# Patient Record
Sex: MALE | Race: WHITE | NOT HISPANIC OR LATINO | Employment: OTHER | URBAN - METROPOLITAN AREA
[De-identification: names, ages, dates, MRNs, and addresses within clinical notes are randomized per-mention and may not be internally consistent; named-entity substitution may affect disease eponyms.]

---

## 2017-01-03 ENCOUNTER — RESULTS ENCOUNTER (OUTPATIENT)
Dept: ENDOCRINOLOGY | Age: 47
End: 2017-01-03

## 2017-01-03 DIAGNOSIS — E03.8 SECONDARY HYPOTHYROIDISM: ICD-10-CM

## 2017-01-03 DIAGNOSIS — IMO0002 UNCONTROLLED TYPE 2 DIABETES MELLITUS WITH COMPLICATION, WITHOUT LONG-TERM CURRENT USE OF INSULIN: ICD-10-CM

## 2017-01-03 DIAGNOSIS — E08.43 DIABETIC AUTONOMIC NEUROPATHY ASSOCIATED WITH DIABETES MELLITUS DUE TO UNDERLYING CONDITION (HCC): ICD-10-CM

## 2017-01-03 DIAGNOSIS — E55.9 VITAMIN D DEFICIENCY: ICD-10-CM

## 2017-01-03 DIAGNOSIS — E78.2 MIXED HYPERLIPIDEMIA: ICD-10-CM

## 2017-01-03 DIAGNOSIS — Z46.81 INSULIN PUMP TITRATION: ICD-10-CM

## 2017-01-06 LAB
25(OH)D3+25(OH)D2 SERPL-MCNC: 19.3 NG/ML (ref 30–100)
ALBUMIN SERPL-MCNC: 4.4 G/DL (ref 3.5–5.2)
ALBUMIN/GLOB SERPL: 2 G/DL
ALP SERPL-CCNC: 89 U/L (ref 39–117)
ALT SERPL-CCNC: 32 U/L (ref 1–41)
AST SERPL-CCNC: 18 U/L (ref 1–40)
BILIRUB SERPL-MCNC: 0.3 MG/DL (ref 0.1–1.2)
BUN SERPL-MCNC: 13 MG/DL (ref 6–20)
BUN/CREAT SERPL: 11.8 (ref 7–25)
CALCIUM SERPL-MCNC: 9.5 MG/DL (ref 8.6–10.5)
CHLORIDE SERPL-SCNC: 101 MMOL/L (ref 98–107)
CHOLEST SERPL-MCNC: 189 MG/DL (ref 0–200)
CO2 SERPL-SCNC: 23.7 MMOL/L (ref 22–29)
CREAT SERPL-MCNC: 1.1 MG/DL (ref 0.76–1.27)
GLOBULIN SER CALC-MCNC: 2.2 GM/DL
GLUCOSE SERPL-MCNC: 215 MG/DL (ref 65–99)
HBA1C MFR BLD: 6.1 % (ref 4.8–5.6)
HDLC SERPL-MCNC: 24 MG/DL (ref 40–60)
LDLC SERPL CALC-MCNC: ABNORMAL MG/DL
POTASSIUM SERPL-SCNC: 4.8 MMOL/L (ref 3.5–5.2)
PROT SERPL-MCNC: 6.6 G/DL (ref 6–8.5)
SODIUM SERPL-SCNC: 140 MMOL/L (ref 136–145)
T3FREE SERPL-MCNC: 4 PG/ML (ref 2–4.4)
T4 FREE SERPL-MCNC: 1.19 NG/DL (ref 0.93–1.7)
TRIGL SERPL-MCNC: 619 MG/DL (ref 0–150)
TSH SERPL DL<=0.005 MIU/L-ACNC: 1.9 MIU/ML (ref 0.27–4.2)
UNABLE TO VOID: NORMAL
VLDLC SERPL CALC-MCNC: ABNORMAL MG/DL

## 2017-01-19 ENCOUNTER — OFFICE VISIT (OUTPATIENT)
Dept: ENDOCRINOLOGY | Age: 47
End: 2017-01-19

## 2017-01-19 VITALS
DIASTOLIC BLOOD PRESSURE: 74 MMHG | SYSTOLIC BLOOD PRESSURE: 122 MMHG | BODY MASS INDEX: 38.37 KG/M2 | WEIGHT: 268 LBS | HEIGHT: 70 IN

## 2017-01-19 DIAGNOSIS — E78.2 MIXED HYPERLIPIDEMIA: ICD-10-CM

## 2017-01-19 DIAGNOSIS — Z46.81 INSULIN PUMP TITRATION: ICD-10-CM

## 2017-01-19 DIAGNOSIS — R73.9 HYPERGLYCEMIA: ICD-10-CM

## 2017-01-19 DIAGNOSIS — E03.8 SECONDARY HYPOTHYROIDISM: ICD-10-CM

## 2017-01-19 DIAGNOSIS — E55.9 VITAMIN D DEFICIENCY: ICD-10-CM

## 2017-01-19 DIAGNOSIS — K86.1 OTHER CHRONIC PANCREATITIS (HCC): ICD-10-CM

## 2017-01-19 DIAGNOSIS — IMO0002 UNCONTROLLED TYPE 2 DIABETES MELLITUS WITH COMPLICATION, WITHOUT LONG-TERM CURRENT USE OF INSULIN: Primary | ICD-10-CM

## 2017-01-19 DIAGNOSIS — E08.43 DIABETIC AUTONOMIC NEUROPATHY ASSOCIATED WITH DIABETES MELLITUS DUE TO UNDERLYING CONDITION (HCC): ICD-10-CM

## 2017-01-19 PROCEDURE — 99214 OFFICE O/P EST MOD 30 MIN: CPT | Performed by: NURSE PRACTITIONER

## 2017-01-19 NOTE — MR AVS SNAPSHOT
Ricardo Pereira   1/19/2017 11:20 AM   Office Visit    Dept Phone:  [phone]   Encounter #:  [id number]    Provider:  MADHU Malagon   Department:  [facility] ENDOCRINOLOGY                Your Full Care Plan              Your Updated Medication List          This list is accurate as of: 1/19/17 12:21 PM.  Always use your most recent med list.                albuterol 108 (90 BASE) MCG/ACT inhaler   Commonly known as:  PROVENTIL HFA;VENTOLIN HFA       amitriptyline 10 MG tablet   Commonly known as:  ELAVIL       aspirin 81 MG EC tablet       VIDA MICROLET LANCETS lancets   Test blood glucose 8 times daily       budesonide-formoterol 80-4.5 MCG/ACT inhaler   Commonly known as:  SYMBICORT       BYSTOLIC 5 MG tablet   Generic drug:  nebivolol       fenofibrate 160 MG tablet       gabapentin 600 MG tablet   Commonly known as:  NEURONTIN   Take 1 tablet by mouth 2 (two) times a day.       * glucose blood test strip   Check blood glucose 5-6 times daily       * glucose blood test strip   Commonly known as:  Quture CONTOUR NEXT TEST   Test blood glucose 8 times daily       icosapent ethyl 1 G capsule capsule   Commonly known as:  VASCEPA   Take 2 g by mouth 2 (Two) Times a Day With Meals.       insulin aspart 100 UNIT/ML injection   Commonly known as:  NOVOLOG   TDD 100u daily via insulin pump       lisinopril 10 MG tablet   Commonly known as:  PRINIVIL,ZESTRIL   Take 1 tablet by mouth daily.       ondansetron 4 MG tablet   Commonly known as:  ZOFRAN   Take 1 tablet by mouth every 8 (eight) hours as needed for nausea.       oxyCODONE-acetaminophen 5-325 MG per tablet   Commonly known as:  PERCOCET   Take 1-2 tablets by mouth every 4 (four) hours as needed for moderate pain (4-6).       pantoprazole 40 MG EC tablet   Commonly known as:  PROTONIX       pregabalin 150 MG capsule   Commonly known as:  LYRICA   Take 1 capsule by mouth 2 (Two) Times a Day.       rosuvastatin  20 MG tablet   Commonly known as:  CRESTOR   Take 1 tablet by mouth Every Night.       SYNTHROID 112 MCG tablet   Generic drug:  levothyroxine   Take 1 tablet by mouth Daily. On an empty stomach       vitamin D 66917 UNITS capsule capsule   Commonly known as:  ERGOCALCIFEROL   Take 1 capsule 2 times weekly       * Notice:  This list has 2 medication(s) that are the same as other medications prescribed for you. Read the directions carefully, and ask your doctor or other care provider to review them with you.            You Were Diagnosed With        Codes Comments    Uncontrolled type 2 diabetes mellitus with complication, without long-term current use of insulin    -  Primary ICD-10-CM: E11.8, E11.65  ICD-9-CM: 250.82     Diabetic autonomic neuropathy associated with diabetes mellitus due to underlying condition     ICD-10-CM: E08.43  ICD-9-CM: 249.60, 337.1     Mixed hyperlipidemia     ICD-10-CM: E78.2  ICD-9-CM: 272.2     Vitamin D deficiency     ICD-10-CM: E55.9  ICD-9-CM: 268.9     Secondary hypothyroidism     ICD-10-CM: E03.8  ICD-9-CM: 244.8     Insulin pump titration     ICD-10-CM: Z46.81  ICD-9-CM: V53.91     Other chronic pancreatitis     ICD-10-CM: K86.1  ICD-9-CM: 577.1     Hyperglycemia     ICD-10-CM: R73.9  ICD-9-CM: 790.29       Instructions     None    Patient Instructions History      Upcoming Appointments     Visit Type Date Time Department    OFFICE VISIT 1/19/2017 11:20 AM [facility]    [facility] 1/26/2017  8:10 AM [facility]    [facility] 4/13/2017  9:40 AM [facility]    OFFICE VISIT 4/20/2017  9:40 AM [facility]      [employer] Signup     [employer] allows you to send messages to your doctor, view your test results, renew your prescriptions, schedule appointments, and more. To sign up, go to [employer] and click on the Sign Up Now link in the New User? box. Enter your [employer] Activation Code exactly as it appears below along with the last  "four digits of your Social Security Number and your Date of Birth () to complete the sign-up process. If you do not sign up before the expiration date, you must request a new code.    [employer] Activation Code: [email or web address]  Expires: 2017 12:19 PM    If you have questions, you can email [email or web address] or call [phone] to talk to our [employer] staff. Remember, [employer] is NOT to be used for urgent needs. For medical emergencies, dial 911.               Other Info from Your Visit           Your Appointments     2017  8:10 AM EST   [facility]   [facility] ENDOCRINOLOGY (--)    [address] [place], [place] [id number]   [phone]            2017  9:40 AM EDT   [facility] with [facility]   [facility] ENDOCRINOLOGY (--)    [address] [place] [id number]   [phone]            2017  9:40 AM EDT   Office Visit with MADHU Malagon   [facility] ENDOCRINOLOGY (--)    [address] [place], [place] [id number]   [phone]           Arrive 15 minutes prior to appointment.              Allergies     Penicillins  Swelling      Reason for Visit     Follow-up DM2, labs 1/3/17, (RM 1)      Vital Signs     Blood Pressure Height Weight Body Mass Index Smoking Status       122/74 70\" (177.8 cm) 268 lb (122 kg) 38.45 kg/m2 Current Some Day Smoker       Problems and Diagnoses Noted     Uncontrolled type 2 diabetes mellitus with complication, without long-term current use of insulin    -  Primary    Diabetic autonomic neuropathy associated with diabetes mellitus due to underlying condition        Mixed hyperlipidemia        Vitamin D deficiency        Secondary hypothyroidism        Fitting and adjustment of insulin pump        Other chronic pancreatitis        Hyperglycemia            "

## 2017-01-19 NOTE — PROGRESS NOTES
Ricardo WALLACE Randall  Presents to the office  for the follow-up appointment for Type 2 diabetes mellitus.     Location is system with the  clinical course has fluctuated, the severity is Mild, and the modifying/allievating factors are insulin pump.  Medications and  Dosages were  reviewed with Ricardo Pereira and suggested that compliance most of the time.    Associated symptoms of hyperglycemia have been irritability.  Associated symptoms of hypoglycemia have been irritability and fatigue. Patient reports  hypoglycemia threshold for symptoms is 80 mg/dl .     The patient is currently on insulin pump    Compliance with blood glucose monitoring: good.     Meal panning: The patient is using carbohydrate counting, but is not on a specified limit, being a pump user.    The patient is currently taking home blood tests - Blood glucose testin-6 times daily, that are: fasting- 1st thing in morning before eating or drinking, before each meal  bedtime  Patient is presently on Novolog U100 per insulin pump with the setting as  Basal: 12am 1.60 u/hr, 6am 1.65u/hr,   sensitivity factor: 1 unit for 20 mg/dl  Carb ratio: 6 gram,   Target/ goals- day - 100 to 140   active insulin time 4 hours  special bolus instructions: dual wave 40% / 60% for 30 minutes - the higher the fat content with protein and the complex of the Carb- extend the bolus time out to 1 hour.     Last instructions given were:   to pay attention to patterns with breakfast and eating to determine the ration of 40/60% or 20/80% with dual wave.     Home blood glucose testing daily: 4-6  insulin pump upload -YES - average blood glucose 157+ or -44 with an average 3.1 blood glucose checking a day 59% of the time is above target.  He has a total daily dose of 52 units a day with 74% of a BM basal and 26% of a BM bolusing    Last reported blood glucose readings are:   Home blood glucose testing daily: 6  insulin pump upload -Yes    Patterns reported per patient are that he  has been having a hard time affording supplies and medications.          The following portions of the patient's history were reviewed and updated as appropriate: current medications, past family history, past medical history, past social history, past surgical history and problem list.      Current Outpatient Prescriptions:   •  albuterol (PROVENTIL HFA;VENTOLIN HFA) 108 (90 BASE) MCG/ACT inhaler, Inhale 2 puffs every 4 (four) hours as needed for wheezing., Disp: , Rfl:   •  amitriptyline (ELAVIL) 10 MG tablet, Take 10 mg by mouth every night., Disp: , Rfl:   •  aspirin 81 MG EC tablet, Take 81 mg by mouth daily., Disp: , Rfl:   •  VIDA MICROLET LANCETS lancets, Test blood glucose 8 times daily, Disp: 250 each, Rfl: 5  •  budesonide-formoterol (SYMBICORT) 80-4.5 MCG/ACT inhaler, Inhale 2 puffs 2 (two) times a day., Disp: , Rfl:   •  BYSTOLIC 5 MG tablet, daily., Disp: , Rfl:   •  fenofibrate 160 MG tablet, Take 160 mg by mouth daily., Disp: , Rfl:   •  gabapentin (NEURONTIN) 600 MG tablet, Take 1 tablet by mouth 2 (two) times a day., Disp: 30 tablet, Rfl: 2  •  glucose blood (VIDA CONTOUR NEXT TEST) test strip, Test blood glucose 8 times daily, Disp: 250 each, Rfl: 5  •  glucose blood test strip, Check blood glucose 5-6 times daily, Disp: 180 each, Rfl: 5  •  icosapent ethyl (VASCEPA) 1 G capsule capsule, Take 2 g by mouth 2 (Two) Times a Day With Meals., Disp: 120 capsule, Rfl: 3  •  insulin aspart (NOVOLOG) 100 UNIT/ML injection, TDD 100u daily via insulin pump, Disp: 4000 Units, Rfl: 3  •  lisinopril (PRINIVIL,ZESTRIL) 10 MG tablet, Take 1 tablet by mouth daily., Disp: 30 tablet, Rfl: 4  •  ondansetron (ZOFRAN) 4 MG tablet, Take 1 tablet by mouth every 8 (eight) hours as needed for nausea., Disp: 10 tablet, Rfl: 0  •  oxyCODONE-acetaminophen (PERCOCET) 5-325 MG per tablet, Take 1-2 tablets by mouth every 4 (four) hours as needed for moderate pain (4-6)., Disp: 20 tablet, Rfl: 0  •  pantoprazole (PROTONIX) 40  "MG EC tablet, Take 40 mg by mouth daily., Disp: , Rfl:   •  pregabalin (LYRICA) 150 MG capsule, Take 1 capsule by mouth 2 (Two) Times a Day., Disp: 60 capsule, Rfl: 2  •  rosuvastatin (CRESTOR) 20 MG tablet, Take 1 tablet by mouth Every Night., Disp: 30 tablet, Rfl: 4  •  SYNTHROID 112 MCG tablet, Take 1 tablet by mouth Daily. On an empty stomach, Disp: 30 tablet, Rfl: 3  •  vitamin D (ERGOCALCIFEROL) 71916 UNITS capsule capsule, Take 1 capsule 2 times weekly, Disp: 24 capsule, Rfl: 1    Patient Active Problem List    Diagnosis   • Pain of upper abdomen [R10.10]   • Pancreatitis [K85.90]       Review of Systems   A comprehensive review of the 14 systems was negative except of listed below:  Neurological: positive for coordination problems, gait problems, paresthesia, weakness and pain, numbness and tingling with feet & legs  Endocrine: diabetic symptoms including increased fatigue and polydipsia  hyperglycemia     Objective:     Wt Readings from Last 3 Encounters:   01/19/17 268 lb (122 kg)   11/14/16 269 lb 6.4 oz (122 kg)   11/25/16 270 lb (122 kg)     Temp Readings from Last 3 Encounters:   09/10/16 96.9 °F (36.1 °C) (Tympanic)   07/27/16 98.7 °F (37.1 °C) (Tympanic)   04/13/16 97.6 °F (36.4 °C) (Oral)     BP Readings from Last 3 Encounters:   01/19/17 122/74   11/14/16 122/84   10/03/16 126/84     Pulse Readings from Last 3 Encounters:   09/10/16 81   07/27/16 68   04/13/16 77          Visit Vitals   • /74   • Ht 70\" (177.8 cm)   • Wt 268 lb (122 kg)   • BMI 38.45 kg/m2       General appearance:  alert, cooperative, delirious, fatigued, nourished\" \"appears stated age and cooperative\" \"NAD   Neck: no carotid bruit, supple, symmetrical, trachea midline and thyroid not enlarged, symmetric, no tenderness/mass/nodules   Thyroid:  no palpable nodule   Lung:  wheezes bibasilar and insp/ exp throughout   Heart: regular rate and rhythm, S1, S2 normal, no murmur, click, rub or gallop      Abdomen:  normal bowel " "sounds- 4 quads, soft non-tender, contour - rounded and contour - obese      Extremities: extremities normal, atraumatic, no cyanosis or edema extremities normal, atraumatic, no cyanosis or edema\" WNL - gait and station, strength and stability\"       Skin:   Pulses:  warm and dry, no hyperpigmentation, normal skin coloring, or suspicious lesions   2+ and symmetric   Neuro: Alert and oriented x3. Gait normal. Reflexes and motor strength normal and symmetric. Cranial nerves 2-12 and sensation grossly intact.      Psych: behavior - normal, judgement - normal, mood - normal, affect - normal                 Lab Review      Results Encounter on 01/03/2017   Component Date Value Ref Range Status   • Glucose 01/03/2017 215* 65 - 99 mg/dL Final   • BUN 01/03/2017 13  6 - 20 mg/dL Final   • Creatinine 01/03/2017 1.10  0.76 - 1.27 mg/dL Final   • eGFR Non  Am 01/03/2017 73  >60 mL/min/1.73 Final   • eGFR African Am 01/03/2017 88  >60 mL/min/1.73 Final   • BUN/Creatinine Ratio 01/03/2017 11.8  7.0 - 25.0 Final   • Sodium 01/03/2017 140  136 - 145 mmol/L Final   • Potassium 01/03/2017 4.8  3.5 - 5.2 mmol/L Final   • Chloride 01/03/2017 101  98 - 107 mmol/L Final   • Total CO2 01/03/2017 23.7  22.0 - 29.0 mmol/L Final   • Calcium 01/03/2017 9.5  8.6 - 10.5 mg/dL Final   • Total Protein 01/03/2017 6.6  6.0 - 8.5 g/dL Final   • Albumin 01/03/2017 4.40  3.50 - 5.20 g/dL Final   • Globulin 01/03/2017 2.2  gm/dL Final   • A/G Ratio 01/03/2017 2.0  g/dL Final   • Total Bilirubin 01/03/2017 0.3  0.1 - 1.2 mg/dL Final   • Alkaline Phosphatase 01/03/2017 89  39 - 117 U/L Final   • AST (SGOT) 01/03/2017 18  1 - 40 U/L Final   • ALT (SGPT) 01/03/2017 32  1 - 41 U/L Final   • Hemoglobin A1C 01/03/2017 6.10* 4.80 - 5.60 % Final    Comment: Hemoglobin A1C Ranges:  Increased Risk for Diabetes  5.7% to 6.4%  Diabetes                     >= 6.5%  Diabetic Goal                < 7.0%     • Total Cholesterol 01/03/2017 189  0 - 200 mg/dL " Final   • Triglycerides 2017 619* 0 - 150 mg/dL Final   • HDL Cholesterol 2017 24* 40 - 60 mg/dL Final   • VLDL Cholesterol 2017 CANCELED  mg/dL Final-Edited    Comment: Test not performed  Unable to calculate    Result canceled by the ancillary     • LDL Cholesterol  2017 CANCELED  mg/dL Final-Edited    Comment: Test not performed  Unable to calculate    Result canceled by the ancillary     • 25 Hydroxy, Vitamin D 2017 19.3* 30.0 - 100.0 ng/mL Final    Comment: Reference Range for Total Vitamin D 25(OH)  Deficiency    <20.0 ng/mL  Insufficiency 21-29 ng/mL  Sufficiency    ng/mL  Toxicity      >100 ng/ml        • TSH 2017 1.900  0.270 - 4.200 mIU/mL Final   • T3, Free 2017 4.0  2.0 - 4.4 pg/mL Final   • Free T4 2017 1.19  0.93 - 1.70 ng/dL Final   • Unable to Void 2017 Comment   Final    Comment: The patient was not able to render a urine sample and has been  instructed to return for a urine collection at their earliest  convenience.  The urine testing that you have requested has  been deleted from this report.  When the patient returns and  provides a urine specimen, the urine testing will be performed  and separately reported.             Assessment:   Ricardo was seen today for follow-up.    Diagnoses and all orders for this visit:    Uncontrolled type 2 diabetes mellitus with complication, without long-term current use of insulin    Diabetic autonomic neuropathy associated with diabetes mellitus due to underlying condition    Mixed hyperlipidemia    Vitamin D deficiency    Secondary hypothyroidism    Insulin pump titration    Other chronic pancreatitis    Hyperglycemia        Plan:    Education:  interpretation of lab results, blood sugar goals, complications of diabetes mellitus, hypoglycemia prevention and treatment, exercise, illness management, self-monitoring of blood glucose skills and nutrition    home blood tests -  Blood glucose testin-6  times daily, that are:  fasting- 1st thing in morning before eating or drinking  before each meal and 1 or 2 hours after meal  bedtime  anytime you feel symptoms of hyperglycemia or hypoglycemia (high or low blood sugars)    Insulin pump changes  Basal-12 AM 1.7 units per hour, 12 PM-1.75 units per hour.  Carb changes 1 unit for every 5 g  Insulin sensitivity 1 unit for every 18 with goals 100-1 120 mg/Donna.  Active insulin time 4 hours.  Changes dining room with provider.      Office visit with additional education given 12 minutes - SMBG with goals. Pump upload and changes. Exercising with LBP and safety conditions with diabetic neuropathy.       Return in about 3 months (around 4/19/2017).

## 2017-01-26 ENCOUNTER — LAB (OUTPATIENT)
Dept: ENDOCRINOLOGY | Age: 47
End: 2017-01-26

## 2017-01-26 ENCOUNTER — TELEPHONE (OUTPATIENT)
Dept: ENDOCRINOLOGY | Age: 47
End: 2017-01-26

## 2017-01-26 DIAGNOSIS — R68.89 ABNORMAL ENDOCRINE LABORATORY TEST FINDING: ICD-10-CM

## 2017-01-26 DIAGNOSIS — R68.89 ABNORMAL ENDOCRINE LABORATORY TEST FINDING: Primary | ICD-10-CM

## 2017-01-27 LAB
C PEPTIDE SERPL-MCNC: 6.7 NG/ML (ref 1.1–4.4)
GLUCOSE SERPL-MCNC: 108 MG/DL (ref 65–99)

## 2017-02-01 ENCOUNTER — TELEPHONE (OUTPATIENT)
Dept: ENDOCRINOLOGY | Age: 47
End: 2017-02-01

## 2017-02-01 NOTE — TELEPHONE ENCOUNTER
----- Message from MADHU Malagon sent at 2/1/2017 11:10 AM EST -----  Contact: Morgan  Yes, most definitely  Thank you  ----- Message -----     From: Codi Fung MA     Sent: 2/1/2017   9:18 AM       To: MADHU Malagon    Is it okay to make this change ? Patient is on insulin pump.    ----- Message -----     From: Shweta Quesada     Sent: 2/1/2017   8:28 AM       To: Codi Fung MA    Patient's insurance prefers humalog instead of novalog, morgan wants to know if we can make this change  Morgan  [phone] (Phone)  [phone] (Fax)

## 2017-04-11 ENCOUNTER — TELEPHONE (OUTPATIENT)
Dept: GASTROENTEROLOGY | Facility: CLINIC | Age: 47
End: 2017-04-11

## 2017-04-11 NOTE — TELEPHONE ENCOUNTER
----- Message from Javad Morrissey sent at 4/11/2017 10:14 AM EDT -----  Regarding: EGD  Contact: [phone]  PT CALLED WITH QUESTION ABOUT EGD

## 2017-04-12 DIAGNOSIS — E78.5 HYPERLIPIDEMIA, UNSPECIFIED HYPERLIPIDEMIA TYPE: ICD-10-CM

## 2017-04-12 DIAGNOSIS — IMO0002 UNCONTROLLED TYPE 2 DIABETES MELLITUS WITH OTHER SPECIFIED COMPLICATION: Primary | ICD-10-CM

## 2017-04-12 NOTE — TELEPHONE ENCOUNTER
Call to Dr Arpit Armstrong's office and spoke with Michelle to request abd imaging.  She states that have CT and US - will fax to [phone].    Call to Dr Simba Moore's office @ [phone]  - [email or web address] without answer. Will try back - requesting HIDA.

## 2017-04-12 NOTE — TELEPHONE ENCOUNTER
Call to Dr Moore's office and spoke with Riya.  No records of HIDA scan.    CT and US under Media Tab.  Message to Dr Jones.

## 2017-04-13 ENCOUNTER — OFFICE VISIT (OUTPATIENT)
Dept: GASTROENTEROLOGY | Facility: CLINIC | Age: 47
End: 2017-04-13

## 2017-04-13 ENCOUNTER — TELEPHONE (OUTPATIENT)
Dept: GASTROENTEROLOGY | Facility: CLINIC | Age: 47
End: 2017-04-13

## 2017-04-13 VITALS
DIASTOLIC BLOOD PRESSURE: 84 MMHG | WEIGHT: 280 LBS | SYSTOLIC BLOOD PRESSURE: 138 MMHG | HEIGHT: 70 IN | BODY MASS INDEX: 40.09 KG/M2

## 2017-04-13 DIAGNOSIS — E66.01 MORBID OBESITY DUE TO EXCESS CALORIES (HCC): ICD-10-CM

## 2017-04-13 DIAGNOSIS — E78.5 HYPERLIPIDEMIA, UNSPECIFIED HYPERLIPIDEMIA TYPE: ICD-10-CM

## 2017-04-13 DIAGNOSIS — Z72.0 TOBACCO ABUSE: ICD-10-CM

## 2017-04-13 DIAGNOSIS — R10.33 PERIUMBILICAL PAIN: Primary | ICD-10-CM

## 2017-04-13 DIAGNOSIS — Z87.19 HISTORY OF PANCREATITIS: ICD-10-CM

## 2017-04-13 DIAGNOSIS — IMO0002 UNCONTROLLED TYPE 2 DIABETES MELLITUS WITH OTHER SPECIFIED COMPLICATION: ICD-10-CM

## 2017-04-13 PROCEDURE — 99214 OFFICE O/P EST MOD 30 MIN: CPT | Performed by: INTERNAL MEDICINE

## 2017-04-13 NOTE — TELEPHONE ENCOUNTER
Call to Dr Moore's office and spoke with Sari.  She states c/s/path report from 2014 - no EGD.  Will fax to [phone].

## 2017-04-13 NOTE — TELEPHONE ENCOUNTER
----- Message from Marissa Jones MD sent at 4/13/2017  9:19 AM EDT -----  2 things-- reports recent imaging at [facility] -- CT and USN    Also reports endoscopy ~4 years ago, ? If Dr Hernandez has the results?    thx

## 2017-04-13 NOTE — TELEPHONE ENCOUNTER
CT and US under Media tab.  (see message of 4/11).      Call to Dr Moore's office @ [phone] - [email or web address] without answer.  Will try back.

## 2017-04-13 NOTE — PATIENT INSTRUCTIONS
Schedule the small bowel follow through test    I will get the records of your recent CT scan    To ER for any worsening symptoms    For any additional questions, concerns or changes to your condition after today's office visit please contact the office at [phone].

## 2017-04-13 NOTE — PROGRESS NOTES
"Chief Complaint   Patient presents with   • Abdominal Pain     Subjective     HPI  Ricardo Pereira is a 46 y.o. male who presents as a follow up.  He has new complaint of abdominal pain but I have been following him for fatty liver and recurrent pancreatitis.  Describes a new peribumbilical pain. Present for about 3 weeks.  Described as sharp.  Severe in quality, 10/10.  Constant.  Worse with movement.  Better with laying down on his stomach with a pillow.  Associated with bloating.  There is no nausea or vomiting.  No change in appetite or eating habits.  He was seen at both [facility] and [facility].  Reports CT abd and GB USN at [facility] which was reported as normal.  HIDA at [facility] (reviewed in Care Everywhere) 4/4/17 that was normal with 94% ejection fraction.  He was seen by a surgeon who told him that he needed an EGD and colonoscopy for further evaluation.  He reports endoscopy 4 years ago for diverticulitis, can't remember where this was done but thinks his pcp would have the results.    In terms of his recurrent pancreatitis and fatty liver, workup of liver disease negative for autoimmune, genetic liver disease. MRCP 11/25/16 with normal ducts, no evidence of pancreas divisum.  Episodes of pancreatitis thought to be due to elevated triglycerides (have been in the 600 to 700 range).  He is on fenofibrate.    Still smoking and reeks of smoke today.  Weight is up 12# in the past 3 months.      Past Medical History:   Diagnosis Date   • Chronic pain     flank pain \" permenantly damaged muscle\"   • COPD (chronic obstructive pulmonary disease)    • Diabetes mellitus    • Diverticulitis    • Diverticulitis of colon    • Fibromyalgia    • Hypertension    • Pancreatitis     x2   • Renal cell carcinoma     stage 4       Social History     Social History   • Marital status:      Spouse name: N/A   • Number of children: N/A   • Years of education: N/A     Social History Main Topics   • Smoking status: Current " Some Day Smoker     Types: Cigars   • Smokeless tobacco: Never Used   • Alcohol use No   • Drug use: No   • Sexual activity: Not Asked     Other Topics Concern   • None     Social History Narrative         Current Outpatient Prescriptions:   •  albuterol (PROVENTIL HFA;VENTOLIN HFA) 108 (90 BASE) MCG/ACT inhaler, Inhale 2 puffs every 4 (four) hours as needed for wheezing., Disp: , Rfl:   •  amitriptyline (ELAVIL) 10 MG tablet, Take 10 mg by mouth every night., Disp: , Rfl:   •  aspirin 81 MG EC tablet, Take 81 mg by mouth daily., Disp: , Rfl:   •  VIDA MICROLET LANCETS lancets, Test blood glucose 8 times daily, Disp: 250 each, Rfl: 5  •  budesonide-formoterol (SYMBICORT) 80-4.5 MCG/ACT inhaler, Inhale 2 puffs 2 (two) times a day., Disp: , Rfl:   •  BYSTOLIC 5 MG tablet, daily., Disp: , Rfl:   •  fenofibrate 160 MG tablet, Take 160 mg by mouth daily., Disp: , Rfl:   •  gabapentin (NEURONTIN) 600 MG tablet, Take 1 tablet by mouth 2 (two) times a day., Disp: 30 tablet, Rfl: 2  •  glucose blood (VIDA CONTOUR NEXT TEST) test strip, Test blood glucose 8 times daily, Disp: 250 each, Rfl: 5  •  glucose blood test strip, Check blood glucose 5-6 times daily, Disp: 180 each, Rfl: 5  •  icosapent ethyl (VASCEPA) 1 G capsule capsule, Take 2 g by mouth 2 (Two) Times a Day With Meals., Disp: 120 capsule, Rfl: 3  •  insulin aspart (NOVOLOG) 100 UNIT/ML injection, TDD 100u daily via insulin pump, Disp: 4000 Units, Rfl: 3  •  insulin lispro (HUMALOG) 100 UNIT/ML injection, Inject 100 units daily via insulin pump, Disp: 30 mL, Rfl: 2  •  lisinopril (PRINIVIL,ZESTRIL) 10 MG tablet, Take 1 tablet by mouth daily., Disp: 30 tablet, Rfl: 4  •  ondansetron (ZOFRAN) 4 MG tablet, Take 1 tablet by mouth every 8 (eight) hours as needed for nausea., Disp: 10 tablet, Rfl: 0  •  oxyCODONE-acetaminophen (PERCOCET) 5-325 MG per tablet, Take 1-2 tablets by mouth every 4 (four) hours as needed for moderate pain (4-6)., Disp: 20 tablet, Rfl: 0  •   pantoprazole (PROTONIX) 40 MG EC tablet, Take 40 mg by mouth daily., Disp: , Rfl:   •  pregabalin (LYRICA) 150 MG capsule, Take 1 capsule by mouth 2 (Two) Times a Day., Disp: 60 capsule, Rfl: 2  •  rosuvastatin (CRESTOR) 20 MG tablet, Take 1 tablet by mouth Every Night., Disp: 30 tablet, Rfl: 4  •  SYNTHROID 112 MCG tablet, Take 1 tablet by mouth Daily. On an empty stomach, Disp: 30 tablet, Rfl: 3  •  vitamin D (ERGOCALCIFEROL) 65121 UNITS capsule capsule, Take 1 capsule 2 times weekly, Disp: 24 capsule, Rfl: 1    Review of Systems   Constitutional: Negative for activity change, appetite change and fatigue.   HENT: Negative for congestion, sore throat and trouble swallowing.    Gastrointestinal: Positive for abdominal pain. Negative for abdominal distention, blood in stool, diarrhea, nausea and vomiting.   Endocrine: Negative for cold intolerance and heat intolerance.   Genitourinary: Negative for difficulty urinating, dysuria and frequency.   Musculoskeletal: Negative for arthralgias, back pain and myalgias.   Skin: Negative.    Hematological: Negative for adenopathy. Does not bruise/bleed easily.   All other systems reviewed and are negative.      Objective   Vitals:    04/13/17 0854   BP: 138/84     Last Weight    04/13/17  0854   Weight: 280 lb (127 kg)     Body mass index is 40.18 kg/(m^2).      Physical Exam   Constitutional: He is oriented to person, place, and time. He appears well-developed and well-nourished. No distress.   He is obese   HENT:   Head: Normocephalic and atraumatic.   Right Ear: External ear normal.   Left Ear: External ear normal.   Nose: Nose normal.   Eyes: Conjunctivae and EOM are normal. No scleral icterus.   Cardiovascular: Normal rate, regular rhythm, normal heart sounds and intact distal pulses.  Exam reveals no gallop.    No murmur heard.  No lower extremity edema   Pulmonary/Chest: Effort normal. No respiratory distress. He has no wheezes.   He has coarse breath sounds bilaterally    Abdominal: Soft. Normal appearance and bowel sounds are normal. He exhibits no distension and no mass. There is no hepatosplenomegaly. There is tenderness. There is no rigidity, no rebound and no guarding. A hernia is present.   Around the periumbilical area, even to light palpation.  However he can cough heavily without any pain.  He does have a notable mild ventral hernia.  His abdomen is significantly obese   Genitourinary:   Genitourinary Comments: Rectal exam deferred   Musculoskeletal: Normal range of motion. He exhibits no edema or tenderness.   Normal digits and nails of both hands.  No atrophy or wasting of upper or lower extremeties   Neurological: He is alert and oriented to person, place, and time. He displays no atrophy. Coordination normal.   Skin: Skin is warm and dry. No rash noted. He is not diaphoretic. No erythema.   Psychiatric: He has a normal mood and affect. His behavior is normal. Judgment and thought content normal.   Vitals reviewed.      WBC   Date Value Ref Range Status   09/09/2016 8.89 4.50 - 10.70 10*3/mm3 Final     RBC   Date Value Ref Range Status   09/09/2016 5.15 4.60 - 6.00 10*6/mm3 Final     Hemoglobin   Date Value Ref Range Status   09/09/2016 16.7 13.7 - 17.6 g/dL Final     Hematocrit   Date Value Ref Range Status   09/09/2016 46.6 40.4 - 52.2 % Final     MCV   Date Value Ref Range Status   09/09/2016 90.5 79.8 - 96.2 fL Final     MCH   Date Value Ref Range Status   09/09/2016 32.4 27.0 - 32.7 pg Final     MCHC   Date Value Ref Range Status   09/09/2016 35.8 32.6 - 36.4 g/dL Final     RDW   Date Value Ref Range Status   09/09/2016 13.0 11.5 - 14.5 % Final     RDW-SD   Date Value Ref Range Status   09/09/2016 42.6 37.0 - 54.0 fl Final     MPV   Date Value Ref Range Status   09/09/2016 12.2 (H) 6.0 - 12.0 fL Final     Platelets   Date Value Ref Range Status   09/09/2016 173 140 - 500 10*3/mm3 Final     Neutrophil %   Date Value Ref Range Status   09/09/2016 52.6 42.7 - 76.0 %  Final     Lymphocyte %   Date Value Ref Range Status   09/09/2016 37.3 19.6 - 45.3 % Final     Monocyte %   Date Value Ref Range Status   09/09/2016 7.1 5.0 - 12.0 % Final     Eosinophil %   Date Value Ref Range Status   09/09/2016 2.6 0.3 - 6.2 % Final     Basophil %   Date Value Ref Range Status   09/09/2016 0.2 0.0 - 1.5 % Final     Immature Grans %   Date Value Ref Range Status   09/09/2016 0.2 0.0 - 0.5 % Final     Neutrophils, Absolute   Date Value Ref Range Status   09/09/2016 4.67 1.90 - 8.10 10*3/mm3 Final     Lymphocytes, Absolute   Date Value Ref Range Status   09/09/2016 3.32 0.90 - 4.80 10*3/mm3 Final     Monocytes, Absolute   Date Value Ref Range Status   09/09/2016 0.63 0.20 - 1.20 10*3/mm3 Final     Eosinophils, Absolute   Date Value Ref Range Status   09/09/2016 0.23 0.00 - 0.70 10*3/mm3 Final     Basophils, Absolute   Date Value Ref Range Status   09/09/2016 0.02 0.00 - 0.20 10*3/mm3 Final     Immature Grans, Absolute   Date Value Ref Range Status   09/09/2016 0.02 0.00 - 0.03 10*3/mm3 Final       Lab Results   Component Value Date    GLUCOSE 162 (H) 09/09/2016    BUN 13 01/03/2017    CREATININE 1.10 01/03/2017    EGFRIFNONA 73 01/03/2017    EGFRIFAFRI 88 01/03/2017    BCR 11.8 01/03/2017    CO2 23.7 01/03/2017    CALCIUM 9.5 01/03/2017    PROTENTOTREF 6.6 01/03/2017    ALBUMIN 4.40 01/03/2017    LABIL2 2.0 01/03/2017    AST 18 01/03/2017    ALT 32 01/03/2017         Imaging Results (last 7 days)     ** No results found for the last 168 hours. **            Assessment/Plan    1.  Umbilical pain: This is a new issue for him.  It is been present for 3 weeks.  He has had workup at both [place] and [facility] that has been reportedly normal.  He is quite tender on exam but also no tenderness with distraction.  With his past surgeries, he is at risk for adhesions versus hernia pain.  He was quite uncomfortable with laying down on the exam table.  I asked numerous times if he felt that he needed to go  to the emergency room and he refuses.    2.  History of pancreatitis: Related to hypertriglyceridemia. On fenbfibrate    3.  Morbid obesity: He has actually gained weight and is at 280 today.  His BMI is now 40    4.  Tobacco abuse: He is still smoking.  This is not good for his history of pancreatitis nor his lung disease    Plan:  -I will get the records from [place] regarding his CT of his abdomen and gallbladder ultrasound.  I have reviewed the HIDA scan from [place] which is entirely normal.  -I think checking a small bowel follow-through will be important to rule out adhesions and possibly herniation  -Additionally, I will try to get the records of his past colonoscopy.  Given the location of this pain I don't know how much endoscopy will help unless there is an abnormality seen on the imaging that he has had  -I advised him again to quit smoking  -Device and to go to the ER for any worsening symptoms  Ricardo was seen today for abdominal pain.    Diagnoses and all orders for this visit:    Periumbilical pain  -     FL small bowel follow through; Future    History of pancreatitis    Morbid obesity due to excess calories    Tobacco abuse    Uncontrolled type 2 diabetes mellitus with other specified complication  -     Comprehensive Metabolic Panel  -     C-Peptide  -     Hemoglobin A1c  -     MicroAlbumin, Urine, Random  -     Lipid Panel    Hyperlipidemia, unspecified hyperlipidemia type  -     Comprehensive Metabolic Panel  -     C-Peptide  -     Hemoglobin A1c  -     MicroAlbumin, Urine, Random  -     Lipid Panel        EMR Dragon/Transcription disclaimer:       Much of this encounter note is an electronic transcription/translation of spoken language to printed text. The electronic translation of spoken language may permit erroneous, or at times, nonsensical words or phrases to be inadvertently transcribed; Although I have reviewed the note for such errors, some may still exist.

## 2017-04-14 LAB
ALBUMIN SERPL-MCNC: 4.4 G/DL (ref 3.5–5.2)
ALBUMIN/GLOB SERPL: 1.6 G/DL
ALP SERPL-CCNC: 87 U/L (ref 39–117)
ALT SERPL-CCNC: 59 U/L (ref 1–41)
AST SERPL-CCNC: 38 U/L (ref 1–40)
BILIRUB SERPL-MCNC: 0.4 MG/DL (ref 0.1–1.2)
BUN SERPL-MCNC: 13 MG/DL (ref 6–20)
BUN/CREAT SERPL: 12.4 (ref 7–25)
C PEPTIDE SERPL-MCNC: 6.9 NG/ML (ref 1.1–4.4)
CALCIUM SERPL-MCNC: 9.9 MG/DL (ref 8.6–10.5)
CHLORIDE SERPL-SCNC: 100 MMOL/L (ref 98–107)
CHOLEST SERPL-MCNC: 171 MG/DL (ref 0–200)
CO2 SERPL-SCNC: 25.5 MMOL/L (ref 22–29)
CREAT SERPL-MCNC: 1.05 MG/DL (ref 0.76–1.27)
GLOBULIN SER CALC-MCNC: 2.7 GM/DL
GLUCOSE SERPL-MCNC: 117 MG/DL (ref 65–99)
HBA1C MFR BLD: 7.27 % (ref 4.8–5.6)
HDLC SERPL-MCNC: 24 MG/DL (ref 40–60)
LDLC SERPL CALC-MCNC: 82 MG/DL (ref 0–100)
MICROALBUMIN UR-MCNC: 10.4 UG/ML
POTASSIUM SERPL-SCNC: 4.4 MMOL/L (ref 3.5–5.2)
PROT SERPL-MCNC: 7.1 G/DL (ref 6–8.5)
SODIUM SERPL-SCNC: 141 MMOL/L (ref 136–145)
TRIGL SERPL-MCNC: 324 MG/DL (ref 0–150)
VLDLC SERPL CALC-MCNC: 64.8 MG/DL (ref 5–40)

## 2017-04-14 NOTE — TELEPHONE ENCOUNTER
Records not received.  Call to DR Moore's office and spoke with Yesi.  She will fax c/s/path report from 10/27/14 to [phone].

## 2017-04-20 ENCOUNTER — OFFICE VISIT (OUTPATIENT)
Dept: ENDOCRINOLOGY | Age: 47
End: 2017-04-20

## 2017-04-20 VITALS
WEIGHT: 281 LBS | DIASTOLIC BLOOD PRESSURE: 78 MMHG | SYSTOLIC BLOOD PRESSURE: 124 MMHG | HEIGHT: 70 IN | BODY MASS INDEX: 40.23 KG/M2

## 2017-04-20 DIAGNOSIS — E78.2 MIXED HYPERLIPIDEMIA: ICD-10-CM

## 2017-04-20 DIAGNOSIS — IMO0002 UNCONTROLLED TYPE 2 DIABETES MELLITUS WITH COMPLICATION, WITH LONG-TERM CURRENT USE OF INSULIN: Primary | ICD-10-CM

## 2017-04-20 DIAGNOSIS — Z79.4 LONG-TERM INSULIN USE (HCC): ICD-10-CM

## 2017-04-20 DIAGNOSIS — E08.43 DIABETIC AUTONOMIC NEUROPATHY ASSOCIATED WITH DIABETES MELLITUS DUE TO UNDERLYING CONDITION (HCC): ICD-10-CM

## 2017-04-20 DIAGNOSIS — Z46.81 INSULIN PUMP TITRATION: ICD-10-CM

## 2017-04-20 DIAGNOSIS — E03.8 SECONDARY HYPOTHYROIDISM: ICD-10-CM

## 2017-04-20 DIAGNOSIS — R73.9 HYPERGLYCEMIA: ICD-10-CM

## 2017-04-20 DIAGNOSIS — I10 ESSENTIAL HYPERTENSION: ICD-10-CM

## 2017-04-20 PROCEDURE — 99214 OFFICE O/P EST MOD 30 MIN: CPT | Performed by: NURSE PRACTITIONER

## 2017-04-20 RX ORDER — GABAPENTIN 800 MG/1
800 TABLET ORAL 3 TIMES DAILY
Qty: 90 TABLET | Refills: 4 | Status: SHIPPED | OUTPATIENT
Start: 2017-04-20 | End: 2017-12-04 | Stop reason: SDUPTHER

## 2017-04-20 RX ORDER — AMITRIPTYLINE HYDROCHLORIDE 25 MG/1
25 TABLET, FILM COATED ORAL NIGHTLY
Qty: 30 TABLET | Refills: 4 | Status: SHIPPED | OUTPATIENT
Start: 2017-04-20 | End: 2017-09-04

## 2017-04-20 NOTE — PROGRESS NOTES
Ricardo Pereira  presents to the office  for the follow-up appointment for Type 2 diabetes mellitus.     The diabete's condition location is throughout the system with the  clinical course has fluctuated, the severity is Moderate, and the modifying/allievating factors are insulin pump.  Medications and  Dosages were  reviewed with Ricardo WALLACE Randall and suggested that compliance most of the time.    Associated symptoms of hyperglycemia have been irritability.  Associated symptoms of hypoglycemia have been irritabilty and fatigue. Patient reports  hypoglycemia threshold for symptoms is 80 mg/dl .     The patient is currently on insulin    Compliance with blood glucose monitoring: good.     Meal panning: The patient is using carbohydrate counting, but is not on a specified limit, being a pump user.    The patient is currently taking home blood tests - Blood glucose testin times daily, that are: fasting- 1st thing in morning before eating or drinking, before each meal  Bedtime, and anytime you feel symptoms of hyperglycemia or hypoglycemia (high or low blood sugars)  The Humalog U100 per insulin pump    Last instructions given were:   Insulin pump changes  Basal-12 AM 1.7 units per hour, 12 PM-1.75 units per hour.  Carb changes 1 unit for every 5 g  Insulin sensitivity 1 unit for every 18 with goals 100-1 120 mg/Donna.  Active insulin time 4 hours.  Changes dining room with provider.    Home blood glucose testing daily: 4  insulin pump upload -Yes  -adherence-good.  1 override appears to be after eaten a small amount more and bolused at that time.  Average blood glucose 195+ or -38.  Blood glucose readings per day 2.2 times.  94% of the time as above target 0% below target.  90 g daily plus or -13.  Total daily dose of insulin's 67.7 units with 61% of it being basal 39% of that BM bolus.  Sensor meter over view shows an average glucose is between 151 mg/Donna and 295 mg/Donna.  Patient is only bolusing with meals.  Logbook is  equally all other reports.    Last reported blood glucose readings are:   *see insulin pump upload    Patterns reported per patient are that he is in extreme pain and his B/S are fluctuating greatly.          The following portions of the patient's history were reviewed and updated as appropriate: current medications, past family history, past medical history, past social history, past surgical history and problem list.      Current Outpatient Prescriptions:   •  albuterol (PROVENTIL HFA;VENTOLIN HFA) 108 (90 BASE) MCG/ACT inhaler, Inhale 2 puffs every 4 (four) hours as needed for wheezing., Disp: , Rfl:   •  aspirin 81 MG EC tablet, Take 81 mg by mouth daily., Disp: , Rfl:   •  VIDA MICROLET LANCETS lancets, Test blood glucose 8 times daily, Disp: 250 each, Rfl: 5  •  budesonide-formoterol (SYMBICORT) 80-4.5 MCG/ACT inhaler, Inhale 2 puffs 2 (two) times a day., Disp: , Rfl:   •  BYSTOLIC 5 MG tablet, daily., Disp: , Rfl:   •  fenofibrate 160 MG tablet, Take 160 mg by mouth daily., Disp: , Rfl:   •  glucose blood (VIDA CONTOUR NEXT TEST) test strip, Test blood glucose 8 times daily, Disp: 250 each, Rfl: 5  •  glucose blood test strip, Check blood glucose 5-6 times daily, Disp: 180 each, Rfl: 5  •  icosapent ethyl (VASCEPA) 1 G capsule capsule, Take 2 g by mouth 2 (Two) Times a Day With Meals., Disp: 120 capsule, Rfl: 3  •  insulin aspart (NOVOLOG) 100 UNIT/ML injection, TDD 100u daily via insulin pump, Disp: 4000 Units, Rfl: 3  •  insulin lispro (HUMALOG) 100 UNIT/ML injection, Inject 100 units daily via insulin pump, Disp: 30 mL, Rfl: 2  •  lisinopril (PRINIVIL,ZESTRIL) 10 MG tablet, Take 1 tablet by mouth daily., Disp: 30 tablet, Rfl: 4  •  ondansetron (ZOFRAN) 4 MG tablet, Take 1 tablet by mouth every 8 (eight) hours as needed for nausea., Disp: 10 tablet, Rfl: 0  •  oxyCODONE-acetaminophen (PERCOCET) 5-325 MG per tablet, Take 1-2 tablets by mouth every 4 (four) hours as needed for moderate pain (4-6)., Disp:  20 tablet, Rfl: 0  •  pantoprazole (PROTONIX) 40 MG EC tablet, Take 40 mg by mouth daily., Disp: , Rfl:   •  pregabalin (LYRICA) 150 MG capsule, Take 1 capsule by mouth 2 (Two) Times a Day., Disp: 60 capsule, Rfl: 2  •  rosuvastatin (CRESTOR) 20 MG tablet, Take 1 tablet by mouth Every Night., Disp: 30 tablet, Rfl: 4  •  SYNTHROID 112 MCG tablet, Take 1 tablet by mouth Daily. On an empty stomach, Disp: 30 tablet, Rfl: 3  •  vitamin D (ERGOCALCIFEROL) 00892 UNITS capsule capsule, Take 1 capsule 2 times weekly, Disp: 24 capsule, Rfl: 1  •  amitriptyline (ELAVIL) 25 MG tablet, Take 1 tablet by mouth Every Night., Disp: 30 tablet, Rfl: 4  •  gabapentin (NEURONTIN) 800 MG tablet, Take 1 tablet by mouth 3 (Three) Times a Day., Disp: 90 tablet, Rfl: 4    Patient Active Problem List    Diagnosis   • Pain of upper abdomen [R10.10]   • Pancreatitis [K85.90]       Review of Systems   A comprehensive review of the 14 systems was negative except of listed below:  Constitutional: fatigue and malaise  Eyes: positive for visual disturbance  Gastrointestinal: positive for abdominal pain, change in bowel habits, constipation, dyspepsia, dysphagia, reflux symptoms and dx with umbical hernia  Musculoskeletal:positive for back pain, bone pain, muscle weakness and myalgias - location is generalized  Neurological: positive for paresthesia, weakness and numbness and tingling in feet and hands.   Behavioral/Psych: positive for depression, fatigue and sleep disturbance  Endocrine: hyperglycemia  .     Objective:     Wt Readings from Last 3 Encounters:   04/20/17 281 lb (127 kg)   04/13/17 280 lb (127 kg)   01/19/17 268 lb (122 kg)     Temp Readings from Last 3 Encounters:   09/10/16 96.9 °F (36.1 °C) (Tympanic)   07/27/16 98.7 °F (37.1 °C) (Tympanic)   04/13/16 97.6 °F (36.4 °C) (Oral)     BP Readings from Last 3 Encounters:   04/20/17 124/78   04/13/17 138/84   01/19/17 122/74     Pulse Readings from Last 3 Encounters:   09/10/16 81  "  07/27/16 68   04/13/16 77        /78  Ht 70\" (177.8 cm)  Wt 281 lb (127 kg)  BMI 40.32 kg/m2    General appearance:  alert, cooperative, delirious, fatigued, nourished\" \"appears stated age and cooperative\" \"NAD   Neck: no carotid bruit, supple, symmetrical, trachea midline and thyroid not enlarged, symmetric, no tenderness/mass/nodules   Thyroid:  no palpable nodule, no enlargement, no tenderness   Lung:  \"clear to auscultation bilaterally\" \"no abnormal breath sounds  \" effort was normal, no labored breath, no use of accessory muscles.   Heart: regular rate and rhythm, S1, S2 normal, no murmur, click, rub or gallop      Abdomen:  normal bowel sounds- 4 quads, soft non-tender, contour - rounded and contour - obese      Extremities: extremities normal, atraumatic, no cyanosis or edema extremities normal, atraumatic, no cyanosis or edema\" WNL - gait and station, strength and stability\"       Skin:   Pulses:  warm and dry, no hyperpigmentation, normal skin coloring, or suspicious lesions   2+ and symmetric   Neuro: Alert and oriented x3. Gait normal. Reflexes and motor strength normal and symmetric. Cranial nerves 2-12 and sensation grossly intact.      Psych: behavior - normal, judgement - normal, mood - normal, affect - normal                 Lab Review  Results for orders placed or performed in visit on 04/13/17   Comprehensive Metabolic Panel   Result Value Ref Range    Glucose 117 (H) 65 - 99 mg/dL    BUN 13 6 - 20 mg/dL    Creatinine 1.05 0.76 - 1.27 mg/dL    eGFR Non African Am 77 >60 mL/min/1.73    eGFR African Am 93 >60 mL/min/1.73    BUN/Creatinine Ratio 12.4 7.0 - 25.0    Sodium 141 136 - 145 mmol/L    Potassium 4.4 3.5 - 5.2 mmol/L    Chloride 100 98 - 107 mmol/L    Total CO2 25.5 22.0 - 29.0 mmol/L    Calcium 9.9 8.6 - 10.5 mg/dL    Total Protein 7.1 6.0 - 8.5 g/dL    Albumin 4.40 3.50 - 5.20 g/dL    Globulin 2.7 gm/dL    A/G Ratio 1.6 g/dL    Total Bilirubin 0.4 0.1 - 1.2 mg/dL    Alkaline " Phosphatase 87 39 - 117 U/L    AST (SGOT) 38 1 - 40 U/L    ALT (SGPT) 59 (H) 1 - 41 U/L   Lipid Panel   Result Value Ref Range    Total Cholesterol 171 0 - 200 mg/dL    Triglycerides 324 (H) 0 - 150 mg/dL    HDL Cholesterol 24 (L) 40 - 60 mg/dL    VLDL Cholesterol 64.8 (H) 5 - 40 mg/dL    LDL Cholesterol  82 0 - 100 mg/dL   Hemoglobin A1c   Result Value Ref Range    Hemoglobin A1C 7.27 (H) 4.80 - 5.60 %   C-Peptide   Result Value Ref Range    C-Peptide 6.9 (H) 1.1 - 4.4 ng/mL   MicroAlbumin, Urine, Random   Result Value Ref Range    Microalbumin, Urine 10.4 Not Estab. ug/mL           Assessment:   Ricardo was seen today for follow-up.    Diagnoses and all orders for this visit:    Uncontrolled type 2 diabetes mellitus with complication, with long-term current use of insulin    Essential hypertension    Long-term insulin use    Mixed hyperlipidemia    Hyperglycemia    Insulin pump titration    Secondary hypothyroidism    Diabetic autonomic neuropathy associated with diabetes mellitus due to underlying condition  -     gabapentin (NEURONTIN) 800 MG tablet; Take 1 tablet by mouth 3 (Three) Times a Day.  -     amitriptyline (ELAVIL) 25 MG tablet; Take 1 tablet by mouth Every Night.        Plan:    Education:  interpretation of lab results, blood sugar goals, complications of diabetes mellitus, hypoglycemia prevention and treatment, exercise, illness management, self-monitoring of blood glucose skills, nutrition and carbohydrate counting    home blood tests -  Blood glucose testin times daily, that are:  fasting- 1st thing in morning before eating or drinking  before each meal and 1 or 2 hours after meal  bedtime  anytime you feel symptoms of hyperglycemia or hypoglycemia (high or low blood sugars)       Pump changes  Basal changes will be 12 AM 1.8, 12 PM 1.85  Carb ratio one unit for every 5 g  Insulin sensitivity 1 unit for every 18 with target 110 through 120  Active insulin 4 hours.  Office visit 25  minutes with  additional education given 13 minutes - SMBG with goals, medication changes with purposes and s/e profiles.       Return in about 4 weeks (around 5/18/2017).      Dragon transcription disclaimer     Much of this encounter note is an electronic transcription/translation of spoken language to printed text. The electronic translation of spoken language may permit erroneous, or at times, nonsensical words or phrases to be inadvertently transcribed. Although I have reviewed the note for such errors, some may still exist.

## 2017-04-28 ENCOUNTER — HOSPITAL ENCOUNTER (OUTPATIENT)
Dept: GENERAL RADIOLOGY | Facility: HOSPITAL | Age: 47
Discharge: HOME | End: 2017-04-28
Attending: INTERNAL MEDICINE | Admitting: INTERNAL MEDICINE

## 2017-04-28 DIAGNOSIS — R10.33 PERIUMBILICAL PAIN: ICD-10-CM

## 2017-04-28 PROCEDURE — 74250 X-RAY XM SM INT 1CNTRST STD: CPT

## 2017-05-02 ENCOUNTER — APPOINTMENT (OUTPATIENT)
Dept: GENERAL RADIOLOGY | Facility: HOSPITAL | Age: 47
End: 2017-05-02

## 2017-05-02 ENCOUNTER — HOSPITAL ENCOUNTER (EMERGENCY)
Facility: HOSPITAL | Age: 47
Discharge: HOME | End: 2017-05-02
Attending: EMERGENCY MEDICINE | Admitting: EMERGENCY MEDICINE

## 2017-05-02 ENCOUNTER — TELEPHONE (OUTPATIENT)
Dept: GASTROENTEROLOGY | Facility: CLINIC | Age: 47
End: 2017-05-02

## 2017-05-02 ENCOUNTER — APPOINTMENT (OUTPATIENT)
Dept: CT IMAGING | Facility: HOSPITAL | Age: 47
End: 2017-05-02

## 2017-05-02 VITALS
HEIGHT: 70 IN | SYSTOLIC BLOOD PRESSURE: 126 MMHG | HEART RATE: 98 BPM | BODY MASS INDEX: 40.09 KG/M2 | DIASTOLIC BLOOD PRESSURE: 71 MMHG | RESPIRATION RATE: 16 BRPM | WEIGHT: 280 LBS | OXYGEN SATURATION: 95 % | TEMPERATURE: 98.3 F

## 2017-05-02 DIAGNOSIS — R10.33 PERIUMBILICAL ABDOMINAL PAIN: Primary | ICD-10-CM

## 2017-05-02 DIAGNOSIS — R93.5 ABNORMAL CT OF THE ABDOMEN: Primary | ICD-10-CM

## 2017-05-02 LAB
ALBUMIN SERPL-MCNC: 4.1 G/DL (ref 3.5–5.2)
ALBUMIN/GLOB SERPL: 1.5 G/DL
ALP SERPL-CCNC: 91 U/L (ref 39–117)
ALT SERPL W P-5'-P-CCNC: 62 U/L (ref 1–41)
ANION GAP SERPL CALCULATED.3IONS-SCNC: 15.6 MMOL/L
AST SERPL-CCNC: 48 U/L (ref 1–40)
BASOPHILS # BLD AUTO: 0.02 10*3/MM3 (ref 0–0.2)
BASOPHILS NFR BLD AUTO: 0.2 % (ref 0–1.5)
BILIRUB SERPL-MCNC: 0.3 MG/DL (ref 0.1–1.2)
BILIRUB UR QL STRIP: NEGATIVE
BUN BLD-MCNC: 14 MG/DL (ref 6–20)
BUN/CREAT SERPL: 9.5 (ref 7–25)
CALCIUM SPEC-SCNC: 9.1 MG/DL (ref 8.6–10.5)
CHLORIDE SERPL-SCNC: 99 MMOL/L (ref 98–107)
CLARITY UR: CLEAR
CO2 SERPL-SCNC: 25.4 MMOL/L (ref 22–29)
COLOR UR: YELLOW
CREAT BLD-MCNC: 1.48 MG/DL (ref 0.76–1.27)
DEPRECATED RDW RBC AUTO: 43.2 FL (ref 37–54)
EOSINOPHIL # BLD AUTO: 0.24 10*3/MM3 (ref 0–0.7)
EOSINOPHIL NFR BLD AUTO: 2.8 % (ref 0.3–6.2)
ERYTHROCYTE [DISTWIDTH] IN BLOOD BY AUTOMATED COUNT: 12.7 % (ref 11.5–14.5)
GFR SERPL CREATININE-BSD FRML MDRD: 52 ML/MIN/1.73
GLOBULIN UR ELPH-MCNC: 2.8 GM/DL
GLUCOSE BLD-MCNC: 236 MG/DL (ref 65–99)
GLUCOSE UR STRIP-MCNC: ABNORMAL MG/DL
HCT VFR BLD AUTO: 47.8 % (ref 40.4–52.2)
HGB BLD-MCNC: 17.3 G/DL (ref 13.7–17.6)
HGB UR QL STRIP.AUTO: NEGATIVE
HOLD SPECIMEN: NORMAL
HOLD SPECIMEN: NORMAL
IMM GRANULOCYTES # BLD: 0.04 10*3/MM3 (ref 0–0.03)
IMM GRANULOCYTES NFR BLD: 0.5 % (ref 0–0.5)
KETONES UR QL STRIP: NEGATIVE
LEUKOCYTE ESTERASE UR QL STRIP.AUTO: NEGATIVE
LIPASE SERPL-CCNC: 62 U/L (ref 13–60)
LYMPHOCYTES # BLD AUTO: 2.75 10*3/MM3 (ref 0.9–4.8)
LYMPHOCYTES NFR BLD AUTO: 32.1 % (ref 19.6–45.3)
MCH RBC QN AUTO: 33.7 PG (ref 27–32.7)
MCHC RBC AUTO-ENTMCNC: 36.2 G/DL (ref 32.6–36.4)
MCV RBC AUTO: 93.2 FL (ref 79.8–96.2)
MONOCYTES # BLD AUTO: 0.51 10*3/MM3 (ref 0.2–1.2)
MONOCYTES NFR BLD AUTO: 6 % (ref 5–12)
NEUTROPHILS # BLD AUTO: 5.01 10*3/MM3 (ref 1.9–8.1)
NEUTROPHILS NFR BLD AUTO: 58.4 % (ref 42.7–76)
NITRITE UR QL STRIP: NEGATIVE
PH UR STRIP.AUTO: 6 [PH] (ref 5–8)
PLATELET # BLD AUTO: 155 10*3/MM3 (ref 140–500)
PMV BLD AUTO: 12.1 FL (ref 6–12)
POTASSIUM BLD-SCNC: 4.2 MMOL/L (ref 3.5–5.2)
PROT SERPL-MCNC: 6.9 G/DL (ref 6–8.5)
PROT UR QL STRIP: NEGATIVE
RBC # BLD AUTO: 5.13 10*6/MM3 (ref 4.6–6)
SODIUM BLD-SCNC: 140 MMOL/L (ref 136–145)
SP GR UR STRIP: 1.03 (ref 1–1.03)
TROPONIN T SERPL-MCNC: <0.01 NG/ML (ref 0–0.03)
UROBILINOGEN UR QL STRIP: ABNORMAL
WBC NRBC COR # BLD: 8.57 10*3/MM3 (ref 4.5–10.7)
WHOLE BLOOD HOLD SPECIMEN: NORMAL
WHOLE BLOOD HOLD SPECIMEN: NORMAL

## 2017-05-02 PROCEDURE — 25010000002 HYDROMORPHONE PER 4 MG: Performed by: EMERGENCY MEDICINE

## 2017-05-02 PROCEDURE — 83690 ASSAY OF LIPASE: CPT | Performed by: EMERGENCY MEDICINE

## 2017-05-02 PROCEDURE — 93010 ELECTROCARDIOGRAM REPORT: CPT | Performed by: INTERNAL MEDICINE

## 2017-05-02 PROCEDURE — 25010000002 MORPHINE PER 10 MG: Performed by: EMERGENCY MEDICINE

## 2017-05-02 PROCEDURE — 25010000002 ONDANSETRON PER 1 MG: Performed by: PHYSICIAN ASSISTANT

## 2017-05-02 PROCEDURE — 99283 EMERGENCY DEPT VISIT LOW MDM: CPT

## 2017-05-02 PROCEDURE — 84484 ASSAY OF TROPONIN QUANT: CPT | Performed by: EMERGENCY MEDICINE

## 2017-05-02 PROCEDURE — 74176 CT ABD & PELVIS W/O CONTRAST: CPT

## 2017-05-02 PROCEDURE — 96375 TX/PRO/DX INJ NEW DRUG ADDON: CPT

## 2017-05-02 PROCEDURE — 71020 HC CHEST PA AND LATERAL: CPT

## 2017-05-02 PROCEDURE — 80053 COMPREHEN METABOLIC PANEL: CPT | Performed by: EMERGENCY MEDICINE

## 2017-05-02 PROCEDURE — 85025 COMPLETE CBC W/AUTO DIFF WBC: CPT | Performed by: EMERGENCY MEDICINE

## 2017-05-02 PROCEDURE — 96374 THER/PROPH/DIAG INJ IV PUSH: CPT

## 2017-05-02 PROCEDURE — 81003 URINALYSIS AUTO W/O SCOPE: CPT | Performed by: EMERGENCY MEDICINE

## 2017-05-02 PROCEDURE — 96361 HYDRATE IV INFUSION ADD-ON: CPT

## 2017-05-02 PROCEDURE — 36415 COLL VENOUS BLD VENIPUNCTURE: CPT

## 2017-05-02 PROCEDURE — 93005 ELECTROCARDIOGRAM TRACING: CPT

## 2017-05-02 RX ORDER — SODIUM CHLORIDE, SODIUM LACTATE, POTASSIUM CHLORIDE, CALCIUM CHLORIDE 600; 310; 30; 20 MG/100ML; MG/100ML; MG/100ML; MG/100ML
30 INJECTION, SOLUTION INTRAVENOUS CONTINUOUS
Status: CANCELLED | OUTPATIENT
Start: 2017-05-02

## 2017-05-02 RX ORDER — DICYCLOMINE HCL 20 MG
20 TABLET ORAL EVERY 6 HOURS PRN
Qty: 30 TABLET | Refills: 0 | Status: SHIPPED | OUTPATIENT
Start: 2017-05-02 | End: 2017-05-10

## 2017-05-02 RX ORDER — ONDANSETRON 2 MG/ML
4 INJECTION INTRAMUSCULAR; INTRAVENOUS ONCE
Status: COMPLETED | OUTPATIENT
Start: 2017-05-02 | End: 2017-05-02

## 2017-05-02 RX ORDER — ONDANSETRON 4 MG/1
4 TABLET, ORALLY DISINTEGRATING ORAL EVERY 8 HOURS PRN
Qty: 12 TABLET | Refills: 0 | Status: SHIPPED | OUTPATIENT
Start: 2017-05-02 | End: 2017-05-10

## 2017-05-02 RX ORDER — SODIUM CHLORIDE 0.9 % (FLUSH) 0.9 %
10 SYRINGE (ML) INJECTION AS NEEDED
Status: DISCONTINUED | OUTPATIENT
Start: 2017-05-02 | End: 2017-05-03 | Stop reason: HOSPADM

## 2017-05-02 RX ORDER — SODIUM CHLORIDE 0.9 % (FLUSH) 0.9 %
1-10 SYRINGE (ML) INJECTION AS NEEDED
Status: CANCELLED | OUTPATIENT
Start: 2017-05-02

## 2017-05-02 RX ADMIN — ONDANSETRON 4 MG: 2 INJECTION INTRAMUSCULAR; INTRAVENOUS at 21:10

## 2017-05-02 RX ADMIN — MORPHINE SULFATE 4 MG: 4 INJECTION, SOLUTION INTRAMUSCULAR; INTRAVENOUS at 21:10

## 2017-05-02 RX ADMIN — HYDROMORPHONE HYDROCHLORIDE 1 MG: 1 INJECTION, SOLUTION INTRAMUSCULAR; INTRAVENOUS; SUBCUTANEOUS at 23:08

## 2017-05-02 RX ADMIN — SODIUM CHLORIDE 1000 ML: 9 INJECTION, SOLUTION INTRAVENOUS at 21:09

## 2017-05-08 ENCOUNTER — PREP FOR SURGERY (OUTPATIENT)
Dept: SURGERY | Facility: HOSPITAL | Age: 47
End: 2017-05-08

## 2017-05-08 DIAGNOSIS — K43.2 RECURRENT INCISIONAL HERNIA: Primary | ICD-10-CM

## 2017-05-08 RX ORDER — CLINDAMYCIN PHOSPHATE 900 MG/50ML
900 INJECTION INTRAVENOUS ONCE
Status: CANCELLED | OUTPATIENT
Start: 2017-05-08 | End: 2017-05-08

## 2017-05-11 ENCOUNTER — ANESTHESIA (OUTPATIENT)
Dept: PERIOP | Facility: HOSPITAL | Age: 47
End: 2017-05-11

## 2017-05-11 ENCOUNTER — HOSPITAL ENCOUNTER (OUTPATIENT)
Facility: HOSPITAL | Age: 47
Setting detail: OBSERVATION
Discharge: HOME | End: 2017-05-12
Attending: SURGERY | Admitting: SURGERY

## 2017-05-11 ENCOUNTER — ANESTHESIA EVENT (OUTPATIENT)
Dept: PERIOP | Facility: HOSPITAL | Age: 47
End: 2017-05-11

## 2017-05-11 PROBLEM — K43.2 INCISIONAL HERNIA: Status: ACTIVE | Noted: 2017-05-11

## 2017-05-11 LAB
GLUCOSE BLDC GLUCOMTR-MCNC: 101 MG/DL (ref 70–130)
GLUCOSE BLDC GLUCOMTR-MCNC: 102 MG/DL (ref 70–130)
GLUCOSE BLDC GLUCOMTR-MCNC: 97 MG/DL (ref 70–130)

## 2017-05-11 PROCEDURE — G0378 HOSPITAL OBSERVATION PER HR: HCPCS

## 2017-05-11 PROCEDURE — 25010000002 HYDROMORPHONE PER 4 MG: Performed by: NURSE ANESTHETIST, CERTIFIED REGISTERED

## 2017-05-11 PROCEDURE — C1781 MESH (IMPLANTABLE): HCPCS | Performed by: SURGERY

## 2017-05-11 PROCEDURE — 25010000002 FENTANYL CITRATE (PF) 100 MCG/2ML SOLUTION: Performed by: NURSE ANESTHETIST, CERTIFIED REGISTERED

## 2017-05-11 PROCEDURE — 25010000002 MEPERIDINE 25 MG/0.5ML SOLUTION: Performed by: NURSE ANESTHETIST, CERTIFIED REGISTERED

## 2017-05-11 PROCEDURE — 25010000002 NEOSTIGMINE 10 MG/10ML SOLUTION: Performed by: NURSE ANESTHETIST, CERTIFIED REGISTERED

## 2017-05-11 PROCEDURE — 25010000002 HYDROMORPHONE PER 4 MG: Performed by: SURGERY

## 2017-05-11 PROCEDURE — 25010000002 MIDAZOLAM PER 1 MG: Performed by: ANESTHESIOLOGY

## 2017-05-11 PROCEDURE — 25010000002 PHENYLEPHRINE PER 1 ML: Performed by: NURSE ANESTHETIST, CERTIFIED REGISTERED

## 2017-05-11 PROCEDURE — 25010000002 PROPOFOL 10 MG/ML EMULSION: Performed by: NURSE ANESTHETIST, CERTIFIED REGISTERED

## 2017-05-11 PROCEDURE — 25010000002 FENTANYL CITRATE (PF) 100 MCG/2ML SOLUTION: Performed by: ANESTHESIOLOGY

## 2017-05-11 PROCEDURE — 82962 GLUCOSE BLOOD TEST: CPT

## 2017-05-11 PROCEDURE — 94640 AIRWAY INHALATION TREATMENT: CPT

## 2017-05-11 PROCEDURE — 25010000002 ONDANSETRON PER 1 MG: Performed by: NURSE ANESTHETIST, CERTIFIED REGISTERED

## 2017-05-11 DEVICE — VENTRALEX ST HERNIA PATCH
Type: IMPLANTABLE DEVICE | Site: ABDOMEN | Status: FUNCTIONAL
Brand: VENTRALEX ST HERNIA PATCH

## 2017-05-11 RX ORDER — ACETAMINOPHEN 10 MG/ML
1000 INJECTION, SOLUTION INTRAVENOUS ONCE
Status: COMPLETED | OUTPATIENT
Start: 2017-05-11 | End: 2017-05-11

## 2017-05-11 RX ORDER — PROMETHAZINE HYDROCHLORIDE 25 MG/1
25 TABLET ORAL ONCE AS NEEDED
Status: DISCONTINUED | OUTPATIENT
Start: 2017-05-11 | End: 2017-05-11 | Stop reason: HOSPADM

## 2017-05-11 RX ORDER — ACETAMINOPHEN 650 MG/1
650 SUPPOSITORY RECTAL EVERY 4 HOURS PRN
Status: DISCONTINUED | OUTPATIENT
Start: 2017-05-11 | End: 2017-05-12 | Stop reason: HOSPADM

## 2017-05-11 RX ORDER — FAMOTIDINE 10 MG/ML
20 INJECTION, SOLUTION INTRAVENOUS ONCE
Status: COMPLETED | OUTPATIENT
Start: 2017-05-11 | End: 2017-05-11

## 2017-05-11 RX ORDER — SODIUM CHLORIDE 0.9 % (FLUSH) 0.9 %
1-10 SYRINGE (ML) INJECTION AS NEEDED
Status: DISCONTINUED | OUTPATIENT
Start: 2017-05-11 | End: 2017-05-11 | Stop reason: HOSPADM

## 2017-05-11 RX ORDER — LIDOCAINE HYDROCHLORIDE 20 MG/ML
INJECTION, SOLUTION INFILTRATION; PERINEURAL AS NEEDED
Status: DISCONTINUED | OUTPATIENT
Start: 2017-05-11 | End: 2017-05-11 | Stop reason: SURG

## 2017-05-11 RX ORDER — PROMETHAZINE HYDROCHLORIDE 25 MG/ML
12.5 INJECTION, SOLUTION INTRAMUSCULAR; INTRAVENOUS ONCE AS NEEDED
Status: DISCONTINUED | OUTPATIENT
Start: 2017-05-11 | End: 2017-05-11 | Stop reason: HOSPADM

## 2017-05-11 RX ORDER — SODIUM CHLORIDE, SODIUM LACTATE, POTASSIUM CHLORIDE, CALCIUM CHLORIDE 600; 310; 30; 20 MG/100ML; MG/100ML; MG/100ML; MG/100ML
75 INJECTION, SOLUTION INTRAVENOUS CONTINUOUS
Status: DISCONTINUED | OUTPATIENT
Start: 2017-05-11 | End: 2017-05-12

## 2017-05-11 RX ORDER — ALBUTEROL SULFATE 2.5 MG/3ML
2.5 SOLUTION RESPIRATORY (INHALATION) EVERY 4 HOURS PRN
Status: DISCONTINUED | OUTPATIENT
Start: 2017-05-11 | End: 2017-05-12 | Stop reason: HOSPADM

## 2017-05-11 RX ORDER — ONDANSETRON 2 MG/ML
INJECTION INTRAMUSCULAR; INTRAVENOUS AS NEEDED
Status: DISCONTINUED | OUTPATIENT
Start: 2017-05-11 | End: 2017-05-11 | Stop reason: SURG

## 2017-05-11 RX ORDER — BUDESONIDE AND FORMOTEROL FUMARATE DIHYDRATE 80; 4.5 UG/1; UG/1
2 AEROSOL RESPIRATORY (INHALATION)
Status: DISCONTINUED | OUTPATIENT
Start: 2017-05-11 | End: 2017-05-12 | Stop reason: HOSPADM

## 2017-05-11 RX ORDER — SODIUM CHLORIDE, SODIUM LACTATE, POTASSIUM CHLORIDE, CALCIUM CHLORIDE 600; 310; 30; 20 MG/100ML; MG/100ML; MG/100ML; MG/100ML
9 INJECTION, SOLUTION INTRAVENOUS CONTINUOUS
Status: DISCONTINUED | OUTPATIENT
Start: 2017-05-11 | End: 2017-05-12 | Stop reason: HOSPADM

## 2017-05-11 RX ORDER — FENTANYL CITRATE 50 UG/ML
50 INJECTION, SOLUTION INTRAMUSCULAR; INTRAVENOUS
Status: DISCONTINUED | OUTPATIENT
Start: 2017-05-11 | End: 2017-05-11 | Stop reason: HOSPADM

## 2017-05-11 RX ORDER — GLYCOPYRROLATE 0.2 MG/ML
INJECTION INTRAMUSCULAR; INTRAVENOUS AS NEEDED
Status: DISCONTINUED | OUTPATIENT
Start: 2017-05-11 | End: 2017-05-11 | Stop reason: SURG

## 2017-05-11 RX ORDER — HYDROMORPHONE HYDROCHLORIDE 1 MG/ML
0.5 INJECTION, SOLUTION INTRAMUSCULAR; INTRAVENOUS; SUBCUTANEOUS
Status: DISCONTINUED | OUTPATIENT
Start: 2017-05-11 | End: 2017-05-11 | Stop reason: HOSPADM

## 2017-05-11 RX ORDER — ACETAMINOPHEN 10 MG/ML
INJECTION, SOLUTION INTRAVENOUS
Status: COMPLETED
Start: 2017-05-11 | End: 2017-05-11

## 2017-05-11 RX ORDER — AMITRIPTYLINE HYDROCHLORIDE 25 MG/1
25 TABLET, FILM COATED ORAL NIGHTLY
Status: DISCONTINUED | OUTPATIENT
Start: 2017-05-11 | End: 2017-05-12 | Stop reason: HOSPADM

## 2017-05-11 RX ORDER — MIDAZOLAM HYDROCHLORIDE 1 MG/ML
1 INJECTION INTRAMUSCULAR; INTRAVENOUS
Status: DISCONTINUED | OUTPATIENT
Start: 2017-05-11 | End: 2017-05-11 | Stop reason: HOSPADM

## 2017-05-11 RX ORDER — PROPOFOL 10 MG/ML
VIAL (ML) INTRAVENOUS AS NEEDED
Status: DISCONTINUED | OUTPATIENT
Start: 2017-05-11 | End: 2017-05-11 | Stop reason: SURG

## 2017-05-11 RX ORDER — GABAPENTIN 800 MG/1
800 TABLET ORAL 3 TIMES DAILY
Status: DISCONTINUED | OUTPATIENT
Start: 2017-05-11 | End: 2017-05-12 | Stop reason: HOSPADM

## 2017-05-11 RX ORDER — ALBUTEROL SULFATE 90 UG/1
2 AEROSOL, METERED RESPIRATORY (INHALATION) EVERY 4 HOURS PRN
Status: DISCONTINUED | OUTPATIENT
Start: 2017-05-11 | End: 2017-05-11 | Stop reason: CLARIF

## 2017-05-11 RX ORDER — FLUMAZENIL 0.1 MG/ML
0.2 INJECTION INTRAVENOUS AS NEEDED
Status: DISCONTINUED | OUTPATIENT
Start: 2017-05-11 | End: 2017-05-11 | Stop reason: HOSPADM

## 2017-05-11 RX ORDER — LEVOTHYROXINE SODIUM 112 UG/1
112 TABLET ORAL
Status: DISCONTINUED | OUTPATIENT
Start: 2017-05-12 | End: 2017-05-12 | Stop reason: HOSPADM

## 2017-05-11 RX ORDER — PANTOPRAZOLE SODIUM 40 MG/1
40 TABLET, DELAYED RELEASE ORAL DAILY
Status: DISCONTINUED | OUTPATIENT
Start: 2017-05-11 | End: 2017-05-12 | Stop reason: HOSPADM

## 2017-05-11 RX ORDER — ACETAMINOPHEN 325 MG/1
650 TABLET ORAL EVERY 4 HOURS PRN
Status: DISCONTINUED | OUTPATIENT
Start: 2017-05-11 | End: 2017-05-12 | Stop reason: HOSPADM

## 2017-05-11 RX ORDER — ASPIRIN 81 MG/1
81 TABLET ORAL DAILY
Status: DISCONTINUED | OUTPATIENT
Start: 2017-05-11 | End: 2017-05-12 | Stop reason: HOSPADM

## 2017-05-11 RX ORDER — PROMETHAZINE HYDROCHLORIDE 25 MG/1
25 SUPPOSITORY RECTAL ONCE AS NEEDED
Status: DISCONTINUED | OUTPATIENT
Start: 2017-05-11 | End: 2017-05-11 | Stop reason: HOSPADM

## 2017-05-11 RX ORDER — CLINDAMYCIN PHOSPHATE 900 MG/50ML
900 INJECTION INTRAVENOUS ONCE
Status: COMPLETED | OUTPATIENT
Start: 2017-05-11 | End: 2017-05-11

## 2017-05-11 RX ORDER — NEOSTIGMINE METHYLSULFATE 1 MG/ML
INJECTION, SOLUTION INTRAVENOUS AS NEEDED
Status: DISCONTINUED | OUTPATIENT
Start: 2017-05-11 | End: 2017-05-11 | Stop reason: SURG

## 2017-05-11 RX ORDER — OXYCODONE HYDROCHLORIDE AND ACETAMINOPHEN 5; 325 MG/1; MG/1
2 TABLET ORAL EVERY 4 HOURS PRN
Status: DISCONTINUED | OUTPATIENT
Start: 2017-05-11 | End: 2017-05-12 | Stop reason: HOSPADM

## 2017-05-11 RX ORDER — NALOXONE HCL 0.4 MG/ML
0.1 VIAL (ML) INJECTION
Status: DISCONTINUED | OUTPATIENT
Start: 2017-05-11 | End: 2017-05-12 | Stop reason: HOSPADM

## 2017-05-11 RX ORDER — HYDROCODONE BITARTRATE AND ACETAMINOPHEN 7.5; 325 MG/1; MG/1
1 TABLET ORAL ONCE AS NEEDED
Status: DISCONTINUED | OUTPATIENT
Start: 2017-05-11 | End: 2017-05-11 | Stop reason: HOSPADM

## 2017-05-11 RX ORDER — ONDANSETRON 2 MG/ML
4 INJECTION INTRAMUSCULAR; INTRAVENOUS ONCE AS NEEDED
Status: DISCONTINUED | OUTPATIENT
Start: 2017-05-11 | End: 2017-05-11 | Stop reason: HOSPADM

## 2017-05-11 RX ORDER — MAGNESIUM HYDROXIDE 1200 MG/15ML
LIQUID ORAL AS NEEDED
Status: DISCONTINUED | OUTPATIENT
Start: 2017-05-11 | End: 2017-05-11 | Stop reason: HOSPADM

## 2017-05-11 RX ORDER — LISINOPRIL 10 MG/1
10 TABLET ORAL EVERY MORNING
Status: DISCONTINUED | OUTPATIENT
Start: 2017-05-12 | End: 2017-05-12 | Stop reason: HOSPADM

## 2017-05-11 RX ORDER — NALOXONE HCL 0.4 MG/ML
0.2 VIAL (ML) INJECTION AS NEEDED
Status: DISCONTINUED | OUTPATIENT
Start: 2017-05-11 | End: 2017-05-11 | Stop reason: HOSPADM

## 2017-05-11 RX ORDER — FENTANYL CITRATE 50 UG/ML
INJECTION, SOLUTION INTRAMUSCULAR; INTRAVENOUS AS NEEDED
Status: DISCONTINUED | OUTPATIENT
Start: 2017-05-11 | End: 2017-05-11 | Stop reason: SURG

## 2017-05-11 RX ORDER — ONDANSETRON 2 MG/ML
4 INJECTION INTRAMUSCULAR; INTRAVENOUS EVERY 6 HOURS PRN
Status: DISCONTINUED | OUTPATIENT
Start: 2017-05-11 | End: 2017-05-12 | Stop reason: HOSPADM

## 2017-05-11 RX ORDER — MIDAZOLAM HYDROCHLORIDE 1 MG/ML
2 INJECTION INTRAMUSCULAR; INTRAVENOUS
Status: DISCONTINUED | OUTPATIENT
Start: 2017-05-11 | End: 2017-05-11 | Stop reason: HOSPADM

## 2017-05-11 RX ORDER — ONDANSETRON 4 MG/1
4 TABLET, ORALLY DISINTEGRATING ORAL EVERY 6 HOURS PRN
Status: DISCONTINUED | OUTPATIENT
Start: 2017-05-11 | End: 2017-05-12 | Stop reason: HOSPADM

## 2017-05-11 RX ORDER — BUPIVACAINE HYDROCHLORIDE AND EPINEPHRINE 2.5; 5 MG/ML; UG/ML
INJECTION, SOLUTION INFILTRATION; PERINEURAL AS NEEDED
Status: DISCONTINUED | OUTPATIENT
Start: 2017-05-11 | End: 2017-05-11 | Stop reason: HOSPADM

## 2017-05-11 RX ORDER — PROMETHAZINE HYDROCHLORIDE 25 MG/ML
12.5 INJECTION, SOLUTION INTRAMUSCULAR; INTRAVENOUS EVERY 4 HOURS PRN
Status: DISCONTINUED | OUTPATIENT
Start: 2017-05-11 | End: 2017-05-12 | Stop reason: HOSPADM

## 2017-05-11 RX ORDER — ONDANSETRON 4 MG/1
4 TABLET, FILM COATED ORAL EVERY 6 HOURS PRN
Status: DISCONTINUED | OUTPATIENT
Start: 2017-05-11 | End: 2017-05-12 | Stop reason: HOSPADM

## 2017-05-11 RX ORDER — OXYCODONE AND ACETAMINOPHEN 7.5; 325 MG/1; MG/1
1 TABLET ORAL ONCE AS NEEDED
Status: DISCONTINUED | OUTPATIENT
Start: 2017-05-11 | End: 2017-05-11 | Stop reason: HOSPADM

## 2017-05-11 RX ORDER — ROCURONIUM BROMIDE 10 MG/ML
INJECTION, SOLUTION INTRAVENOUS AS NEEDED
Status: DISCONTINUED | OUTPATIENT
Start: 2017-05-11 | End: 2017-05-11 | Stop reason: SURG

## 2017-05-11 RX ORDER — LABETALOL HYDROCHLORIDE 5 MG/ML
5 INJECTION, SOLUTION INTRAVENOUS
Status: DISCONTINUED | OUTPATIENT
Start: 2017-05-11 | End: 2017-05-11 | Stop reason: HOSPADM

## 2017-05-11 RX ORDER — HYDRALAZINE HYDROCHLORIDE 20 MG/ML
5 INJECTION INTRAMUSCULAR; INTRAVENOUS
Status: DISCONTINUED | OUTPATIENT
Start: 2017-05-11 | End: 2017-05-11 | Stop reason: HOSPADM

## 2017-05-11 RX ORDER — PROMETHAZINE HYDROCHLORIDE 25 MG/1
12.5 TABLET ORAL ONCE AS NEEDED
Status: DISCONTINUED | OUTPATIENT
Start: 2017-05-11 | End: 2017-05-11 | Stop reason: HOSPADM

## 2017-05-11 RX ORDER — PROMETHAZINE HYDROCHLORIDE 25 MG/ML
12.5 INJECTION, SOLUTION INTRAMUSCULAR; INTRAVENOUS EVERY 6 HOURS PRN
Status: DISCONTINUED | OUTPATIENT
Start: 2017-05-11 | End: 2017-05-12 | Stop reason: HOSPADM

## 2017-05-11 RX ORDER — DIPHENHYDRAMINE HYDROCHLORIDE 50 MG/ML
12.5 INJECTION INTRAMUSCULAR; INTRAVENOUS
Status: DISCONTINUED | OUTPATIENT
Start: 2017-05-11 | End: 2017-05-11 | Stop reason: HOSPADM

## 2017-05-11 RX ORDER — HYDROMORPHONE HYDROCHLORIDE 1 MG/ML
0.5 INJECTION, SOLUTION INTRAMUSCULAR; INTRAVENOUS; SUBCUTANEOUS
Status: DISCONTINUED | OUTPATIENT
Start: 2017-05-11 | End: 2017-05-12

## 2017-05-11 RX ORDER — CLINDAMYCIN PHOSPHATE 600 MG/50ML
600 INJECTION INTRAVENOUS EVERY 8 HOURS
Status: COMPLETED | OUTPATIENT
Start: 2017-05-11 | End: 2017-05-12

## 2017-05-11 RX ORDER — NEBIVOLOL 5 MG/1
5 TABLET ORAL EVERY MORNING
Status: DISCONTINUED | OUTPATIENT
Start: 2017-05-12 | End: 2017-05-12 | Stop reason: HOSPADM

## 2017-05-11 RX ADMIN — PROPOFOL 200 MG: 10 INJECTION, EMULSION INTRAVENOUS at 16:23

## 2017-05-11 RX ADMIN — GLYCOPYRROLATE 0.6 MG: 0.2 INJECTION INTRAMUSCULAR; INTRAVENOUS at 16:55

## 2017-05-11 RX ADMIN — MIDAZOLAM 1 MG: 1 INJECTION INTRAMUSCULAR; INTRAVENOUS at 15:23

## 2017-05-11 RX ADMIN — HYDROMORPHONE HYDROCHLORIDE 0.5 MG: 1 INJECTION, SOLUTION INTRAMUSCULAR; INTRAVENOUS; SUBCUTANEOUS at 19:09

## 2017-05-11 RX ADMIN — SODIUM CHLORIDE, POTASSIUM CHLORIDE, SODIUM LACTATE AND CALCIUM CHLORIDE 75 ML/HR: 600; 310; 30; 20 INJECTION, SOLUTION INTRAVENOUS at 20:49

## 2017-05-11 RX ADMIN — HYDROMORPHONE HYDROCHLORIDE 0.5 MG: 1 INJECTION, SOLUTION INTRAMUSCULAR; INTRAVENOUS; SUBCUTANEOUS at 18:28

## 2017-05-11 RX ADMIN — HYDROMORPHONE HYDROCHLORIDE 0.5 MG: 1 INJECTION, SOLUTION INTRAMUSCULAR; INTRAVENOUS; SUBCUTANEOUS at 17:57

## 2017-05-11 RX ADMIN — ALBUTEROL SULFATE 2.5 MG: 2.5 SOLUTION RESPIRATORY (INHALATION) at 19:28

## 2017-05-11 RX ADMIN — CLINDAMYCIN PHOSPHATE 900 MG: 900 INJECTION INTRAVENOUS at 16:15

## 2017-05-11 RX ADMIN — HYDROMORPHONE HYDROCHLORIDE 0.5 MG: 1 INJECTION, SOLUTION INTRAMUSCULAR; INTRAVENOUS; SUBCUTANEOUS at 18:14

## 2017-05-11 RX ADMIN — HYDROMORPHONE HYDROCHLORIDE 0.5 MG: 1 INJECTION, SOLUTION INTRAMUSCULAR; INTRAVENOUS; SUBCUTANEOUS at 21:00

## 2017-05-11 RX ADMIN — FENTANYL CITRATE 100 MCG: 50 INJECTION INTRAMUSCULAR; INTRAVENOUS at 16:21

## 2017-05-11 RX ADMIN — FENTANYL CITRATE 50 MCG: 50 INJECTION INTRAMUSCULAR; INTRAVENOUS at 14:29

## 2017-05-11 RX ADMIN — SUGAMMADEX 2 ML: 100 INJECTION, SOLUTION INTRAVENOUS at 16:55

## 2017-05-11 RX ADMIN — ACETAMINOPHEN 1000 MG: 10 INJECTION, SOLUTION INTRAVENOUS at 17:24

## 2017-05-11 RX ADMIN — SODIUM CHLORIDE, POTASSIUM CHLORIDE, SODIUM LACTATE AND CALCIUM CHLORIDE: 600; 310; 30; 20 INJECTION, SOLUTION INTRAVENOUS at 17:11

## 2017-05-11 RX ADMIN — NEOSTIGMINE METHYLSULFATE 5 MG: 1 INJECTION INTRAVENOUS at 16:55

## 2017-05-11 RX ADMIN — FAMOTIDINE 20 MG: 10 INJECTION, SOLUTION INTRAVENOUS at 14:30

## 2017-05-11 RX ADMIN — LIDOCAINE HYDROCHLORIDE 60 MG: 20 INJECTION, SOLUTION INFILTRATION; PERINEURAL at 16:23

## 2017-05-11 RX ADMIN — PANTOPRAZOLE SODIUM 40 MG: 40 TABLET, DELAYED RELEASE ORAL at 21:00

## 2017-05-11 RX ADMIN — SODIUM CHLORIDE, POTASSIUM CHLORIDE, SODIUM LACTATE AND CALCIUM CHLORIDE 9 ML/HR: 600; 310; 30; 20 INJECTION, SOLUTION INTRAVENOUS at 14:29

## 2017-05-11 RX ADMIN — OXYCODONE HYDROCHLORIDE AND ACETAMINOPHEN 2 TABLET: 5; 325 TABLET ORAL at 22:09

## 2017-05-11 RX ADMIN — FENTANYL CITRATE 50 MCG: 50 INJECTION INTRAMUSCULAR; INTRAVENOUS at 15:23

## 2017-05-11 RX ADMIN — MEPERIDINE HYDROCHLORIDE 12.5 MG: 25 INJECTION, SOLUTION INTRAMUSCULAR; INTRAVENOUS; SUBCUTANEOUS at 17:33

## 2017-05-11 RX ADMIN — PHENYLEPHRINE HYDROCHLORIDE 100 MCG: 10 INJECTION INTRAVENOUS at 16:40

## 2017-05-11 RX ADMIN — ONDANSETRON 4 MG: 2 INJECTION INTRAMUSCULAR; INTRAVENOUS at 16:47

## 2017-05-11 RX ADMIN — ROCURONIUM BROMIDE 40 MG: 10 INJECTION INTRAVENOUS at 16:23

## 2017-05-11 RX ADMIN — GABAPENTIN 800 MG: 800 TABLET ORAL at 21:00

## 2017-05-11 RX ADMIN — FENTANYL CITRATE 50 MCG: 50 INJECTION INTRAMUSCULAR; INTRAVENOUS at 17:39

## 2017-05-11 RX ADMIN — MEPERIDINE HYDROCHLORIDE 12.5 MG: 25 INJECTION, SOLUTION INTRAMUSCULAR; INTRAVENOUS; SUBCUTANEOUS at 17:22

## 2017-05-11 RX ADMIN — FENTANYL CITRATE 50 MCG: 50 INJECTION INTRAMUSCULAR; INTRAVENOUS at 17:51

## 2017-05-11 RX ADMIN — SODIUM CHLORIDE, POTASSIUM CHLORIDE, SODIUM LACTATE AND CALCIUM CHLORIDE: 600; 310; 30; 20 INJECTION, SOLUTION INTRAVENOUS at 16:14

## 2017-05-11 RX ADMIN — MIDAZOLAM 1 MG: 1 INJECTION INTRAMUSCULAR; INTRAVENOUS at 14:29

## 2017-05-11 RX ADMIN — CLINDAMYCIN PHOSPHATE 600 MG: 12 INJECTION, SOLUTION INTRAMUSCULAR; INTRAVENOUS at 23:19

## 2017-05-11 RX ADMIN — AMITRIPTYLINE HYDROCHLORIDE 25 MG: 25 TABLET, FILM COATED ORAL at 21:00

## 2017-05-11 RX ADMIN — ASPIRIN 81 MG: 81 TABLET ORAL at 21:00

## 2017-05-11 RX ADMIN — FENTANYL CITRATE 50 MCG: 50 INJECTION INTRAMUSCULAR; INTRAVENOUS at 17:15

## 2017-05-11 RX ADMIN — HYDROMORPHONE HYDROCHLORIDE 0.5 MG: 1 INJECTION, SOLUTION INTRAMUSCULAR; INTRAVENOUS; SUBCUTANEOUS at 23:11

## 2017-05-12 VITALS
BODY MASS INDEX: 39.76 KG/M2 | HEART RATE: 101 BPM | HEIGHT: 70 IN | WEIGHT: 277.7 LBS | RESPIRATION RATE: 14 BRPM | SYSTOLIC BLOOD PRESSURE: 142 MMHG | OXYGEN SATURATION: 87 % | TEMPERATURE: 97.6 F | DIASTOLIC BLOOD PRESSURE: 98 MMHG

## 2017-05-12 PROBLEM — K43.2 INCISIONAL HERNIA: Status: RESOLVED | Noted: 2017-05-11 | Resolved: 2017-05-12

## 2017-05-12 LAB
ANION GAP SERPL CALCULATED.3IONS-SCNC: 14.9 MMOL/L
BASOPHILS # BLD AUTO: 0.02 10*3/MM3 (ref 0–0.2)
BASOPHILS NFR BLD AUTO: 0.2 % (ref 0–1.5)
BUN BLD-MCNC: 15 MG/DL (ref 6–20)
BUN/CREAT SERPL: 10.5 (ref 7–25)
CALCIUM SPEC-SCNC: 9.4 MG/DL (ref 8.6–10.5)
CHLORIDE SERPL-SCNC: 96 MMOL/L (ref 98–107)
CO2 SERPL-SCNC: 24.1 MMOL/L (ref 22–29)
CREAT BLD-MCNC: 1.43 MG/DL (ref 0.76–1.27)
DEPRECATED RDW RBC AUTO: 45.8 FL (ref 37–54)
EOSINOPHIL # BLD AUTO: 0.17 10*3/MM3 (ref 0–0.7)
EOSINOPHIL NFR BLD AUTO: 1.5 % (ref 0.3–6.2)
ERYTHROCYTE [DISTWIDTH] IN BLOOD BY AUTOMATED COUNT: 13.3 % (ref 11.5–14.5)
GFR SERPL CREATININE-BSD FRML MDRD: 54 ML/MIN/1.73
GLUCOSE BLD-MCNC: 117 MG/DL (ref 65–99)
GLUCOSE BLDC GLUCOMTR-MCNC: 117 MG/DL (ref 70–130)
HCT VFR BLD AUTO: 47.1 % (ref 40.4–52.2)
HGB BLD-MCNC: 16.1 G/DL (ref 13.7–17.6)
IMM GRANULOCYTES # BLD: 0.04 10*3/MM3 (ref 0–0.03)
IMM GRANULOCYTES NFR BLD: 0.3 % (ref 0–0.5)
LYMPHOCYTES # BLD AUTO: 2.7 10*3/MM3 (ref 0.9–4.8)
LYMPHOCYTES NFR BLD AUTO: 23.5 % (ref 19.6–45.3)
MCH RBC QN AUTO: 32.8 PG (ref 27–32.7)
MCHC RBC AUTO-ENTMCNC: 34.2 G/DL (ref 32.6–36.4)
MCV RBC AUTO: 95.9 FL (ref 79.8–96.2)
MONOCYTES # BLD AUTO: 1.01 10*3/MM3 (ref 0.2–1.2)
MONOCYTES NFR BLD AUTO: 8.8 % (ref 5–12)
NEUTROPHILS # BLD AUTO: 7.54 10*3/MM3 (ref 1.9–8.1)
NEUTROPHILS NFR BLD AUTO: 65.7 % (ref 42.7–76)
PLATELET # BLD AUTO: 174 10*3/MM3 (ref 140–500)
PMV BLD AUTO: 11.6 FL (ref 6–12)
POTASSIUM BLD-SCNC: 4.3 MMOL/L (ref 3.5–5.2)
RBC # BLD AUTO: 4.91 10*6/MM3 (ref 4.6–6)
SODIUM BLD-SCNC: 135 MMOL/L (ref 136–145)
WBC NRBC COR # BLD: 11.48 10*3/MM3 (ref 4.5–10.7)

## 2017-05-12 PROCEDURE — 25010000002 ENOXAPARIN PER 10 MG: Performed by: SURGERY

## 2017-05-12 PROCEDURE — 80048 BASIC METABOLIC PNL TOTAL CA: CPT | Performed by: SURGERY

## 2017-05-12 PROCEDURE — G0378 HOSPITAL OBSERVATION PER HR: HCPCS

## 2017-05-12 PROCEDURE — 90732 PPSV23 VACC 2 YRS+ SUBQ/IM: CPT | Performed by: SURGERY

## 2017-05-12 PROCEDURE — 25010000002 HYDROMORPHONE PER 4 MG: Performed by: SURGERY

## 2017-05-12 PROCEDURE — G0009 ADMIN PNEUMOCOCCAL VACCINE: HCPCS | Performed by: SURGERY

## 2017-05-12 PROCEDURE — 94799 UNLISTED PULMONARY SVC/PX: CPT

## 2017-05-12 PROCEDURE — 94640 AIRWAY INHALATION TREATMENT: CPT

## 2017-05-12 PROCEDURE — 82962 GLUCOSE BLOOD TEST: CPT

## 2017-05-12 PROCEDURE — 25010000002 PNEUMOCOCCAL VAC POLYVALENT PER 0.5 ML: Performed by: SURGERY

## 2017-05-12 PROCEDURE — 85025 COMPLETE CBC W/AUTO DIFF WBC: CPT | Performed by: SURGERY

## 2017-05-12 RX ORDER — OXYCODONE HYDROCHLORIDE AND ACETAMINOPHEN 5; 325 MG/1; MG/1
2 TABLET ORAL EVERY 4 HOURS PRN
Qty: 40 TABLET | Refills: 0 | Status: SHIPPED | OUTPATIENT
Start: 2017-05-12 | End: 2017-06-21

## 2017-05-12 RX ORDER — LISINOPRIL 10 MG/1
10 TABLET ORAL EVERY MORNING
Qty: 30 TABLET | Refills: 1 | OUTPATIENT
Start: 2017-05-12 | End: 2017-09-04 | Stop reason: SDUPTHER

## 2017-05-12 RX ADMIN — NEBIVOLOL HYDROCHLORIDE 5 MG: 5 TABLET ORAL at 08:00

## 2017-05-12 RX ADMIN — OXYCODONE HYDROCHLORIDE AND ACETAMINOPHEN 2 TABLET: 5; 325 TABLET ORAL at 02:11

## 2017-05-12 RX ADMIN — OXYCODONE HYDROCHLORIDE AND ACETAMINOPHEN 2 TABLET: 5; 325 TABLET ORAL at 08:00

## 2017-05-12 RX ADMIN — BUDESONIDE AND FORMOTEROL FUMARATE DIHYDRATE 2 PUFF: 80; 4.5 AEROSOL RESPIRATORY (INHALATION) at 08:39

## 2017-05-12 RX ADMIN — LEVOTHYROXINE SODIUM 112 MCG: 112 TABLET ORAL at 07:02

## 2017-05-12 RX ADMIN — ENOXAPARIN SODIUM 40 MG: 40 INJECTION SUBCUTANEOUS at 08:00

## 2017-05-12 RX ADMIN — ALBUTEROL SULFATE 2.5 MG: 2.5 SOLUTION RESPIRATORY (INHALATION) at 03:29

## 2017-05-12 RX ADMIN — GABAPENTIN 800 MG: 800 TABLET ORAL at 08:00

## 2017-05-12 RX ADMIN — HYDROMORPHONE HYDROCHLORIDE 0.5 MG: 1 INJECTION, SOLUTION INTRAMUSCULAR; INTRAVENOUS; SUBCUTANEOUS at 02:53

## 2017-05-12 RX ADMIN — PANTOPRAZOLE SODIUM 40 MG: 40 TABLET, DELAYED RELEASE ORAL at 08:00

## 2017-05-12 RX ADMIN — CLINDAMYCIN PHOSPHATE 600 MG: 12 INJECTION, SOLUTION INTRAMUSCULAR; INTRAVENOUS at 07:02

## 2017-05-12 RX ADMIN — PNEUMOCOCCAL VACCINE POLYVALENT 0.5 ML
25; 25; 25; 25; 25; 25; 25; 25; 25; 25; 25; 25; 25; 25; 25; 25; 25; 25; 25; 25; 25; 25; 25 INJECTION, SOLUTION INTRAMUSCULAR; SUBCUTANEOUS at 09:04

## 2017-05-12 RX ADMIN — HYDROMORPHONE HYDROCHLORIDE 0.5 MG: 1 INJECTION, SOLUTION INTRAMUSCULAR; INTRAVENOUS; SUBCUTANEOUS at 05:00

## 2017-05-12 RX ADMIN — HYDROMORPHONE HYDROCHLORIDE 0.5 MG: 1 INJECTION, SOLUTION INTRAMUSCULAR; INTRAVENOUS; SUBCUTANEOUS at 07:02

## 2017-05-12 RX ADMIN — HYDROMORPHONE HYDROCHLORIDE 0.5 MG: 1 INJECTION, SOLUTION INTRAMUSCULAR; INTRAVENOUS; SUBCUTANEOUS at 00:55

## 2017-05-12 RX ADMIN — LISINOPRIL 10 MG: 10 TABLET ORAL at 08:00

## 2017-08-29 ENCOUNTER — OFFICE VISIT (OUTPATIENT)
Dept: ENDOCRINOLOGY | Age: 47
End: 2017-08-29

## 2017-08-29 VITALS
WEIGHT: 275 LBS | SYSTOLIC BLOOD PRESSURE: 132 MMHG | HEIGHT: 70 IN | DIASTOLIC BLOOD PRESSURE: 88 MMHG | BODY MASS INDEX: 39.37 KG/M2

## 2017-08-29 DIAGNOSIS — K86.1 OTHER CHRONIC PANCREATITIS (HCC): ICD-10-CM

## 2017-08-29 DIAGNOSIS — I10 ESSENTIAL HYPERTENSION: ICD-10-CM

## 2017-08-29 DIAGNOSIS — R73.9 HYPERGLYCEMIA: ICD-10-CM

## 2017-08-29 DIAGNOSIS — E03.8 SECONDARY HYPOTHYROIDISM: ICD-10-CM

## 2017-08-29 DIAGNOSIS — IMO0002 UNCONTROLLED TYPE 2 DIABETES MELLITUS WITH COMPLICATION, WITHOUT LONG-TERM CURRENT USE OF INSULIN: ICD-10-CM

## 2017-08-29 DIAGNOSIS — E55.9 VITAMIN D DEFICIENCY: ICD-10-CM

## 2017-08-29 DIAGNOSIS — E67.3 HYPERVITAMINOSIS D: ICD-10-CM

## 2017-08-29 DIAGNOSIS — IMO0002 UNCONTROLLED TYPE 2 DIABETES MELLITUS WITH COMPLICATION, WITHOUT LONG-TERM CURRENT USE OF INSULIN: Primary | ICD-10-CM

## 2017-08-29 DIAGNOSIS — E78.2 MIXED HYPERLIPIDEMIA: ICD-10-CM

## 2017-08-29 DIAGNOSIS — R68.89 ABNORMAL ENDOCRINE LABORATORY TEST FINDING: ICD-10-CM

## 2017-08-29 PROCEDURE — 99214 OFFICE O/P EST MOD 30 MIN: CPT | Performed by: NURSE PRACTITIONER

## 2017-08-29 RX ORDER — SYRINGE-NEEDLE,INSULIN,0.5 ML 27GX1/2"
SYRINGE, EMPTY DISPOSABLE MISCELLANEOUS
Qty: 200 EACH | Refills: 11 | Status: SHIPPED | OUTPATIENT
Start: 2017-08-29 | End: 2017-12-04 | Stop reason: SDUPTHER

## 2017-08-29 NOTE — PROGRESS NOTES
Ricardo Pereira  presents to the office  for the follow-up appointment for Type 2 diabetes mellitus.     The diabete's condition location is throughout the system with the  clinical course has fluctuated, the severity is Moderate, and the modifying/allievating factors are none.  Medications and  Dosages were  reviewed with Ricardo Pereira and suggested that noncompliance some of the time.    Associated symptoms of hyperglycemia have been irritability.  Associated symptoms of hypoglycemia have been irritability and fatigue. Patient reports  hypoglycemia threshold for symptoms is 80 mg/dl .     The patient is currently on nothing.    Compliance with blood glucose monitoring: good.     Meal panning: The patient is using carbohydrate counting, but is not on a specified limit, being a pump user.    The patient is currently taking home blood tests - Blood glucose testin times daily, that are:     Last instructions given were:   Pump changes  Basal changes will be 12 AM 1.8, 12 PM 1.85  Carb ratio one unit for every 5 g  Insulin sensitivity 1 unit for every 18 with target 110 through 120  Active insulin 4 hours.      Home blood glucose testing daily: 0    Last reported blood glucose readings are:   Home blood glucose testing daily: 4  insulin pump upload -Yes  -adherence-good.  1 override appears to be after eaten a small amount more and bolused at that time.  Average blood glucose 195+ or -38.  Blood glucose readings per day 2.2 times.  94% of the time as above target 0% below target.  90 g daily plus or -13.  Total daily dose of insulin's 67.7 units with 61% of it being basal 39% of that BM bolus.  Sensor meter over view shows an average glucose is between 151 mg/Donna and 295 mg/Donna.  Patient is only bolusing with meals.  Logbook is equally all other reports.    Patterns reported per patient are that he has not had any medication at all because he cannot afford it and he states he cannot get in contact with our office.     "      The following portions of the patient's history were reviewed and updated as appropriate: current medications, past family history, past medical history, past social history, past surgical history and problem list.      Current Outpatient Prescriptions:   •  albuterol (PROVENTIL HFA;VENTOLIN HFA) 108 (90 BASE) MCG/ACT inhaler, Inhale 2 puffs every 4 (four) hours as needed for wheezing., Disp: , Rfl:   •  amitriptyline (ELAVIL) 25 MG tablet, Take 1 tablet by mouth Every Night., Disp: 30 tablet, Rfl: 4  •  aspirin 81 MG EC tablet, Take 81 mg by mouth daily., Disp: , Rfl:   •  VIDA MICROLET LANCETS lancets, Test blood glucose 8 times daily, Disp: 250 each, Rfl: 5  •  budesonide-formoterol (SYMBICORT) 80-4.5 MCG/ACT inhaler, Inhale 2 puffs 2 (two) times a day., Disp: , Rfl:   •  BYSTOLIC 5 MG tablet, Take 5 mg by mouth Every Morning., Disp: , Rfl:   •  fenofibrate 160 MG tablet, Take 160 mg by mouth daily., Disp: , Rfl:   •  gabapentin (NEURONTIN) 800 MG tablet, Take 1 tablet by mouth 3 (Three) Times a Day., Disp: 90 tablet, Rfl: 4  •  glucose blood (VIDA CONTOUR NEXT TEST) test strip, Test blood glucose 8 times daily, Disp: 250 each, Rfl: 5  •  glucose blood test strip, Check blood glucose 5-6 times daily, Disp: 180 each, Rfl: 5  •  insulin lispro (HUMALOG) 100 UNIT/ML injection, Inject up to 100 units daily via insulin pump, Disp: 30 mL, Rfl: 2  •  insulin NPH (NOVOLIN N) 100 UNIT/ML injection, 50 units twice daily titrate as instructed, Disp: 20 mL, Rfl: 3  •  insulin regular (humuLIN R,novoLIN R) 100 UNIT/ML injection, 10 units before each meal with sliding scale, Disp: 20 mL, Rfl: 3  •  Insulin Syringe-Needle U-100 (SAFETY INSULIN SYRINGES) 27G X 1/2\" 1 ML misc, 5 inj daily, Disp: 200 each, Rfl: 11  •  lisinopril (PRINIVIL,ZESTRIL) 10 MG tablet, Take 1 tablet by mouth Every Morning., Disp: 30 tablet, Rfl: 1  •  pantoprazole (PROTONIX) 40 MG EC tablet, Take 40 mg by mouth daily., Disp: , Rfl:   •  " "rosuvastatin (CRESTOR) 20 MG tablet, Take 1 tablet by mouth Every Night., Disp: 30 tablet, Rfl: 4  •  SYNTHROID 112 MCG tablet, Take 1 tablet by mouth Daily. On an empty stomach, Disp: 30 tablet, Rfl: 3    Patient Active Problem List    Diagnosis   • Uncontrolled type 2 diabetes mellitus with complication, without long-term current use of insulin [E11.8, E11.65]   • Vitamin D deficiency [E55.9]   • Chronic pancreatitis [K86.1]   • Mixed hyperlipidemia [E78.2]   • Abnormal endocrine laboratory test finding [R68.89]   • Secondary hypothyroidism [E03.8]   • Hyperglycemia [R73.9]   • Essential hypertension [I10]       Review of Systems   A comprehensive review of the 14 systems was negative except of listed below:  Constitutional: fatigue and weight loss 2* lb in 2 pounds in months**   Eyes: positive for visual disturbance  Respiratory: positive for dyspnea at rest and dyspnea on exertion  Cardiovascular: positive for cough, dyspnea and exertional chest pressure/discomfort  Genitourinary:positive for frequency and nocturia  Musculoskeletal:positive for back pain, bone pain and muscle weakness  Neurological: positive for weakness and numbness and tingling in feet and hands  Behavioral/Psych: positive for depression, fatigue, mood swings, sleep disturbance and tearful  Endocrine: diabetic symptoms including blurry vision, increased fatigue, polydipsia, polyphagia and polyuria  hyperglycemia     Objective:     Wt Readings from Last 3 Encounters:   08/29/17 275 lb (125 kg)   05/11/17 277 lb 11.2 oz (126 kg)   05/02/17 280 lb (127 kg)     Temp Readings from Last 3 Encounters:   05/12/17 97.6 °F (36.4 °C) (Oral)   05/02/17 98.3 °F (36.8 °C) (Tympanic)   09/10/16 96.9 °F (36.1 °C) (Tympanic)     BP Readings from Last 3 Encounters:   08/29/17 132/88   05/12/17 142/98   05/02/17 126/71     Pulse Readings from Last 3 Encounters:   05/12/17 101   05/02/17 98   09/10/16 81        /88  Ht 70\" (177.8 cm)  Wt 275 lb (125 kg)  " "BMI 39.46 kg/m2    General appearance:  alert, cooperative, delirious, fatigued, nourished\" \"appears stated age and cooperative\" \"NAD   Neck: no carotid bruit, supple, symmetrical, trachea midline and thyroid not enlarged, symmetric, no tenderness/mass/nodules   Thyroid:  no palpable nodule, no enlargement, no tenderness   Lung:  diminished breath sounds base - jai, bibasilar and bilaterally, rales bibasilar and bilaterally and wheezes bibasilar and bilaterally   Heart: regularly irregular rhythm      Abdomen:  normal bowel sounds- 4 quads, soft non-tender, contour - rounded and contour - obese      Extremities: extremities normal, atraumatic, no cyanosis or edema extremities normal, atraumatic, no cyanosis or edema\" WNL - gait and station, strength and stability\"       Skin:   Pulses:  warm and dry, no hyperpigmentation, normal skin coloring, or suspicious lesions   2+ and symmetric   Neuro: Alert and oriented x3. Gait normal. Reflexes and motor strength normal and symmetric. Cranial nerves 2-12 and sensation grossly intact.      Psych: behavior - abnormal - sad and tearful, judgement - normal, mood abnormal - wanting to give up, affect - normal, suicidal ideation without plan, no homicidal ideation, no suicidal ideation                 Lab Review  Results for orders placed or performed in visit on 08/29/17   C-Peptide   Result Value Ref Range    C-Peptide 10.9 (H) 1.1 - 4.4 ng/mL   Comprehensive Metabolic Panel   Result Value Ref Range    Glucose 128 (H) 65 - 99 mg/dL    BUN 11 6 - 20 mg/dL    Creatinine 1.02 0.76 - 1.27 mg/dL    eGFR Non African Am 79 >60 mL/min/1.73    eGFR African Am 96 >60 mL/min/1.73    BUN/Creatinine Ratio 10.8 7.0 - 25.0    Sodium 140 136 - 145 mmol/L    Potassium 4.3 3.5 - 5.2 mmol/L    Chloride 100 98 - 107 mmol/L    Total CO2 24.5 22.0 - 29.0 mmol/L    Calcium 10.0 8.6 - 10.5 mg/dL    Total Protein 7.4 6.0 - 8.5 g/dL    Albumin 4.50 3.50 - 5.20 g/dL    Globulin 2.9 gm/dL    A/G Ratio 1.6 " "g/dL    Total Bilirubin 0.4 0.1 - 1.2 mg/dL    Alkaline Phosphatase 100 39 - 117 U/L    AST (SGOT) 45 (H) 1 - 40 U/L    ALT (SGPT) 70 (H) 1 - 41 U/L   Hemoglobin A1c   Result Value Ref Range    Hemoglobin A1C 8.20 (H) 4.80 - 5.60 %   Insulin, Total   Result Value Ref Range    Insulin 77.0 (H) 2.6 - 24.9 uIU/mL   Lipid Panel   Result Value Ref Range    Total Cholesterol 220 (H) 0 - 200 mg/dL    Triglycerides 558 (H) 0 - 150 mg/dL    HDL Cholesterol 22 (L) 40 - 60 mg/dL    VLDL Cholesterol CANCELED mg/dL    LDL Cholesterol  CANCELED mg/dL   Uric Acid   Result Value Ref Range    Uric Acid 6.6 3.4 - 7.0 mg/dL   Vitamin D 25 Hydroxy   Result Value Ref Range    25 Hydroxy, Vitamin D 26.4 (L) 30.0 - 100.0 ng/mL   TSH   Result Value Ref Range    TSH 2.180 0.270 - 4.200 mIU/mL   T4, Free   Result Value Ref Range    Free T4 1.45 0.93 - 1.70 ng/dL   T3, Free   Result Value Ref Range    T3, Free 3.6 2.0 - 4.4 pg/mL   Microalbumin / Creatinine Urine Ratio   Result Value Ref Range    Creatinine, Urine 153.2 Not Estab. mg/dL    Microalbumin, Urine 14.7 Not Estab. ug/mL    Microalbumin/Creatinine Ratio Urine 9.6 0.0 - 30.0 mg/g creat           Assessment:   Ricardo was seen today for follow-up.    Diagnoses and all orders for this visit:    Uncontrolled type 2 diabetes mellitus with complication, without long-term current use of insulin  -     Discontinue: insulin NPH (NOVOLIN N) 100 UNIT/ML injection; 50 units twice daily titrate as instructed  -     Discontinue: insulin regular (humuLIN R,novoLIN R) 100 UNIT/ML injection; 10 units before each meal with sliding scale  -     Insulin Syringe-Needle U-100 (SAFETY INSULIN SYRINGES) 27G X 1/2\" 1 ML misc; 5 inj daily  -     C-Peptide  -     Comprehensive Metabolic Panel  -     Hemoglobin A1c  -     Insulin, Total  -     Microalbumin / Creatinine Urine Ratio  -     Uric Acid  -     Vitamin D 25 Hydroxy  -     TSH  -     T4, Free  -     T3, Free    Vitamin D deficiency  -     Comprehensive " Metabolic Panel    Other chronic pancreatitis  -     Comprehensive Metabolic Panel    Mixed hyperlipidemia  -     Comprehensive Metabolic Panel  -     Lipid Panel    Abnormal endocrine laboratory test finding  -     Comprehensive Metabolic Panel    Secondary hypothyroidism  -     Comprehensive Metabolic Panel    Hyperglycemia  -     Comprehensive Metabolic Panel    Essential hypertension  -     Comprehensive Metabolic Panel    Hypervitaminosis D   -     Vitamin D 25 Hydroxy    Other orders  -     Microalbumin / Creatinine Urine Ratio          Plan:   In summary: Patient is without insurance at this time with the exception of Medicare.  Is without any of his medications.  Patient states that he has not applied for any type of assistance.  Patient was instructed on how to go and apply for assistance for drugs online also if unable to get assistance that he might have to go to one of the clinics such as [facility] or the clinics here in [facility] to assist him with his medications.  Today's change was to get him back on some form of his insulins.  Medications:  NPH 50 units in the morning increase 2 units every day if before evening meal is over 120 mg/Donna  NPH 50 units in the evening before evening meal increase 2 units every day if fasting blood glucoses over 110 mg/Donna    Regular insulin 12 units before each meal with 1 unit for every 30 points above 120 mg/Donna       Education:  interpretation of lab results, blood sugar goals, complications of diabetes mellitus, hypoglycemia prevention and treatment, exercise, illness management, self-monitoring of blood glucose skills, nutrition, carbohydrate counting, site rotation, use of insulin pen, insulin adjustments, self-injection of insulin, use of sliding scale/correction formula and use of insulin: carb ratio    home blood tests -  Blood glucose testin times daily, that are:  fasting- 1st thing in morning before eating or drinking  before each meal and 1 or 2 hours  after meal  bedtime  anytime you feel symptoms of hyperglycemia or hypoglycemia (high or low blood sugars)       Office visit 25 minutes with additional education given 13 minutes - SMBG with goals, medication changes with purposes and s/e profiles.  The importance of medication as instructed.  Medication assistance programs.  Free clinics here in [place].      Return in about 3 months (around 11/29/2017). 3 Months with Rosemarie-1 week prior for labs 6 months with Dr. Gottlieb-one week prior for labs        Dragon transcription disclaimer     Much of this encounter note is an electronic transcription/translation of spoken language to printed text. The electronic translation of spoken language may permit erroneous, or at times, nonsensical words or phrases to be inadvertently transcribed. Although I have reviewed the note for such errors, some may still exist.

## 2017-08-29 NOTE — PATIENT INSTRUCTIONS
NPH 50 units in the morning increase 2 units every day if before evening meal is over 120 mg/Donna  NPH 50 units in the evening before evening meal increase 2 units every day if fasting blood glucoses over 110 mg/Donna    Regular insulin 12 units before each meal with 1 unit for every 30 points above 120 mg/Donna

## 2017-08-30 LAB
25(OH)D3+25(OH)D2 SERPL-MCNC: 26.4 NG/ML (ref 30–100)
ALBUMIN SERPL-MCNC: 4.5 G/DL (ref 3.5–5.2)
ALBUMIN/CREAT UR: 9.6 MG/G CREAT (ref 0–30)
ALBUMIN/GLOB SERPL: 1.6 G/DL
ALP SERPL-CCNC: 100 U/L (ref 39–117)
ALT SERPL-CCNC: 70 U/L (ref 1–41)
AST SERPL-CCNC: 45 U/L (ref 1–40)
BILIRUB SERPL-MCNC: 0.4 MG/DL (ref 0.1–1.2)
BUN SERPL-MCNC: 11 MG/DL (ref 6–20)
BUN/CREAT SERPL: 10.8 (ref 7–25)
C PEPTIDE SERPL-MCNC: 10.9 NG/ML (ref 1.1–4.4)
CALCIUM SERPL-MCNC: 10 MG/DL (ref 8.6–10.5)
CHLORIDE SERPL-SCNC: 100 MMOL/L (ref 98–107)
CHOLEST SERPL-MCNC: 220 MG/DL (ref 0–200)
CO2 SERPL-SCNC: 24.5 MMOL/L (ref 22–29)
CREAT SERPL-MCNC: 1.02 MG/DL (ref 0.76–1.27)
CREAT UR-MCNC: 153.2 MG/DL
GLOBULIN SER CALC-MCNC: 2.9 GM/DL
GLUCOSE SERPL-MCNC: 128 MG/DL (ref 65–99)
HBA1C MFR BLD: 8.2 % (ref 4.8–5.6)
HDLC SERPL-MCNC: 22 MG/DL (ref 40–60)
INSULIN SERPL-ACNC: 77 UIU/ML (ref 2.6–24.9)
LDLC SERPL CALC-MCNC: ABNORMAL MG/DL
MICROALBUMIN UR-MCNC: 14.7 UG/ML
POTASSIUM SERPL-SCNC: 4.3 MMOL/L (ref 3.5–5.2)
PROT SERPL-MCNC: 7.4 G/DL (ref 6–8.5)
SODIUM SERPL-SCNC: 140 MMOL/L (ref 136–145)
T3FREE SERPL-MCNC: 3.6 PG/ML (ref 2–4.4)
T4 FREE SERPL-MCNC: 1.45 NG/DL (ref 0.93–1.7)
TRIGL SERPL-MCNC: 558 MG/DL (ref 0–150)
TSH SERPL DL<=0.005 MIU/L-ACNC: 2.18 MIU/ML (ref 0.27–4.2)
URATE SERPL-MCNC: 6.6 MG/DL (ref 3.4–7)
VLDLC SERPL CALC-MCNC: ABNORMAL MG/DL

## 2017-09-04 DIAGNOSIS — E55.9 VITAMIN D DEFICIENCY: ICD-10-CM

## 2017-09-04 DIAGNOSIS — E78.2 MIXED HYPERLIPIDEMIA: Primary | ICD-10-CM

## 2017-09-04 DIAGNOSIS — E03.8 SECONDARY HYPOTHYROIDISM: ICD-10-CM

## 2017-09-04 DIAGNOSIS — IMO0002 UNCONTROLLED TYPE 2 DIABETES MELLITUS WITH COMPLICATION, WITHOUT LONG-TERM CURRENT USE OF INSULIN: ICD-10-CM

## 2017-09-04 RX ORDER — ERGOCALCIFEROL 1.25 MG/1
50000 CAPSULE ORAL WEEKLY
Qty: 12 CAPSULE | Refills: 3 | Status: SHIPPED | OUTPATIENT
Start: 2017-09-04 | End: 2017-12-04 | Stop reason: SDUPTHER

## 2017-09-04 RX ORDER — LEVOTHYROXINE SODIUM 112 MCG
112 TABLET ORAL DAILY
Qty: 30 TABLET | Refills: 4 | Status: SHIPPED | OUTPATIENT
Start: 2017-09-04 | End: 2017-12-06 | Stop reason: SDUPTHER

## 2017-09-04 RX ORDER — CHLORAL HYDRATE 500 MG
2000 CAPSULE ORAL 2 TIMES DAILY WITH MEALS
Qty: 120 EACH | Refills: 4 | Status: SHIPPED | OUTPATIENT
Start: 2017-09-04 | End: 2017-12-04 | Stop reason: SDUPTHER

## 2017-09-04 RX ORDER — LISINOPRIL 10 MG/1
20 TABLET ORAL EVERY MORNING
Qty: 30 TABLET | Refills: 4 | OUTPATIENT
Start: 2017-09-04 | End: 2017-12-04 | Stop reason: SDUPTHER

## 2017-11-27 ENCOUNTER — LAB (OUTPATIENT)
Dept: ENDOCRINOLOGY | Age: 47
End: 2017-11-27

## 2017-11-27 DIAGNOSIS — E03.8 SECONDARY HYPOTHYROIDISM: ICD-10-CM

## 2017-11-27 DIAGNOSIS — IMO0002 UNCONTROLLED TYPE 2 DIABETES MELLITUS WITH COMPLICATION, WITHOUT LONG-TERM CURRENT USE OF INSULIN: ICD-10-CM

## 2017-11-27 DIAGNOSIS — E78.2 MIXED HYPERLIPIDEMIA: ICD-10-CM

## 2017-11-27 DIAGNOSIS — E55.9 VITAMIN D DEFICIENCY: ICD-10-CM

## 2017-11-27 DIAGNOSIS — E78.2 MIXED HYPERLIPIDEMIA: Primary | ICD-10-CM

## 2017-12-04 ENCOUNTER — OFFICE VISIT (OUTPATIENT)
Dept: ENDOCRINOLOGY | Age: 47
End: 2017-12-04

## 2017-12-04 VITALS
SYSTOLIC BLOOD PRESSURE: 134 MMHG | WEIGHT: 282 LBS | BODY MASS INDEX: 40.37 KG/M2 | DIASTOLIC BLOOD PRESSURE: 88 MMHG | HEIGHT: 70 IN

## 2017-12-04 DIAGNOSIS — IMO0002 DIABETES MELLITUS TYPE 2, UNCONTROLLED, WITH COMPLICATIONS: ICD-10-CM

## 2017-12-04 DIAGNOSIS — E08.43 DIABETIC AUTONOMIC NEUROPATHY ASSOCIATED WITH DIABETES MELLITUS DUE TO UNDERLYING CONDITION (HCC): ICD-10-CM

## 2017-12-04 DIAGNOSIS — IMO0002 UNCONTROLLED TYPE 2 DIABETES MELLITUS WITH COMPLICATION, WITHOUT LONG-TERM CURRENT USE OF INSULIN: ICD-10-CM

## 2017-12-04 DIAGNOSIS — E78.2 MIXED HYPERLIPIDEMIA: ICD-10-CM

## 2017-12-04 DIAGNOSIS — Z46.81 INSULIN PUMP TITRATION: ICD-10-CM

## 2017-12-04 PROCEDURE — 99214 OFFICE O/P EST MOD 30 MIN: CPT | Performed by: NURSE PRACTITIONER

## 2017-12-04 RX ORDER — CHLORAL HYDRATE 500 MG
2000 CAPSULE ORAL 2 TIMES DAILY WITH MEALS
Qty: 120 EACH | Refills: 4 | Status: ON HOLD | OUTPATIENT
Start: 2017-12-04 | End: 2018-03-06

## 2017-12-04 RX ORDER — SYRINGE-NEEDLE,INSULIN,0.5 ML 27GX1/2"
SYRINGE, EMPTY DISPOSABLE MISCELLANEOUS
Qty: 200 EACH | Refills: 11 | Status: ON HOLD | OUTPATIENT
Start: 2017-12-04 | End: 2018-03-06

## 2017-12-04 RX ORDER — ERGOCALCIFEROL 1.25 MG/1
50000 CAPSULE ORAL WEEKLY
Qty: 12 CAPSULE | Refills: 3 | Status: SHIPPED | OUTPATIENT
Start: 2017-12-04 | End: 2018-09-11 | Stop reason: SDUPTHER

## 2017-12-04 RX ORDER — LISINOPRIL 10 MG/1
20 TABLET ORAL EVERY MORNING
Qty: 30 TABLET | Refills: 4 | OUTPATIENT
Start: 2017-12-04 | End: 2017-12-06 | Stop reason: SDUPTHER

## 2017-12-04 RX ORDER — GABAPENTIN 800 MG/1
800 TABLET ORAL 3 TIMES DAILY
Qty: 90 TABLET | Refills: 4 | Status: SHIPPED | OUTPATIENT
Start: 2017-12-04 | End: 2018-09-11 | Stop reason: SDUPTHER

## 2017-12-04 RX ORDER — ASPIRIN 81 MG/1
81 TABLET ORAL DAILY
Qty: 90 TABLET | Refills: 1 | Status: SHIPPED | OUTPATIENT
Start: 2017-12-04 | End: 2021-11-10

## 2017-12-04 RX ORDER — ATORVASTATIN CALCIUM 20 MG/1
20 TABLET, FILM COATED ORAL DAILY
Qty: 30 TABLET | Refills: 4 | Status: SHIPPED | OUTPATIENT
Start: 2017-12-04 | End: 2018-09-11 | Stop reason: SDUPTHER

## 2017-12-04 NOTE — PROGRESS NOTES
Ricardo Pereira  presents to the office  for the follow-up appointment for Type 2 diabetes mellitus.     The diabete's condition location is throughout the system with the  clinical course has fluctuated, the severity is Mild, and the modifying/allievating factors are insulin.  Medications and  Dosages were  reviewed with Ricardo Pereira and suggested that compliance most of the time.    Associated symptoms of hyperglycemia have been irritability.  Associated symptoms of hypoglycemia have been irritabilty and fatigue. Patient reports  hypoglycemia threshold for symptoms is 80 mg/dl .     The patient is currently on insulin.     Compliance with blood glucose monitoring: good.     Meal panning: The patient is using avoidance of concentrated sweets.    The patient is currently taking home blood tests - Blood glucose testing: 3 times daily, that are:  fasting- 1st thing in morning before eating or drinking  before each meal   basal insulIn  NPH, Novolin N 50 units in AM  NPH, Novolin N 50 units in PM  Regular 10 units with each meal     Last instructions given were:   NPH 50 units in the morning increase 2 units every day if before evening meal is over 120 mg/Donna  NPH 50 units in the evening before evening meal increase 2 units every day if fasting blood glucoses over 110 mg/Donna     Regular insulin 12 units before each meal with 1 unit for every 30 points above 120 mg/Donna    Home blood glucose testing daily: 3  FB to 210 mg/dl  before lunch 80 to 242 mg/dl  before dinner/supper 200 to 243 mg/dl    Last reported blood glucose readings are:   Home blood glucose testing daily: 0    Patterns reported per patient are that he is unable afford any medication other that his relion insulin from [facility] so that is all he has been taking.          The following portions of the patient's history were reviewed and updated as appropriate: current medications, past family history, past medical history, past social history, past  "surgical history and problem list.      Current Outpatient Prescriptions:   •  aspirin 81 MG EC tablet, Take 1 tablet by mouth Daily., Disp: 90 tablet, Rfl: 1  •  budesonide-formoterol (SYMBICORT) 80-4.5 MCG/ACT inhaler, Inhale 2 puffs 2 (two) times a day., Disp: , Rfl:   •  BYSTOLIC 5 MG tablet, Take 5 mg by mouth Every Morning., Disp: , Rfl:   •  gabapentin (NEURONTIN) 800 MG tablet, Take 1 tablet by mouth 3 (Three) Times a Day., Disp: 90 tablet, Rfl: 4  •  glucose blood (VIDA CONTOUR NEXT TEST) test strip, Test blood glucose 8 times daily, Disp: 250 each, Rfl: 5  •  glucose blood test strip, Check blood glucose 5-6 times daily, Disp: 180 each, Rfl: 5  •  insulin NPH (NOVOLIN N) 100 UNIT/ML injection, 50 units twice daily titrate as instructed, Disp: 20 mL, Rfl: 3  •  insulin regular (humuLIN R,novoLIN R) 100 UNIT/ML injection, 10 units before each meal with sliding scale, Disp: 20 mL, Rfl: 3  •  Insulin Syringe-Needle U-100 (SAFETY INSULIN SYRINGES) 27G X 1/2\" 1 ML misc, 5 inj daily, Disp: 200 each, Rfl: 11  •  lisinopril (PRINIVIL,ZESTRIL) 10 MG tablet, Take 2 tablets by mouth Every Morning., Disp: 30 tablet, Rfl: 4  •  Omega-3 Fatty Acids (FISH OIL) 1000 MG capsule capsule, Take 2 capsules by mouth 2 (Two) Times a Day With Meals., Disp: 120 each, Rfl: 4  •  pantoprazole (PROTONIX) 40 MG EC tablet, Take 40 mg by mouth daily., Disp: , Rfl:   •  SYNTHROID 112 MCG tablet, Take 1 tablet by mouth Daily. On an empty stomach, Disp: 30 tablet, Rfl: 4  •  vitamin D (ERGOCALCIFEROL) 39824 units capsule capsule, Take 1 capsule by mouth 1 (One) Time Per Week., Disp: 12 capsule, Rfl: 3  •  atorvastatin (LIPITOR) 20 MG tablet, Take 1 tablet by mouth Daily., Disp: 30 tablet, Rfl: 4    Patient Active Problem List    Diagnosis   • Uncontrolled type 2 diabetes mellitus with complication, without long-term current use of insulin [E11.8, E11.65]   • Vitamin D deficiency [E55.9]   • Chronic pancreatitis [K86.1]   • Mixed " "hyperlipidemia [E78.2]   • Abnormal endocrine laboratory test finding [R68.89]   • Secondary hypothyroidism [E03.8]   • Hyperglycemia [R73.9]   • Essential hypertension [I10]       Review of Systems   A comprehensive review of the 14 systems was negative except of listed below:  Constitutional: fatigue  Respiratory: positive for dyspnea at rest and dyspnea on exertion  Cardiovascular: positive for chest pain, chest pressure/discomfort, dyspnea, exertional chest pressure/discomfort and fatigue  Gastrointestinal: positive for change in bowel habits, dyspepsia, dysphagia, nausea and reflux symptoms  Genitourinary:positive for frequency and nocturia  Neurological: positive for weakness and numbness and tingling in feet & hands- jai  Behavioral/Psych: positive for fatigue and sleep disturbance  Endocrine: diabetic symptoms including blurry vision, increased fatigue, polydipsia, polyphagia and polyuria  weight gain  hyperglycemia     Objective:     Wt Readings from Last 3 Encounters:   12/04/17 282 lb (128 kg)   08/29/17 275 lb (125 kg)   05/11/17 277 lb 11.2 oz (126 kg)     Temp Readings from Last 3 Encounters:   05/12/17 97.6 °F (36.4 °C) (Oral)   05/02/17 98.3 °F (36.8 °C) (Tympanic)   09/10/16 96.9 °F (36.1 °C) (Tympanic)     BP Readings from Last 3 Encounters:   12/04/17 134/88   08/29/17 132/88   05/12/17 142/98     Pulse Readings from Last 3 Encounters:   05/12/17 101   05/02/17 98   09/10/16 81        /88  Ht 70\" (177.8 cm)  Wt 282 lb (128 kg)  BMI 40.46 kg/m2    General appearance:  alert, cooperative, delirious, fatigued, nourished\" \"appears stated age and cooperative\" \"NAD   Neck: no carotid bruit, supple, symmetrical, trachea midline and thyroid not enlarged, symmetric, no tenderness/mass/nodules   Thyroid:  no palpable nodule, no enlargement, no tenderness   Lung:  \"clear to auscultation bilaterally\" \"no abnormal breath sounds  \" effort was normal, no labored breath, no use of accessory muscles. " "  Heart: regular rate and rhythm, S1, S2 normal, no murmur, click, rub or gallop      Abdomen:  normal bowel sounds- 4 quads, soft non-tender, contour - rounded and contour - obese      Extremities: extremities normal, atraumatic, no cyanosis or edema extremities normal, atraumatic, no cyanosis or edema\" WNL - gait and station, strength and stability\"       Skin:   Pulses:  warm and dry, no hyperpigmentation, normal skin coloring, or suspicious lesions   2+ and symmetric   Neuro: Alert and oriented x3. Gait normal. Reflexes and motor strength normal and symmetric. Cranial nerves 2-12 and sensation grossly intact.      Psych: behavior - normal, judgement - normal, mood - normal, affect - normal                 Lab Review  Results for orders placed or performed in visit on 08/29/17   C-Peptide   Result Value Ref Range    C-Peptide 10.9 (H) 1.1 - 4.4 ng/mL   Comprehensive Metabolic Panel   Result Value Ref Range    Glucose 128 (H) 65 - 99 mg/dL    BUN 11 6 - 20 mg/dL    Creatinine 1.02 0.76 - 1.27 mg/dL    eGFR Non African Am 79 >60 mL/min/1.73    eGFR African Am 96 >60 mL/min/1.73    BUN/Creatinine Ratio 10.8 7.0 - 25.0    Sodium 140 136 - 145 mmol/L    Potassium 4.3 3.5 - 5.2 mmol/L    Chloride 100 98 - 107 mmol/L    Total CO2 24.5 22.0 - 29.0 mmol/L    Calcium 10.0 8.6 - 10.5 mg/dL    Total Protein 7.4 6.0 - 8.5 g/dL    Albumin 4.50 3.50 - 5.20 g/dL    Globulin 2.9 gm/dL    A/G Ratio 1.6 g/dL    Total Bilirubin 0.4 0.1 - 1.2 mg/dL    Alkaline Phosphatase 100 39 - 117 U/L    AST (SGOT) 45 (H) 1 - 40 U/L    ALT (SGPT) 70 (H) 1 - 41 U/L   Hemoglobin A1c   Result Value Ref Range    Hemoglobin A1C 8.20 (H) 4.80 - 5.60 %   Insulin, Total   Result Value Ref Range    Insulin 77.0 (H) 2.6 - 24.9 uIU/mL   Lipid Panel   Result Value Ref Range    Total Cholesterol 220 (H) 0 - 200 mg/dL    Triglycerides 558 (H) 0 - 150 mg/dL    HDL Cholesterol 22 (L) 40 - 60 mg/dL    VLDL Cholesterol CANCELED mg/dL    LDL Cholesterol  CANCELED " "mg/dL   Uric Acid   Result Value Ref Range    Uric Acid 6.6 3.4 - 7.0 mg/dL   Vitamin D 25 Hydroxy   Result Value Ref Range    25 Hydroxy, Vitamin D 26.4 (L) 30.0 - 100.0 ng/mL   TSH   Result Value Ref Range    TSH 2.180 0.270 - 4.200 mIU/mL   T4, Free   Result Value Ref Range    Free T4 1.45 0.93 - 1.70 ng/dL   T3, Free   Result Value Ref Range    T3, Free 3.6 2.0 - 4.4 pg/mL   Microalbumin / Creatinine Urine Ratio   Result Value Ref Range    Creatinine, Urine 153.2 Not Estab. mg/dL    Microalbumin, Urine 14.7 Not Estab. ug/mL    Microalbumin/Creatinine Ratio Urine 9.6 0.0 - 30.0 mg/g creat     Labs dated November 27, 2017 glucose is 233 mg/Donna, cholesterol 211 mg/Donna, triglycerides 442, HDLs 21 LDLs not tested, TSH 2.5, T4 9 0.3, T3 uptake 25, free T3 2 0.3, A1c 7.7, free T4 1 0.3, C-peptide 13.4, vitamin D 17.9      Assessment:   Ricardo was seen today for follow-up.    Diagnoses and all orders for this visit:    Diabetic autonomic neuropathy associated with diabetes mellitus due to underlying condition  -     gabapentin (NEURONTIN) 800 MG tablet; Take 1 tablet by mouth 3 (Three) Times a Day.  -     aspirin 81 MG EC tablet; Take 1 tablet by mouth Daily.    Mixed hyperlipidemia  -     Omega-3 Fatty Acids (FISH OIL) 1000 MG capsule capsule; Take 2 capsules by mouth 2 (Two) Times a Day With Meals.  -     vitamin D (ERGOCALCIFEROL) 58917 units capsule capsule; Take 1 capsule by mouth 1 (One) Time Per Week.  -     atorvastatin (LIPITOR) 20 MG tablet; Take 1 tablet by mouth Daily.    Uncontrolled type 2 diabetes mellitus with complication, without long-term current use of insulin  -     insulin NPH (NOVOLIN N) 100 UNIT/ML injection; 50 units twice daily titrate as instructed  -     insulin regular (humuLIN R,novoLIN R) 100 UNIT/ML injection; 10 units before each meal with sliding scale  -     Insulin Syringe-Needle U-100 (SAFETY INSULIN SYRINGES) 27G X 1/2\" 1 ML misc; 5 inj daily  -     lisinopril (PRINIVIL,ZESTRIL) 10 " MG tablet; Take 2 tablets by mouth Every Morning.  -     aspirin 81 MG EC tablet; Take 1 tablet by mouth Daily.  -     glucose blood (VIDA CONTOUR NEXT TEST) test strip; Test blood glucose 8 times daily  -     glucose blood test strip; Check blood glucose 5-6 times daily    Insulin pump titration  -     glucose blood test strip; Check blood glucose 5-6 times daily        Plan:   In Summary:I met with the patient today who is metabolically stable.  Patient reports several complaints with abdominal bloating discomfort post meals and chest pressure intermittently not related to meals sporadically no patterns.  Patient does have an appointment this afternoon with primary care.  Referred this work up to that appointment.  Patient stated that that was one of the things that was going to be worked up.  Patient also disclosed to this provider that in January for Medicare with medication coverage will start.  Patient has been without any Medicare or insurance coverage for the last 3-4 months.  Has not been taking any medication with the exception of his plan changes that was done upon last visit.  At this time several medications were restarted at [facility].  Patient states that he will go get them if possible in January when medication coverage restarts.  Titration of insulin as listed below was done today.      Medication changes:  NPH 55  units in the morning increase 2 units every day if before evening meal is over 120 mg/Donna    NPH 55 units in the evening before evening meal increase 2 units every day if fasting blood glucoses over 110 mg/Donna    Regular insulin 15 units before each meal with 1 unit for every 25 points above 120 mg/Donna        Education:  interpretation of lab results, blood sugar goals, complications of diabetes mellitus, hypoglycemia prevention and treatment, exercise, illness management, self-monitoring of blood glucose skills, nutrition, carbohydrate counting, site rotation, use of insulin pen,  insulin adjustments, self-injection of insulin, use of sliding scale/correction formula and use of insulin: carb ratio    Additional instructions given  home blood tests -  Blood glucose testin times daily, that are:  fasting- 1st thing in morning before eating or drinking  before each meal and 1 or 2 hours after meal  bedtime  anytime you feel symptoms of hyperglycemia or hypoglycemia (high or low blood sugars)    Office visit 25  minutes with additional education given 13 minutes - SMBG with goals, medication changes with purposes and s/e profiles.       Return in about 3 months (around 3/4/2018), or if symptoms worsen or fail to improve, for Recheck. 3  Months with Rosemarie-1 week prior for labs 6 months with Dr. Gottlieb-one week prior for labs        Dragon transcription disclaimer     Much of this encounter note is an electronic transcription/translation of spoken language to printed text. The electronic translation of spoken language may permit erroneous, or at times, nonsensical words or phrases to be inadvertently transcribed. Although I have reviewed the note for such errors, some may still exist.

## 2017-12-04 NOTE — PATIENT INSTRUCTIONS
NPH 55  units in the morning increase 2 units every day if before evening meal is over 120 mg/Donna    NPH 55 units in the evening before evening meal increase 2 units every day if fasting blood glucoses over 110 mg/Donna    Regular insulin 15 units before each meal with 1 unit for every 25 points above 120 mg/Donna

## 2017-12-05 RX ORDER — LISINOPRIL 10 MG/1
TABLET ORAL
Qty: 30 TABLET | Refills: 3 | OUTPATIENT
Start: 2017-12-05

## 2017-12-06 DIAGNOSIS — E03.8 SECONDARY HYPOTHYROIDISM: ICD-10-CM

## 2017-12-06 DIAGNOSIS — IMO0002 UNCONTROLLED TYPE 2 DIABETES MELLITUS WITH COMPLICATION, WITHOUT LONG-TERM CURRENT USE OF INSULIN: ICD-10-CM

## 2017-12-06 LAB
25(OH)D3+25(OH)D2 SERPL-MCNC: 17.9 NG/ML (ref 30–100)
ALBUMIN SERPL-MCNC: 4.2 G/DL (ref 3.5–5.2)
ALBUMIN/GLOB SERPL: 1.8 G/DL
ALP SERPL-CCNC: 90 U/L (ref 39–117)
ALT SERPL-CCNC: 52 U/L (ref 1–41)
AST SERPL-CCNC: 40 U/L (ref 1–40)
BILIRUB SERPL-MCNC: 0.4 MG/DL (ref 0.1–1.2)
BUN SERPL-MCNC: 13 MG/DL (ref 6–20)
BUN/CREAT SERPL: 11.3 (ref 7–25)
C PEPTIDE SERPL-MCNC: 13.4 NG/ML (ref 1.1–4.4)
CALCIUM SERPL-MCNC: 9.4 MG/DL (ref 8.6–10.5)
CHLORIDE SERPL-SCNC: 100 MMOL/L (ref 98–107)
CHOLEST SERPL-MCNC: 211 MG/DL (ref 0–200)
CO2 SERPL-SCNC: 23.8 MMOL/L (ref 22–29)
CREAT SERPL-MCNC: 1.15 MG/DL (ref 0.76–1.27)
FT4I SERPL CALC-MCNC: 2.3 (ref 1.2–4.9)
GFR SERPLBLD CREATININE-BSD FMLA CKD-EPI: 69 ML/MIN/1.73
GFR SERPLBLD CREATININE-BSD FMLA CKD-EPI: 83 ML/MIN/1.73
GLOBULIN SER CALC-MCNC: 2.4 GM/DL
GLUCOSE SERPL-MCNC: 233 MG/DL (ref 65–99)
HBA1C MFR BLD: 7.7 % (ref 4.8–5.6)
HDLC SERPL-MCNC: 21 MG/DL (ref 40–60)
LDLC SERPL CALC-MCNC: ABNORMAL MG/DL
POTASSIUM SERPL-SCNC: 4.8 MMOL/L (ref 3.5–5.2)
PROT SERPL-MCNC: 6.6 G/DL (ref 6–8.5)
SODIUM SERPL-SCNC: 138 MMOL/L (ref 136–145)
T3FREE SERPL-MCNC: 3 PG/ML (ref 2–4.4)
T3RU NFR SERPL: 25 % (ref 24–39)
T4 FREE SERPL-MCNC: 1.3 NG/DL (ref 0.93–1.7)
T4 SERPL-MCNC: 9.3 UG/DL (ref 4.5–12)
THYROGLOB AB SERPL-ACNC: <1 IU/ML (ref 0–0.9)
THYROGLOB SERPL-MCNC: 40 NG/ML
TRIGL SERPL-MCNC: 442 MG/DL (ref 0–150)
TSH SERPL DL<=0.005 MIU/L-ACNC: 2.51 UIU/ML (ref 0.45–4.5)
VLDLC SERPL CALC-MCNC: ABNORMAL MG/DL

## 2017-12-06 RX ORDER — LEVOTHYROXINE SODIUM 112 UG/1
112 TABLET ORAL DAILY
Qty: 30 TABLET | Refills: 4 | Status: SHIPPED | OUTPATIENT
Start: 2017-12-06 | End: 2018-09-11 | Stop reason: SDUPTHER

## 2017-12-06 RX ORDER — LISINOPRIL 10 MG/1
20 TABLET ORAL EVERY MORNING
Qty: 30 TABLET | Refills: 4 | OUTPATIENT
Start: 2017-12-06 | End: 2018-01-24 | Stop reason: SDUPTHER

## 2017-12-06 RX ORDER — LEVOTHYROXINE SODIUM 112 MCG
112 TABLET ORAL DAILY
Qty: 30 TABLET | Refills: 4 | Status: SHIPPED | OUTPATIENT
Start: 2017-12-06 | End: 2017-12-06 | Stop reason: SDUPTHER

## 2018-01-23 DIAGNOSIS — IMO0002 UNCONTROLLED TYPE 2 DIABETES MELLITUS WITH COMPLICATION, WITHOUT LONG-TERM CURRENT USE OF INSULIN: ICD-10-CM

## 2018-01-23 RX ORDER — HUMAN INSULIN 100 [IU]/ML
INJECTION, SUSPENSION SUBCUTANEOUS
Qty: 30 ML | Refills: 3 | Status: SHIPPED | OUTPATIENT
Start: 2018-01-23 | End: 2018-02-19 | Stop reason: SDUPTHER

## 2018-01-24 DIAGNOSIS — IMO0002 UNCONTROLLED TYPE 2 DIABETES MELLITUS WITH COMPLICATION, WITHOUT LONG-TERM CURRENT USE OF INSULIN: ICD-10-CM

## 2018-01-24 RX ORDER — LISINOPRIL 10 MG/1
20 TABLET ORAL EVERY MORNING
Qty: 30 TABLET | Refills: 4 | Status: SHIPPED | OUTPATIENT
Start: 2018-01-24 | End: 2018-09-11 | Stop reason: SDUPTHER

## 2018-01-30 ENCOUNTER — APPOINTMENT (OUTPATIENT)
Dept: GENERAL RADIOLOGY | Facility: HOSPITAL | Age: 48
End: 2018-01-30

## 2018-01-30 ENCOUNTER — HOSPITAL ENCOUNTER (EMERGENCY)
Facility: HOSPITAL | Age: 48
Discharge: HOME | End: 2018-01-30
Attending: EMERGENCY MEDICINE | Admitting: EMERGENCY MEDICINE

## 2018-01-30 VITALS
HEIGHT: 70 IN | OXYGEN SATURATION: 98 % | HEART RATE: 84 BPM | WEIGHT: 285 LBS | BODY MASS INDEX: 40.8 KG/M2 | TEMPERATURE: 97.7 F | RESPIRATION RATE: 18 BRPM | DIASTOLIC BLOOD PRESSURE: 82 MMHG | SYSTOLIC BLOOD PRESSURE: 130 MMHG

## 2018-01-30 DIAGNOSIS — G89.18 POST-OPERATIVE PAIN: Primary | ICD-10-CM

## 2018-01-30 DIAGNOSIS — L60.0 INGROWN LEFT BIG TOENAIL: ICD-10-CM

## 2018-01-30 LAB — GLUCOSE BLDC GLUCOMTR-MCNC: 289 MG/DL (ref 70–130)

## 2018-01-30 PROCEDURE — 99284 EMERGENCY DEPT VISIT MOD MDM: CPT

## 2018-01-30 PROCEDURE — 82962 GLUCOSE BLOOD TEST: CPT

## 2018-01-30 PROCEDURE — 73630 X-RAY EXAM OF FOOT: CPT

## 2018-01-30 PROCEDURE — 99283 EMERGENCY DEPT VISIT LOW MDM: CPT

## 2018-01-30 RX ORDER — CEPHALEXIN 500 MG/1
500 CAPSULE ORAL 3 TIMES DAILY
Qty: 21 CAPSULE | Refills: 0 | Status: SHIPPED | OUTPATIENT
Start: 2018-01-30 | End: 2018-02-09

## 2018-01-30 RX ORDER — HYDROCODONE BITARTRATE AND ACETAMINOPHEN 5; 325 MG/1; MG/1
1 TABLET ORAL EVERY 6 HOURS PRN
Qty: 12 TABLET | Refills: 0 | Status: ON HOLD | OUTPATIENT
Start: 2018-01-30 | End: 2018-03-06

## 2018-01-30 RX ORDER — HYDROCODONE BITARTRATE AND ACETAMINOPHEN 7.5; 325 MG/1; MG/1
1 TABLET ORAL ONCE
Status: COMPLETED | OUTPATIENT
Start: 2018-01-30 | End: 2018-01-30

## 2018-01-30 RX ADMIN — HYDROCODONE BITARTRATE AND ACETAMINOPHEN 1 TABLET: 7.5; 325 TABLET ORAL at 22:56

## 2018-02-19 ENCOUNTER — PRIOR AUTHORIZATION (OUTPATIENT)
Dept: ENDOCRINOLOGY | Age: 48
End: 2018-02-19

## 2018-02-19 DIAGNOSIS — IMO0002 UNCONTROLLED TYPE 2 DIABETES MELLITUS WITH COMPLICATION, WITHOUT LONG-TERM CURRENT USE OF INSULIN: ICD-10-CM

## 2018-02-19 NOTE — TELEPHONE ENCOUNTER
PT'S PA FOR NOVOLIN N RELION WAS SUBMITTED TO [employer] ON 2/19/18 AND WAS APPROVED ON 2/19/18. PHARMACY WAS NOTIFIED.

## 2018-03-01 DIAGNOSIS — E03.8 SECONDARY HYPOTHYROIDISM: ICD-10-CM

## 2018-03-01 DIAGNOSIS — IMO0002 UNCONTROLLED TYPE 2 DIABETES MELLITUS WITH COMPLICATION, WITHOUT LONG-TERM CURRENT USE OF INSULIN: ICD-10-CM

## 2018-03-01 DIAGNOSIS — E55.9 VITAMIN D DEFICIENCY: ICD-10-CM

## 2018-03-01 DIAGNOSIS — E78.2 MIXED HYPERLIPIDEMIA: Primary | ICD-10-CM

## 2018-03-01 DIAGNOSIS — R68.89 ABNORMAL ENDOCRINE LABORATORY TEST FINDING: ICD-10-CM

## 2018-03-05 ENCOUNTER — HOSPITAL ENCOUNTER (INPATIENT)
Facility: HOSPITAL | Age: 48
LOS: 4 days | Discharge: HOME | End: 2018-03-10
Attending: EMERGENCY MEDICINE | Admitting: INTERNAL MEDICINE

## 2018-03-05 ENCOUNTER — APPOINTMENT (OUTPATIENT)
Dept: GENERAL RADIOLOGY | Facility: HOSPITAL | Age: 48
End: 2018-03-05

## 2018-03-05 DIAGNOSIS — J44.1 ACUTE EXACERBATION OF CHRONIC OBSTRUCTIVE PULMONARY DISEASE (COPD) (HCC): Primary | ICD-10-CM

## 2018-03-05 DIAGNOSIS — J06.9 UPPER RESPIRATORY TRACT INFECTION, UNSPECIFIED TYPE: ICD-10-CM

## 2018-03-05 LAB
ALBUMIN SERPL-MCNC: 3.9 G/DL (ref 3.5–5.2)
ALBUMIN/GLOB SERPL: 1.2 G/DL
ALP SERPL-CCNC: 109 U/L (ref 39–117)
ALT SERPL W P-5'-P-CCNC: 77 U/L (ref 1–41)
ANION GAP SERPL CALCULATED.3IONS-SCNC: 12.3 MMOL/L
AST SERPL-CCNC: 76 U/L (ref 1–40)
BASOPHILS # BLD AUTO: 0.03 10*3/MM3 (ref 0–0.2)
BASOPHILS NFR BLD AUTO: 0.3 % (ref 0–1.5)
BILIRUB SERPL-MCNC: 0.4 MG/DL (ref 0.1–1.2)
BUN BLD-MCNC: 12 MG/DL (ref 6–20)
BUN/CREAT SERPL: 10.5 (ref 7–25)
CALCIUM SPEC-SCNC: 9.7 MG/DL (ref 8.6–10.5)
CHLORIDE SERPL-SCNC: 99 MMOL/L (ref 98–107)
CO2 SERPL-SCNC: 26.7 MMOL/L (ref 22–29)
CREAT BLD-MCNC: 1.14 MG/DL (ref 0.76–1.27)
D DIMER PPP FEU-MCNC: <0.27 MCGFEU/ML (ref 0–0.49)
DEPRECATED RDW RBC AUTO: 44.8 FL (ref 37–54)
EOSINOPHIL # BLD AUTO: 0.28 10*3/MM3 (ref 0–0.7)
EOSINOPHIL NFR BLD AUTO: 2.9 % (ref 0.3–6.2)
ERYTHROCYTE [DISTWIDTH] IN BLOOD BY AUTOMATED COUNT: 13 % (ref 11.5–14.5)
GFR SERPL CREATININE-BSD FRML MDRD: 69 ML/MIN/1.73
GLOBULIN UR ELPH-MCNC: 3.3 GM/DL
GLUCOSE BLD-MCNC: 267 MG/DL (ref 65–99)
HCT VFR BLD AUTO: 47.7 % (ref 40.4–52.2)
HGB BLD-MCNC: 16.3 G/DL (ref 13.7–17.6)
HOLD SPECIMEN: NORMAL
HOLD SPECIMEN: NORMAL
IMM GRANULOCYTES # BLD: 0.02 10*3/MM3 (ref 0–0.03)
IMM GRANULOCYTES NFR BLD: 0.2 % (ref 0–0.5)
LYMPHOCYTES # BLD AUTO: 2.97 10*3/MM3 (ref 0.9–4.8)
LYMPHOCYTES NFR BLD AUTO: 31.3 % (ref 19.6–45.3)
MCH RBC QN AUTO: 32.9 PG (ref 27–32.7)
MCHC RBC AUTO-ENTMCNC: 34.2 G/DL (ref 32.6–36.4)
MCV RBC AUTO: 96.2 FL (ref 79.8–96.2)
MONOCYTES # BLD AUTO: 0.74 10*3/MM3 (ref 0.2–1.2)
MONOCYTES NFR BLD AUTO: 7.8 % (ref 5–12)
NEUTROPHILS # BLD AUTO: 5.46 10*3/MM3 (ref 1.9–8.1)
NEUTROPHILS NFR BLD AUTO: 57.5 % (ref 42.7–76)
NT-PROBNP SERPL-MCNC: 16 PG/ML (ref 5–450)
PLATELET # BLD AUTO: 151 10*3/MM3 (ref 140–500)
PMV BLD AUTO: 12.6 FL (ref 6–12)
POTASSIUM BLD-SCNC: 4.3 MMOL/L (ref 3.5–5.2)
PROT SERPL-MCNC: 7.2 G/DL (ref 6–8.5)
RBC # BLD AUTO: 4.96 10*6/MM3 (ref 4.6–6)
SODIUM BLD-SCNC: 138 MMOL/L (ref 136–145)
TROPONIN T SERPL-MCNC: <0.01 NG/ML (ref 0–0.03)
WBC NRBC COR # BLD: 9.5 10*3/MM3 (ref 4.5–10.7)
WHOLE BLOOD HOLD SPECIMEN: NORMAL
WHOLE BLOOD HOLD SPECIMEN: NORMAL

## 2018-03-05 PROCEDURE — 80053 COMPREHEN METABOLIC PANEL: CPT | Performed by: EMERGENCY MEDICINE

## 2018-03-05 PROCEDURE — 83036 HEMOGLOBIN GLYCOSYLATED A1C: CPT | Performed by: INTERNAL MEDICINE

## 2018-03-05 PROCEDURE — 71046 X-RAY EXAM CHEST 2 VIEWS: CPT

## 2018-03-05 PROCEDURE — 82962 GLUCOSE BLOOD TEST: CPT

## 2018-03-05 PROCEDURE — 93010 ELECTROCARDIOGRAM REPORT: CPT | Performed by: INTERNAL MEDICINE

## 2018-03-05 PROCEDURE — 36415 COLL VENOUS BLD VENIPUNCTURE: CPT | Performed by: EMERGENCY MEDICINE

## 2018-03-05 PROCEDURE — 85025 COMPLETE CBC W/AUTO DIFF WBC: CPT | Performed by: EMERGENCY MEDICINE

## 2018-03-05 PROCEDURE — 94799 UNLISTED PULMONARY SVC/PX: CPT

## 2018-03-05 PROCEDURE — 85379 FIBRIN DEGRADATION QUANT: CPT | Performed by: EMERGENCY MEDICINE

## 2018-03-05 PROCEDURE — 99285 EMERGENCY DEPT VISIT HI MDM: CPT

## 2018-03-05 PROCEDURE — 83880 ASSAY OF NATRIURETIC PEPTIDE: CPT | Performed by: EMERGENCY MEDICINE

## 2018-03-05 PROCEDURE — 25010000002 METHYLPREDNISOLONE PER 125 MG: Performed by: EMERGENCY MEDICINE

## 2018-03-05 PROCEDURE — 93005 ELECTROCARDIOGRAM TRACING: CPT

## 2018-03-05 PROCEDURE — 84484 ASSAY OF TROPONIN QUANT: CPT | Performed by: EMERGENCY MEDICINE

## 2018-03-05 PROCEDURE — 94640 AIRWAY INHALATION TREATMENT: CPT

## 2018-03-05 RX ORDER — HYDROCODONE BITARTRATE AND ACETAMINOPHEN 7.5; 325 MG/1; MG/1
1 TABLET ORAL ONCE
Status: COMPLETED | OUTPATIENT
Start: 2018-03-05 | End: 2018-03-05

## 2018-03-05 RX ORDER — METHYLPREDNISOLONE SODIUM SUCCINATE 125 MG/2ML
125 INJECTION, POWDER, LYOPHILIZED, FOR SOLUTION INTRAMUSCULAR; INTRAVENOUS ONCE
Status: COMPLETED | OUTPATIENT
Start: 2018-03-05 | End: 2018-03-05

## 2018-03-05 RX ORDER — ALBUTEROL SULFATE 2.5 MG/3ML
2.5 SOLUTION RESPIRATORY (INHALATION)
Status: COMPLETED | OUTPATIENT
Start: 2018-03-05 | End: 2018-03-05

## 2018-03-05 RX ORDER — SODIUM CHLORIDE 0.9 % (FLUSH) 0.9 %
10 SYRINGE (ML) INJECTION AS NEEDED
Status: DISCONTINUED | OUTPATIENT
Start: 2018-03-05 | End: 2018-03-10 | Stop reason: HOSPADM

## 2018-03-05 RX ORDER — ALBUTEROL SULFATE 2.5 MG/3ML
2.5 SOLUTION RESPIRATORY (INHALATION) EVERY 4 HOURS PRN
COMMUNITY
End: 2018-03-10 | Stop reason: HOSPADM

## 2018-03-05 RX ADMIN — ALBUTEROL SULFATE 2.5 MG: 2.5 SOLUTION RESPIRATORY (INHALATION) at 22:42

## 2018-03-05 RX ADMIN — ALBUTEROL SULFATE 2.5 MG: 2.5 SOLUTION RESPIRATORY (INHALATION) at 22:53

## 2018-03-05 RX ADMIN — METHYLPREDNISOLONE SODIUM SUCCINATE 125 MG: 125 INJECTION, POWDER, FOR SOLUTION INTRAMUSCULAR; INTRAVENOUS at 22:39

## 2018-03-05 RX ADMIN — HYDROCODONE BITARTRATE AND ACETAMINOPHEN 1 TABLET: 7.5; 325 TABLET ORAL at 22:38

## 2018-03-05 RX ADMIN — ALBUTEROL SULFATE 2.5 MG: 2.5 SOLUTION RESPIRATORY (INHALATION) at 23:05

## 2018-03-06 PROBLEM — J44.9 COPD (CHRONIC OBSTRUCTIVE PULMONARY DISEASE): Status: ACTIVE | Noted: 2018-03-06

## 2018-03-06 PROBLEM — J44.1 ACUTE EXACERBATION OF CHRONIC OBSTRUCTIVE PULMONARY DISEASE (COPD): Status: ACTIVE | Noted: 2018-03-06

## 2018-03-06 LAB
B PERT DNA SPEC QL NAA+PROBE: NOT DETECTED
C PNEUM DNA NPH QL NAA+NON-PROBE: NOT DETECTED
FLUAV H1 2009 PAND RNA NPH QL NAA+PROBE: NOT DETECTED
FLUAV H1 HA GENE NPH QL NAA+PROBE: NOT DETECTED
FLUAV H3 RNA NPH QL NAA+PROBE: NOT DETECTED
FLUAV SUBTYP SPEC NAA+PROBE: NOT DETECTED
FLUBV RNA ISLT QL NAA+PROBE: NOT DETECTED
GLUCOSE BLDC GLUCOMTR-MCNC: 280 MG/DL (ref 70–130)
GLUCOSE BLDC GLUCOMTR-MCNC: 351 MG/DL (ref 70–130)
GLUCOSE BLDC GLUCOMTR-MCNC: 423 MG/DL (ref 70–130)
GLUCOSE BLDC GLUCOMTR-MCNC: 435 MG/DL (ref 70–130)
GLUCOSE BLDC GLUCOMTR-MCNC: 439 MG/DL (ref 70–130)
GLUCOSE BLDC GLUCOMTR-MCNC: 457 MG/DL (ref 70–130)
GLUCOSE BLDC GLUCOMTR-MCNC: 463 MG/DL (ref 70–130)
HADV DNA SPEC NAA+PROBE: NOT DETECTED
HBA1C MFR BLD: 9.3 % (ref 4.8–5.6)
HCOV 229E RNA SPEC QL NAA+PROBE: NOT DETECTED
HCOV HKU1 RNA SPEC QL NAA+PROBE: NOT DETECTED
HCOV NL63 RNA SPEC QL NAA+PROBE: NOT DETECTED
HCOV OC43 RNA SPEC QL NAA+PROBE: NOT DETECTED
HMPV RNA NPH QL NAA+NON-PROBE: NOT DETECTED
HPIV1 RNA SPEC QL NAA+PROBE: NOT DETECTED
HPIV2 RNA SPEC QL NAA+PROBE: NOT DETECTED
HPIV3 RNA NPH QL NAA+PROBE: NOT DETECTED
HPIV4 P GENE NPH QL NAA+PROBE: NOT DETECTED
M PNEUMO IGG SER IA-ACNC: NOT DETECTED
RHINOVIRUS RNA SPEC NAA+PROBE: DETECTED
RSV RNA NPH QL NAA+NON-PROBE: NOT DETECTED
TROPONIN T SERPL-MCNC: <0.01 NG/ML (ref 0–0.03)

## 2018-03-06 PROCEDURE — 94799 UNLISTED PULMONARY SVC/PX: CPT

## 2018-03-06 PROCEDURE — 84484 ASSAY OF TROPONIN QUANT: CPT | Performed by: HOSPITALIST

## 2018-03-06 PROCEDURE — 63710000001 INSULIN ASPART PER 5 UNITS: Performed by: HOSPITALIST

## 2018-03-06 PROCEDURE — 25010000002 ENOXAPARIN PER 10 MG: Performed by: INTERNAL MEDICINE

## 2018-03-06 PROCEDURE — 87486 CHLMYD PNEUM DNA AMP PROBE: CPT | Performed by: INTERNAL MEDICINE

## 2018-03-06 PROCEDURE — 94640 AIRWAY INHALATION TREATMENT: CPT

## 2018-03-06 PROCEDURE — 87633 RESP VIRUS 12-25 TARGETS: CPT | Performed by: INTERNAL MEDICINE

## 2018-03-06 PROCEDURE — 82962 GLUCOSE BLOOD TEST: CPT

## 2018-03-06 PROCEDURE — 25010000002 METHYLPREDNISOLONE PER 125 MG: Performed by: INTERNAL MEDICINE

## 2018-03-06 PROCEDURE — 87798 DETECT AGENT NOS DNA AMP: CPT | Performed by: INTERNAL MEDICINE

## 2018-03-06 PROCEDURE — 63710000001 INSULIN ASPART PER 5 UNITS: Performed by: INTERNAL MEDICINE

## 2018-03-06 PROCEDURE — 25010000002 METHYLPREDNISOLONE PER 125 MG: Performed by: HOSPITALIST

## 2018-03-06 PROCEDURE — 63710000001 INSULIN ISOPHANE HUMAN PER 5 UNITS: Performed by: INTERNAL MEDICINE

## 2018-03-06 PROCEDURE — 87581 M.PNEUMON DNA AMP PROBE: CPT | Performed by: INTERNAL MEDICINE

## 2018-03-06 RX ORDER — ALPRAZOLAM 0.5 MG/1
0.5 TABLET ORAL ONCE
Status: COMPLETED | OUTPATIENT
Start: 2018-03-06 | End: 2018-03-06

## 2018-03-06 RX ORDER — NICOTINE POLACRILEX 4 MG
15 LOZENGE BUCCAL
Status: DISCONTINUED | OUTPATIENT
Start: 2018-03-06 | End: 2018-03-07 | Stop reason: SDUPTHER

## 2018-03-06 RX ORDER — SODIUM CHLORIDE FOR INHALATION 7 %
4 VIAL, NEBULIZER (ML) INHALATION
Status: DISCONTINUED | OUTPATIENT
Start: 2018-03-06 | End: 2018-03-10 | Stop reason: HOSPADM

## 2018-03-06 RX ORDER — ATORVASTATIN CALCIUM 20 MG/1
20 TABLET, FILM COATED ORAL DAILY
Status: DISCONTINUED | OUTPATIENT
Start: 2018-03-06 | End: 2018-03-10 | Stop reason: HOSPADM

## 2018-03-06 RX ORDER — NICOTINE POLACRILEX 4 MG
15 LOZENGE BUCCAL
Status: DISCONTINUED | OUTPATIENT
Start: 2018-03-06 | End: 2018-03-10 | Stop reason: HOSPADM

## 2018-03-06 RX ORDER — DEXTROSE MONOHYDRATE 25 G/50ML
25 INJECTION, SOLUTION INTRAVENOUS
Status: DISCONTINUED | OUTPATIENT
Start: 2018-03-06 | End: 2018-03-10 | Stop reason: HOSPADM

## 2018-03-06 RX ORDER — PANTOPRAZOLE SODIUM 40 MG/1
40 TABLET, DELAYED RELEASE ORAL DAILY
Status: DISCONTINUED | OUTPATIENT
Start: 2018-03-06 | End: 2018-03-10 | Stop reason: HOSPADM

## 2018-03-06 RX ORDER — IPRATROPIUM BROMIDE AND ALBUTEROL SULFATE 2.5; .5 MG/3ML; MG/3ML
3 SOLUTION RESPIRATORY (INHALATION)
Status: DISCONTINUED | OUTPATIENT
Start: 2018-03-06 | End: 2018-03-10 | Stop reason: HOSPADM

## 2018-03-06 RX ORDER — ASPIRIN 81 MG/1
81 TABLET ORAL DAILY
Status: DISCONTINUED | OUTPATIENT
Start: 2018-03-06 | End: 2018-03-10 | Stop reason: HOSPADM

## 2018-03-06 RX ORDER — HYDROCODONE BITARTRATE AND ACETAMINOPHEN 5; 325 MG/1; MG/1
1 TABLET ORAL EVERY 4 HOURS PRN
Status: DISCONTINUED | OUTPATIENT
Start: 2018-03-06 | End: 2018-03-10 | Stop reason: HOSPADM

## 2018-03-06 RX ORDER — BUDESONIDE AND FORMOTEROL FUMARATE DIHYDRATE 80; 4.5 UG/1; UG/1
2 AEROSOL RESPIRATORY (INHALATION)
Status: DISCONTINUED | OUTPATIENT
Start: 2018-03-06 | End: 2018-03-07

## 2018-03-06 RX ORDER — METHYLPREDNISOLONE SODIUM SUCCINATE 125 MG/2ML
60 INJECTION, POWDER, LYOPHILIZED, FOR SOLUTION INTRAMUSCULAR; INTRAVENOUS EVERY 8 HOURS
Status: DISCONTINUED | OUTPATIENT
Start: 2018-03-06 | End: 2018-03-06

## 2018-03-06 RX ORDER — GABAPENTIN 400 MG/1
800 CAPSULE ORAL EVERY 8 HOURS SCHEDULED
Status: DISCONTINUED | OUTPATIENT
Start: 2018-03-06 | End: 2018-03-10 | Stop reason: HOSPADM

## 2018-03-06 RX ORDER — METHYLPREDNISOLONE SODIUM SUCCINATE 125 MG/2ML
80 INJECTION, POWDER, LYOPHILIZED, FOR SOLUTION INTRAMUSCULAR; INTRAVENOUS EVERY 8 HOURS
Status: DISCONTINUED | OUTPATIENT
Start: 2018-03-06 | End: 2018-03-08

## 2018-03-06 RX ORDER — METHYLPREDNISOLONE SODIUM SUCCINATE 125 MG/2ML
60 INJECTION, POWDER, LYOPHILIZED, FOR SOLUTION INTRAMUSCULAR; INTRAVENOUS EVERY 8 HOURS
Status: DISCONTINUED | OUTPATIENT
Start: 2018-03-07 | End: 2018-03-06

## 2018-03-06 RX ORDER — SODIUM CHLORIDE 0.9 % (FLUSH) 0.9 %
1-10 SYRINGE (ML) INJECTION AS NEEDED
Status: DISCONTINUED | OUTPATIENT
Start: 2018-03-06 | End: 2018-03-10 | Stop reason: HOSPADM

## 2018-03-06 RX ORDER — ACETAMINOPHEN 325 MG/1
650 TABLET ORAL EVERY 4 HOURS PRN
Status: DISCONTINUED | OUTPATIENT
Start: 2018-03-06 | End: 2018-03-10 | Stop reason: HOSPADM

## 2018-03-06 RX ORDER — LEVOTHYROXINE SODIUM 112 UG/1
112 TABLET ORAL
Status: DISCONTINUED | OUTPATIENT
Start: 2018-03-06 | End: 2018-03-10 | Stop reason: HOSPADM

## 2018-03-06 RX ORDER — LISINOPRIL 20 MG/1
20 TABLET ORAL EVERY MORNING
Status: DISCONTINUED | OUTPATIENT
Start: 2018-03-06 | End: 2018-03-07

## 2018-03-06 RX ORDER — DEXTROSE MONOHYDRATE 25 G/50ML
25 INJECTION, SOLUTION INTRAVENOUS
Status: DISCONTINUED | OUTPATIENT
Start: 2018-03-06 | End: 2018-03-07 | Stop reason: SDUPTHER

## 2018-03-06 RX ADMIN — SODIUM CHLORIDE SOLN NEBU 7% 4 ML: 7 NEBU SOLN at 12:42

## 2018-03-06 RX ADMIN — GABAPENTIN 800 MG: 400 CAPSULE ORAL at 21:43

## 2018-03-06 RX ADMIN — IPRATROPIUM BROMIDE AND ALBUTEROL SULFATE 3 ML: .5; 3 SOLUTION RESPIRATORY (INHALATION) at 07:58

## 2018-03-06 RX ADMIN — ASPIRIN 81 MG: 81 TABLET ORAL at 10:32

## 2018-03-06 RX ADMIN — INSULIN ASPART 12 UNITS: 100 INJECTION, SOLUTION INTRAVENOUS; SUBCUTANEOUS at 18:07

## 2018-03-06 RX ADMIN — METHYLPREDNISOLONE SODIUM SUCCINATE 80 MG: 125 INJECTION, POWDER, FOR SOLUTION INTRAMUSCULAR; INTRAVENOUS at 14:05

## 2018-03-06 RX ADMIN — INSULIN ASPART 8 UNITS: 100 INJECTION, SOLUTION INTRAVENOUS; SUBCUTANEOUS at 09:50

## 2018-03-06 RX ADMIN — METHYLPREDNISOLONE SODIUM SUCCINATE 60 MG: 125 INJECTION, POWDER, FOR SOLUTION INTRAMUSCULAR; INTRAVENOUS at 05:30

## 2018-03-06 RX ADMIN — HYDROCODONE BITARTRATE AND ACETAMINOPHEN 1 TABLET: 5; 325 TABLET ORAL at 10:35

## 2018-03-06 RX ADMIN — IPRATROPIUM BROMIDE AND ALBUTEROL SULFATE 3 ML: .5; 3 SOLUTION RESPIRATORY (INHALATION) at 23:34

## 2018-03-06 RX ADMIN — INSULIN ASPART 24 UNITS: 100 INJECTION, SOLUTION INTRAVENOUS; SUBCUTANEOUS at 18:04

## 2018-03-06 RX ADMIN — INSULIN ASPART 14 UNITS: 100 INJECTION, SOLUTION INTRAVENOUS; SUBCUTANEOUS at 08:05

## 2018-03-06 RX ADMIN — HYDROCODONE BITARTRATE AND ACETAMINOPHEN 1 TABLET: 5; 325 TABLET ORAL at 19:15

## 2018-03-06 RX ADMIN — INSULIN ASPART 24 UNITS: 100 INJECTION, SOLUTION INTRAVENOUS; SUBCUTANEOUS at 11:23

## 2018-03-06 RX ADMIN — LEVOTHYROXINE SODIUM 112 MCG: 112 TABLET ORAL at 10:32

## 2018-03-06 RX ADMIN — ENOXAPARIN SODIUM 40 MG: 40 INJECTION SUBCUTANEOUS at 10:34

## 2018-03-06 RX ADMIN — METHYLPREDNISOLONE SODIUM SUCCINATE 80 MG: 125 INJECTION, POWDER, FOR SOLUTION INTRAMUSCULAR; INTRAVENOUS at 21:45

## 2018-03-06 RX ADMIN — IPRATROPIUM BROMIDE AND ALBUTEROL SULFATE 3 ML: .5; 3 SOLUTION RESPIRATORY (INHALATION) at 04:43

## 2018-03-06 RX ADMIN — BUDESONIDE AND FORMOTEROL FUMARATE DIHYDRATE 2 PUFF: 80; 4.5 AEROSOL RESPIRATORY (INHALATION) at 21:11

## 2018-03-06 RX ADMIN — ATORVASTATIN CALCIUM 20 MG: 20 TABLET, FILM COATED ORAL at 10:32

## 2018-03-06 RX ADMIN — ALPRAZOLAM 0.5 MG: 0.5 TABLET ORAL at 20:24

## 2018-03-06 RX ADMIN — LISINOPRIL 20 MG: 20 TABLET ORAL at 10:32

## 2018-03-06 RX ADMIN — BUDESONIDE AND FORMOTEROL FUMARATE DIHYDRATE 2 PUFF: 80; 4.5 AEROSOL RESPIRATORY (INHALATION) at 12:43

## 2018-03-06 RX ADMIN — IPRATROPIUM BROMIDE AND ALBUTEROL SULFATE 3 ML: .5; 3 SOLUTION RESPIRATORY (INHALATION) at 15:35

## 2018-03-06 RX ADMIN — INSULIN ASPART 8 UNITS: 100 INJECTION, SOLUTION INTRAVENOUS; SUBCUTANEOUS at 02:06

## 2018-03-06 RX ADMIN — HUMAN INSULIN 30 UNITS: 100 INJECTION, SUSPENSION SUBCUTANEOUS at 08:53

## 2018-03-06 RX ADMIN — INSULIN ASPART 24 UNITS: 100 INJECTION, SOLUTION INTRAVENOUS; SUBCUTANEOUS at 21:40

## 2018-03-06 RX ADMIN — GABAPENTIN 800 MG: 400 CAPSULE ORAL at 14:05

## 2018-03-06 RX ADMIN — HUMAN INSULIN 50 UNITS: 100 INJECTION, SUSPENSION SUBCUTANEOUS at 18:04

## 2018-03-06 RX ADMIN — PANTOPRAZOLE SODIUM 40 MG: 40 TABLET, DELAYED RELEASE ORAL at 09:50

## 2018-03-06 RX ADMIN — INSULIN ASPART 9 UNITS: 100 INJECTION, SOLUTION INTRAVENOUS; SUBCUTANEOUS at 07:36

## 2018-03-06 RX ADMIN — IPRATROPIUM BROMIDE AND ALBUTEROL SULFATE 3 ML: .5; 3 SOLUTION RESPIRATORY (INHALATION) at 21:11

## 2018-03-06 RX ADMIN — INSULIN ASPART 8 UNITS: 100 INJECTION, SOLUTION INTRAVENOUS; SUBCUTANEOUS at 11:24

## 2018-03-06 RX ADMIN — IPRATROPIUM BROMIDE AND ALBUTEROL SULFATE 3 ML: .5; 3 SOLUTION RESPIRATORY (INHALATION) at 12:38

## 2018-03-06 RX ADMIN — HYDROCODONE BITARTRATE AND ACETAMINOPHEN 1 TABLET: 5; 325 TABLET ORAL at 14:49

## 2018-03-06 NOTE — PLAN OF CARE
Problem: Respiratory Insufficiency (Adult)  Goal: Acid/Base Balance  Outcome: Ongoing (interventions implemented as appropriate)   03/06/18 0517   Respiratory Insufficiency (Adult)   Acid/Base Balance making progress toward outcome       Problem: Pain, Acute (Adult)  Goal: Acceptable Pain Control/Comfort Level  Outcome: Ongoing (interventions implemented as appropriate)   03/06/18 0517   Pain, Acute (Adult)   Acceptable Pain Control/Comfort Level making progress toward outcome       Problem: Patient Care Overview (Adult)  Goal: Plan of Care Review  Outcome: Ongoing (interventions implemented as appropriate)   03/06/18 0517   Coping/Psychosocial Response Interventions   Plan Of Care Reviewed With patient   Patient Care Overview   Progress progress toward functional goals as expected   Outcome Evaluation   Outcome Summary/Follow up Plan Pt not sleeping well tonight; steroids, late arrival, etc. Pt may need some basal insulin started in the morning after we check his blood sugar in the morning. I'm not sure why, but his basal insulin was not even ordered.

## 2018-03-06 NOTE — PROGRESS NOTES
Clinical Pharmacy Services: Medication History    Ricardo Pereira is a 47 y.o. male presenting to [facility] for COPD (chronic obstructive pulmonary disease) [J44.9]  Acute exacerbation of chronic obstructive pulmonary disease (COPD) [J44.1]  Upper respiratory tract infection, unspecified type [J06.9]    He  has a past medical history of Chronic pain; COPD (chronic obstructive pulmonary disease); Diabetes mellitus; Diabetic neuropathy; Disease of thyroid gland; Diverticulitis of colon; Fibromyalgia; History of pancreatitis; History of renal cell cancer; Hypertension; and Umbilical hernia.    Allergies as of 03/05/2018 - Sai as Reviewed 03/05/2018   Allergen Reaction Noted   • Penicillins Swelling 04/10/2016       Medication information was obtained from: [facility]  Pharmacy and Phone Number: [id number] - [place] ([place], [place] - [address] [facility] [phone] [facility] [phone] FX    Prior to Admission Medications       Prescriptions Last Dose Informant Patient Reported? Taking?      albuterol (PROVENTIL) (2.5 MG/3ML) 0.083% nebulizer solution  Pharmacy Yes Yes    Take 2.5 mg by nebulization Every 4 (Four) Hours As Needed for Wheezing.    aspirin 81 MG EC tablet  Self No Yes    Take 1 tablet by mouth Daily.    atorvastatin (LIPITOR) 20 MG tablet  Pharmacy No Yes    Take 1 tablet by mouth Daily.    gabapentin (NEURONTIN) 800 MG tablet  Pharmacy No Yes    Take 1 tablet by mouth 3 (Three) Times a Day.    insulin NPH (NOVOLIN N RELION) 100 UNIT/ML injection  Pharmacy No Yes    Inject 50 Units under the skin 2 (Two) Times a Day Before Meals.    insulin regular (humuLIN R,novoLIN R) 100 UNIT/ML injection  Pharmacy No Yes    10 units before each meal with sliding scale    levothyroxine (SYNTHROID) 112 MCG tablet  Pharmacy No Yes    Take 1 tablet by mouth Daily. On an empty stomach    lisinopril (PRINIVIL,ZESTRIL) 10 MG tablet  Pharmacy No Yes    Take 2 tablets by mouth Every Morning.     "pantoprazole (PROTONIX) 40 MG EC tablet  Pharmacy Yes Yes    Take 40 mg by mouth daily.    vitamin D (ERGOCALCIFEROL) 13499 units capsule capsule  Pharmacy No Yes    Take 1 capsule by mouth 1 (One) Time Per Week.    budesonide-formoterol (SYMBICORT) 80-4.5 MCG/ACT inhaler  Pharmacy Yes No    Inhale 2 puffs 2 (two) times a day.                Medication notes: The above medications with \"pharmacy\" informant have been verified with [facility]. The following changes have been made since the initial reconciliation:    1) No changes    Of note, patient claims to be on Gabapentin 800 mg and [place] 5/325 mg. [facility] claims to have filled these prescriptions, but the [facility] scan does not show any recent fills.     This medication list is complete to the best of my knowledge as of 3/6/2018    Please call if questions.    Rufus Augustine, Pharmacy Intern ([id number])  3/6/2018 9:37 AM          "

## 2018-03-06 NOTE — ED NOTES
Pt arrived from PCP office with complaints of increasing SOA, body aches and fever. Pt was seen at Dr Moore's office today and received 2 breathing treatments then referred to ER for further evaluation.  Pt is an insulin dependent diabetic and had insulin R for blood sugar 239. areAudible wheezing noted. Pt SATS when sitting are 97% RA but pt feels like he can't get enough air.     Sylvia Fung RN  03/05/18 2021

## 2018-03-06 NOTE — PLAN OF CARE
Problem: BH Patient Care Overview (Adult)  Goal: Plan of Care Review  Outcome: Ongoing (interventions implemented as appropriate)      Problem: Respiratory Insufficiency (Adult)  Goal: Identify Related Risk Factors and Signs and Symptoms  Outcome: Outcome(s) achieved Date Met: 03/06/18    Goal: Acid/Base Balance  Outcome: Ongoing (interventions implemented as appropriate)    Goal: Effective Ventilation  Outcome: Ongoing (interventions implemented as appropriate)      Problem: Pain, Acute (Adult)  Goal: Identify Related Risk Factors and Signs and Symptoms  Outcome: Outcome(s) achieved Date Met: 03/06/18    Goal: Acceptable Pain Control/Comfort Level  Outcome: Ongoing (interventions implemented as appropriate)      Problem: Patient Care Overview (Adult)  Goal: Plan of Care Review  Outcome: Ongoing (interventions implemented as appropriate)   03/06/18 1723   Coping/Psychosocial Response Interventions   Plan Of Care Reviewed With patient   Patient Care Overview   Progress progress toward functional goals as expected   Outcome Evaluation   Outcome Summary/Follow up Plan pt complains of constant pain in lower legs. pt states pain is severe before admission. lortab given with moderate relief. alert and oriented. up ad sabiha. no signs or symtpoms of distress. pt on 2.5 liters of oxygen. blood sugars elevated. MD notified. insulin dose altered.      Goal: Adult Individualization and Mutuality  Outcome: Ongoing (interventions implemented as appropriate)    Goal: Discharge Needs Assessment  Outcome: Ongoing (interventions implemented as appropriate)

## 2018-03-06 NOTE — PROGRESS NOTES
Discharge Planning Assessment  [facility]     Patient Name: Ricardo Pereira  MRN: [id number]  Today's Date: 3/6/2018    Admit Date: 3/5/2018          Discharge Needs Assessment       03/06/18 1446    Living Environment    Lives With spouse    Living Arrangements house    Home Accessibility no concerns    Stair Railings at Home none    Type of Financial/Environmental Concern none    Transportation Available family or friend will provide;car    Living Environment    Provides Primary Care For no one, unable/limited ability to care for self    Primary Care Provided By spouse/significant other    Quality Of Family Relationships supportive;helpful;involved    Able to Return to Prior Living Arrangements yes    Discharge Needs Assessment    Concerns To Be Addressed denies needs/concerns at this time;no discharge needs identified    Concerns Comments Follow for Home O2    Readmission Within The Last 30 Days no previous admission in last 30 days    Anticipated Changes Related to Illness none    Equipment Currently Used at Home cane, quad;cane, straight;bipap/ cpap    Equipment Needed After Discharge oxygen    Current Discharge Risk chronically ill    Discharge Disposition still a patient            Discharge Plan       03/06/18 1442    Case Management/Social Work Plan    Plan Home with wife-follow for home o2 setup.    Patient/Family In Agreement With Plan yes    Additional Comments Spoke with pt and wife Macrina Pereira ([phone]) at bedside, introduced self and explained CCP role. Verified facesheet and confirmed pharmacy is [facility]. Pt denies any problems with medication cost. Pt states he is independent with adls with either straight cane/quad cane prior to hospitalization. Pt states he does have a CPAP but unable to tolerate it. Pt denies any HH or SNF history. Pt is currently requiring oxygen, CCP to follow for Home O2 setup if needed. Pt states he would use [facility] for his home o2 if needed. Pt denies  Advance Directive,  CCP provided Information leaflet. Pt and wife denies any dc needs or concerns and CCP to follow. Suzie RN/CCP        Discharge Placement     No information found                Demographic Summary       03/06/18 1447    Referral Information    Admission Type inpatient    Arrived From admitted as an inpatient    Referral Source admission list    Reason For Consult discharge planning    Record Reviewed medical record    Contact Information    Permission Granted to Share Information With family/designee    Primary Care Physician Information    Name Dr. Simba Moore            Functional Status     None            Psychosocial     None            Abuse/Neglect     None            Legal     None            Substance Abuse     None            Patient Forms     None          Gayle Viveros, RN

## 2018-03-06 NOTE — ED PROVIDER NOTES
EMERGENCY DEPARTMENT ENCOUNTER       CHIEF COMPLAINT  Chief Complaint: Dyspnea  History given by: Patient, Family  History limited by: N/A  Room Number: 37/37  PMD: Simba Moore MD      HPI:  HPI Comments: Pt has h/o COPD. Pt reports that he has recently had sick contact from his daughter, who had URI-like symptoms last week. Pt presents to the ED c/o intermittent episodes of dyspnea onset about 2-3 days ago. Pt reports that his dyspnea has been gradually worsening since initial onset. Pt states that he has also had generalized myalgias, subjective fever, and wheezes. Pt denies chest pain, palpitations, N/V/D, abdominal pain, sore throat, headache, cough, and neck pain/stiffness. Pt reports that he was seen for this at his PMD's office earlier today and while there, pt was administered breathing treatments with some relief of his dyspnea; pt was then referred to the ER for further evaluation. There are no other complaints at this time.     Patient is a 47 y.o. male presenting with shortness of breath.   Shortness of Breath   Severity:  Moderate  Onset quality:  Gradual  Duration: onset about 2-3 days ago.  Timing:  Intermittent  Progression:  Worsening  Chronicity:  Recurrent (Pt has h/o COPD)  Context comment:  Pt has h/o COPD. Pt has had recent sick contact from his daughter, who had URI-like symptoms last week.   Relieved by: breathing treatments.  Worsened by:  Nothing  Associated symptoms: fever (subjective) and wheezing    Associated symptoms: no abdominal pain, no chest pain, no cough, no headaches, no neck pain, no rash, no sore throat and no vomiting          PAST MEDICAL HISTORY  Active Ambulatory Problems     Diagnosis Date Noted   • Uncontrolled type 2 diabetes mellitus with complication, without long-term current use of insulin 08/29/2017   • Vitamin D deficiency 08/29/2017   • Chronic pancreatitis 08/29/2017   • Mixed hyperlipidemia 08/29/2017   • Abnormal endocrine laboratory test finding  08/29/2017   • Secondary hypothyroidism 08/29/2017   • Hyperglycemia 08/29/2017   • Essential hypertension 08/29/2017     Resolved Ambulatory Problems     Diagnosis Date Noted   • Pain of upper abdomen 04/10/2016   • Pancreatitis 04/10/2016   • Incisional hernia 05/11/2017     Past Medical History:   Diagnosis Date   • Chronic pain    • COPD (chronic obstructive pulmonary disease)    • Diabetes mellitus    • Diabetic neuropathy    • Disease of thyroid gland    • Diverticulitis of colon    • Fibromyalgia    • History of pancreatitis    • History of renal cell cancer    • Hypertension    • Umbilical hernia          PAST SURGICAL HISTORY  Past Surgical History:   Procedure Laterality Date   • APPENDECTOMY     • COLONOSCOPY  2014    tics   • HERNIA REPAIR      umbilical   • NEPHRECTOMY Right    • VENTRAL/INCISIONAL HERNIA REPAIR N/A 5/11/2017    Procedure: VENTRAL/INCISIONAL HERNIA REPAIR LAPAROSCOPIC, RECURRENT INCISION, WITH MESH AND AHEDLYSIS;  Surgeon: Albin Bee MD;  Location: [facility] OR;  Service:          FAMILY HISTORY  Family History   Problem Relation Age of Onset   • Family history unknown: Yes         SOCIAL HISTORY  Social History     Social History   • Marital status:      Spouse name: N/A   • Number of children: N/A   • Years of education: N/A     Occupational History   • Not on file.     Social History Main Topics   • Smoking status: Current Some Day Smoker     Types: Cigars   • Smokeless tobacco: Never Used   • Alcohol use No   • Drug use: No   • Sexual activity: Defer     Other Topics Concern   • Not on file     Social History Narrative         ALLERGIES  Penicillins        REVIEW OF SYSTEMS  Review of Systems   Constitutional: Positive for fever (subjective). Negative for chills.   HENT: Negative for congestion, rhinorrhea and sore throat.    Eyes: Negative for pain.   Respiratory: Positive for shortness of breath and wheezing. Negative for cough.    Cardiovascular: Negative for chest  pain, palpitations and leg swelling.   Gastrointestinal: Negative for abdominal pain, diarrhea, nausea and vomiting.   Genitourinary: Negative for difficulty urinating, dysuria, flank pain and frequency.   Musculoskeletal: Positive for myalgias (generalized). Negative for neck pain and neck stiffness.   Skin: Negative for rash.   Neurological: Negative for dizziness, speech difficulty, weakness, light-headedness, numbness and headaches.   Psychiatric/Behavioral: Negative.    All other systems reviewed and are negative.           PHYSICAL EXAM  ED Triage Vitals   Temp Heart Rate Resp BP SpO2   03/05/18 2000 03/05/18 2000 03/05/18 2011 03/05/18 2011 03/05/18 2000   98.1 °F (36.7 °C) 119 28 132/84 99 %      Temp src Heart Rate Source Patient Position BP Location FiO2 (%)   03/05/18 2000 03/05/18 2000 03/05/18 2011 03/05/18 2011 --   Tympanic Monitor Sitting Left arm        Physical Exam   Constitutional: He is oriented to person, place, and time. No distress.   HENT:   Head: Normocephalic.   Mouth/Throat: Mucous membranes are normal.   Eyes: EOM are normal. Pupils are equal, round, and reactive to light.   Neck: Normal range of motion. Neck supple.   Cardiovascular: Normal rate, regular rhythm and normal heart sounds.    Pulmonary/Chest: Effort normal. No respiratory distress. He has decreased breath sounds (bilaterally). He has wheezes (bilaterally). He has no rhonchi. He has no rales.   Abdominal: Soft. There is no tenderness. There is no rebound and no guarding.   Musculoskeletal: Normal range of motion.   Neurological: He is alert and oriented to person, place, and time. He has normal sensation.   Skin: Skin is warm and dry.   Psychiatric: Mood and affect normal.   Nursing note and vitals reviewed.          LAB RESULTS  Recent Results (from the past 24 hour(s))   Comprehensive Metabolic Panel    Collection Time: 03/05/18  8:46 PM   Result Value Ref Range    Glucose 267 (H) 65 - 99 mg/dL    BUN 12 6 - 20 mg/dL     Creatinine 1.14 0.76 - 1.27 mg/dL    Sodium 138 136 - 145 mmol/L    Potassium 4.3 3.5 - 5.2 mmol/L    Chloride 99 98 - 107 mmol/L    CO2 26.7 22.0 - 29.0 mmol/L    Calcium 9.7 8.6 - 10.5 mg/dL    Total Protein 7.2 6.0 - 8.5 g/dL    Albumin 3.90 3.50 - 5.20 g/dL    ALT (SGPT) 77 (H) 1 - 41 U/L    AST (SGOT) 76 (H) 1 - 40 U/L    Alkaline Phosphatase 109 39 - 117 U/L    Total Bilirubin 0.4 0.1 - 1.2 mg/dL    eGFR Non African Amer 69 >60 mL/min/1.73    Globulin 3.3 gm/dL    A/G Ratio 1.2 g/dL    BUN/Creatinine Ratio 10.5 7.0 - 25.0    Anion Gap 12.3 mmol/L   BNP    Collection Time: 03/05/18  8:46 PM   Result Value Ref Range    proBNP 16.0 5.0 - 450.0 pg/mL   Troponin    Collection Time: 03/05/18  8:46 PM   Result Value Ref Range    Troponin T <0.010 0.000 - 0.030 ng/mL   Light Blue Top    Collection Time: 03/05/18  8:46 PM   Result Value Ref Range    Extra Tube hold for add-on    Green Top (Gel)    Collection Time: 03/05/18  8:46 PM   Result Value Ref Range    Extra Tube Hold for add-ons.    Lavender Top    Collection Time: 03/05/18  8:46 PM   Result Value Ref Range    Extra Tube hold for add-on    Gold Top - SST    Collection Time: 03/05/18  8:46 PM   Result Value Ref Range    Extra Tube Hold for add-ons.    CBC Auto Differential    Collection Time: 03/05/18  8:46 PM   Result Value Ref Range    WBC 9.50 4.50 - 10.70 10*3/mm3    RBC 4.96 4.60 - 6.00 10*6/mm3    Hemoglobin 16.3 13.7 - 17.6 g/dL    Hematocrit 47.7 40.4 - 52.2 %    MCV 96.2 79.8 - 96.2 fL    MCH 32.9 (H) 27.0 - 32.7 pg    MCHC 34.2 32.6 - 36.4 g/dL    RDW 13.0 11.5 - 14.5 %    RDW-SD 44.8 37.0 - 54.0 fl    MPV 12.6 (H) 6.0 - 12.0 fL    Platelets 151 140 - 500 10*3/mm3    Neutrophil % 57.5 42.7 - 76.0 %    Lymphocyte % 31.3 19.6 - 45.3 %    Monocyte % 7.8 5.0 - 12.0 %    Eosinophil % 2.9 0.3 - 6.2 %    Basophil % 0.3 0.0 - 1.5 %    Immature Grans % 0.2 0.0 - 0.5 %    Neutrophils, Absolute 5.46 1.90 - 8.10 10*3/mm3    Lymphocytes, Absolute 2.97 0.90 - 4.80  10*3/mm3    Monocytes, Absolute 0.74 0.20 - 1.20 10*3/mm3    Eosinophils, Absolute 0.28 0.00 - 0.70 10*3/mm3    Basophils, Absolute 0.03 0.00 - 0.20 10*3/mm3    Immature Grans, Absolute 0.02 0.00 - 0.03 10*3/mm3   D-dimer, Quantitative    Collection Time: 03/05/18 11:21 PM   Result Value Ref Range    D-Dimer, Quantitative <0.27 0.00 - 0.49 MCGFEU/mL   POC Glucose Once    Collection Time: 03/05/18 11:59 PM   Result Value Ref Range    Glucose 280 (H) 70 - 130 mg/dL       Ordered the above labs and reviewed the results.        RADIOLOGY  XR Chest 2 View   Preliminary Result       1. No active disease is seen in the chest with no significant change   when compared to prior chest x-ray 05/02/2017.    Interpreted by radiologist. Independently viewed by me.                    Ordered the above noted radiological studies. Reviewed by me in PACS.           PROCEDURES  Procedures    EKG:           EKG time: 20:28  Rhythm/Rate: Sinus tachycardia rate 100  P waves and DE: Normal P waves  QRS, axis: Incomplete RBBB   ST and T waves: Normal ST     Interpreted Contemporaneously by me, independently viewed  Similar compared to prior 05/02/17          PROGRESS AND CONSULTS  ED Course   Comment By Time   12:23 AM  Patient with COPD and wheezing most likely from viral syndrome.  Seen by PMD and sent in for admission.  Patient given steroids and breathing treatments here. Still wheezing.  Will admit.  Discussed with Dr. Alford who will admit. Red Vazquez MD 03/06 0024     10:24 PM:  D-Dimer ordered for further evaluation. [place] ordered to treat for myalgias. Albuterol nebulizer ordered to improve breathing. Solumedrol ordered to decrease inflammation.     11:50 PM:  Rechecked pt. Pt reports that he has not had significant improvement of his dyspnea after administration of breathing treatments in the ER. Informed pt that his D-Dimer is <0.27. CXR is negative acute. Will discuss with the hospitalist regarding pt's admission to the  hospital for further evaluation and management. Pt agrees with plan. Decision time to admit: Now.     11:54 PM:  Placed call to A for admission.     12:17 AM:  Discussed case with Dr. Alford, hospitalist. He will admit pt to a telemetry bed.                 MEDICAL DECISION MAKING        MDM  Number of Diagnoses or Management Options     Amount and/or Complexity of Data Reviewed  Clinical lab tests: ordered and reviewed (Troponin is negative. D-Dimer is <0.27. )  Tests in the radiology section of CPT®: ordered and reviewed (CXR:  1. No active disease is seen in the chest with no significant change when compared to prior chest x-ray 05/02/2017.)  Tests in the medicine section of CPT®: reviewed and ordered (EKG interpreted.   )  Discuss the patient with other providers: yes (Case d/w Dr. Alford, hospitalist, who will admit pt to a telemetry bed.   )    Patient Progress  Patient progress: stable             DIAGNOSIS  Final diagnoses:   Acute exacerbation of chronic obstructive pulmonary disease (COPD)   Upper respiratory tract infection, unspecified type         DISPOSITION  Pt admitted to telemetry.      ADMISSION    Discussed treatment plan and reason for admission with pt/family and admitting physician.  Pt/family voiced understanding of the plan for admission for further testing/treatment as needed.                 Latest Documented Vital Signs:  As of 12:26 AM  BP- 145/74 HR- 107 Temp- 98.1 °F (36.7 °C) (Tympanic) O2 sat- 93%        --  Documentation assistance provided by kimber Baptiste for Dr. George MD.  Information recorded by the scribjasmina was done at my direction and has been verified and validated by me.              Doron Baptiste  03/06/18 0133       Red Vazquez MD  03/06/18 0202

## 2018-03-06 NOTE — ED NOTES
"Pt reports daughter had respiratory infection last week and pt c/o \"in and out of fever, using nebulizer and its not helping.\" c/o difficulty breathing, temp 102.5 yesterday.      Ashley Nazario RN  03/05/18 1959    "

## 2018-03-06 NOTE — H&P
"    Name: Ricardo Pereira ADMIT: 3/5/2018   : 1970  PCP: Simba Moore MD    MRN: [id number] LOS: 0 days   AGE/SEX: 47 y.o. male  ROOM: 53/1     Chief Complaint   Patient presents with   • Shortness of Breath     Started friday and progressively worse.       Subjective   Mr. Pereira is a 47 y.o. male with a history of COPD that presents to [facility] complaining of increasing cough productive of green/yellow sputum for the past several days.  He has had accompanying severe wheezing and shortness of breath.  He states that his mucus has been so thick that he has had trouble coughing it up, and on a few occasions this has caused him to vomit.  He called his PCP and was told to come to the ER.  He notes that about a week ago his daughter was diagnosed with a viral URI and he has been in contact with her.  He has had some associated myalgias and subjective fever.        History of Present Illness    Past Medical History:   Diagnosis Date   • Chronic pain     flank pain \" permenantly damaged muscle\"   • COPD (chronic obstructive pulmonary disease)    • Diabetes mellitus     Insulin pump   • Diabetic neuropathy    • Disease of thyroid gland     hypothyroid   • Diverticulitis of colon    • Fibromyalgia    • History of pancreatitis     X 2   • History of renal cell cancer     stage 4 , Rt kidney   • Hypertension    • Umbilical hernia      Past Surgical History:   Procedure Laterality Date   • APPENDECTOMY     • COLONOSCOPY      tics   • HERNIA REPAIR      umbilical   • NEPHRECTOMY Right    • VENTRAL/INCISIONAL HERNIA REPAIR N/A 2017    Procedure: VENTRAL/INCISIONAL HERNIA REPAIR LAPAROSCOPIC, RECURRENT INCISION, WITH MESH AND AHEDLYSIS;  Surgeon: Albin Bee MD;  Location: [facility];  Service:      Family History   Problem Relation Age of Onset   • Family history unknown: Yes     Social History   Substance Use Topics   • Smoking status: Current Some Day Smoker     Types: Cigars   • " Smokeless tobacco: Never Used   • Alcohol use No     Prescriptions Prior to Admission   Medication Sig Dispense Refill Last Dose   • albuterol (PROVENTIL) (2.5 MG/3ML) 0.083% nebulizer solution Take 2.5 mg by nebulization Every 4 (Four) Hours As Needed for Wheezing.      • aspirin 81 MG EC tablet Take 1 tablet by mouth Daily. 90 tablet 1    • atorvastatin (LIPITOR) 20 MG tablet Take 1 tablet by mouth Daily. 30 tablet 4    • gabapentin (NEURONTIN) 800 MG tablet Take 1 tablet by mouth 3 (Three) Times a Day. 90 tablet 4    • insulin NPH (NOVOLIN N RELION) 100 UNIT/ML injection Inject 50 Units under the skin 2 (Two) Times a Day Before Meals. 30 mL 3    • insulin regular (humuLIN R,novoLIN R) 100 UNIT/ML injection 10 units before each meal with sliding scale 20 mL 3    • levothyroxine (SYNTHROID) 112 MCG tablet Take 1 tablet by mouth Daily. On an empty stomach 30 tablet 4    • lisinopril (PRINIVIL,ZESTRIL) 10 MG tablet Take 2 tablets by mouth Every Morning. 30 tablet 4    • pantoprazole (PROTONIX) 40 MG EC tablet Take 40 mg by mouth daily.   Taking   • vitamin D (ERGOCALCIFEROL) 10255 units capsule capsule Take 1 capsule by mouth 1 (One) Time Per Week. 12 capsule 3    • budesonide-formoterol (SYMBICORT) 80-4.5 MCG/ACT inhaler Inhale 2 puffs 2 (two) times a day.   Taking     Allergies:    Allergies   Allergen Reactions   • Penicillins Swelling       Review of Systems   Constitutional: Positive for fatigue and fever. Negative for chills.   HENT: Negative for congestion, nosebleeds, rhinorrhea, sore throat and trouble swallowing.    Eyes: Negative for redness and visual disturbance.   Respiratory: Positive for cough, chest tightness, shortness of breath and wheezing. Negative for choking.    Cardiovascular: Negative for chest pain and palpitations.   Gastrointestinal: Positive for vomiting. Negative for abdominal pain, constipation, diarrhea and nausea.   Endocrine: Negative for cold intolerance and heat intolerance.    Genitourinary: Negative for decreased urine volume, difficulty urinating and dysuria.   Musculoskeletal: Positive for myalgias. Negative for arthralgias and neck pain.   Skin: Negative for pallor and rash.   Neurological: Negative for dizziness, light-headedness and headaches.   Hematological: Negative for adenopathy. Does not bruise/bleed easily.   Psychiatric/Behavioral: Negative for confusion and decreased concentration.        Objective    Vital Signs  Temp:  [98.1 °F (36.7 °C)-98.6 °F (37 °C)] 98.6 °F (37 °C)  Heart Rate:  [] 110  Resp:  [16-28] 20  BP: (128-147)/(74-90) 137/80  SpO2:  [92 %-99 %] 92 %  on  Flow (L/min):  [2-2.5] 2.5;   O2 Device: nasal cannula  Body mass index is 38.88 kg/(m^2).    Physical Exam   Constitutional: He is oriented to person, place, and time. He appears distressed.   HENT:   Head: Normocephalic and atraumatic.   Mouth/Throat: Oropharynx is clear and moist.   Eyes: Conjunctivae and EOM are normal. Pupils are equal, round, and reactive to light. No scleral icterus.   Neck: Normal range of motion. Neck supple. No JVD present.   Cardiovascular: Regular rhythm and intact distal pulses.  Tachycardia present.    Pulmonary/Chest: He is in respiratory distress (speaking in short phrases). He has wheezes (diffuse).   Overall decreased air movement   Abdominal: Soft. Bowel sounds are normal. There is no tenderness.   Right nephrectomy scar well-healed   Musculoskeletal: He exhibits tenderness (BLE, has peripheral neuropathy). He exhibits no edema.   Neurological: He is alert and oriented to person, place, and time.   Skin: Skin is warm and dry. He is not diaphoretic.   Psychiatric: He has a normal mood and affect. His behavior is normal.   Nursing note and vitals reviewed.      Results Review:   I reviewed the patient's new clinical results.    Lab Results (last 24 hours)     Procedure Component Value Units Date/Time    CBC & Differential [[id number]] Collected:  03/05/18 2046     Specimen:  Blood Updated:  03/05/18 2110    Narrative:       The following orders were created for panel order CBC & Differential.  Procedure                               Abnormality         Status                     ---------                               -----------         ------                     CBC Auto Differential[[id number]]        Abnormal            Final result                 Please view results for these tests on the individual orders.    Comprehensive Metabolic Panel [[id number]]  (Abnormal) Collected:  03/05/18 2046    Specimen:  Blood Updated:  03/05/18 2130     Glucose 267 (H) mg/dL      BUN 12 mg/dL      Creatinine 1.14 mg/dL      Sodium 138 mmol/L      Potassium 4.3 mmol/L      Chloride 99 mmol/L      CO2 26.7 mmol/L      Calcium 9.7 mg/dL      Total Protein 7.2 g/dL      Albumin 3.90 g/dL      ALT (SGPT) 77 (H) U/L      AST (SGOT) 76 (H) U/L      Alkaline Phosphatase 109 U/L      Total Bilirubin 0.4 mg/dL      eGFR Non African Amer 69 mL/min/1.73      Globulin 3.3 gm/dL      A/G Ratio 1.2 g/dL      BUN/Creatinine Ratio 10.5     Anion Gap 12.3 mmol/L     BNP [[id number]]  (Normal) Collected:  03/05/18 2046    Specimen:  Blood Updated:  03/05/18 2127     proBNP 16.0 pg/mL     Narrative:       Among patients with dyspnea, NT-proBNP is highly sensitive for the detection of acute congestive heart failure. In addition NT-proBNP of <300 pg/ml effectively rules out acute congestive heart failure with 99% negative predictive value.    Troponin [[id number]]  (Normal) Collected:  03/05/18 2046    Specimen:  Blood Updated:  03/05/18 2130     Troponin T <0.010 ng/mL     Narrative:       Troponin T Reference Ranges:  Less than 0.03 ng/mL:    Negative for AMI  0.03 to 0.09 ng/mL:      Indeterminant for AMI  Greater than 0.09 ng/mL: Positive for AMI    CBC Auto Differential [[id number]]  (Abnormal) Collected:  03/05/18 2046    Specimen:  Blood Updated:  03/05/18 2110     WBC 9.50 10*3/mm3      RBC 4.96 10*6/mm3       Hemoglobin 16.3 g/dL      Hematocrit 47.7 %      MCV 96.2 fL      MCH 32.9 (H) pg      MCHC 34.2 g/dL      RDW 13.0 %      RDW-SD 44.8 fl      MPV 12.6 (H) fL      Platelets 151 10*3/mm3      Neutrophil % 57.5 %      Lymphocyte % 31.3 %      Monocyte % 7.8 %      Eosinophil % 2.9 %      Basophil % 0.3 %      Immature Grans % 0.2 %      Neutrophils, Absolute 5.46 10*3/mm3      Lymphocytes, Absolute 2.97 10*3/mm3      Monocytes, Absolute 0.74 10*3/mm3      Eosinophils, Absolute 0.28 10*3/mm3      Basophils, Absolute 0.03 10*3/mm3      Immature Grans, Absolute 0.02 10*3/mm3     Hemoglobin A1c [[id number]] Collected:  03/05/18 2046    Specimen:  Blood Updated:  03/06/18 0955    D-dimer, Quantitative [[id number]]  (Normal) Collected:  03/05/18 2321    Specimen:  Blood Updated:  03/05/18 2343     D-Dimer, Quantitative <0.27 MCGFEU/mL     Narrative:       The [employer]ago D-Dimer test used in conjunction with a clinical pretest probability (PTP) assessment model, has been approved by the FDA to rule out the presence of venous thromboembolism (VTE) in outpatients suspected of deep venous thrombosis (DVT) or pulmonary embolism (PE).     POC Glucose Once [[id number]]  (Abnormal) Collected:  03/05/18 2359    Specimen:  Blood Updated:  03/06/18 0001     Glucose 280 (H) mg/dL     Narrative:       Meter: [id number] : [id number] Leidy Clifford RN    POC Glucose Once [[id number]]  (Abnormal) Collected:  03/06/18 0152    Specimen:  Blood Updated:  03/06/18 0153     Glucose 351 (H) mg/dL     Narrative:       Meter: [id number] : [id number] Rehana KONG    Troponin [[id number]]  (Normal) Collected:  03/06/18 0518    Specimen:  Blood Updated:  03/06/18 0609     Troponin T <0.010 ng/mL     Narrative:       Troponin T Reference Ranges:  Less than 0.03 ng/mL:    Negative for AMI  0.03 to 0.09 ng/mL:      Indeterminant for AMI  Greater than 0.09 ng/mL: Positive for AMI    POC Glucose Once [[id number]]  (Abnormal) Collected:  03/06/18  0604    Specimen:  Blood Updated:  03/06/18 0606     Glucose 463 (C) mg/dL     Narrative:       VJ Meter: [id number] : [id number] Rehana Ward NA          XR Chest 2 View   Final Result       1. No active disease is seen in the chest with no significant change   when compared to prior chest x-ray 05/02/2017.       This report was finalized on 3/6/2018 8:40 AM by Dr. Michael Maria MD.            Assessment/Plan   Active Hospital Problems (** Indicates Principal Problem)    Diagnosis Date Noted   • COPD (chronic obstructive pulmonary disease) [J44.9] 03/06/2018   • Acute exacerbation of chronic obstructive pulmonary disease (COPD) [J44.1] 03/06/2018   • Essential hypertension [I10] 08/29/2017   • Uncontrolled type 2 diabetes mellitus with complication, without long-term current use of insulin [E11.8, E11.65] 08/29/2017   • Hyperglycemia [R73.9] 08/29/2017      Resolved Hospital Problems    Diagnosis Date Noted Date Resolved   No resolved problems to display.   COPD with acute exacerbation  - troponins, D-dimer negative  - Reviewed CXR, no pneumonia  - start on IV steroids, scheduled bronchodilators  - add hypertonic saline to aid in secretion  - check RVP    Type 2 DM  - check A1c  - he is on large amounts of insulin at home, will start his home NPH with high-dose ssi and scheduled prandial novolog- will require increased amounts due to IV steroids    HTN  - restart his home regimen  - renal function and electrolytes are okay but will monitor    DVT Prophylaxis  - Lovenox    Code Status  Full code.  Healthcare surrogate is his wife    I discussed the patients findings and my recommendations with patient and nursing staff.      Simba Gaytan MD  [facility]  03/06/18  9:58 AM

## 2018-03-07 PROBLEM — N17.9 AKI (ACUTE KIDNEY INJURY) (HCC): Status: ACTIVE | Noted: 2018-03-07

## 2018-03-07 LAB
ANION GAP SERPL CALCULATED.3IONS-SCNC: 18.3 MMOL/L
BASOPHILS # BLD AUTO: 0.01 10*3/MM3 (ref 0–0.2)
BASOPHILS NFR BLD AUTO: 0.1 % (ref 0–1.5)
BUN BLD-MCNC: 25 MG/DL (ref 6–20)
BUN/CREAT SERPL: 17.2 (ref 7–25)
CALCIUM SPEC-SCNC: 9.2 MG/DL (ref 8.6–10.5)
CHLORIDE SERPL-SCNC: 96 MMOL/L (ref 98–107)
CO2 SERPL-SCNC: 21.7 MMOL/L (ref 22–29)
CREAT BLD-MCNC: 1.45 MG/DL (ref 0.76–1.27)
DEPRECATED RDW RBC AUTO: 47.5 FL (ref 37–54)
EOSINOPHIL # BLD AUTO: 0 10*3/MM3 (ref 0–0.7)
EOSINOPHIL NFR BLD AUTO: 0 % (ref 0.3–6.2)
ERYTHROCYTE [DISTWIDTH] IN BLOOD BY AUTOMATED COUNT: 13.2 % (ref 11.5–14.5)
GFR SERPL CREATININE-BSD FRML MDRD: 53 ML/MIN/1.73
GLUCOSE BLD-MCNC: 479 MG/DL (ref 65–99)
GLUCOSE BLDC GLUCOMTR-MCNC: 395 MG/DL (ref 70–130)
GLUCOSE BLDC GLUCOMTR-MCNC: 472 MG/DL (ref 70–130)
GLUCOSE BLDC GLUCOMTR-MCNC: 521 MG/DL (ref 70–130)
GLUCOSE BLDC GLUCOMTR-MCNC: 540 MG/DL (ref 70–130)
GLUCOSE BLDC GLUCOMTR-MCNC: >599 MG/DL (ref 70–130)
HCT VFR BLD AUTO: 45.6 % (ref 40.4–52.2)
HGB BLD-MCNC: 15 G/DL (ref 13.7–17.6)
IMM GRANULOCYTES # BLD: 0.06 10*3/MM3 (ref 0–0.03)
IMM GRANULOCYTES NFR BLD: 0.3 % (ref 0–0.5)
LYMPHOCYTES # BLD AUTO: 1.58 10*3/MM3 (ref 0.9–4.8)
LYMPHOCYTES NFR BLD AUTO: 9.1 % (ref 19.6–45.3)
MCH RBC QN AUTO: 32.6 PG (ref 27–32.7)
MCHC RBC AUTO-ENTMCNC: 32.9 G/DL (ref 32.6–36.4)
MCV RBC AUTO: 99.1 FL (ref 79.8–96.2)
MONOCYTES # BLD AUTO: 0.77 10*3/MM3 (ref 0.2–1.2)
MONOCYTES NFR BLD AUTO: 4.4 % (ref 5–12)
NEUTROPHILS # BLD AUTO: 14.97 10*3/MM3 (ref 1.9–8.1)
NEUTROPHILS NFR BLD AUTO: 86.1 % (ref 42.7–76)
PLATELET # BLD AUTO: 167 10*3/MM3 (ref 140–500)
PMV BLD AUTO: 12.5 FL (ref 6–12)
POTASSIUM BLD-SCNC: 4.4 MMOL/L (ref 3.5–5.2)
RBC # BLD AUTO: 4.6 10*6/MM3 (ref 4.6–6)
SODIUM BLD-SCNC: 136 MMOL/L (ref 136–145)
WBC NRBC COR # BLD: 17.39 10*3/MM3 (ref 4.5–10.7)

## 2018-03-07 PROCEDURE — 80048 BASIC METABOLIC PNL TOTAL CA: CPT | Performed by: HOSPITALIST

## 2018-03-07 PROCEDURE — 82962 GLUCOSE BLOOD TEST: CPT

## 2018-03-07 PROCEDURE — 36415 COLL VENOUS BLD VENIPUNCTURE: CPT | Performed by: HOSPITALIST

## 2018-03-07 PROCEDURE — 94760 N-INVAS EAR/PLS OXIMETRY 1: CPT

## 2018-03-07 PROCEDURE — 25010000002 ENOXAPARIN PER 10 MG: Performed by: INTERNAL MEDICINE

## 2018-03-07 PROCEDURE — 94799 UNLISTED PULMONARY SVC/PX: CPT

## 2018-03-07 PROCEDURE — 85025 COMPLETE CBC W/AUTO DIFF WBC: CPT | Performed by: HOSPITALIST

## 2018-03-07 PROCEDURE — 63710000001 INSULIN ISOPHANE HUMAN PER 5 UNITS: Performed by: INTERNAL MEDICINE

## 2018-03-07 PROCEDURE — 25010000002 METHYLPREDNISOLONE PER 125 MG: Performed by: INTERNAL MEDICINE

## 2018-03-07 PROCEDURE — 63710000001 INSULIN ASPART PER 5 UNITS: Performed by: INTERNAL MEDICINE

## 2018-03-07 RX ORDER — ARFORMOTEROL TARTRATE 15 UG/2ML
15 SOLUTION RESPIRATORY (INHALATION)
Status: DISCONTINUED | OUTPATIENT
Start: 2018-03-07 | End: 2018-03-10 | Stop reason: HOSPADM

## 2018-03-07 RX ORDER — BUDESONIDE 0.5 MG/2ML
0.5 INHALANT ORAL
Status: DISCONTINUED | OUTPATIENT
Start: 2018-03-07 | End: 2018-03-10 | Stop reason: HOSPADM

## 2018-03-07 RX ORDER — DEXTROMETHORPHAN HYDROBROMIDE AND PROMETHAZINE HYDROCHLORIDE 15; 6.25 MG/5ML; MG/5ML
5 SYRUP ORAL EVERY 4 HOURS PRN
Status: DISCONTINUED | OUTPATIENT
Start: 2018-03-07 | End: 2018-03-09

## 2018-03-07 RX ORDER — SODIUM CHLORIDE 9 MG/ML
125 INJECTION, SOLUTION INTRAVENOUS CONTINUOUS
Status: DISCONTINUED | OUTPATIENT
Start: 2018-03-07 | End: 2018-03-08

## 2018-03-07 RX ADMIN — INSULIN ASPART 24 UNITS: 100 INJECTION, SOLUTION INTRAVENOUS; SUBCUTANEOUS at 16:57

## 2018-03-07 RX ADMIN — HYDROCODONE BITARTRATE AND ACETAMINOPHEN 1 TABLET: 5; 325 TABLET ORAL at 10:20

## 2018-03-07 RX ADMIN — METHYLPREDNISOLONE SODIUM SUCCINATE 80 MG: 125 INJECTION, POWDER, FOR SOLUTION INTRAMUSCULAR; INTRAVENOUS at 14:57

## 2018-03-07 RX ADMIN — SODIUM CHLORIDE 125 ML/HR: 9 INJECTION, SOLUTION INTRAVENOUS at 15:10

## 2018-03-07 RX ADMIN — ASPIRIN 81 MG: 81 TABLET ORAL at 08:17

## 2018-03-07 RX ADMIN — PANTOPRAZOLE SODIUM 40 MG: 40 TABLET, DELAYED RELEASE ORAL at 08:17

## 2018-03-07 RX ADMIN — INSULIN ASPART 12 UNITS: 100 INJECTION, SOLUTION INTRAVENOUS; SUBCUTANEOUS at 08:18

## 2018-03-07 RX ADMIN — IPRATROPIUM BROMIDE AND ALBUTEROL SULFATE 3 ML: .5; 3 SOLUTION RESPIRATORY (INHALATION) at 07:59

## 2018-03-07 RX ADMIN — HYDROCODONE BITARTRATE AND ACETAMINOPHEN 1 TABLET: 5; 325 TABLET ORAL at 23:29

## 2018-03-07 RX ADMIN — IPRATROPIUM BROMIDE AND ALBUTEROL SULFATE 3 ML: .5; 3 SOLUTION RESPIRATORY (INHALATION) at 03:11

## 2018-03-07 RX ADMIN — GABAPENTIN 800 MG: 400 CAPSULE ORAL at 14:56

## 2018-03-07 RX ADMIN — ATORVASTATIN CALCIUM 20 MG: 20 TABLET, FILM COATED ORAL at 08:17

## 2018-03-07 RX ADMIN — ACETAMINOPHEN 650 MG: 325 TABLET ORAL at 14:56

## 2018-03-07 RX ADMIN — SODIUM CHLORIDE 125 ML/HR: 9 INJECTION, SOLUTION INTRAVENOUS at 23:07

## 2018-03-07 RX ADMIN — INSULIN ASPART 20 UNITS: 100 INJECTION, SOLUTION INTRAVENOUS; SUBCUTANEOUS at 22:19

## 2018-03-07 RX ADMIN — IPRATROPIUM BROMIDE AND ALBUTEROL SULFATE 3 ML: .5; 3 SOLUTION RESPIRATORY (INHALATION) at 15:45

## 2018-03-07 RX ADMIN — LEVOTHYROXINE SODIUM 112 MCG: 112 TABLET ORAL at 06:02

## 2018-03-07 RX ADMIN — IPRATROPIUM BROMIDE AND ALBUTEROL SULFATE 3 ML: .5; 3 SOLUTION RESPIRATORY (INHALATION) at 12:33

## 2018-03-07 RX ADMIN — INSULIN ASPART 24 UNITS: 100 INJECTION, SOLUTION INTRAVENOUS; SUBCUTANEOUS at 08:18

## 2018-03-07 RX ADMIN — INSULIN ASPART 15 UNITS: 100 INJECTION, SOLUTION INTRAVENOUS; SUBCUTANEOUS at 12:19

## 2018-03-07 RX ADMIN — GABAPENTIN 800 MG: 400 CAPSULE ORAL at 06:02

## 2018-03-07 RX ADMIN — BUDESONIDE AND FORMOTEROL FUMARATE DIHYDRATE 2 PUFF: 80; 4.5 AEROSOL RESPIRATORY (INHALATION) at 07:59

## 2018-03-07 RX ADMIN — INSULIN ASPART 15 UNITS: 100 INJECTION, SOLUTION INTRAVENOUS; SUBCUTANEOUS at 16:57

## 2018-03-07 RX ADMIN — INSULIN ASPART 24 UNITS: 100 INJECTION, SOLUTION INTRAVENOUS; SUBCUTANEOUS at 12:18

## 2018-03-07 RX ADMIN — HUMAN INSULIN 50 UNITS: 100 INJECTION, SUSPENSION SUBCUTANEOUS at 08:17

## 2018-03-07 RX ADMIN — ARFORMOTEROL TARTRATE 15 MCG: 15 SOLUTION RESPIRATORY (INHALATION) at 20:27

## 2018-03-07 RX ADMIN — SODIUM CHLORIDE SOLN NEBU 7% 4 ML: 7 NEBU SOLN at 07:59

## 2018-03-07 RX ADMIN — SODIUM CHLORIDE SOLN NEBU 7% 4 ML: 7 NEBU SOLN at 20:28

## 2018-03-07 RX ADMIN — GABAPENTIN 800 MG: 400 CAPSULE ORAL at 22:19

## 2018-03-07 RX ADMIN — METHYLPREDNISOLONE SODIUM SUCCINATE 80 MG: 125 INJECTION, POWDER, FOR SOLUTION INTRAMUSCULAR; INTRAVENOUS at 22:19

## 2018-03-07 RX ADMIN — ENOXAPARIN SODIUM 40 MG: 40 INJECTION SUBCUTANEOUS at 10:17

## 2018-03-07 RX ADMIN — LISINOPRIL 20 MG: 20 TABLET ORAL at 06:03

## 2018-03-07 RX ADMIN — METHYLPREDNISOLONE SODIUM SUCCINATE 80 MG: 125 INJECTION, POWDER, FOR SOLUTION INTRAMUSCULAR; INTRAVENOUS at 06:02

## 2018-03-07 RX ADMIN — DEXTROMETHORPHAN HYDROBROMIDE AND PROMETHAZINE HYDROCHLORIDE 5 ML: 15; 6.25 SOLUTION ORAL at 22:19

## 2018-03-07 RX ADMIN — BUDESONIDE 0.5 MG: 0.5 INHALANT RESPIRATORY (INHALATION) at 20:28

## 2018-03-07 RX ADMIN — HUMAN INSULIN 70 UNITS: 100 INJECTION, SUSPENSION SUBCUTANEOUS at 16:58

## 2018-03-07 RX ADMIN — HYDROCODONE BITARTRATE AND ACETAMINOPHEN 1 TABLET: 5; 325 TABLET ORAL at 19:38

## 2018-03-07 NOTE — PLAN OF CARE
Problem: Respiratory Insufficiency (Adult)  Goal: Acid/Base Balance  Outcome: Ongoing (interventions implemented as appropriate)   03/07/18 1716   Respiratory Insufficiency (Adult)   Acid/Base Balance making progress toward outcome     Goal: Effective Ventilation  Outcome: Ongoing (interventions implemented as appropriate)   03/07/18 1716   Respiratory Insufficiency (Adult)   Effective Ventilation making progress toward outcome       Problem: Pain, Acute (Adult)  Goal: Acceptable Pain Control/Comfort Level  Outcome: Ongoing (interventions implemented as appropriate)      Problem: Patient Care Overview (Adult)  Goal: Plan of Care Review  Outcome: Ongoing (interventions implemented as appropriate)   03/07/18 1716   Coping/Psychosocial Response Interventions   Plan Of Care Reviewed With patient;spouse   Patient Care Overview   Progress improving   Outcome Evaluation   Outcome Summary/Follow up Plan Pt given pain med X 1 and Tylenol X 1. Lung sounds this afternoon wheezes expiratory in the bases. Otherwise much better tonight than this am. RT tx's continue. VSS. NS @ 125. Good oral intake and output. Still trying to bring down BS, w/increases in NPH and scheduled Novolog. Steroids, Rhinovirus contributing to high BS's. CTM.     Goal: Adult Individualization and Mutuality  Outcome: Ongoing (interventions implemented as appropriate)

## 2018-03-07 NOTE — PLAN OF CARE
Problem: BH Patient Care Overview (Adult)  Goal: Plan of Care Review  Outcome: Ongoing (interventions implemented as appropriate)   03/06/18 2234   Coping/Psychosocial Response Interventions   Plan Of Care Reviewed With patient;spouse   Patient Care Overview   Progress improving   Outcome Evaluation   Outcome Summary/Follow up Plan Pt with 1 episode of extreme panic attack this evening that completely resolved after 1 dose of xanax. Breathing skills promoted, guided self imagry; as well.   Coping/Psychosocial   Patient Agreement with Plan of Care agrees       Problem: Respiratory Insufficiency (Adult)  Goal: Acid/Base Balance  Outcome: Ongoing (interventions implemented as appropriate)   03/06/18 2234   Respiratory Insufficiency (Adult)   Acid/Base Balance making progress toward outcome     Goal: Effective Ventilation  Outcome: Ongoing (interventions implemented as appropriate)   03/06/18 2234   Respiratory Insufficiency (Adult)   Effective Ventilation making progress toward outcome       Problem: Pain, Acute (Adult)  Goal: Acceptable Pain Control/Comfort Level  Outcome: Ongoing (interventions implemented as appropriate)      Problem: Patient Care Overview (Adult)  Goal: Plan of Care Review  Outcome: Ongoing (interventions implemented as appropriate)   03/06/18 2234   Coping/Psychosocial Response Interventions   Plan Of Care Reviewed With patient;spouse   Patient Care Overview   Progress improving   Outcome Evaluation   Outcome Summary/Follow up Plan Pt with 1 episode of extreme panic attack this evening that completely resolved after 1 dose of xanax. Breathing skills promoted, guided self imagry; as well..//////////////////////////////////////////////////////////////////////////////

## 2018-03-07 NOTE — PROGRESS NOTES
Name: Ricardo Pereira ADMIT: 3/5/2018   : 1970  PCP: Simba Moore MD    MRN: [id number] LOS: 1 days   AGE/SEX: 47 y.o. male  ROOM: [facility]/   Subjective   Subjective  CC: Shortness of breath  Patient had severe panic attack last night, resolved with xanax.  Breathing is a little better but still significantly limited.  Taking PO well, in fact reported to have had [employer] last night.  No n/v/d.  BG has been quite high.    Objective   Vital Signs  Temp:  [97.1 °F (36.2 °C)-98.1 °F (36.7 °C)] 97.8 °F (36.6 °C)  Heart Rate:  [] 99  Resp:  [20-24] 20  BP: (123-141)/(61-84) 141/64  SpO2:  [91 %-95 %] 92 %  on  Flow (L/min):  [2-2.5] 2;   O2 Device: nasal cannula  Body mass index is 38.88 kg/(m^2).    Physical Exam   Constitutional: He is oriented to person, place, and time. No distress.   HENT:   Head: Normocephalic and atraumatic.   Mouth/Throat: Oropharynx is clear and moist.   Eyes: Conjunctivae and EOM are normal. Pupils are equal, round, and reactive to light.   Neck: Normal range of motion.   Cardiovascular: Normal rate, regular rhythm and intact distal pulses.    Pulmonary/Chest: Effort normal. He has decreased breath sounds in the right lower field and the left lower field. He has wheezes.   Abdominal: Soft. Bowel sounds are normal. There is no tenderness.   Musculoskeletal: He exhibits no edema or tenderness.   Neurological: He is alert and oriented to person, place, and time.   Skin: Skin is warm and dry. He is not diaphoretic.   Psychiatric: He has a normal mood and affect. His behavior is normal.   Nursing note and vitals reviewed.      Results Review:       I reviewed the patient's new clinical results.    Results from last 7 days  Lab Units 18  0528 18  2046   WBC 10*3/mm3 17.39* 9.50   HEMOGLOBIN g/dL 15.0 16.3   PLATELETS 10*3/mm3 167 151       Results from last 7 days  Lab Units 18  0528 18  2046   SODIUM mmol/L 136 138   POTASSIUM mmol/L 4.4 4.3   CHLORIDE  mmol/L 96* 99   CO2 mmol/L 21.7* 26.7   BUN mg/dL 25* 12   CREATININE mg/dL 1.45* 1.14   GLUCOSE mg/dL 479* 267*   Estimated Creatinine Clearance: 84.6 mL/min (by C-G formula based on Cr of 1.45).    Results from last 7 days  Lab Units 03/07/18  0528 03/05/18  2046   CALCIUM mg/dL 9.2 9.7   ALBUMIN g/dL  --  3.90         arformoterol 15 mcg Nebulization BID - RT   aspirin 81 mg Oral Daily   atorvastatin 20 mg Oral Daily   budesonide 0.5 mg Nebulization BID - RT   enoxaparin 40 mg Subcutaneous Q24H   gabapentin 800 mg Oral Q8H   insulin aspart 0-24 Units Subcutaneous 4x Daily With Meals & Nightly   insulin aspart 15 Units Subcutaneous TID With Meals   insulin NPH 70 Units Subcutaneous BID AC   ipratropium-albuterol 3 mL Nebulization Q4H - RT   levothyroxine 112 mcg Oral Q AM   lisinopril 20 mg Oral QAM   methylPREDNISolone sodium succinate 80 mg Intravenous Q8H   pantoprazole 40 mg Oral Daily   sodium chloride 4 mL Nebulization BID - RT       sodium chloride 125 mL/hr Last Rate: 125 mL/hr (03/07/18 1510)   Diet Regular; Consistent Carbohydrate      Assessment/Plan   Active Hospital Problems (** Indicates Principal Problem)    Diagnosis Date Noted   • MADINA (acute kidney injury) [N17.9] 03/07/2018   • COPD (chronic obstructive pulmonary disease) [J44.9] 03/06/2018   • Acute exacerbation of chronic obstructive pulmonary disease (COPD) [J44.1] 03/06/2018   • Essential hypertension [I10] 08/29/2017   • Uncontrolled type 2 diabetes mellitus with complication, without long-term current use of insulin [E11.8, E11.65] 08/29/2017   • Hyperglycemia [R73.9] 08/29/2017      Resolved Hospital Problems    Diagnosis Date Noted Date Resolved   No resolved problems to display.   COPD with acute exacerbation  - continue on IV steroids can hopefully wean these soon as they are causing a lot of issues with his BG  - will replace symbicort with brovana and pulmicort nebs  - continue scheduled duo-nebs and hypertonic saline     Type 2 DM  -  patient states his BG is low to normal at home but A1C is 9.30%- DM educator consulted  - increase NPH from 50 units BID to 70 units BID as well as prandial insulin  - high dose ssi  - I stressed the importance of adhering to a consistent carbohydrate diet     HTN  - stop lisinopril due to MADINA    MADINA  - almost certainly pre-renal due to volume depletion as a result of hyperglycemia  - will start IVF, follow BMP in AM  - no symptoms of obstruction     DVT Prophylaxis  - Lovenox      Simba Gaytan MD  [facility]  03/07/18  3:10 PM

## 2018-03-08 LAB
ANION GAP SERPL CALCULATED.3IONS-SCNC: 11 MMOL/L
BASOPHILS # BLD AUTO: 0.01 10*3/MM3 (ref 0–0.2)
BASOPHILS NFR BLD AUTO: 0.1 % (ref 0–1.5)
BUN BLD-MCNC: 21 MG/DL (ref 6–20)
BUN/CREAT SERPL: 20.6 (ref 7–25)
CALCIUM SPEC-SCNC: 8.5 MG/DL (ref 8.6–10.5)
CHLORIDE SERPL-SCNC: 102 MMOL/L (ref 98–107)
CO2 SERPL-SCNC: 26 MMOL/L (ref 22–29)
CREAT BLD-MCNC: 1.02 MG/DL (ref 0.76–1.27)
DEPRECATED RDW RBC AUTO: 47.5 FL (ref 37–54)
EOSINOPHIL # BLD AUTO: 0 10*3/MM3 (ref 0–0.7)
EOSINOPHIL NFR BLD AUTO: 0 % (ref 0.3–6.2)
ERYTHROCYTE [DISTWIDTH] IN BLOOD BY AUTOMATED COUNT: 13.3 % (ref 11.5–14.5)
GFR SERPL CREATININE-BSD FRML MDRD: 79 ML/MIN/1.73
GLUCOSE BLD-MCNC: 342 MG/DL (ref 65–99)
GLUCOSE BLDC GLUCOMTR-MCNC: 304 MG/DL (ref 70–130)
GLUCOSE BLDC GLUCOMTR-MCNC: 321 MG/DL (ref 70–130)
GLUCOSE BLDC GLUCOMTR-MCNC: 376 MG/DL (ref 70–130)
GLUCOSE BLDC GLUCOMTR-MCNC: 432 MG/DL (ref 70–130)
HCT VFR BLD AUTO: 44.4 % (ref 40.4–52.2)
HGB BLD-MCNC: 14.7 G/DL (ref 13.7–17.6)
IMM GRANULOCYTES # BLD: 0.17 10*3/MM3 (ref 0–0.03)
IMM GRANULOCYTES NFR BLD: 0.9 % (ref 0–0.5)
LYMPHOCYTES # BLD AUTO: 1.65 10*3/MM3 (ref 0.9–4.8)
LYMPHOCYTES NFR BLD AUTO: 9.2 % (ref 19.6–45.3)
MCH RBC QN AUTO: 32.5 PG (ref 27–32.7)
MCHC RBC AUTO-ENTMCNC: 33.1 G/DL (ref 32.6–36.4)
MCV RBC AUTO: 98.2 FL (ref 79.8–96.2)
MONOCYTES # BLD AUTO: 0.8 10*3/MM3 (ref 0.2–1.2)
MONOCYTES NFR BLD AUTO: 4.5 % (ref 5–12)
NEUTROPHILS # BLD AUTO: 15.28 10*3/MM3 (ref 1.9–8.1)
NEUTROPHILS NFR BLD AUTO: 85.3 % (ref 42.7–76)
PLATELET # BLD AUTO: 145 10*3/MM3 (ref 140–500)
PMV BLD AUTO: 12.6 FL (ref 6–12)
POTASSIUM BLD-SCNC: 4 MMOL/L (ref 3.5–5.2)
RBC # BLD AUTO: 4.52 10*6/MM3 (ref 4.6–6)
SODIUM BLD-SCNC: 139 MMOL/L (ref 136–145)
WBC NRBC COR # BLD: 17.91 10*3/MM3 (ref 4.5–10.7)

## 2018-03-08 PROCEDURE — 25010000002 ENOXAPARIN PER 10 MG: Performed by: INTERNAL MEDICINE

## 2018-03-08 PROCEDURE — 25010000002 METHYLPREDNISOLONE PER 125 MG: Performed by: INTERNAL MEDICINE

## 2018-03-08 PROCEDURE — 85025 COMPLETE CBC W/AUTO DIFF WBC: CPT | Performed by: HOSPITALIST

## 2018-03-08 PROCEDURE — 82962 GLUCOSE BLOOD TEST: CPT

## 2018-03-08 PROCEDURE — 80048 BASIC METABOLIC PNL TOTAL CA: CPT | Performed by: HOSPITALIST

## 2018-03-08 PROCEDURE — 94799 UNLISTED PULMONARY SVC/PX: CPT

## 2018-03-08 PROCEDURE — 63710000001 INSULIN ISOPHANE HUMAN PER 5 UNITS: Performed by: INTERNAL MEDICINE

## 2018-03-08 PROCEDURE — 63710000001 INSULIN ASPART PER 5 UNITS: Performed by: INTERNAL MEDICINE

## 2018-03-08 RX ORDER — METHYLPREDNISOLONE SODIUM SUCCINATE 125 MG/2ML
80 INJECTION, POWDER, LYOPHILIZED, FOR SOLUTION INTRAMUSCULAR; INTRAVENOUS EVERY 12 HOURS
Status: DISCONTINUED | OUTPATIENT
Start: 2018-03-08 | End: 2018-03-08

## 2018-03-08 RX ORDER — METHYLPREDNISOLONE SODIUM SUCCINATE 125 MG/2ML
60 INJECTION, POWDER, LYOPHILIZED, FOR SOLUTION INTRAMUSCULAR; INTRAVENOUS EVERY 12 HOURS
Status: DISCONTINUED | OUTPATIENT
Start: 2018-03-08 | End: 2018-03-10 | Stop reason: HOSPADM

## 2018-03-08 RX ADMIN — GABAPENTIN 800 MG: 400 CAPSULE ORAL at 15:28

## 2018-03-08 RX ADMIN — INSULIN ASPART 16 UNITS: 100 INJECTION, SOLUTION INTRAVENOUS; SUBCUTANEOUS at 08:31

## 2018-03-08 RX ADMIN — DEXTROMETHORPHAN HYDROBROMIDE AND PROMETHAZINE HYDROCHLORIDE 5 ML: 15; 6.25 SOLUTION ORAL at 06:07

## 2018-03-08 RX ADMIN — DEXTROMETHORPHAN HYDROBROMIDE AND PROMETHAZINE HYDROCHLORIDE 5 ML: 15; 6.25 SOLUTION ORAL at 20:16

## 2018-03-08 RX ADMIN — INSULIN ASPART 15 UNITS: 100 INJECTION, SOLUTION INTRAVENOUS; SUBCUTANEOUS at 12:14

## 2018-03-08 RX ADMIN — ENOXAPARIN SODIUM 40 MG: 40 INJECTION SUBCUTANEOUS at 12:14

## 2018-03-08 RX ADMIN — ASPIRIN 81 MG: 81 TABLET ORAL at 08:29

## 2018-03-08 RX ADMIN — HUMAN INSULIN 70 UNITS: 100 INJECTION, SUSPENSION SUBCUTANEOUS at 08:29

## 2018-03-08 RX ADMIN — IPRATROPIUM BROMIDE AND ALBUTEROL SULFATE 3 ML: .5; 3 SOLUTION RESPIRATORY (INHALATION) at 00:02

## 2018-03-08 RX ADMIN — SODIUM CHLORIDE SOLN NEBU 7% 4 ML: 7 NEBU SOLN at 19:22

## 2018-03-08 RX ADMIN — IPRATROPIUM BROMIDE AND ALBUTEROL SULFATE 3 ML: .5; 3 SOLUTION RESPIRATORY (INHALATION) at 19:22

## 2018-03-08 RX ADMIN — INSULIN ASPART 20 UNITS: 100 INJECTION, SOLUTION INTRAVENOUS; SUBCUTANEOUS at 17:47

## 2018-03-08 RX ADMIN — ATORVASTATIN CALCIUM 20 MG: 20 TABLET, FILM COATED ORAL at 08:29

## 2018-03-08 RX ADMIN — PANTOPRAZOLE SODIUM 40 MG: 40 TABLET, DELAYED RELEASE ORAL at 08:29

## 2018-03-08 RX ADMIN — HYDROCODONE BITARTRATE AND ACETAMINOPHEN 1 TABLET: 5; 325 TABLET ORAL at 20:16

## 2018-03-08 RX ADMIN — LEVOTHYROXINE SODIUM 112 MCG: 112 TABLET ORAL at 06:07

## 2018-03-08 RX ADMIN — IPRATROPIUM BROMIDE AND ALBUTEROL SULFATE 3 ML: .5; 3 SOLUTION RESPIRATORY (INHALATION) at 09:54

## 2018-03-08 RX ADMIN — HYDROCODONE BITARTRATE AND ACETAMINOPHEN 1 TABLET: 5; 325 TABLET ORAL at 06:07

## 2018-03-08 RX ADMIN — ARFORMOTEROL TARTRATE 15 MCG: 15 SOLUTION RESPIRATORY (INHALATION) at 12:37

## 2018-03-08 RX ADMIN — GABAPENTIN 800 MG: 400 CAPSULE ORAL at 21:50

## 2018-03-08 RX ADMIN — IPRATROPIUM BROMIDE AND ALBUTEROL SULFATE 3 ML: .5; 3 SOLUTION RESPIRATORY (INHALATION) at 16:03

## 2018-03-08 RX ADMIN — ARFORMOTEROL TARTRATE 15 MCG: 15 SOLUTION RESPIRATORY (INHALATION) at 21:34

## 2018-03-08 RX ADMIN — GABAPENTIN 800 MG: 400 CAPSULE ORAL at 06:07

## 2018-03-08 RX ADMIN — METHYLPREDNISOLONE SODIUM SUCCINATE 60 MG: 125 INJECTION, POWDER, FOR SOLUTION INTRAMUSCULAR; INTRAVENOUS at 17:44

## 2018-03-08 RX ADMIN — INSULIN ASPART 16 UNITS: 100 INJECTION, SOLUTION INTRAVENOUS; SUBCUTANEOUS at 17:47

## 2018-03-08 RX ADMIN — HUMAN INSULIN 75 UNITS: 100 INJECTION, SUSPENSION SUBCUTANEOUS at 17:44

## 2018-03-08 RX ADMIN — INSULIN ASPART 20 UNITS: 100 INJECTION, SOLUTION INTRAVENOUS; SUBCUTANEOUS at 21:50

## 2018-03-08 RX ADMIN — BUDESONIDE 0.5 MG: 0.5 INHALANT RESPIRATORY (INHALATION) at 12:37

## 2018-03-08 RX ADMIN — SODIUM CHLORIDE SOLN NEBU 7% 4 ML: 7 NEBU SOLN at 09:54

## 2018-03-08 RX ADMIN — INSULIN ASPART 15 UNITS: 100 INJECTION, SOLUTION INTRAVENOUS; SUBCUTANEOUS at 08:29

## 2018-03-08 RX ADMIN — HYDROCODONE BITARTRATE AND ACETAMINOPHEN 1 TABLET: 5; 325 TABLET ORAL at 14:29

## 2018-03-08 RX ADMIN — METHYLPREDNISOLONE SODIUM SUCCINATE 80 MG: 125 INJECTION, POWDER, FOR SOLUTION INTRAMUSCULAR; INTRAVENOUS at 06:07

## 2018-03-08 RX ADMIN — BUDESONIDE 0.5 MG: 0.5 INHALANT RESPIRATORY (INHALATION) at 21:34

## 2018-03-08 RX ADMIN — INSULIN ASPART 24 UNITS: 100 INJECTION, SOLUTION INTRAVENOUS; SUBCUTANEOUS at 12:15

## 2018-03-08 NOTE — PLAN OF CARE
Problem: Patient Care Overview (Adult)  Goal: Plan of Care Review  Outcome: Ongoing (interventions implemented as appropriate)   03/07/18 1716 03/08/18 1410 03/08/18 1721   Coping/Psychosocial Response Interventions   Plan Of Care Reviewed With --  patient --    Patient Care Overview   Progress improving --  --    Outcome Evaluation   Outcome Summary/Follow up Plan --  --  3 Liters of O2. Coughing causing great pain. PRN medications may need to be evaluated. SOB greatly. Up and walked today on the unit. No falls. Urinating well. DC fluids. Possible DC in two days. CTM      Goal: Adult Individualization and Mutuality  Outcome: Ongoing (interventions implemented as appropriate)    Goal: Discharge Needs Assessment  Outcome: Ongoing (interventions implemented as appropriate)      Problem: Fall Risk (Adult)  Goal: Absence of Falls  Outcome: Ongoing (interventions implemented as appropriate)      Problem: Skin Integrity Impairment, Risk/Actual (Adult)  Goal: Identify Related Risk Factors and Signs and Symptoms  Outcome: Ongoing (interventions implemented as appropriate)    Goal: Skin Integrity/Wound Healing  Outcome: Ongoing (interventions implemented as appropriate)      Problem: Infection, Risk/Actual (Adult)  Goal: Infection Prevention/Resolution  Outcome: Ongoing (interventions implemented as appropriate)

## 2018-03-08 NOTE — PROGRESS NOTES
Name: Ricardo Pereira ADMIT: 3/5/2018   : 1970  PCP: Simba Moore MD    MRN: [id number] LOS: 2 days   AGE/SEX: 47 y.o. male  ROOM: [facility]   Subjective   Subjective  CC: Shortness of breath  No acute events.  Breathing is improving, he is ambulating. Taking PO well.  No n/v/d.    Objective   Vital Signs  Temp:  [96.9 °F (36.1 °C)-98.2 °F (36.8 °C)] 97.6 °F (36.4 °C)  Heart Rate:  [] 90  Resp:  [20-32] 20  BP: (139-146)/(77-93) 139/92  SpO2:  [85 %-94 %] 93 %  on  Flow (L/min):  [2-4] 4;   O2 Device: nasal cannula  Body mass index is 38.88 kg/(m^2).    Physical Exam   Constitutional: He is oriented to person, place, and time. No distress.   HENT:   Head: Normocephalic and atraumatic.   Mouth/Throat: Oropharynx is clear and moist.   Eyes: Conjunctivae and EOM are normal. Pupils are equal, round, and reactive to light.   Neck: Normal range of motion.   Cardiovascular: Normal rate, regular rhythm and intact distal pulses.    Pulmonary/Chest: Effort normal. He has decreased breath sounds in the right lower field and the left lower field. He has wheezes (mild, scattered).   Improving air movement   Abdominal: Soft. Bowel sounds are normal. There is no tenderness.   Musculoskeletal: He exhibits no edema or tenderness.   Neurological: He is alert and oriented to person, place, and time.   Skin: Skin is warm and dry. He is not diaphoretic.   Psychiatric: He has a normal mood and affect. His behavior is normal.   Nursing note and vitals reviewed.      Results Review:       I reviewed the patient's new clinical results.    Results from last 7 days  Lab Units 18  0542 18  0528 186   WBC 10*3/mm3 17.91* 17.39* 9.50   HEMOGLOBIN g/dL 14.7 15.0 16.3   PLATELETS 10*3/mm3 145 167 151       Results from last 7 days  Lab Units 18  0542 18  0528 18   SODIUM mmol/L 139 136 138   POTASSIUM mmol/L 4.0 4.4 4.3   CHLORIDE mmol/L 102 96* 99   CO2 mmol/L 26.0 21.7* 26.7   BUN  mg/dL 21* 25* 12   CREATININE mg/dL 1.02 1.45* 1.14   GLUCOSE mg/dL 342* 479* 267*   Estimated Creatinine Clearance: 120.3 mL/min (by C-G formula based on Cr of 1.02).    Results from last 7 days  Lab Units 03/08/18  0542 03/07/18  0528 03/05/18  2046   CALCIUM mg/dL 8.5* 9.2 9.7   ALBUMIN g/dL  --   --  3.90         arformoterol 15 mcg Nebulization BID - RT   aspirin 81 mg Oral Daily   atorvastatin 20 mg Oral Daily   budesonide 0.5 mg Nebulization BID - RT   enoxaparin 40 mg Subcutaneous Q24H   gabapentin 800 mg Oral Q8H   insulin aspart 0-24 Units Subcutaneous 4x Daily With Meals & Nightly   insulin aspart 20 Units Subcutaneous TID With Meals   insulin NPH 75 Units Subcutaneous BID AC   ipratropium-albuterol 3 mL Nebulization Q4H - RT   levothyroxine 112 mcg Oral Q AM   methylPREDNISolone sodium succinate 60 mg Intravenous Q12H   pantoprazole 40 mg Oral Daily   sodium chloride 4 mL Nebulization BID - RT       sodium chloride 125 mL/hr Last Rate: 125 mL/hr (03/07/18 2307)   Diet Regular; Consistent Carbohydrate      Assessment/Plan   Active Hospital Problems (** Indicates Principal Problem)    Diagnosis Date Noted   • MADINA (acute kidney injury) [N17.9] 03/07/2018   • COPD (chronic obstructive pulmonary disease) [J44.9] 03/06/2018   • Acute exacerbation of chronic obstructive pulmonary disease (COPD) [J44.1] 03/06/2018   • Essential hypertension [I10] 08/29/2017   • Uncontrolled type 2 diabetes mellitus with complication, without long-term current use of insulin [E11.8, E11.65] 08/29/2017   • Hyperglycemia [R73.9] 08/29/2017      Resolved Hospital Problems    Diagnosis Date Noted Date Resolved   No resolved problems to display.   COPD with acute exacerbation  - improving, will decrease IV steroids  - continue brovana and pulmicort nebs  - continue scheduled duo-nebs and hypertonic saline     Type 2 DM  - A1C is 9.30%- DM educator consulted  - increase NPH to 75 units BID as well as increase prandial insulin to 20  units TID  - high dose ssi     HTN  - hold lisinopril due to recent MADINA    MADINA  - almost certainly pre-renal due to volume depletion as a result of hyperglycemia  - resolved with IVF, will SLIV but will need to restart if BG remains high but these are doing a little better     DVT Prophylaxis  - Lovenox    Disposition  - home in 2 days if he continues to improve    Simba Gaytan MD  [facility]  03/08/18  3:02 PM

## 2018-03-08 NOTE — PLAN OF CARE
Problem: Respiratory Insufficiency (Adult)  Goal: Acid/Base Balance  Outcome: Ongoing (interventions implemented as appropriate)    Goal: Effective Ventilation  Outcome: Ongoing (interventions implemented as appropriate)      Problem: Pain, Acute (Adult)  Goal: Acceptable Pain Control/Comfort Level  Outcome: Ongoing (interventions implemented as appropriate)      Problem: Patient Care Overview (Adult)  Goal: Plan of Care Review  Outcome: Ongoing (interventions implemented as appropriate)   03/07/18 1716 03/08/18 0433   Coping/Psychosocial Response Interventions   Plan Of Care Reviewed With --  patient   Patient Care Overview   Progress improving --    Outcome Evaluation   Outcome Summary/Follow up Plan --  2 Liters of oxygen most of night; 4 Liters when in deep sleep and desaturating to low and mid 80's at 0400 while on 2 and 3 Liters; pain treated with medication prn as prescribed; promethazine-DM for cough prn as prescribed; pt requesting many snacks from different staff members and in need of reminding of high blood glucose levels while on steroids     Goal: Adult Individualization and Mutuality  Outcome: Ongoing (interventions implemented as appropriate)    Goal: Discharge Needs Assessment  Outcome: Ongoing (interventions implemented as appropriate)      Problem: Fall Risk (Adult)  Goal: Identify Related Risk Factors and Signs and Symptoms  Outcome: Outcome(s) achieved Date Met: 03/08/18    Goal: Absence of Falls  Outcome: Ongoing (interventions implemented as appropriate)      Problem: Skin Integrity Impairment, Risk/Actual (Adult)  Goal: Identify Related Risk Factors and Signs and Symptoms  Outcome: Ongoing (interventions implemented as appropriate)    Goal: Skin Integrity/Wound Healing  Outcome: Ongoing (interventions implemented as appropriate)      Problem: Infection, Risk/Actual (Adult)  Goal: Identify Related Risk Factors and Signs and Symptoms  Outcome: Outcome(s) achieved Date Met: 03/08/18    Goal:  Infection Prevention/Resolution  Outcome: Ongoing (interventions implemented as appropriate)

## 2018-03-09 PROBLEM — E03.9 HYPOTHYROIDISM: Chronic | Status: ACTIVE | Noted: 2018-03-09

## 2018-03-09 LAB
ANION GAP SERPL CALCULATED.3IONS-SCNC: 12 MMOL/L
BASOPHILS # BLD AUTO: 0.01 10*3/MM3 (ref 0–0.2)
BASOPHILS NFR BLD AUTO: 0.1 % (ref 0–1.5)
BUN BLD-MCNC: 19 MG/DL (ref 6–20)
BUN/CREAT SERPL: 20.9 (ref 7–25)
CALCIUM SPEC-SCNC: 8.3 MG/DL (ref 8.6–10.5)
CHLORIDE SERPL-SCNC: 102 MMOL/L (ref 98–107)
CO2 SERPL-SCNC: 24 MMOL/L (ref 22–29)
CREAT BLD-MCNC: 0.91 MG/DL (ref 0.76–1.27)
DEPRECATED RDW RBC AUTO: 45.7 FL (ref 37–54)
EOSINOPHIL # BLD AUTO: 0 10*3/MM3 (ref 0–0.7)
EOSINOPHIL NFR BLD AUTO: 0 % (ref 0.3–6.2)
ERYTHROCYTE [DISTWIDTH] IN BLOOD BY AUTOMATED COUNT: 12.9 % (ref 11.5–14.5)
GFR SERPL CREATININE-BSD FRML MDRD: 90 ML/MIN/1.73
GLUCOSE BLD-MCNC: 243 MG/DL (ref 65–99)
GLUCOSE BLDC GLUCOMTR-MCNC: 214 MG/DL (ref 70–130)
GLUCOSE BLDC GLUCOMTR-MCNC: 262 MG/DL (ref 70–130)
GLUCOSE BLDC GLUCOMTR-MCNC: 279 MG/DL (ref 70–130)
GLUCOSE BLDC GLUCOMTR-MCNC: 295 MG/DL (ref 70–130)
HCT VFR BLD AUTO: 43.3 % (ref 40.4–52.2)
HGB BLD-MCNC: 14.2 G/DL (ref 13.7–17.6)
IMM GRANULOCYTES # BLD: 0.15 10*3/MM3 (ref 0–0.03)
IMM GRANULOCYTES NFR BLD: 1 % (ref 0–0.5)
LYMPHOCYTES # BLD AUTO: 2.36 10*3/MM3 (ref 0.9–4.8)
LYMPHOCYTES NFR BLD AUTO: 16.2 % (ref 19.6–45.3)
MCH RBC QN AUTO: 31.9 PG (ref 27–32.7)
MCHC RBC AUTO-ENTMCNC: 32.8 G/DL (ref 32.6–36.4)
MCV RBC AUTO: 97.3 FL (ref 79.8–96.2)
MONOCYTES # BLD AUTO: 0.97 10*3/MM3 (ref 0.2–1.2)
MONOCYTES NFR BLD AUTO: 6.6 % (ref 5–12)
NEUTROPHILS # BLD AUTO: 11.1 10*3/MM3 (ref 1.9–8.1)
NEUTROPHILS NFR BLD AUTO: 76.1 % (ref 42.7–76)
PLATELET # BLD AUTO: 139 10*3/MM3 (ref 140–500)
PMV BLD AUTO: 12.1 FL (ref 6–12)
POTASSIUM BLD-SCNC: 3.9 MMOL/L (ref 3.5–5.2)
RBC # BLD AUTO: 4.45 10*6/MM3 (ref 4.6–6)
SODIUM BLD-SCNC: 138 MMOL/L (ref 136–145)
WBC NRBC COR # BLD: 14.59 10*3/MM3 (ref 4.5–10.7)

## 2018-03-09 PROCEDURE — 80048 BASIC METABOLIC PNL TOTAL CA: CPT | Performed by: HOSPITALIST

## 2018-03-09 PROCEDURE — 94799 UNLISTED PULMONARY SVC/PX: CPT

## 2018-03-09 PROCEDURE — 63710000001 INSULIN ISOPHANE HUMAN PER 5 UNITS: Performed by: INTERNAL MEDICINE

## 2018-03-09 PROCEDURE — 82962 GLUCOSE BLOOD TEST: CPT

## 2018-03-09 PROCEDURE — 85025 COMPLETE CBC W/AUTO DIFF WBC: CPT | Performed by: HOSPITALIST

## 2018-03-09 PROCEDURE — 63710000001 INSULIN ASPART PER 5 UNITS: Performed by: INTERNAL MEDICINE

## 2018-03-09 PROCEDURE — 25010000002 ENOXAPARIN PER 10 MG: Performed by: INTERNAL MEDICINE

## 2018-03-09 PROCEDURE — 25010000002 METHYLPREDNISOLONE PER 125 MG: Performed by: INTERNAL MEDICINE

## 2018-03-09 RX ORDER — GUAIFENESIN AND DEXTROMETHORPHAN HYDROBROMIDE 600; 30 MG/1; MG/1
2 TABLET, EXTENDED RELEASE ORAL 2 TIMES DAILY PRN
Status: DISCONTINUED | OUTPATIENT
Start: 2018-03-09 | End: 2018-03-10 | Stop reason: HOSPADM

## 2018-03-09 RX ORDER — BENZONATATE 100 MG/1
200 CAPSULE ORAL 3 TIMES DAILY PRN
Status: DISCONTINUED | OUTPATIENT
Start: 2018-03-09 | End: 2018-03-10 | Stop reason: HOSPADM

## 2018-03-09 RX ADMIN — LEVOTHYROXINE SODIUM 112 MCG: 112 TABLET ORAL at 06:13

## 2018-03-09 RX ADMIN — GABAPENTIN 800 MG: 400 CAPSULE ORAL at 06:13

## 2018-03-09 RX ADMIN — HYDROCODONE BITARTRATE AND ACETAMINOPHEN 1 TABLET: 5; 325 TABLET ORAL at 13:18

## 2018-03-09 RX ADMIN — HUMAN INSULIN 75 UNITS: 100 INJECTION, SUSPENSION SUBCUTANEOUS at 17:42

## 2018-03-09 RX ADMIN — INSULIN ASPART 20 UNITS: 100 INJECTION, SOLUTION INTRAVENOUS; SUBCUTANEOUS at 11:50

## 2018-03-09 RX ADMIN — ARFORMOTEROL TARTRATE 15 MCG: 15 SOLUTION RESPIRATORY (INHALATION) at 21:14

## 2018-03-09 RX ADMIN — INSULIN ASPART 8 UNITS: 100 INJECTION, SOLUTION INTRAVENOUS; SUBCUTANEOUS at 08:47

## 2018-03-09 RX ADMIN — IPRATROPIUM BROMIDE AND ALBUTEROL SULFATE 3 ML: .5; 3 SOLUTION RESPIRATORY (INHALATION) at 23:23

## 2018-03-09 RX ADMIN — METHYLPREDNISOLONE SODIUM SUCCINATE 60 MG: 125 INJECTION, POWDER, FOR SOLUTION INTRAMUSCULAR; INTRAVENOUS at 06:13

## 2018-03-09 RX ADMIN — IPRATROPIUM BROMIDE AND ALBUTEROL SULFATE 3 ML: .5; 3 SOLUTION RESPIRATORY (INHALATION) at 07:10

## 2018-03-09 RX ADMIN — DEXTROMETHORPHAN HYDROBROMIDE AND PROMETHAZINE HYDROCHLORIDE 5 ML: 15; 6.25 SOLUTION ORAL at 09:25

## 2018-03-09 RX ADMIN — BUDESONIDE 0.5 MG: 0.5 INHALANT RESPIRATORY (INHALATION) at 21:14

## 2018-03-09 RX ADMIN — PANTOPRAZOLE SODIUM 40 MG: 40 TABLET, DELAYED RELEASE ORAL at 08:47

## 2018-03-09 RX ADMIN — ENOXAPARIN SODIUM 40 MG: 40 INJECTION SUBCUTANEOUS at 11:50

## 2018-03-09 RX ADMIN — DEXTROMETHORPHAN HYDROBROMIDE AND PROMETHAZINE HYDROCHLORIDE 5 ML: 15; 6.25 SOLUTION ORAL at 03:34

## 2018-03-09 RX ADMIN — IPRATROPIUM BROMIDE AND ALBUTEROL SULFATE 3 ML: .5; 3 SOLUTION RESPIRATORY (INHALATION) at 19:50

## 2018-03-09 RX ADMIN — INSULIN ASPART 20 UNITS: 100 INJECTION, SOLUTION INTRAVENOUS; SUBCUTANEOUS at 08:50

## 2018-03-09 RX ADMIN — GABAPENTIN 800 MG: 400 CAPSULE ORAL at 21:26

## 2018-03-09 RX ADMIN — HUMAN INSULIN 75 UNITS: 100 INJECTION, SUSPENSION SUBCUTANEOUS at 08:56

## 2018-03-09 RX ADMIN — BUDESONIDE 0.5 MG: 0.5 INHALANT RESPIRATORY (INHALATION) at 07:10

## 2018-03-09 RX ADMIN — INSULIN ASPART 20 UNITS: 100 INJECTION, SOLUTION INTRAVENOUS; SUBCUTANEOUS at 17:33

## 2018-03-09 RX ADMIN — HYDROCODONE BITARTRATE AND ACETAMINOPHEN 1 TABLET: 5; 325 TABLET ORAL at 17:59

## 2018-03-09 RX ADMIN — IPRATROPIUM BROMIDE AND ALBUTEROL SULFATE 3 ML: .5; 3 SOLUTION RESPIRATORY (INHALATION) at 11:41

## 2018-03-09 RX ADMIN — ARFORMOTEROL TARTRATE 15 MCG: 15 SOLUTION RESPIRATORY (INHALATION) at 09:40

## 2018-03-09 RX ADMIN — GUAIFENESIN AND DEXTROMETHORPHAN HYDROBROMIDE 2 TABLET: 600; 30 TABLET, EXTENDED RELEASE ORAL at 17:41

## 2018-03-09 RX ADMIN — IPRATROPIUM BROMIDE AND ALBUTEROL SULFATE 3 ML: .5; 3 SOLUTION RESPIRATORY (INHALATION) at 15:22

## 2018-03-09 RX ADMIN — ASPIRIN 81 MG: 81 TABLET ORAL at 08:47

## 2018-03-09 RX ADMIN — GABAPENTIN 800 MG: 400 CAPSULE ORAL at 13:18

## 2018-03-09 RX ADMIN — METHYLPREDNISOLONE SODIUM SUCCINATE 60 MG: 125 INJECTION, POWDER, FOR SOLUTION INTRAMUSCULAR; INTRAVENOUS at 17:33

## 2018-03-09 RX ADMIN — INSULIN ASPART 12 UNITS: 100 INJECTION, SOLUTION INTRAVENOUS; SUBCUTANEOUS at 17:46

## 2018-03-09 RX ADMIN — INSULIN ASPART 12 UNITS: 100 INJECTION, SOLUTION INTRAVENOUS; SUBCUTANEOUS at 11:51

## 2018-03-09 RX ADMIN — INSULIN ASPART 12 UNITS: 100 INJECTION, SOLUTION INTRAVENOUS; SUBCUTANEOUS at 21:26

## 2018-03-09 RX ADMIN — HYDROCODONE BITARTRATE AND ACETAMINOPHEN 1 TABLET: 5; 325 TABLET ORAL at 09:17

## 2018-03-09 RX ADMIN — HYDROCODONE BITARTRATE AND ACETAMINOPHEN 1 TABLET: 5; 325 TABLET ORAL at 03:34

## 2018-03-09 RX ADMIN — ATORVASTATIN CALCIUM 20 MG: 20 TABLET, FILM COATED ORAL at 08:47

## 2018-03-09 RX ADMIN — SODIUM CHLORIDE SOLN NEBU 7% 4 ML: 7 NEBU SOLN at 19:53

## 2018-03-09 NOTE — PROGRESS NOTES
"   LOS: 3 days   Patient Care Team:  Simba Moore MD as PCP - General (Internal Medicine)    Chief Complaint: SOA    Subjective     HPI Comments: Still c/o SOA at rest. Worse with exertion. C/o dry cough. Can only \"get stuff up\" after hypertonic saline nebs. No fever or chills.     Shortness of Breath   Pertinent negatives include no chest pain, fever or vomiting.       Subjective:  Symptoms:  Stable.  He reports shortness of breath, malaise, cough and weakness.  No chest pain, headache, chest pressure, anorexia, diarrhea or anxiety.    Diet:  Adequate intake.  No nausea or vomiting.    Activity level: Impaired due to weakness.    Pain:  He reports no pain.        History taken from: patient chart family    Objective     Vital Signs  Temp:  [96.7 °F (35.9 °C)-98 °F (36.7 °C)] 96.7 °F (35.9 °C)  Heart Rate:  [72-97] 72  Resp:  [20-32] 20  BP: (138-148)/(85-92) 138/86    Objective:  General Appearance:  Comfortable and in no acute distress.    Vital signs: (most recent): Blood pressure 138/86, pulse 72, temperature 96.7 °F (35.9 °C), temperature source Oral, resp. rate 20, height 177.8 cm (70\"), weight 123 kg (271 lb), SpO2 93 %.  Vital signs are normal.  No fever.    Output: Producing urine and producing stool.    HEENT: Normal HEENT exam.  (Voice hoarse)    Lungs:  Normal respiratory rate and normal effort.  There are wheezes (global expiratory, prolonged exp phase).    Heart: Normal rate.  Regular rhythm.    Abdomen: Abdomen is soft.  Bowel sounds are normal.   There is no abdominal tenderness.     Extremities: There is no dependent edema.    Pulses: Distal pulses are intact.    Neurological: Patient is alert and oriented to person, place and time.    Pupils:  Pupils are equal, round, and reactive to light.    Skin:  Warm and dry.              Results Review:     I reviewed the patient's new clinical results.  I reviewed the patient's other test results and agree with the interpretation  Discussed with " patient and wife    Medication Review: reviewed    Assessment/Plan     Principal Problem:    Acute exacerbation of chronic obstructive pulmonary disease (COPD)  Active Problems:    Uncontrolled type 2 diabetes mellitus with complication, without long-term current use of insulin    Hyperglycemia    Essential hypertension    COPD (chronic obstructive pulmonary disease)    MADINA (acute kidney injury)    Hypothyroidism          Plan:   (Continue nebulized Brovana, Pulmicort, DuoNebs  Continue IV Solumedrol  Continue hypertonic saline, flutter valve, add IS  Change phenergan-DM to Mucinex-DM and Tessalon  Continue O2 for now, wean as able  Insulin adjusted, sugars a little better  Cr normalized  Hopefully home soon, but still pretty sick  ).       Thad Tian MD  03/09/18  11:51 AM    Time: 25min

## 2018-03-09 NOTE — PLAN OF CARE
Problem: Respiratory Insufficiency (Adult)  Goal: Acid/Base Balance  Outcome: Ongoing (interventions implemented as appropriate)    Goal: Effective Ventilation  Outcome: Ongoing (interventions implemented as appropriate)      Problem: Pain, Acute (Adult)  Goal: Acceptable Pain Control/Comfort Level  Outcome: Ongoing (interventions implemented as appropriate)      Problem: Patient Care Overview (Adult)  Goal: Plan of Care Review  Outcome: Ongoing (interventions implemented as appropriate)   03/09/18 0522   Coping/Psychosocial Response Interventions   Plan Of Care Reviewed With patient   Patient Care Overview   Progress no change   Outcome Evaluation   Outcome Summary/Follow up Plan Patients vitals stable. Patient reports pain from coughing. PRN pain and cough medication given. Patient reports trouble sleeping this shift and is up all night in chair. Will continue to monitor.       Problem: Fall Risk (Adult)  Goal: Absence of Falls  Outcome: Ongoing (interventions implemented as appropriate)

## 2018-03-09 NOTE — PLAN OF CARE
Problem: BH Patient Care Overview (Adult)  Goal: Plan of Care Review  Outcome: Ongoing (interventions implemented as appropriate)    Goal: Interdisciplinary Rounds/Family Conference  Outcome: Ongoing (interventions implemented as appropriate)    Goal: Individualization and Mutuality  Outcome: Ongoing (interventions implemented as appropriate)    Goal: Discharge Needs Assessment  Outcome: Ongoing (interventions implemented as appropriate)      Problem: Respiratory Insufficiency (Adult)  Goal: Acid/Base Balance  Outcome: Ongoing (interventions implemented as appropriate)    Goal: Effective Ventilation  Outcome: Ongoing (interventions implemented as appropriate)      Problem: Pain, Acute (Adult)  Goal: Acceptable Pain Control/Comfort Level  Outcome: Ongoing (interventions implemented as appropriate)      Problem: Patient Care Overview (Adult)  Goal: Plan of Care Review  Outcome: Ongoing (interventions implemented as appropriate)    Goal: Adult Individualization and Mutuality  Outcome: Ongoing (interventions implemented as appropriate)    Goal: Discharge Needs Assessment  Outcome: Ongoing (interventions implemented as appropriate)      Problem: Fall Risk (Adult)  Goal: Absence of Falls  Outcome: Ongoing (interventions implemented as appropriate)      Problem: Skin Integrity Impairment, Risk/Actual (Adult)  Goal: Identify Related Risk Factors and Signs and Symptoms  Outcome: Ongoing (interventions implemented as appropriate)    Goal: Skin Integrity/Wound Healing  Outcome: Ongoing (interventions implemented as appropriate)      Problem: Infection, Risk/Actual (Adult)  Goal: Infection Prevention/Resolution  Outcome: Ongoing (interventions implemented as appropriate)

## 2018-03-09 NOTE — PROGRESS NOTES
Continued Stay Note  [facility]     Patient Name: Ricardo Pereira  MRN: [id number]  Today's Date: 3/9/2018    Admit Date: 3/5/2018          Discharge Plan       03/09/18 1732    Case Management/Social Work Plan    Plan Home with wife- Following for Home o2 setup.    Patient/Family In Agreement With Plan yes    Additional Comments Followed up with patient at bedside, plan remains home with family. Pt states may need Oxygen. CCP to be notified if discharged over the weekend to arrange home o2 with [facility]. Suzie WADE/CCP              Discharge Codes     None            Gayle Viveros, RN

## 2018-03-10 VITALS
DIASTOLIC BLOOD PRESSURE: 99 MMHG | RESPIRATION RATE: 16 BRPM | TEMPERATURE: 97.7 F | BODY MASS INDEX: 38.8 KG/M2 | SYSTOLIC BLOOD PRESSURE: 150 MMHG | WEIGHT: 271 LBS | HEIGHT: 70 IN | OXYGEN SATURATION: 93 % | HEART RATE: 82 BPM

## 2018-03-10 PROBLEM — N17.9 AKI (ACUTE KIDNEY INJURY): Status: RESOLVED | Noted: 2018-03-07 | Resolved: 2018-03-10

## 2018-03-10 LAB
ANION GAP SERPL CALCULATED.3IONS-SCNC: 10.4 MMOL/L
BASOPHILS # BLD AUTO: 0.01 10*3/MM3 (ref 0–0.2)
BASOPHILS NFR BLD AUTO: 0.1 % (ref 0–1.5)
BUN BLD-MCNC: 17 MG/DL (ref 6–20)
BUN/CREAT SERPL: 17 (ref 7–25)
CALCIUM SPEC-SCNC: 8.2 MG/DL (ref 8.6–10.5)
CHLORIDE SERPL-SCNC: 100 MMOL/L (ref 98–107)
CO2 SERPL-SCNC: 28.6 MMOL/L (ref 22–29)
CREAT BLD-MCNC: 1 MG/DL (ref 0.76–1.27)
DEPRECATED RDW RBC AUTO: 45.2 FL (ref 37–54)
EOSINOPHIL # BLD AUTO: 0.01 10*3/MM3 (ref 0–0.7)
EOSINOPHIL NFR BLD AUTO: 0.1 % (ref 0.3–6.2)
ERYTHROCYTE [DISTWIDTH] IN BLOOD BY AUTOMATED COUNT: 13 % (ref 11.5–14.5)
GFR SERPL CREATININE-BSD FRML MDRD: 81 ML/MIN/1.73
GLUCOSE BLD-MCNC: 232 MG/DL (ref 65–99)
GLUCOSE BLDC GLUCOMTR-MCNC: 244 MG/DL (ref 70–130)
GLUCOSE BLDC GLUCOMTR-MCNC: 263 MG/DL (ref 70–130)
HCT VFR BLD AUTO: 42.6 % (ref 40.4–52.2)
HGB BLD-MCNC: 14.2 G/DL (ref 13.7–17.6)
IMM GRANULOCYTES # BLD: 0.3 10*3/MM3 (ref 0–0.03)
IMM GRANULOCYTES NFR BLD: 2.4 % (ref 0–0.5)
LYMPHOCYTES # BLD AUTO: 2.29 10*3/MM3 (ref 0.9–4.8)
LYMPHOCYTES NFR BLD AUTO: 18 % (ref 19.6–45.3)
MCH RBC QN AUTO: 31.8 PG (ref 27–32.7)
MCHC RBC AUTO-ENTMCNC: 33.3 G/DL (ref 32.6–36.4)
MCV RBC AUTO: 95.5 FL (ref 79.8–96.2)
MONOCYTES # BLD AUTO: 0.81 10*3/MM3 (ref 0.2–1.2)
MONOCYTES NFR BLD AUTO: 6.4 % (ref 5–12)
NEUTROPHILS # BLD AUTO: 9.33 10*3/MM3 (ref 1.9–8.1)
NEUTROPHILS NFR BLD AUTO: 73 % (ref 42.7–76)
NRBC BLD MANUAL-RTO: 0 /100 WBC (ref 0–0)
PLATELET # BLD AUTO: 130 10*3/MM3 (ref 140–500)
PMV BLD AUTO: 12.4 FL (ref 6–12)
POTASSIUM BLD-SCNC: 4.1 MMOL/L (ref 3.5–5.2)
RBC # BLD AUTO: 4.46 10*6/MM3 (ref 4.6–6)
SODIUM BLD-SCNC: 139 MMOL/L (ref 136–145)
WBC NRBC COR # BLD: 12.75 10*3/MM3 (ref 4.5–10.7)

## 2018-03-10 PROCEDURE — 63710000001 INSULIN ISOPHANE HUMAN PER 5 UNITS: Performed by: INTERNAL MEDICINE

## 2018-03-10 PROCEDURE — 25010000002 METHYLPREDNISOLONE PER 125 MG: Performed by: INTERNAL MEDICINE

## 2018-03-10 PROCEDURE — 94799 UNLISTED PULMONARY SVC/PX: CPT

## 2018-03-10 PROCEDURE — 80048 BASIC METABOLIC PNL TOTAL CA: CPT | Performed by: HOSPITALIST

## 2018-03-10 PROCEDURE — 94618 PULMONARY STRESS TESTING: CPT

## 2018-03-10 PROCEDURE — 82962 GLUCOSE BLOOD TEST: CPT

## 2018-03-10 PROCEDURE — 36415 COLL VENOUS BLD VENIPUNCTURE: CPT | Performed by: HOSPITALIST

## 2018-03-10 PROCEDURE — 85025 COMPLETE CBC W/AUTO DIFF WBC: CPT | Performed by: HOSPITALIST

## 2018-03-10 PROCEDURE — 63710000001 INSULIN ASPART PER 5 UNITS: Performed by: INTERNAL MEDICINE

## 2018-03-10 RX ORDER — GUAIFENESIN AND DEXTROMETHORPHAN HYDROBROMIDE 600; 30 MG/1; MG/1
2 TABLET, EXTENDED RELEASE ORAL 2 TIMES DAILY PRN
Qty: 120 TABLET | Refills: 0 | Status: SHIPPED | OUTPATIENT
Start: 2018-03-10 | End: 2019-04-17

## 2018-03-10 RX ORDER — IPRATROPIUM BROMIDE AND ALBUTEROL SULFATE 2.5; .5 MG/3ML; MG/3ML
3 SOLUTION RESPIRATORY (INHALATION) EVERY 4 HOURS PRN
Qty: 360 ML | Refills: 0 | Status: SHIPPED | OUTPATIENT
Start: 2018-03-10 | End: 2018-03-12

## 2018-03-10 RX ORDER — METHYLPREDNISOLONE 4 MG/1
TABLET ORAL
Qty: 21 EACH | Refills: 0 | Status: SHIPPED | OUTPATIENT
Start: 2018-03-10 | End: 2018-05-10

## 2018-03-10 RX ORDER — ARFORMOTEROL TARTRATE 15 UG/2ML
15 SOLUTION RESPIRATORY (INHALATION)
Qty: 120 ML | Refills: 0 | Status: SHIPPED | OUTPATIENT
Start: 2018-03-10 | End: 2018-03-12

## 2018-03-10 RX ORDER — BENZONATATE 200 MG/1
200 CAPSULE ORAL 3 TIMES DAILY PRN
Qty: 30 CAPSULE | Refills: 0 | Status: SHIPPED | OUTPATIENT
Start: 2018-03-10 | End: 2018-05-10

## 2018-03-10 RX ORDER — BUDESONIDE 0.5 MG/2ML
0.5 INHALANT ORAL
Qty: 60 EACH | Refills: 0 | Status: SHIPPED | OUTPATIENT
Start: 2018-03-10 | End: 2018-03-12

## 2018-03-10 RX ADMIN — ATORVASTATIN CALCIUM 20 MG: 20 TABLET, FILM COATED ORAL at 09:36

## 2018-03-10 RX ADMIN — IPRATROPIUM BROMIDE AND ALBUTEROL SULFATE 3 ML: .5; 3 SOLUTION RESPIRATORY (INHALATION) at 11:58

## 2018-03-10 RX ADMIN — IPRATROPIUM BROMIDE AND ALBUTEROL SULFATE 3 ML: .5; 3 SOLUTION RESPIRATORY (INHALATION) at 07:19

## 2018-03-10 RX ADMIN — INSULIN ASPART 8 UNITS: 100 INJECTION, SOLUTION INTRAVENOUS; SUBCUTANEOUS at 09:39

## 2018-03-10 RX ADMIN — BENZONATATE 200 MG: 100 CAPSULE ORAL at 09:47

## 2018-03-10 RX ADMIN — GABAPENTIN 800 MG: 400 CAPSULE ORAL at 06:11

## 2018-03-10 RX ADMIN — METHYLPREDNISOLONE SODIUM SUCCINATE 60 MG: 125 INJECTION, POWDER, FOR SOLUTION INTRAMUSCULAR; INTRAVENOUS at 06:11

## 2018-03-10 RX ADMIN — BUDESONIDE 0.5 MG: 0.5 INHALANT RESPIRATORY (INHALATION) at 09:22

## 2018-03-10 RX ADMIN — SODIUM CHLORIDE SOLN NEBU 7% 4 ML: 7 NEBU SOLN at 07:23

## 2018-03-10 RX ADMIN — LEVOTHYROXINE SODIUM 112 MCG: 112 TABLET ORAL at 06:11

## 2018-03-10 RX ADMIN — HUMAN INSULIN 75 UNITS: 100 INJECTION, SUSPENSION SUBCUTANEOUS at 09:37

## 2018-03-10 RX ADMIN — ASPIRIN 81 MG: 81 TABLET ORAL at 09:36

## 2018-03-10 RX ADMIN — IPRATROPIUM BROMIDE AND ALBUTEROL SULFATE 3 ML: .5; 3 SOLUTION RESPIRATORY (INHALATION) at 03:45

## 2018-03-10 RX ADMIN — BENZONATATE 200 MG: 100 CAPSULE ORAL at 02:07

## 2018-03-10 RX ADMIN — ARFORMOTEROL TARTRATE 15 MCG: 15 SOLUTION RESPIRATORY (INHALATION) at 09:22

## 2018-03-10 RX ADMIN — INSULIN ASPART 20 UNITS: 100 INJECTION, SOLUTION INTRAVENOUS; SUBCUTANEOUS at 12:38

## 2018-03-10 RX ADMIN — INSULIN ASPART 20 UNITS: 100 INJECTION, SOLUTION INTRAVENOUS; SUBCUTANEOUS at 09:37

## 2018-03-10 RX ADMIN — INSULIN ASPART 8 UNITS: 100 INJECTION, SOLUTION INTRAVENOUS; SUBCUTANEOUS at 12:38

## 2018-03-10 NOTE — PROGRESS NOTES
Exercise Oximetry    Patient Name:Ricardo Pereira   MRN: [id number]   Date: 03/10/18             ROOM AIR BASELINE   SpO2% 94   Heart Rate 93   Blood Pressure      EXERCISE ON ROOM AIR SpO2% EXERCISE ON O2 @  LPM SpO2%   1 MINUTE 94 1 MINUTE    2 MINUTES 95 2 MINUTES    3 MINUTES 95 3 MINUTES    4 MINUTES 94 4 MINUTES    5 MINUTES 94 5 MINUTES    6 MINUTES 94 6 MINUTES               Distance Walked   Distance Walked    Dyspnea (Yusef Scale)   Fatigue (Yusef Scale)  Fatigue (Yusef Scale)   SpO2% Post Exercise  94 SpO2% Post Exercise   HR Post Exercise  95 HR Post Exercise   Time to Recovery   Time to Recovery     Comments: PT was able to complete walk while holding conversation with no need for cane or a O2. PT expressed and was visibly SOA but SATS never dropped below 94% during walk. PT does not need or qualify for home O2.

## 2018-03-10 NOTE — PLAN OF CARE
Problem: Respiratory Insufficiency (Adult)  Goal: Acid/Base Balance  Outcome: Ongoing (interventions implemented as appropriate)    Goal: Effective Ventilation  Outcome: Ongoing (interventions implemented as appropriate)      Problem: Pain, Acute (Adult)  Goal: Acceptable Pain Control/Comfort Level  Outcome: Ongoing (interventions implemented as appropriate)      Problem: Patient Care Overview (Adult)  Goal: Plan of Care Review  Outcome: Ongoing (interventions implemented as appropriate)   03/10/18 0109   Coping/Psychosocial Response Interventions   Plan Of Care Reviewed With patient   Patient Care Overview   Progress improving   Outcome Evaluation   Outcome Summary/Follow up Plan Patients vitals stable. Patient reports some pain but managing without pain medication. patient up in chair all shift. Will continue to monitor.       Problem: Fall Risk (Adult)  Goal: Absence of Falls  Outcome: Ongoing (interventions implemented as appropriate)      Problem: Skin Integrity Impairment, Risk/Actual (Adult)  Goal: Identify Related Risk Factors and Signs and Symptoms  Outcome: Ongoing (interventions implemented as appropriate)    Goal: Skin Integrity/Wound Healing  Outcome: Ongoing (interventions implemented as appropriate)      Problem: Infection, Risk/Actual (Adult)  Goal: Infection Prevention/Resolution  Outcome: Ongoing (interventions implemented as appropriate)

## 2018-03-10 NOTE — DISCHARGE SUMMARY
"       Name: Ricardo Pereira  Age: 47 y.o.  Sex: male  :  1970  MRN: [id number]         Primary Care Physician: Simba Moore MD      Date of Admission:  3/5/2018  Date of Discharge:  3/10/2018      CHIEF COMPLAINT  Shortness of Breath (Started friday and progressively worse.)      DISCHARGE DIAGNOSIS  Active Hospital Problems (** Indicates Principal Problem)    Diagnosis Date Noted   • **Acute exacerbation of chronic obstructive pulmonary disease (COPD) [J44.1] 2018   • Hypothyroidism [E03.9] 2018   • COPD (chronic obstructive pulmonary disease) [J44.9] 2018   • Essential hypertension [I10] 2017   • Uncontrolled type 2 diabetes mellitus with complication, without long-term current use of insulin [E11.8, E11.65] 2017   • Hyperglycemia [R73.9] 2017      Resolved Hospital Problems    Diagnosis Date Noted Date Resolved   • MADINA (acute kidney injury) [N17.9] 2018 03/10/2018       SECONDARY DIAGNOSES  Past Medical History:   Diagnosis Date   • Chronic pain     flank pain \" permenantly damaged muscle\"   • COPD (chronic obstructive pulmonary disease)    • Diabetes mellitus     Insulin pump   • Diabetic neuropathy    • Disease of thyroid gland     hypothyroid   • Diverticulitis of colon    • Fibromyalgia    • History of pancreatitis     X 2   • History of renal cell cancer     stage 4 , Rt kidney   • Hypertension    • Umbilical hernia        CONSULTS   Consult Orders (all)     Start     Ordered    18 2355  [facility] (on-call MD unless specified)  Once     Specialty:  Internal Medicine  Provider:  Alirio Alford MD    18 2354          PROCEDURES PERFORMED  None        HOSPITAL COURSE  Very pleasant 46yo gentleman admitted with COPD exacerbation due to rhinovirus. Treated with nebulized Brovana, Pulmicort, hypertonice saline nebs, DuoNebs, as well as IV steroid. He has shown slow improvement. Today he is breathing well and is very eager to go home. He has no O2 " requirement and lung exam is greatly improved. Labs have normalized. Will change to oral medrol dose pack and continue nebulized meds. Continue flutter valve and incentive spirometer. Will need prompt f/u with PCP this week.      PHYSICAL EXAM  Temp:  [97.7 °F (36.5 °C)-98 °F (36.7 °C)] 97.7 °F (36.5 °C)  Heart Rate:  [73-86] 82  Resp:  [16-20] 16  BP: (137-165)/(75-99) 150/99  Body mass index is 38.88 kg/m².  Physical Exam  General Appearance:  Comfortable and in no acute distress.    Output: Producing urine and producing stool.    HEENT: Normal HEENT exam.  (Voice hoarse)    Lungs:  Normal respiratory rate and normal effort.  No wheeze today. Good air movement.  Heart: Normal rate.  Regular rhythm.    Abdomen: Abdomen is soft.  Bowel sounds are normal.   There is no abdominal tenderness.     Extremities: There is no dependent edema.    Pulses: Distal pulses are intact.    Neurological: Patient is alert and oriented to person, place and time.    Pupils:  Pupils are equal, round, and reactive to light.    Skin:  Warm and dry.      CONDITION ON DISCHARGE  Stable.      DISCHARGE DISPOSITION   Home with family      ALLERGIES  Allergies   Allergen Reactions   • Penicillins Swelling         DISCHARGE MEDICATIONS     Your medication list      START taking these medications      Instructions Last Dose Given Next Dose Due   arformoterol 15 MCG/2ML nebulizer solution  Commonly known as:  BROVANA      Take 2 mL by nebulization 2 (Two) Times a Day.       benzonatate 200 MG capsule  Commonly known as:  TESSALON      Take 1 capsule by mouth 3 (Three) Times a Day As Needed for Cough.       budesonide 0.5 MG/2ML nebulizer solution  Commonly known as:  PULMICORT      Take 2 mL by nebulization 2 (Two) Times a Day.       guaifenesin-dextromethorphan  MG tablet sustained-release 12 hour tablet      Take 2 tablets by mouth 2 (Two) Times a Day As Needed (cough).       ipratropium-albuterol 0.5-2.5 mg/mL nebulizer  Commonly known  as:  DUO-NEB      Take 3 mL by nebulization Every 4 (Four) Hours As Needed for Wheezing or Shortness of Air.       MethylPREDNISolone 4 MG tablet  Commonly known as:  MEDROL (CY)      Take as directed on package instructions.          CONTINUE taking these medications      Instructions Last Dose Given Next Dose Due   aspirin 81 MG EC tablet      Take 1 tablet by mouth Daily.       atorvastatin 20 MG tablet  Commonly known as:  LIPITOR      Take 1 tablet by mouth Daily.       gabapentin 800 MG tablet  Commonly known as:  NEURONTIN      Take 1 tablet by mouth 3 (Three) Times a Day.       insulin  UNIT/ML injection  Commonly known as:  NOVOLIN N RELION      Inject 50 Units under the skin 2 (Two) Times a Day Before Meals.       insulin regular 100 UNIT/ML injection  Commonly known as:  humuLIN R,novoLIN R      10 units before each meal with sliding scale       levothyroxine 112 MCG tablet  Commonly known as:  SYNTHROID      Take 1 tablet by mouth Daily. On an empty stomach       lisinopril 10 MG tablet  Commonly known as:  PRINIVIL,ZESTRIL      Take 2 tablets by mouth Every Morning.       pantoprazole 40 MG EC tablet  Commonly known as:  PROTONIX      Take 40 mg by mouth daily.       vitamin D 22449 units capsule capsule  Commonly known as:  ERGOCALCIFEROL      Take 1 capsule by mouth 1 (One) Time Per Week.          STOP taking these medications    albuterol (2.5 MG/3ML) 0.083% nebulizer solution  Commonly known as:  PROVENTIL        budesonide-formoterol 80-4.5 MCG/ACT inhaler  Commonly known as:  SYMBICORT              Where to Get Your Medications      These medications were sent to [address] ([place], [place] - [address] [facility] - [phone]  - [phone] FX [address] [facility] ([facility] [address]    Phone:  [phone]    arformoterol 15 MCG/2ML nebulizer solution   benzonatate 200 MG capsule   budesonide 0.5 MG/2ML nebulizer  solution   guaifenesin-dextromethorphan  MG tablet sustained-release 12 hour tablet   ipratropium-albuterol 0.5-2.5 mg/mL nebulizer   MethylPREDNISolone 4 MG tablet          Future Appointments  Date Time Provider Department Center   3/23/2018 10:20 AM Avel Gottlieb MD [facility] None     Additional Instructions for the Follow-ups that You Need to Schedule     Discharge Follow-up with PCP    As directed      Follow Up Details:  Dr. Hernandez (PCP) in 3-5 days           Follow-up Information     Simba Moore MD .    Specialty:  Internal Medicine  Why:  Dr. Hernandez (PCP) in 3-5 days  Contact information:  [address] [facility] [id number] [phone]                   TEST  RESULTS PENDING AT DISCHARGE  None       CODE STATUS  Full Code        Thad Tian MD  [facility]  03/10/18  12:25 PM      Time: greater than 30 minutes.

## 2018-03-12 RX ORDER — ARFORMOTEROL TARTRATE 15 UG/2ML
15 SOLUTION RESPIRATORY (INHALATION)
Qty: 120 ML | Refills: 0 | Status: SHIPPED | OUTPATIENT
Start: 2018-03-12 | End: 2021-06-22

## 2018-03-12 RX ORDER — IPRATROPIUM BROMIDE AND ALBUTEROL SULFATE 2.5; .5 MG/3ML; MG/3ML
3 SOLUTION RESPIRATORY (INHALATION) EVERY 4 HOURS PRN
Qty: 360 ML | Refills: 0 | Status: SHIPPED | OUTPATIENT
Start: 2018-03-12 | End: 2020-12-02

## 2018-03-12 RX ORDER — BUDESONIDE 0.5 MG/2ML
0.5 INHALANT ORAL
Qty: 60 EACH | Refills: 0 | Status: SHIPPED | OUTPATIENT
Start: 2018-03-12 | End: 2021-11-10

## 2018-03-12 NOTE — PROGRESS NOTES
Case Management Discharge Note    Final Note: Home no additional dc orders noted. Suzie RN/CCP    Destination     No service coordination in this encounter.      Durable Medical Equipment     No service coordination in this encounter.      Dialysis/Infusion     No service coordination in this encounter.      Home Medical Care     No service coordination in this encounter.      Social Care     No service coordination in this encounter.             Final Discharge Disposition Code: 01 - home or self-care

## 2018-04-01 ENCOUNTER — RESULTS ENCOUNTER (OUTPATIENT)
Dept: ENDOCRINOLOGY | Age: 48
End: 2018-04-01

## 2018-04-01 DIAGNOSIS — R68.89 ABNORMAL ENDOCRINE LABORATORY TEST FINDING: ICD-10-CM

## 2018-04-01 DIAGNOSIS — E55.9 VITAMIN D DEFICIENCY: ICD-10-CM

## 2018-04-01 DIAGNOSIS — E78.2 MIXED HYPERLIPIDEMIA: ICD-10-CM

## 2018-04-01 DIAGNOSIS — IMO0002 UNCONTROLLED TYPE 2 DIABETES MELLITUS WITH COMPLICATION, WITHOUT LONG-TERM CURRENT USE OF INSULIN: ICD-10-CM

## 2018-04-01 DIAGNOSIS — E03.8 SECONDARY HYPOTHYROIDISM: ICD-10-CM

## 2018-04-30 ENCOUNTER — LAB (OUTPATIENT)
Dept: ENDOCRINOLOGY | Age: 48
End: 2018-04-30

## 2018-04-30 DIAGNOSIS — IMO0002 UNCONTROLLED TYPE 2 DIABETES MELLITUS WITH COMPLICATION, WITHOUT LONG-TERM CURRENT USE OF INSULIN: ICD-10-CM

## 2018-04-30 DIAGNOSIS — E78.2 MIXED HYPERLIPIDEMIA: ICD-10-CM

## 2018-04-30 DIAGNOSIS — E55.9 VITAMIN D DEFICIENCY: ICD-10-CM

## 2018-04-30 DIAGNOSIS — E03.8 SECONDARY HYPOTHYROIDISM: ICD-10-CM

## 2018-04-30 DIAGNOSIS — R68.89 ABNORMAL ENDOCRINE LABORATORY TEST FINDING: ICD-10-CM

## 2018-05-05 LAB
25(OH)D3+25(OH)D2 SERPL-MCNC: 27.1 NG/ML (ref 30–100)
ALBUMIN SERPL-MCNC: 4.4 G/DL (ref 3.5–5.2)
ALBUMIN/GLOB SERPL: 1.8 G/DL
ALP SERPL-CCNC: 96 U/L (ref 39–117)
ALT SERPL-CCNC: 45 U/L (ref 1–41)
AST SERPL-CCNC: 28 U/L (ref 1–40)
BILIRUB SERPL-MCNC: 0.3 MG/DL (ref 0.1–1.2)
BUN SERPL-MCNC: 13 MG/DL (ref 6–20)
BUN/CREAT SERPL: 12.1 (ref 7–25)
C PEPTIDE SERPL-MCNC: 10.6 NG/ML (ref 1.1–4.4)
CALCIUM SERPL-MCNC: 10.3 MG/DL (ref 8.6–10.5)
CHLORIDE SERPL-SCNC: 101 MMOL/L (ref 98–107)
CHOLEST SERPL-MCNC: 195 MG/DL (ref 0–200)
CO2 SERPL-SCNC: 28.5 MMOL/L (ref 22–29)
CREAT SERPL-MCNC: 1.07 MG/DL (ref 0.76–1.27)
GFR SERPLBLD CREATININE-BSD FMLA CKD-EPI: 75 ML/MIN/1.73
GFR SERPLBLD CREATININE-BSD FMLA CKD-EPI: 91 ML/MIN/1.73
GLOBULIN SER CALC-MCNC: 2.5 GM/DL
GLUCOSE SERPL-MCNC: 141 MG/DL (ref 65–99)
HBA1C MFR BLD: 8.5 % (ref 4.8–5.6)
HDLC SERPL-MCNC: 23 MG/DL (ref 40–60)
INTERPRETATION: NORMAL
LDLC SERPL CALC-MCNC: 119 MG/DL (ref 0–100)
Lab: NORMAL
MICROALBUMIN UR-MCNC: 6.3 UG/ML
POTASSIUM SERPL-SCNC: 4.6 MMOL/L (ref 3.5–5.2)
PROT SERPL-MCNC: 6.9 G/DL (ref 6–8.5)
SODIUM SERPL-SCNC: 139 MMOL/L (ref 136–145)
T3FREE SERPL-MCNC: 3.5 PG/ML (ref 2–4.4)
T4 FREE SERPL-MCNC: 1.34 NG/DL (ref 0.93–1.7)
T4 SERPL-MCNC: 9.19 MCG/DL (ref 4.5–11.7)
THYROGLOB AB SERPL-ACNC: <1 IU/ML
THYROGLOB SERPL-MCNC: 30.2 NG/ML
THYROGLOB SERPL-MCNC: ABNORMAL NG/ML
TRIGL SERPL-MCNC: 264 MG/DL (ref 0–150)
TSH SERPL DL<=0.005 MIU/L-ACNC: 2.04 MIU/ML (ref 0.27–4.2)
URATE SERPL-MCNC: 6.6 MG/DL (ref 3.4–7)
VLDLC SERPL CALC-MCNC: 52.8 MG/DL (ref 5–40)

## 2018-05-10 ENCOUNTER — OFFICE VISIT (OUTPATIENT)
Dept: ENDOCRINOLOGY | Age: 48
End: 2018-05-10

## 2018-05-10 VITALS
WEIGHT: 265.8 LBS | SYSTOLIC BLOOD PRESSURE: 126 MMHG | RESPIRATION RATE: 16 BRPM | BODY MASS INDEX: 38.05 KG/M2 | HEIGHT: 70 IN | DIASTOLIC BLOOD PRESSURE: 82 MMHG

## 2018-05-10 DIAGNOSIS — E78.2 MIXED HYPERLIPIDEMIA: ICD-10-CM

## 2018-05-10 DIAGNOSIS — E55.9 VITAMIN D DEFICIENCY: ICD-10-CM

## 2018-05-10 DIAGNOSIS — E03.8 HYPOTHYROIDISM DUE TO HASHIMOTO'S THYROIDITIS: Chronic | ICD-10-CM

## 2018-05-10 DIAGNOSIS — K86.1 OTHER CHRONIC PANCREATITIS (HCC): ICD-10-CM

## 2018-05-10 DIAGNOSIS — I10 ESSENTIAL HYPERTENSION: ICD-10-CM

## 2018-05-10 DIAGNOSIS — IMO0002 UNCONTROLLED TYPE 2 DIABETES MELLITUS WITH COMPLICATION, WITHOUT LONG-TERM CURRENT USE OF INSULIN: Primary | ICD-10-CM

## 2018-05-10 DIAGNOSIS — E06.3 HYPOTHYROIDISM DUE TO HASHIMOTO'S THYROIDITIS: Chronic | ICD-10-CM

## 2018-05-10 PROCEDURE — 99215 OFFICE O/P EST HI 40 MIN: CPT | Performed by: INTERNAL MEDICINE

## 2018-05-10 RX ORDER — L-METHYLFOLATE-ALGAE-VIT B12-B6 CAP 3-90.314-2-35 MG 3-90.314-2-35 MG
1 CAP ORAL 2 TIMES DAILY
Qty: 180 CAPSULE | Refills: 3 | Status: SHIPPED | OUTPATIENT
Start: 2018-05-10 | End: 2019-04-17 | Stop reason: ALTCHOICE

## 2018-05-10 RX ORDER — ICOSAPENT ETHYL 1000 MG/1
4 CAPSULE ORAL DAILY
Qty: 120 CAPSULE | Refills: 5 | Status: SHIPPED | OUTPATIENT
Start: 2018-05-10 | End: 2018-09-11 | Stop reason: SDUPTHER

## 2018-05-10 RX ORDER — DAPAGLIFLOZIN 10 MG/1
TABLET, FILM COATED ORAL
Qty: 30 TABLET | Refills: 5 | Status: SHIPPED | OUTPATIENT
Start: 2018-05-10 | End: 2018-12-04 | Stop reason: SDUPTHER

## 2018-05-10 RX ORDER — INSULIN DEGLUDEC 200 U/ML
120 INJECTION, SOLUTION SUBCUTANEOUS
Qty: 6 PEN | Refills: 5 | Status: SHIPPED | OUTPATIENT
Start: 2018-05-10 | End: 2018-09-11 | Stop reason: SDUPTHER

## 2018-05-10 RX ORDER — INSULIN ASPART 100 [IU]/ML
INJECTION, SOLUTION INTRAVENOUS; SUBCUTANEOUS
Qty: 6 PEN | Refills: 5 | Status: SHIPPED | OUTPATIENT
Start: 2018-05-10 | End: 2018-09-11 | Stop reason: SDUPTHER

## 2018-05-10 NOTE — PROGRESS NOTES
"Abelardo Pereira is a 47 y.o. male seen for follow up for DM2, HTN, lab review. He is checking BG 3 times a day. States that BG has not been below 75-80 and nothing over 180. He states that his son is having surgery today on his throat. He was in the hospital x one month ago for URI and was given steroids that made his BG over 650. He is having neuropathy pain in his legs and feet. He states that his Gabapentin dose continues to go up but it does not seem like it is helping him. He was on Lyrica in the past which he did not do well taking. He is having pain in his feet and having trouble walking. He was told that he should stop taking Lisinopril due to his kidney function.     History of Present Illness this is a 47-year-old gentleman known patient with2 diabetes more's likely secondary to chronic pancreatitis as well as hypertension and dyslipidemia and hypothyroidism with vitamin D deficiency.  About a month ago he was hospitalized for upper respiratory infection for which among his other medications he received IV steroids which cause his blood glucose to go very high.    /82   Resp 16   Ht 177.8 cm (70\")   Wt 121 kg (265 lb 12.8 oz)   BMI 38.14 kg/m²      Allergies   Allergen Reactions   • Penicillins Swelling       Current Outpatient Prescriptions:   •  arformoterol (BROVANA) 15 MCG/2ML nebulizer solution, Take 2 mL by nebulization 2 (Two) Times a Day., Disp: 120 mL, Rfl: 0  •  aspirin 81 MG EC tablet, Take 1 tablet by mouth Daily., Disp: 90 tablet, Rfl: 1  •  atorvastatin (LIPITOR) 20 MG tablet, Take 1 tablet by mouth Daily., Disp: 30 tablet, Rfl: 4  •  budesonide (PULMICORT) 0.5 MG/2ML nebulizer solution, Take 2 mL by nebulization 2 (Two) Times a Day., Disp: 60 each, Rfl: 0  •  gabapentin (NEURONTIN) 800 MG tablet, Take 1 tablet by mouth 3 (Three) Times a Day., Disp: 90 tablet, Rfl: 4  •  guaifenesin-dextromethorphan (MUCINEX DM)  MG tablet sustained-release 12 hour tablet, Take 2 " tablets by mouth 2 (Two) Times a Day As Needed (cough)., Disp: 120 tablet, Rfl: 0  •  insulin NPH (NOVOLIN N RELION) 100 UNIT/ML injection, Inject 50 Units under the skin 2 (Two) Times a Day Before Meals., Disp: 30 mL, Rfl: 3  •  insulin regular (humuLIN R,novoLIN R) 100 UNIT/ML injection, 10 units before each meal with sliding scale, Disp: 20 mL, Rfl: 3  •  ipratropium-albuterol (DUO-NEB) 0.5-2.5 mg/mL nebulizer, Take 3 mL by nebulization Every 4 (Four) Hours As Needed for Wheezing or Shortness of Air., Disp: 360 mL, Rfl: 0  •  levothyroxine (SYNTHROID) 112 MCG tablet, Take 1 tablet by mouth Daily. On an empty stomach, Disp: 30 tablet, Rfl: 4  •  lisinopril (PRINIVIL,ZESTRIL) 10 MG tablet, Take 2 tablets by mouth Every Morning., Disp: 30 tablet, Rfl: 4  •  pantoprazole (PROTONIX) 40 MG EC tablet, Take 40 mg by mouth daily., Disp: , Rfl:   •  vitamin D (ERGOCALCIFEROL) 99710 units capsule capsule, Take 1 capsule by mouth 1 (One) Time Per Week., Disp: 12 capsule, Rfl: 3      The following portions of the patient's history were reviewed and updated as appropriate: allergies, current medications, past family history, past medical history, past social history, past surgical history and problem list.    Review of Systems   Constitutional: Negative.    HENT: Negative.    Eyes: Negative.    Respiratory: Negative.    Cardiovascular: Negative.    Gastrointestinal: Negative.    Endocrine: Negative.    Genitourinary: Negative.    Musculoskeletal: Negative.    Skin: Negative.    Allergic/Immunologic: Negative.    Neurological: Negative.    Hematological: Negative.    Psychiatric/Behavioral: Negative.        Objective   Physical Exam  Results for orders placed or performed in visit on 04/01/18   Comprehensive Metabolic Panel   Result Value Ref Range    Glucose 141 (H) 65 - 99 mg/dL    BUN 13 6 - 20 mg/dL    Creatinine 1.07 0.76 - 1.27 mg/dL    eGFR Non African Am 75 >60 mL/min/1.73    eGFR African Am 91 >60 mL/min/1.73     BUN/Creatinine Ratio 12.1 7.0 - 25.0    Sodium 139 136 - 145 mmol/L    Potassium 4.6 3.5 - 5.2 mmol/L    Chloride 101 98 - 107 mmol/L    Total CO2 28.5 22.0 - 29.0 mmol/L    Calcium 10.3 8.6 - 10.5 mg/dL    Total Protein 6.9 6.0 - 8.5 g/dL    Albumin 4.40 3.50 - 5.20 g/dL    Globulin 2.5 gm/dL    A/G Ratio 1.8 g/dL    Total Bilirubin 0.3 0.1 - 1.2 mg/dL    Alkaline Phosphatase 96 39 - 117 U/L    AST (SGOT) 28 1 - 40 U/L    ALT (SGPT) 45 (H) 1 - 41 U/L   C-Peptide   Result Value Ref Range    C-Peptide 10.6 (H) 1.1 - 4.4 ng/mL   Hemoglobin A1c   Result Value Ref Range    Hemoglobin A1C 8.50 (H) 4.80 - 5.60 %   Lipid Panel   Result Value Ref Range    Total Cholesterol 195 0 - 200 mg/dL    Triglycerides 264 (H) 0 - 150 mg/dL    HDL Cholesterol 23 (L) 40 - 60 mg/dL    VLDL Cholesterol 52.8 (H) 5 - 40 mg/dL    LDL Cholesterol  119 (H) 0 - 100 mg/dL   Uric Acid   Result Value Ref Range    Uric Acid 6.6 3.4 - 7.0 mg/dL   Vitamin D 25 Hydroxy   Result Value Ref Range    25 Hydroxy, Vitamin D 27.1 (L) 30.0 - 100.0 ng/ml   T4, Free   Result Value Ref Range    Free T4 1.34 0.93 - 1.70 ng/dL   T4 & TSH ([facility])   Result Value Ref Range    TSH 2.040 0.270 - 4.200 mIU/mL    T4, Total 9.19 4.50 - 11.70 mcg/dL   T3, Free   Result Value Ref Range    T3, Free 3.5 2.0 - 4.4 pg/mL   Comprehensive Thyroglobulin   Result Value Ref Range    Thyroglobulin Ab <1.0 IU/mL    Thyroglobulin 30.2 (H) ng/mL    Thyroglobulin (TG-MARIAMA) Comment ng/mL   MicroAlbumin, Urine, Random   Result Value Ref Range    Microalbumin, Urine 6.3 Not Estab. ug/mL   Cardiovascular Risk Assessment   Result Value Ref Range    Interpretation Note    Diabetes Patient Education   Result Value Ref Range    PDF Image Not applicable          Assessment/Plan   Diagnoses and all orders for this visit:    Uncontrolled type 2 diabetes mellitus with complication, without long-term current use of insulin  -     T4 & TSH ([facility]); Future  -     T3, Free; Future  -     T4, Free;  Future  -     Uric Acid; Future  -     Vitamin D 25 Hydroxy; Future  -     Comprehensive Metabolic Panel; Future  -     C-Peptide; Future  -     Hemoglobin A1c; Future  -     Lipid Panel; Future  -     MicroAlbumin, Urine, Random - Urine, Clean Catch; Future    Hypothyroidism due to Hashimoto's thyroiditis  -     T4 & TSH ([facility]); Future  -     T3, Free; Future  -     T4, Free; Future  -     Uric Acid; Future  -     Vitamin D 25 Hydroxy; Future  -     Comprehensive Metabolic Panel; Future  -     C-Peptide; Future  -     Hemoglobin A1c; Future  -     Lipid Panel; Future  -     MicroAlbumin, Urine, Random - Urine, Clean Catch; Future    Vitamin D deficiency  -     T4 & TSH ([facility]); Future  -     T3, Free; Future  -     T4, Free; Future  -     Uric Acid; Future  -     Vitamin D 25 Hydroxy; Future  -     Comprehensive Metabolic Panel; Future  -     C-Peptide; Future  -     Hemoglobin A1c; Future  -     Lipid Panel; Future  -     MicroAlbumin, Urine, Random - Urine, Clean Catch; Future    Mixed hyperlipidemia  -     T4 & TSH ([facility]); Future  -     T3, Free; Future  -     T4, Free; Future  -     Uric Acid; Future  -     Vitamin D 25 Hydroxy; Future  -     Comprehensive Metabolic Panel; Future  -     C-Peptide; Future  -     Hemoglobin A1c; Future  -     Lipid Panel; Future  -     MicroAlbumin, Urine, Random - Urine, Clean Catch; Future    Essential hypertension  -     T4 & TSH ([facility]); Future  -     T3, Free; Future  -     T4, Free; Future  -     Uric Acid; Future  -     Vitamin D 25 Hydroxy; Future  -     Comprehensive Metabolic Panel; Future  -     C-Peptide; Future  -     Hemoglobin A1c; Future  -     Lipid Panel; Future  -     MicroAlbumin, Urine, Random - Urine, Clean Catch; Future    Other chronic pancreatitis  -     T4 & TSH ([facility]); Future  -     T3, Free; Future  -     T4, Free; Future  -     Uric Acid; Future  -     Vitamin D 25 Hydroxy; Future  -     Comprehensive Metabolic Panel; Future  -      C-Peptide; Future  -     Hemoglobin A1c; Future  -     Lipid Panel; Future  -     MicroAlbumin, Urine, Random - Urine, Clean Catch; Future    Other orders  -     FARXIGA 10 MG tablet; 1 tablet every morning  -     TRESIBA FLEXTOUCH 200 UNIT/ML solution pen-injector; Inject 120 Units under the skin Daily With Breakfast.  -     NOVOLOG FLEXPEN 100 UNIT/ML solution pen-injector sc pen; Up to 20 units before each meal.  -     l-methylfolate-algae-B6-B12 (METANX) 3-90.314-2-35 MG capsule capsule; Take 1 capsule by mouth 2 (Two) Times a Day.  -     VASCEPA 1 g capsule capsule; Take 4 g by mouth Daily.               In summary I saw and examined this 47-year-old gentleman for above-mentioned problems.  I reviewed his laboratory evaluations of 04/30/2018 and provided him with a hard copy of it.  His hemoglobin A1c is elevated at 8.5 and his triglyceride is also elevated as 264.  He made changes in his insulin regimen and triglycerides management that are reflected in the planning a.  As of vitamin D capsules on 50,000 units are not on his formulary I instructed him to purchase D3 5000 units and take 2 capsules daily.  This office visit which lasted 50 minutes 30 minutes of which was a spent on face-to-face counseling the patient on new medications and how to use them as well as smoking cessation and prevention of recurring pancreatitis.  He will see Ms. Rosemarie Johansenedgard in 4 months or sooner if needed with laboratory evaluation prior to each office visit.

## 2018-08-28 ENCOUNTER — LAB (OUTPATIENT)
Dept: ENDOCRINOLOGY | Age: 48
End: 2018-08-28

## 2018-08-28 DIAGNOSIS — E55.9 VITAMIN D DEFICIENCY: ICD-10-CM

## 2018-08-28 DIAGNOSIS — E03.8 SECONDARY HYPOTHYROIDISM: ICD-10-CM

## 2018-08-28 DIAGNOSIS — IMO0002 UNCONTROLLED TYPE 2 DIABETES MELLITUS WITH COMPLICATION, WITHOUT LONG-TERM CURRENT USE OF INSULIN: ICD-10-CM

## 2018-08-28 DIAGNOSIS — E78.2 MIXED HYPERLIPIDEMIA: ICD-10-CM

## 2018-08-28 DIAGNOSIS — E78.2 MIXED HYPERLIPIDEMIA: Primary | ICD-10-CM

## 2018-08-29 ENCOUNTER — RESULTS ENCOUNTER (OUTPATIENT)
Dept: ENDOCRINOLOGY | Age: 48
End: 2018-08-29

## 2018-08-29 DIAGNOSIS — E06.3 HYPOTHYROIDISM DUE TO HASHIMOTO'S THYROIDITIS: Chronic | ICD-10-CM

## 2018-08-29 DIAGNOSIS — K86.1 OTHER CHRONIC PANCREATITIS (HCC): ICD-10-CM

## 2018-08-29 DIAGNOSIS — I10 ESSENTIAL HYPERTENSION: ICD-10-CM

## 2018-08-29 DIAGNOSIS — IMO0002 UNCONTROLLED TYPE 2 DIABETES MELLITUS WITH COMPLICATION, WITHOUT LONG-TERM CURRENT USE OF INSULIN: ICD-10-CM

## 2018-08-29 DIAGNOSIS — E03.8 HYPOTHYROIDISM DUE TO HASHIMOTO'S THYROIDITIS: Chronic | ICD-10-CM

## 2018-08-29 DIAGNOSIS — E78.2 MIXED HYPERLIPIDEMIA: ICD-10-CM

## 2018-08-29 DIAGNOSIS — E55.9 VITAMIN D DEFICIENCY: ICD-10-CM

## 2018-08-29 LAB
25(OH)D3+25(OH)D2 SERPL-MCNC: 25.6 NG/ML (ref 30–100)
ALBUMIN SERPL-MCNC: 4.2 G/DL (ref 3.5–5.2)
ALBUMIN/GLOB SERPL: 1.8 G/DL
ALP SERPL-CCNC: 98 U/L (ref 39–117)
ALT SERPL-CCNC: 29 U/L (ref 1–41)
AST SERPL-CCNC: 19 U/L (ref 1–40)
BILIRUB SERPL-MCNC: 0.4 MG/DL (ref 0.1–1.2)
BUN SERPL-MCNC: 12 MG/DL (ref 6–20)
BUN/CREAT SERPL: 11.2 (ref 7–25)
C PEPTIDE SERPL-MCNC: 18.3 NG/ML (ref 1.1–4.4)
CALCIUM SERPL-MCNC: 9.5 MG/DL (ref 8.6–10.5)
CHLORIDE SERPL-SCNC: 102 MMOL/L (ref 98–107)
CHOLEST SERPL-MCNC: 153 MG/DL (ref 0–200)
CO2 SERPL-SCNC: 23.4 MMOL/L (ref 22–29)
CREAT SERPL-MCNC: 1.07 MG/DL (ref 0.76–1.27)
FT4I SERPL CALC-MCNC: 2.7 (ref 1.2–4.9)
GLOBULIN SER CALC-MCNC: 2.4 GM/DL
GLUCOSE SERPL-MCNC: 197 MG/DL (ref 65–99)
HBA1C MFR BLD: 6.5 % (ref 4.8–5.6)
HDLC SERPL-MCNC: 23 MG/DL (ref 40–60)
INTERPRETATION: NORMAL
LDLC SERPL CALC-MCNC: 92 MG/DL (ref 0–100)
Lab: NORMAL
MICROALBUMIN UR-MCNC: <3 UG/ML
POTASSIUM SERPL-SCNC: 4.9 MMOL/L (ref 3.5–5.2)
PROT SERPL-MCNC: 6.6 G/DL (ref 6–8.5)
SODIUM SERPL-SCNC: 140 MMOL/L (ref 136–145)
T3FREE SERPL-MCNC: 3.7 PG/ML (ref 2–4.4)
T3RU NFR SERPL: 25 % (ref 24–39)
T4 FREE SERPL-MCNC: 1.57 NG/DL (ref 0.93–1.7)
T4 SERPL-MCNC: 10.7 UG/DL (ref 4.5–12)
TRIGL SERPL-MCNC: 190 MG/DL (ref 0–150)
TSH SERPL DL<=0.005 MIU/L-ACNC: 0.91 UIU/ML (ref 0.45–4.5)
VLDLC SERPL CALC-MCNC: 38 MG/DL (ref 5–40)

## 2018-09-11 ENCOUNTER — OFFICE VISIT (OUTPATIENT)
Dept: ENDOCRINOLOGY | Age: 48
End: 2018-09-11

## 2018-09-11 VITALS
SYSTOLIC BLOOD PRESSURE: 126 MMHG | WEIGHT: 268 LBS | DIASTOLIC BLOOD PRESSURE: 84 MMHG | BODY MASS INDEX: 38.37 KG/M2 | HEIGHT: 70 IN

## 2018-09-11 DIAGNOSIS — E67.3 HYPERVITAMINOSIS D: ICD-10-CM

## 2018-09-11 DIAGNOSIS — E78.2 MIXED HYPERLIPIDEMIA: ICD-10-CM

## 2018-09-11 DIAGNOSIS — IMO0002 UNCONTROLLED TYPE 2 DIABETES MELLITUS WITH COMPLICATION, WITHOUT LONG-TERM CURRENT USE OF INSULIN: ICD-10-CM

## 2018-09-11 DIAGNOSIS — E08.43 DIABETIC AUTONOMIC NEUROPATHY ASSOCIATED WITH DIABETES MELLITUS DUE TO UNDERLYING CONDITION (HCC): ICD-10-CM

## 2018-09-11 DIAGNOSIS — E03.8 SECONDARY HYPOTHYROIDISM: ICD-10-CM

## 2018-09-11 PROCEDURE — 99214 OFFICE O/P EST MOD 30 MIN: CPT | Performed by: NURSE PRACTITIONER

## 2018-09-11 RX ORDER — INSULIN ASPART 100 [IU]/ML
INJECTION, SOLUTION INTRAVENOUS; SUBCUTANEOUS
Qty: 10 PEN | Refills: 5 | Status: SHIPPED | OUTPATIENT
Start: 2018-09-11 | End: 2019-01-25 | Stop reason: SDUPTHER

## 2018-09-11 RX ORDER — LISINOPRIL 10 MG/1
20 TABLET ORAL EVERY MORNING
Qty: 30 TABLET | Refills: 4 | Status: SHIPPED | OUTPATIENT
Start: 2018-09-11 | End: 2019-01-25

## 2018-09-11 RX ORDER — LEVOTHYROXINE SODIUM 112 UG/1
112 TABLET ORAL DAILY
Qty: 30 TABLET | Refills: 4 | Status: SHIPPED | OUTPATIENT
Start: 2018-09-11 | End: 2019-01-25 | Stop reason: SDUPTHER

## 2018-09-11 RX ORDER — ICOSAPENT ETHYL 1000 MG/1
4 CAPSULE ORAL DAILY
Qty: 120 CAPSULE | Refills: 5 | Status: SHIPPED | OUTPATIENT
Start: 2018-09-11 | End: 2019-01-25

## 2018-09-11 RX ORDER — GABAPENTIN 800 MG/1
800 TABLET ORAL 3 TIMES DAILY
Qty: 90 TABLET | Refills: 1 | Status: SHIPPED | OUTPATIENT
Start: 2018-09-11 | End: 2019-01-25 | Stop reason: SDUPTHER

## 2018-09-11 RX ORDER — LORATADINE 5 MG/5 ML
SOLUTION, ORAL ORAL
Qty: 100 EACH | Refills: 11 | Status: SHIPPED | OUTPATIENT
Start: 2018-09-11 | End: 2019-07-03

## 2018-09-11 RX ORDER — ATORVASTATIN CALCIUM 20 MG/1
20 TABLET, FILM COATED ORAL DAILY
Qty: 30 TABLET | Refills: 4 | Status: SHIPPED | OUTPATIENT
Start: 2018-09-11 | End: 2019-01-25

## 2018-09-11 RX ORDER — INSULIN DEGLUDEC 200 U/ML
120 INJECTION, SOLUTION SUBCUTANEOUS
Qty: 6 PEN | Refills: 5 | Status: SHIPPED | OUTPATIENT
Start: 2018-09-11 | End: 2019-01-25 | Stop reason: SDUPTHER

## 2018-09-11 RX ORDER — ERGOCALCIFEROL 1.25 MG/1
50000 CAPSULE ORAL 2 TIMES WEEKLY
Qty: 24 CAPSULE | Refills: 3 | Status: SHIPPED | OUTPATIENT
Start: 2018-09-13 | End: 2019-01-25 | Stop reason: SDUPTHER

## 2018-09-11 NOTE — PROGRESS NOTES
Ricardo Pereira  presents to the office  for the follow-up appointment for Type 2 diabetes mellitus.     The diabete's condition location is throughout the system with the  clinical course has fluctuated, the severity is Mild, and the modifying/allievating factors are insulin.  Medications and  Dosages were  reviewed with Ricardo Pereira and suggested that compliance most of the time.    The patient reports associated symptoms of hyperglycemia have been irritability and associated symptoms of hypoglycemia have been fatigue and irritability, with their  hypoglycemia threshold for symptoms is 80 mg/dl .     The patient is currently on insulin.    Compliance with blood glucose monitoring: good.     Meal panning: The patient is using avoidance of concentrated sweets.    The patient is currently taking home blood tests - Blood glucose testing: 3 times daily, that are: fasting- 1st thing in morning before eating or drinking  before each meal  The  basal insulIn  Tresiba U200 120 units in AM with Novolog U100 15-20 units tid ac    Last instructions given were:  In summary I saw and examined this 47-year-old gentleman for above-mentioned problems.  I reviewed his laboratory evaluations of 04/30/2018 and provided him with a hard copy of it.  His hemoglobin A1c is elevated at 8.5 and his triglyceride is also elevated as 264.  He made changes in his insulin regimen and triglycerides management that are reflected in the planning a.  As of vitamin D capsules on 50,000 units are not on his formulary I instructed him to purchase D3 5000 units and take 2 capsules daily.  This office visit which lasted 50 minutes 30 minutes of which was a spent on face-to-face counseling the patient on new medications and how to use them as well as smoking cessation and prevention of recurring pancreatitis.  He will see Ms. Rosemarie Annita in 4 months or sooner if needed with laboratory evaluation prior to each office visit.    Home blood glucose  testing daily: 3  FB to 154 mg/dl  before lunch 110 to 150 mg/dl  before dinner/supper 99 to 133 mg/dl    Last reported blood glucose readings are:  He is checking BG 3 times a day. States that BG has not been below 75-80 and nothing over 180.     Patterns reported per patient are none. .         The following portions of the patient's history were reviewed and updated as appropriate: current medications, past family history, past medical history, past social history, past surgical history and problem list.      Current Outpatient Prescriptions:   •  arformoterol (BROVANA) 15 MCG/2ML nebulizer solution, Take 2 mL by nebulization 2 (Two) Times a Day., Disp: 120 mL, Rfl: 0  •  aspirin 81 MG EC tablet, Take 1 tablet by mouth Daily., Disp: 90 tablet, Rfl: 1  •  atorvastatin (LIPITOR) 20 MG tablet, Take 1 tablet by mouth Daily., Disp: 30 tablet, Rfl: 4  •  budesonide (PULMICORT) 0.5 MG/2ML nebulizer solution, Take 2 mL by nebulization 2 (Two) Times a Day., Disp: 60 each, Rfl: 0  •  FARXIGA 10 MG tablet, 1 tablet every morning, Disp: 30 tablet, Rfl: 5  •  gabapentin (NEURONTIN) 800 MG tablet, Take 1 tablet by mouth 3 (Three) Times a Day., Disp: 90 tablet, Rfl: 1  •  guaifenesin-dextromethorphan (MUCINEX DM)  MG tablet sustained-release 12 hour tablet, Take 2 tablets by mouth 2 (Two) Times a Day As Needed (cough)., Disp: 120 tablet, Rfl: 0  •  ipratropium-albuterol (DUO-NEB) 0.5-2.5 mg/mL nebulizer, Take 3 mL by nebulization Every 4 (Four) Hours As Needed for Wheezing or Shortness of Air., Disp: 360 mL, Rfl: 0  •  l-methylfolate-algae-B6-B12 (METANX) 3-90.314-2-35 MG capsule capsule, Take 1 capsule by mouth 2 (Two) Times a Day., Disp: 180 capsule, Rfl: 3  •  levothyroxine (SYNTHROID) 112 MCG tablet, Take 1 tablet by mouth Daily. On an empty stomach, Disp: 30 tablet, Rfl: 4  •  lisinopril (PRINIVIL,ZESTRIL) 10 MG tablet, Take 2 tablets by mouth Every Morning., Disp: 30 tablet, Rfl: 4  •  NOVOLOG FLEXPEN 100  UNIT/ML solution pen-injector sc pen, 15u AC with SS max 100u daily, Disp: 10 pen, Rfl: 5  •  pantoprazole (PROTONIX) 40 MG EC tablet, Take 40 mg by mouth daily., Disp: , Rfl:   •  TRESIBA FLEXTOUCH 200 UNIT/ML solution pen-injector, Inject 120 Units under the skin into the appropriate area as directed Daily With Breakfast., Disp: 6 pen, Rfl: 5  •  VASCEPA 1 g capsule capsule, Take 4 g by mouth Daily., Disp: 120 capsule, Rfl: 5  •  [START ON 9/13/2018] vitamin D (ERGOCALCIFEROL) 49201 units capsule capsule, Take 1 capsule by mouth 2 (Two) Times a Week., Disp: 24 capsule, Rfl: 3  •  Insulin Pen Needle (RELION PEN NEEDLES) 32G X 4 MM misc, Use to inject insulin 4 times daily, Disp: 150 each, Rfl: 11  •  RELION LANCETS MICRO-THIN 33G misc, Test 3 times daily, Disp: 100 each, Rfl: 11    Patient Active Problem List    Diagnosis   • Hypothyroidism [E03.9]   • COPD (chronic obstructive pulmonary disease) (CMS/formerly [facility]) [J44.9]   • Acute exacerbation of chronic obstructive pulmonary disease (COPD) (CMS/formerly [facility]) [J44.1]   • Uncontrolled type 2 diabetes mellitus with complication, without long-term current use of insulin (CMS/formerly [facility]) [E11.8, E11.65]   • Vitamin D deficiency [E55.9]   • Chronic pancreatitis (CMS/formerly [facility]) [K86.1]   • Mixed hyperlipidemia [E78.2]   • Abnormal endocrine laboratory test finding [R68.89]   • Secondary hypothyroidism [E03.8]   • Hyperglycemia [R73.9]   • Essential hypertension [I10]       Review of Systems   A comprehensive review of systems was negative.- 14 systems reviewed.      Objective:     Wt Readings from Last 3 Encounters:   09/11/18 122 kg (268 lb)   05/10/18 121 kg (265 lb 12.8 oz)   03/05/18 123 kg (271 lb)     Temp Readings from Last 3 Encounters:   03/10/18 97.7 °F (36.5 °C) (Oral)   01/30/18 97.7 °F (36.5 °C)   05/12/17 97.6 °F (36.4 °C) (Oral)     BP Readings from Last 3 Encounters:   09/11/18 126/84   05/10/18 126/82   03/10/18 150/99     Pulse Readings from Last 3 Encounters:   03/10/18 82  "  01/30/18 84   05/12/17 101        /84   Ht 177.8 cm (70\")   Wt 122 kg (268 lb)   BMI 38.45 kg/m²     General appearance:  alert, cooperative, delirious, fatigued, nourished\" \"appears stated age and cooperative\" \"NAD   Neck: no carotid bruit, supple, symmetrical, trachea midline and thyroid not enlarged, symmetric, no tenderness/mass/nodules   Thyroid:  no palpable nodule, no enlargement, no tenderness   Lung:  diminished breath sounds anterior - bilateral, apex - bilateral, base - jai and bibasilar and \"clear to auscultation bilaterally\" \"no abnormal breath sounds  \" effort was normal, no labored breath, no use of accessory muscles.   Heart: regular rate and rhythm, S1, S2 normal, no murmur, click, rub or gallop      Abdomen:  normal bowel sounds- 4 quads, soft non-tender, contour - rounded and contour - obese      Extremities: extremities normal, atraumatic, no cyanosis or edema extremities normal, atraumatic, no cyanosis or edema\" WNL - gait and station, strength and stability\"       Skin:   Pulses:  warm and dry, no hyperpigmentation, normal skin coloring, or suspicious lesions   2+ and symmetric   Neuro: Alert and oriented x3. Gait normal. Reflexes and motor strength normal and symmetric. Cranial nerves 2-12 and sensation grossly intact.      Psych: behavior - normal, judgement - normal, mood - normal, affect - normal                 Lab Review  Results for orders placed or performed in visit on 08/28/18   Comprehensive Metabolic Panel   Result Value Ref Range    Glucose 197 (H) 65 - 99 mg/dL    BUN 12 6 - 20 mg/dL    Creatinine 1.07 0.76 - 1.27 mg/dL    eGFR Non African Am 74 >60 mL/min/1.73    eGFR African Am 90 >60 mL/min/1.73    BUN/Creatinine Ratio 11.2 7.0 - 25.0    Sodium 140 136 - 145 mmol/L    Potassium 4.9 3.5 - 5.2 mmol/L    Chloride 102 98 - 107 mmol/L    Total CO2 23.4 22.0 - 29.0 mmol/L    Calcium 9.5 8.6 - 10.5 mg/dL    Total Protein 6.6 6.0 - 8.5 g/dL    Albumin 4.20 3.50 - 5.20 g/dL "    Globulin 2.4 gm/dL    A/G Ratio 1.8 g/dL    Total Bilirubin 0.4 0.1 - 1.2 mg/dL    Alkaline Phosphatase 98 39 - 117 U/L    AST (SGOT) 19 1 - 40 U/L    ALT (SGPT) 29 1 - 41 U/L   C-Peptide   Result Value Ref Range    C-Peptide 18.3 (H) 1.1 - 4.4 ng/mL   Hemoglobin A1c   Result Value Ref Range    Hemoglobin A1C 6.50 (H) 4.80 - 5.60 %   MicroAlbumin, Urine, Random - Urine, Clean Catch   Result Value Ref Range    Microalbumin, Urine <3.0 Not Estab. ug/mL   Vitamin D 25 Hydroxy   Result Value Ref Range    25 Hydroxy, Vitamin D 25.6 (L) 30.0 - 100.0 ng/ml   T4, Free   Result Value Ref Range    Free T4 1.57 0.93 - 1.70 ng/dL   T3, Free   Result Value Ref Range    T3, Free 3.7 2.0 - 4.4 pg/mL   Thyroid Panel With TSH   Result Value Ref Range    TSH 0.910 0.450 - 4.500 uIU/mL    T4, Total 10.7 4.5 - 12.0 ug/dL    T3 Uptake 25 24 - 39 %    Free Thyroxine Index 2.7 1.2 - 4.9   Lipid Panel   Result Value Ref Range    Total Cholesterol 153 0 - 200 mg/dL    Triglycerides 190 (H) 0 - 150 mg/dL    HDL Cholesterol 23 (L) 40 - 60 mg/dL    VLDL Cholesterol 38 5 - 40 mg/dL    LDL Cholesterol  92 0 - 100 mg/dL   Cardiovascular Risk Assessment   Result Value Ref Range    Interpretation Note    Diabetes Patient Education   Result Value Ref Range    PDF Image Not applicable            Assessment:   Ricardo was seen today for follow-up.    Diagnoses and all orders for this visit:    Diabetic autonomic neuropathy associated with diabetes mellitus due to underlying condition (CMS/[facility])  -     gabapentin (NEURONTIN) 800 MG tablet; Take 1 tablet by mouth 3 (Three) Times a Day.  -     TRESIBA FLEXTOUCH 200 UNIT/ML solution pen-injector; Inject 120 Units under the skin into the appropriate area as directed Daily With Breakfast.  -     NOVOLOG FLEXPEN 100 UNIT/ML solution pen-injector sc pen; 15u AC with SS max 100u daily  -     VASCEPA 1 g capsule capsule; Take 4 g by mouth Daily.  -     Comprehensive Metabolic Panel; Future    Mixed  hyperlipidemia  -     atorvastatin (LIPITOR) 20 MG tablet; Take 1 tablet by mouth Daily.  -     vitamin D (ERGOCALCIFEROL) 74339 units capsule capsule; Take 1 capsule by mouth 2 (Two) Times a Week.  -     Comprehensive Metabolic Panel; Future  -     Lipid Panel; Future    Secondary hypothyroidism  -     levothyroxine (SYNTHROID) 112 MCG tablet; Take 1 tablet by mouth Daily. On an empty stomach  -     Comprehensive Metabolic Panel; Future  -     TSH; Future  -     Thyroid Antibodies; Future  -     T4, Free; Future  -     T3, Free; Future  -     T3; Future  -     T4; Future    Uncontrolled type 2 diabetes mellitus with complication, without long-term current use of insulin (CMS/[facility])  -     lisinopril (PRINIVIL,ZESTRIL) 10 MG tablet; Take 2 tablets by mouth Every Morning.  -     Comprehensive Metabolic Panel; Future  -     C-Peptide; Future  -     Hemoglobin A1c; Future  -     Insulin, Total; Future  -     Lipid Panel; Future  -     Microalbumin / Creatinine Urine Ratio - Urine, Clean Catch; Future  -     Uric Acid; Future  -     Vitamin D 25 Hydroxy; Future  -     TSH; Future  -     Thyroid Antibodies; Future  -     T4, Free; Future  -     T3, Free; Future  -     T3; Future  -     T4; Future    Hypervitaminosis D   -     Vitamin D 25 Hydroxy; Future          Plan:   In summary/ Medication changes:     home blood tests -  Blood glucose testing: 3 times daily, that are: fasting- 1st thing in morning before eating or drinking, before each meal and 1 or 2 hours after meal  Bedtime, anytime you feel symptoms of hyperglycemia or hypoglycemia (high or low blood sugars)  New instructions for basal insulIn  Tresiba U200 120 units  Titration instructions - increase AM dose 2 units every 2 days if fasting blood sugar   is over 100mg/dl  Novolog U100   1 unit for every 20 above 120 mg/dl - this is done with each meal  15  units before breakfast, 15 units before lunch, 15 units before dinner        Education:  interpretation of  lab results, blood sugar goals, complications of diabetes mellitus, hypoglycemia prevention and treatment, exercise, illness management, self-monitoring of blood glucose skills, nutrition and carbohydrate counting        The total face to face time spent was  25 minutes  with additional education given: 14 minutes (greater than 50% of the total time) was spent with counseling and coordination of care on: SMBG with goals, Side effects profiles with medications, medication use and purposes,    Return in about 3 months (around 12/11/2018), or if symptoms worsen or fail to improve, for Recheck. 3 months with Rosemarie-2 weeks prior for labs 6 months with Dr. Gottlieb-2 weeks prior for labs      Dragon transcription disclaimer     Much of this encounter note is an electronic transcription/translation of spoken language to printed text. The electronic translation of spoken language may permit erroneous, or at times, nonsensical words or phrases to be inadvertently transcribed. Although I have reviewed the note for such errors, some may still exist.

## 2018-11-20 DIAGNOSIS — E03.8 SECONDARY HYPOTHYROIDISM: ICD-10-CM

## 2018-11-20 RX ORDER — LEVOTHYROXINE SODIUM 112 UG/1
TABLET ORAL
Qty: 30 TABLET | Refills: 4 | Status: SHIPPED | OUTPATIENT
Start: 2018-11-20 | End: 2019-01-25

## 2018-12-05 RX ORDER — ICOSAPENT ETHYL 1000 MG/1
CAPSULE ORAL
Qty: 120 CAPSULE | Refills: 5 | Status: SHIPPED | OUTPATIENT
Start: 2018-12-05 | End: 2019-01-25

## 2018-12-05 RX ORDER — DAPAGLIFLOZIN 10 MG/1
TABLET, FILM COATED ORAL
Qty: 30 TABLET | Refills: 5 | Status: SHIPPED | OUTPATIENT
Start: 2018-12-05 | End: 2019-01-25 | Stop reason: SDUPTHER

## 2018-12-11 ENCOUNTER — RESULTS ENCOUNTER (OUTPATIENT)
Dept: ENDOCRINOLOGY | Age: 48
End: 2018-12-11

## 2018-12-11 DIAGNOSIS — E08.43 DIABETIC AUTONOMIC NEUROPATHY ASSOCIATED WITH DIABETES MELLITUS DUE TO UNDERLYING CONDITION (HCC): ICD-10-CM

## 2018-12-11 DIAGNOSIS — E03.8 SECONDARY HYPOTHYROIDISM: ICD-10-CM

## 2018-12-11 DIAGNOSIS — E67.3 HYPERVITAMINOSIS D: ICD-10-CM

## 2018-12-11 DIAGNOSIS — IMO0002 UNCONTROLLED TYPE 2 DIABETES MELLITUS WITH COMPLICATION, WITHOUT LONG-TERM CURRENT USE OF INSULIN: ICD-10-CM

## 2018-12-11 DIAGNOSIS — E78.2 MIXED HYPERLIPIDEMIA: ICD-10-CM

## 2019-01-17 DIAGNOSIS — E55.9 VITAMIN D DEFICIENCY: ICD-10-CM

## 2019-01-17 DIAGNOSIS — E03.8 SECONDARY HYPOTHYROIDISM: ICD-10-CM

## 2019-01-17 DIAGNOSIS — E78.2 MIXED HYPERLIPIDEMIA: Primary | ICD-10-CM

## 2019-01-17 DIAGNOSIS — IMO0002 UNCONTROLLED TYPE 2 DIABETES MELLITUS WITH COMPLICATION, WITHOUT LONG-TERM CURRENT USE OF INSULIN: ICD-10-CM

## 2019-01-22 ENCOUNTER — LAB (OUTPATIENT)
Dept: ENDOCRINOLOGY | Age: 49
End: 2019-01-22

## 2019-01-22 DIAGNOSIS — E03.8 SECONDARY HYPOTHYROIDISM: ICD-10-CM

## 2019-01-22 DIAGNOSIS — E78.2 MIXED HYPERLIPIDEMIA: ICD-10-CM

## 2019-01-22 DIAGNOSIS — E55.9 VITAMIN D DEFICIENCY: ICD-10-CM

## 2019-01-22 DIAGNOSIS — IMO0002 UNCONTROLLED TYPE 2 DIABETES MELLITUS WITH COMPLICATION, WITHOUT LONG-TERM CURRENT USE OF INSULIN: ICD-10-CM

## 2019-01-23 LAB
25(OH)D3+25(OH)D2 SERPL-MCNC: 20.8 NG/ML (ref 30–100)
ALBUMIN SERPL-MCNC: 4.5 G/DL (ref 3.5–5.2)
ALBUMIN/GLOB SERPL: 1.8 G/DL
ALP SERPL-CCNC: 97 U/L (ref 39–117)
ALT SERPL-CCNC: 31 U/L (ref 1–41)
AST SERPL-CCNC: 23 U/L (ref 1–40)
BILIRUB SERPL-MCNC: 0.5 MG/DL (ref 0.1–1.2)
BUN SERPL-MCNC: 11 MG/DL (ref 6–20)
BUN/CREAT SERPL: 9.7 (ref 7–25)
C PEPTIDE SERPL-MCNC: 4 NG/ML (ref 1.1–4.4)
CALCIUM SERPL-MCNC: 9.7 MG/DL (ref 8.6–10.5)
CHLORIDE SERPL-SCNC: 102 MMOL/L (ref 98–107)
CHOLEST SERPL-MCNC: 209 MG/DL (ref 0–200)
CO2 SERPL-SCNC: 27 MMOL/L (ref 22–29)
CREAT SERPL-MCNC: 1.13 MG/DL (ref 0.76–1.27)
FT4I SERPL CALC-MCNC: 2.2 (ref 1.2–4.9)
GLOBULIN SER CALC-MCNC: 2.5 GM/DL
GLUCOSE SERPL-MCNC: 89 MG/DL (ref 65–99)
HBA1C MFR BLD: 6.8 % (ref 4.8–5.6)
HDLC SERPL-MCNC: 26 MG/DL (ref 40–60)
INTERPRETATION: NORMAL
LDLC SERPL CALC-MCNC: 123 MG/DL (ref 0–100)
Lab: NORMAL
MICROALBUMIN UR-MCNC: 11.1 UG/ML
POTASSIUM SERPL-SCNC: 4.7 MMOL/L (ref 3.5–5.2)
PROT SERPL-MCNC: 7 G/DL (ref 6–8.5)
SODIUM SERPL-SCNC: 142 MMOL/L (ref 136–145)
T3FREE SERPL-MCNC: 3.7 PG/ML (ref 2–4.4)
T3RU NFR SERPL: 24 % (ref 24–39)
T4 FREE SERPL-MCNC: 1.27 NG/DL (ref 0.93–1.7)
T4 SERPL-MCNC: 9 UG/DL (ref 4.5–12)
TRIGL SERPL-MCNC: 298 MG/DL (ref 0–150)
TSH SERPL DL<=0.005 MIU/L-ACNC: 2.15 UIU/ML (ref 0.45–4.5)
VLDLC SERPL CALC-MCNC: 59.6 MG/DL (ref 5–40)

## 2019-01-25 ENCOUNTER — OFFICE VISIT (OUTPATIENT)
Dept: ENDOCRINOLOGY | Age: 49
End: 2019-01-25

## 2019-01-25 VITALS
SYSTOLIC BLOOD PRESSURE: 126 MMHG | WEIGHT: 274 LBS | DIASTOLIC BLOOD PRESSURE: 78 MMHG | BODY MASS INDEX: 39.22 KG/M2 | HEIGHT: 70 IN

## 2019-01-25 DIAGNOSIS — I10 ESSENTIAL HYPERTENSION: ICD-10-CM

## 2019-01-25 DIAGNOSIS — E78.2 MIXED HYPERLIPIDEMIA: ICD-10-CM

## 2019-01-25 DIAGNOSIS — K86.1 OTHER CHRONIC PANCREATITIS (HCC): ICD-10-CM

## 2019-01-25 DIAGNOSIS — E08.43 DIABETIC AUTONOMIC NEUROPATHY ASSOCIATED WITH DIABETES MELLITUS DUE TO UNDERLYING CONDITION (HCC): ICD-10-CM

## 2019-01-25 DIAGNOSIS — E67.3 HYPERVITAMINOSIS D: ICD-10-CM

## 2019-01-25 DIAGNOSIS — E55.9 VITAMIN D DEFICIENCY: ICD-10-CM

## 2019-01-25 DIAGNOSIS — E03.8 SECONDARY HYPOTHYROIDISM: ICD-10-CM

## 2019-01-25 DIAGNOSIS — E66.01 MORBID OBESITY DUE TO EXCESS CALORIES (HCC): ICD-10-CM

## 2019-01-25 DIAGNOSIS — IMO0002 UNCONTROLLED TYPE 2 DIABETES MELLITUS WITH COMPLICATION, WITHOUT LONG-TERM CURRENT USE OF INSULIN: Primary | ICD-10-CM

## 2019-01-25 PROCEDURE — 99214 OFFICE O/P EST MOD 30 MIN: CPT | Performed by: NURSE PRACTITIONER

## 2019-01-25 RX ORDER — ICOSAPENT ETHYL 1000 MG/1
2 CAPSULE ORAL DAILY
Qty: 120 CAPSULE | Refills: 5 | Status: SHIPPED | OUTPATIENT
Start: 2019-01-25 | End: 2019-04-17 | Stop reason: SDUPTHER

## 2019-01-25 RX ORDER — LEVOTHYROXINE SODIUM 112 UG/1
112 TABLET ORAL DAILY
Qty: 30 TABLET | Refills: 4 | Status: SHIPPED | OUTPATIENT
Start: 2019-01-25 | End: 2019-04-17 | Stop reason: SDUPTHER

## 2019-01-25 RX ORDER — DAPAGLIFLOZIN 10 MG/1
TABLET, FILM COATED ORAL
Qty: 90 TABLET | Refills: 1 | Status: SHIPPED | OUTPATIENT
Start: 2019-01-25 | End: 2019-02-14 | Stop reason: HOSPADM

## 2019-01-25 RX ORDER — GABAPENTIN 800 MG/1
800 TABLET ORAL 3 TIMES DAILY
Qty: 90 TABLET | Refills: 1 | Status: SHIPPED | OUTPATIENT
Start: 2019-01-25 | End: 2019-01-25 | Stop reason: SDUPTHER

## 2019-01-25 RX ORDER — TOPIRAMATE 50 MG/1
50 TABLET, FILM COATED ORAL 2 TIMES DAILY
COMMUNITY
End: 2020-05-14 | Stop reason: ALTCHOICE

## 2019-01-25 RX ORDER — GABAPENTIN 800 MG/1
800 TABLET ORAL 3 TIMES DAILY
Qty: 90 TABLET | Refills: 1 | Status: SHIPPED | OUTPATIENT
Start: 2019-01-25 | End: 2019-04-17 | Stop reason: SDUPTHER

## 2019-01-25 RX ORDER — OLMESARTAN MEDOXOMIL 20 MG/1
20 TABLET ORAL DAILY
Qty: 30 TABLET | Refills: 4 | Status: SHIPPED | OUTPATIENT
Start: 2019-01-25 | End: 2019-04-17 | Stop reason: SDUPTHER

## 2019-01-25 RX ORDER — INSULIN ASPART 100 [IU]/ML
INJECTION, SOLUTION INTRAVENOUS; SUBCUTANEOUS
Qty: 10 PEN | Refills: 5 | Status: SHIPPED | OUTPATIENT
Start: 2019-01-25 | End: 2019-02-14 | Stop reason: HOSPADM

## 2019-01-25 RX ORDER — INSULIN DEGLUDEC 200 U/ML
120 INJECTION, SOLUTION SUBCUTANEOUS
Qty: 6 PEN | Refills: 5 | Status: SHIPPED | OUTPATIENT
Start: 2019-01-25 | End: 2019-02-14 | Stop reason: HOSPADM

## 2019-01-25 RX ORDER — ERGOCALCIFEROL 1.25 MG/1
50000 CAPSULE ORAL 3 TIMES WEEKLY
Qty: 32 CAPSULE | Refills: 3 | Status: SHIPPED | OUTPATIENT
Start: 2019-01-25 | End: 2019-04-17

## 2019-01-25 RX ORDER — FENOFIBRATE 145 MG/1
145 TABLET, COATED ORAL DAILY
Qty: 30 TABLET | Refills: 4 | Status: SHIPPED | OUTPATIENT
Start: 2019-01-25 | End: 2019-04-17 | Stop reason: SDUPTHER

## 2019-01-25 RX ORDER — ERGOCALCIFEROL 1.25 MG/1
50000 CAPSULE ORAL 2 TIMES WEEKLY
Qty: 24 CAPSULE | Refills: 3 | Status: SHIPPED | OUTPATIENT
Start: 2019-01-28 | End: 2019-01-25 | Stop reason: SDUPTHER

## 2019-01-25 RX ORDER — ROSUVASTATIN CALCIUM 20 MG/1
20 TABLET, COATED ORAL NIGHTLY
Qty: 30 TABLET | Refills: 4 | Status: SHIPPED | OUTPATIENT
Start: 2019-01-25 | End: 2019-04-17

## 2019-01-25 NOTE — PATIENT INSTRUCTIONS
Stop The Lipitor    Start Crestor 20 mg once in the evening    Start fenofibrate/TriCor 145 mg in the evening    Vitamin D increase to 50,000 units 1 by mouth 3 times weekly

## 2019-01-25 NOTE — PROGRESS NOTES
Ricardo Pereira  presents to the office  for the follow-up appointment for Type 2 diabetes mellitus.     The diabete's condition location is throughout the system with the  clinical course has fluctuated, the severity is Mild, and the modifying/allievating factors are insulin.  Medications and  Dosages were  reviewed with Ricardo WALLACE Randall and suggested that compliance most of the time.    The patient reports associated symptoms of hyperglycemia have been irritability and associated symptoms of hypoglycemia have been fatigue and irritability, with their  hypoglycemia threshold for symptoms is 80 mg/dl .     The patient is currently on oral medications .      Compliance with blood glucose monitoring: good.     Meal panning: The patient is using avoidance of concentrated sweets.    The patient is currently taking home blood tests - Blood glucose testing: 3 times daily, that are:  fasting- 1st thing in morning before eating or drinking  before each meal   basal insulIn  Tresiba U100 120 units in AM   Novolog U100 20 units before each meal plus s/s    Last instructions given were:  New instructions for basal insulIn  Tresiba U200 120 units  Titration instructions - increase AM dose 2 units every 2 days if fasting blood sugar   is over 100mg/dl  Novolog U100   1 unit for every 20 above 120 mg/dl - this is done with each meal  15  units before breakfast, 15 units before lunch, 15 units before dinner    Home blood glucose testing daily: 3  FB to 115 mg/dl  before lunch 100 to 130 mg/dl  before dinner/supper 90 to 150 mg/dl    Last reported blood glucose readings are:  Home blood glucose testing daily: 3  FB to 154 mg/dl  before lunch 110 to 150 mg/dl  before dinner/supper 99 to 133 mg/dl    Patterns reported per patient are none.         The following portions of the patient's history were reviewed and updated as appropriate: current medications, past family history, past medical history, past social history, past  surgical history and problem list.      Current Outpatient Medications:   •  arformoterol (BROVANA) 15 MCG/2ML nebulizer solution, Take 2 mL by nebulization 2 (Two) Times a Day., Disp: 120 mL, Rfl: 0  •  aspirin 81 MG EC tablet, Take 1 tablet by mouth Daily., Disp: 90 tablet, Rfl: 1  •  budesonide (PULMICORT) 0.5 MG/2ML nebulizer solution, Take 2 mL by nebulization 2 (Two) Times a Day., Disp: 60 each, Rfl: 0  •  FARXIGA 10 MG tablet, TAKE 1 TABLET BY MOUTH IN THE MORNING, Disp: 90 tablet, Rfl: 1  •  gabapentin (NEURONTIN) 800 MG tablet, Take 1 tablet by mouth 3 (Three) Times a Day., Disp: 90 tablet, Rfl: 1  •  guaifenesin-dextromethorphan (MUCINEX DM)  MG tablet sustained-release 12 hour tablet, Take 2 tablets by mouth 2 (Two) Times a Day As Needed (cough)., Disp: 120 tablet, Rfl: 0  •  Insulin Pen Needle (RELION PEN NEEDLES) 32G X 4 MM misc, Use to inject insulin 4 times daily, Disp: 150 each, Rfl: 11  •  ipratropium-albuterol (DUO-NEB) 0.5-2.5 mg/mL nebulizer, Take 3 mL by nebulization Every 4 (Four) Hours As Needed for Wheezing or Shortness of Air., Disp: 360 mL, Rfl: 0  •  l-methylfolate-algae-B6-B12 (METANX) 3-90.314-2-35 MG capsule capsule, Take 1 capsule by mouth 2 (Two) Times a Day., Disp: 180 capsule, Rfl: 3  •  levothyroxine (SYNTHROID) 112 MCG tablet, Take 1 tablet by mouth Daily. On an empty stomach, Disp: 30 tablet, Rfl: 4  •  NOVOLOG FLEXPEN 100 UNIT/ML solution pen-injector sc pen, 15u AC with SS max 100u daily, Disp: 10 pen, Rfl: 5  •  pantoprazole (PROTONIX) 40 MG EC tablet, Take 40 mg by mouth daily., Disp: , Rfl:   •  RELION LANCETS MICRO-THIN 33G misc, Test 3 times daily, Disp: 100 each, Rfl: 11  •  topiramate (TOPAMAX) 50 MG tablet, Take 50 mg by mouth 2 (Two) Times a Day., Disp: , Rfl:   •  TRESIBA FLEXTOUCH 200 UNIT/ML solution pen-injector, Inject 120 Units under the skin into the appropriate area as directed Daily With Breakfast., Disp: 6 pen, Rfl: 5  •  VASCEPA 1 g capsule capsule,  Take 2 g by mouth Daily., Disp: 120 capsule, Rfl: 5  •  [START ON 1/28/2019] vitamin D (ERGOCALCIFEROL) 60911 units capsule capsule, Take 1 capsule by mouth 2 (Two) Times a Week., Disp: 24 capsule, Rfl: 3  •  fenofibrate (TRICOR) 145 MG tablet, Take 1 tablet by mouth Daily., Disp: 30 tablet, Rfl: 4  •  olmesartan (BENICAR) 20 MG tablet, Take 1 tablet by mouth Daily., Disp: 30 tablet, Rfl: 4  •  rosuvastatin (CRESTOR) 20 MG tablet, Take 1 tablet by mouth Every Night., Disp: 30 tablet, Rfl: 4    Patient Active Problem List    Diagnosis   • Morbid obesity due to excess calories (CMS/[facility]) [E66.01]   • Hypothyroidism [E03.9]   • COPD (chronic obstructive pulmonary disease) (CMS/[facility]) [J44.9]   • Acute exacerbation of chronic obstructive pulmonary disease (COPD) (CMS/[facility]) [J44.1]   • Uncontrolled type 2 diabetes mellitus with complication, without long-term current use of insulin (CMS/[facility]) [E11.8, E11.65]   • Vitamin D deficiency [E55.9]   • Chronic pancreatitis (CMS/[facility]) [K86.1]   • Mixed hyperlipidemia [E78.2]   • Abnormal endocrine laboratory test finding [R68.89]   • Secondary hypothyroidism [E03.8]   • Hyperglycemia [R73.9]   • Essential hypertension [I10]       Review of Systems   A comprehensive review of the 14 systems was negative except of listed below:  Respiratory: positive for cough, dyspnea at rest, dyspnea on exertion and cough that leading to seizure activity. Saw Neuro, medical suggestion to change Lisinopril. placed on topamax for Sx activity and migraines.   Musculoskeletal:positive for bone pain and left shoulder left ankle s/p trauma fall. partial tear rotar cuff and sprain ankle.   Endocrine: hyperglycemia     Objective:     Wt Readings from Last 3 Encounters:   01/25/19 124 kg (274 lb)   09/11/18 122 kg (268 lb)   05/10/18 121 kg (265 lb 12.8 oz)     Temp Readings from Last 3 Encounters:   03/10/18 97.7 °F (36.5 °C) (Oral)   01/30/18 97.7 °F (36.5 °C)   05/12/17 97.6 °F (36.4 °C) (Oral)     BP  "Readings from Last 3 Encounters:   01/25/19 126/78   09/11/18 126/84   05/10/18 126/82     Pulse Readings from Last 3 Encounters:   03/10/18 82   01/30/18 84   05/12/17 101        /78   Ht 177.8 cm (70\")   Wt 124 kg (274 lb)   BMI 39.31 kg/m²        Physical Exam   Constitutional: He is oriented to person, place, and time. He appears well-developed and well-nourished. No distress.   HENT:   Head: Normocephalic and atraumatic.   Eyes: EOM are normal. Pupils are equal, round, and reactive to light.   Neck: Normal range of motion. Neck supple. No thyromegaly present.   Cardiovascular: Normal rate, regular rhythm, normal heart sounds and intact distal pulses.   No murmur heard.  Pulmonary/Chest: Effort normal and breath sounds normal.   Abdominal: Soft. Bowel sounds are normal.   Musculoskeletal: Normal range of motion.   Ambulation with limp to the left. In walking support of ankle.    Neurological: He is alert and oriented to person, place, and time.   Skin: Skin is warm and dry. Capillary refill takes 2 to 3 seconds. He is not diaphoretic.   Psychiatric: He has a normal mood and affect. His behavior is normal. Judgment and thought content normal.   Nursing note and vitals reviewed.          Lab Review  Results for orders placed or performed in visit on 01/22/19   Thyroid Panel With TSH   Result Value Ref Range    TSH 2.150 0.450 - 4.500 uIU/mL    T4, Total 9.0 4.5 - 12.0 ug/dL    T3 Uptake 24 24 - 39 %    Free Thyroxine Index 2.2 1.2 - 4.9   T3, Free   Result Value Ref Range    T3, Free 3.7 2.0 - 4.4 pg/mL   Lipid Panel   Result Value Ref Range    Total Cholesterol 209 (H) 0 - 200 mg/dL    Triglycerides 298 (H) 0 - 150 mg/dL    HDL Cholesterol 26 (L) 40 - 60 mg/dL    VLDL Cholesterol 59.6 (H) 5 - 40 mg/dL    LDL Cholesterol  123 (H) 0 - 100 mg/dL   T4, Free   Result Value Ref Range    Free T4 1.27 0.93 - 1.70 ng/dL   Vitamin D 25 Hydroxy   Result Value Ref Range    25 Hydroxy, Vitamin D 20.8 (L) 30.0 - 100.0 " ng/ml   MicroAlbumin, Urine, Random - Urine, Clean Catch   Result Value Ref Range    Microalbumin, Urine 11.1 Not Estab. ug/mL   Hemoglobin A1c   Result Value Ref Range    Hemoglobin A1C 6.80 (H) 4.80 - 5.60 %   C-Peptide   Result Value Ref Range    C-Peptide 4.0 1.1 - 4.4 ng/mL   Comprehensive Metabolic Panel   Result Value Ref Range    Glucose 89 65 - 99 mg/dL    BUN 11 6 - 20 mg/dL    Creatinine 1.13 0.76 - 1.27 mg/dL    eGFR Non African Am 70 >60 mL/min/1.73    eGFR African Am 85 >60 mL/min/1.73    BUN/Creatinine Ratio 9.7 7.0 - 25.0    Sodium 142 136 - 145 mmol/L    Potassium 4.7 3.5 - 5.2 mmol/L    Chloride 102 98 - 107 mmol/L    Total CO2 27.0 22.0 - 29.0 mmol/L    Calcium 9.7 8.6 - 10.5 mg/dL    Total Protein 7.0 6.0 - 8.5 g/dL    Albumin 4.50 3.50 - 5.20 g/dL    Globulin 2.5 gm/dL    A/G Ratio 1.8 g/dL    Total Bilirubin 0.5 0.1 - 1.2 mg/dL    Alkaline Phosphatase 97 39 - 117 U/L    AST (SGOT) 23 1 - 40 U/L    ALT (SGPT) 31 1 - 41 U/L   Cardiovascular Risk Assessment   Result Value Ref Range    Interpretation Note    Diabetes Patient Education   Result Value Ref Range    PDF Image Not applicable            Assessment:   Ricardo was seen today for follow-up.    Diagnoses and all orders for this visit:    Diabetic autonomic neuropathy associated with diabetes mellitus due to underlying condition (CMS/[facility])  -     Discontinue: gabapentin (NEURONTIN) 800 MG tablet; Take 1 tablet by mouth 3 (Three) Times a Day.  -     NOVOLOG FLEXPEN 100 UNIT/ML solution pen-injector sc pen; 15u AC with SS max 100u daily  -     TRESIBA FLEXTOUCH 200 UNIT/ML solution pen-injector; Inject 120 Units under the skin into the appropriate area as directed Daily With Breakfast.  -     VASCEPA 1 g capsule capsule; Take 2 g by mouth Daily.  -     olmesartan (BENICAR) 20 MG tablet; Take 1 tablet by mouth Daily.  -     FARXIGA 10 MG tablet; TAKE 1 TABLET BY MOUTH IN THE MORNING  -     gabapentin (NEURONTIN) 800 MG tablet; Take 1 tablet by  mouth 3 (Three) Times a Day.    Morbid obesity due to excess calories (CMS/HCC)    Other chronic pancreatitis (CMS/HCC)    Mixed hyperlipidemia  -     rosuvastatin (CRESTOR) 20 MG tablet; Take 1 tablet by mouth Every Night.  -     vitamin D (ERGOCALCIFEROL) 60764 units capsule capsule; Take 1 capsule by mouth 2 (Two) Times a Week.  -     fenofibrate (TRICOR) 145 MG tablet; Take 1 tablet by mouth Daily.    Secondary hypothyroidism  -     levothyroxine (SYNTHROID) 112 MCG tablet; Take 1 tablet by mouth Daily. On an empty stomach          Plan:   In summary/ Medication changes: I met with this patient is metabolically stable and doing well at this time.  Laboratory testing was reviewed dated 1.22.2019, discussed with questions and answers completed.  Discussed and formulate a treatment plan with patient, patient verbally stated understood all instructions.      Diabetic Type 2 - autonomic neuropathy associated with diabetes mellitus due to underlying condition,with  chronic pancreatitis   - chronic, stable no medication changes at this time. Refills prescribed. Future labs ordered for upcoming appointment and assessment.        Mixed hyperlipidemia - chronic, stable no medication changes at this time. Refills prescribed. Future labs ordered for upcoming appointment and assessment.      Secondary hypothyroidism - chronic, stable no medication changes at this time. Refills prescribed. Future labs ordered for upcoming appointment and assessment.        Education:  interpretation of lab results, blood sugar goals, complications of diabetes mellitus, hypoglycemia prevention and treatment, exercise, illness management, self-monitoring of blood glucose skills, nutrition and carbohydrate counting        The total face to face time spent was  25 minutes  with additional education given: 14 minutes (greater than 50% of the total time) was spent with counseling and coordination of care on: SMBG with goals, Side effects profiles  with medications, medication use and purposes,     Return in about 3 months (around 4/25/2019), or if symptoms worsen or fail to improve, for Recheck. 3 months with Rosemarie-2 weeks prior for labs 6 months with Dr. Gottlieb-2 weeks prior for labs      Dragon transcription disclaimer     Much of this encounter note is an electronic transcription/translation of spoken language to printed text. The electronic translation of spoken language may permit erroneous, or at times, nonsensical words or phrases to be inadvertently transcribed. Although I have reviewed the note for such errors, some may still exist.

## 2019-02-01 DIAGNOSIS — IMO0002 UNCONTROLLED TYPE 2 DIABETES MELLITUS WITH COMPLICATION, WITHOUT LONG-TERM CURRENT USE OF INSULIN: ICD-10-CM

## 2019-02-11 ENCOUNTER — APPOINTMENT (OUTPATIENT)
Dept: GENERAL RADIOLOGY | Facility: HOSPITAL | Age: 49
End: 2019-02-11

## 2019-02-11 ENCOUNTER — HOSPITAL ENCOUNTER (INPATIENT)
Facility: HOSPITAL | Age: 49
LOS: 3 days | Discharge: HOME | End: 2019-02-14
Attending: EMERGENCY MEDICINE | Admitting: INTERNAL MEDICINE

## 2019-02-11 DIAGNOSIS — J44.1 COPD EXACERBATION (HCC): Primary | ICD-10-CM

## 2019-02-11 DIAGNOSIS — G47.33 OSA (OBSTRUCTIVE SLEEP APNEA): ICD-10-CM

## 2019-02-11 LAB
ALBUMIN SERPL-MCNC: 3.9 G/DL (ref 3.5–5.2)
ALBUMIN/GLOB SERPL: 1.2 G/DL
ALP SERPL-CCNC: 109 U/L (ref 39–117)
ALT SERPL W P-5'-P-CCNC: 38 U/L (ref 1–41)
ANION GAP SERPL CALCULATED.3IONS-SCNC: 16.2 MMOL/L
ARTERIAL PATENCY WRIST A: POSITIVE
AST SERPL-CCNC: 18 U/L (ref 1–40)
ATMOSPHERIC PRESS: 755.6 MMHG
B PARAPERT DNA SPEC QL NAA+PROBE: NOT DETECTED
B PERT DNA SPEC QL NAA+PROBE: NOT DETECTED
BASE EXCESS BLDA CALC-SCNC: 0.6 MMOL/L (ref 0–2)
BASOPHILS # BLD AUTO: 0.03 10*3/MM3 (ref 0–0.2)
BASOPHILS NFR BLD AUTO: 0.2 % (ref 0–1.5)
BDY SITE: ABNORMAL
BILIRUB SERPL-MCNC: 0.4 MG/DL (ref 0.1–1.2)
BUN BLD-MCNC: 8 MG/DL (ref 6–20)
BUN/CREAT SERPL: 7.1 (ref 7–25)
C PNEUM DNA NPH QL NAA+NON-PROBE: NOT DETECTED
CALCIUM SPEC-SCNC: 9.6 MG/DL (ref 8.6–10.5)
CHLORIDE SERPL-SCNC: 100 MMOL/L (ref 98–107)
CO2 SERPL-SCNC: 21.8 MMOL/L (ref 22–29)
CREAT BLD-MCNC: 1.13 MG/DL (ref 0.76–1.27)
DEPRECATED RDW RBC AUTO: 46.8 FL (ref 37–54)
EOSINOPHIL # BLD AUTO: 0.1 10*3/MM3 (ref 0–0.4)
EOSINOPHIL NFR BLD AUTO: 0.7 % (ref 0.3–6.2)
ERYTHROCYTE [DISTWIDTH] IN BLOOD BY AUTOMATED COUNT: 13.5 % (ref 12.3–15.4)
FLUAV H1 2009 PAND RNA NPH QL NAA+PROBE: NOT DETECTED
FLUAV H1 HA GENE NPH QL NAA+PROBE: NOT DETECTED
FLUAV H3 RNA NPH QL NAA+PROBE: NOT DETECTED
FLUAV SUBTYP SPEC NAA+PROBE: NOT DETECTED
FLUBV RNA ISLT QL NAA+PROBE: NOT DETECTED
GAS FLOW AIRWAY: 2 LPM
GFR SERPL CREATININE-BSD FRML MDRD: 70 ML/MIN/1.73
GLOBULIN UR ELPH-MCNC: 3.3 GM/DL
GLUCOSE BLD-MCNC: 191 MG/DL (ref 65–99)
GLUCOSE BLDC GLUCOMTR-MCNC: 202 MG/DL (ref 70–130)
GLUCOSE BLDC GLUCOMTR-MCNC: 327 MG/DL (ref 70–130)
GLUCOSE BLDC GLUCOMTR-MCNC: 347 MG/DL (ref 70–130)
HADV DNA SPEC NAA+PROBE: NOT DETECTED
HCO3 BLDA-SCNC: 24.4 MMOL/L (ref 22–28)
HCOV 229E RNA SPEC QL NAA+PROBE: NOT DETECTED
HCOV HKU1 RNA SPEC QL NAA+PROBE: NOT DETECTED
HCOV NL63 RNA SPEC QL NAA+PROBE: NOT DETECTED
HCOV OC43 RNA SPEC QL NAA+PROBE: NOT DETECTED
HCT VFR BLD AUTO: 50 % (ref 37.5–51)
HGB BLD-MCNC: 17.8 G/DL (ref 13–17.7)
HMPV RNA NPH QL NAA+NON-PROBE: NOT DETECTED
HOLD SPECIMEN: NORMAL
HOLD SPECIMEN: NORMAL
HPIV1 RNA SPEC QL NAA+PROBE: NOT DETECTED
HPIV2 RNA SPEC QL NAA+PROBE: NOT DETECTED
HPIV3 RNA NPH QL NAA+PROBE: NOT DETECTED
HPIV4 P GENE NPH QL NAA+PROBE: NOT DETECTED
IMM GRANULOCYTES # BLD AUTO: 0.05 10*3/MM3 (ref 0–0.05)
IMM GRANULOCYTES NFR BLD AUTO: 0.4 % (ref 0–0.5)
LYMPHOCYTES # BLD AUTO: 3.1 10*3/MM3 (ref 0.7–3.1)
LYMPHOCYTES NFR BLD AUTO: 22.5 % (ref 19.6–45.3)
M PNEUMO IGG SER IA-ACNC: NOT DETECTED
MCH RBC QN AUTO: 33.7 PG (ref 26.6–33)
MCHC RBC AUTO-ENTMCNC: 35.6 G/DL (ref 31.5–35.7)
MCV RBC AUTO: 94.7 FL (ref 79–97)
MODALITY: ABNORMAL
MONOCYTES # BLD AUTO: 1.05 10*3/MM3 (ref 0.1–0.9)
MONOCYTES NFR BLD AUTO: 7.6 % (ref 5–12)
NEUTROPHILS # BLD AUTO: 9.5 10*3/MM3 (ref 1.4–7)
NEUTROPHILS NFR BLD AUTO: 69 % (ref 42.7–76)
NT-PROBNP SERPL-MCNC: 35.6 PG/ML (ref 5–450)
PCO2 BLDA: 36.2 MM HG (ref 35–45)
PH BLDA: 7.44 PH UNITS (ref 7.35–7.45)
PLATELET # BLD AUTO: 197 10*3/MM3 (ref 140–450)
PMV BLD AUTO: 11.2 FL (ref 6–12)
PO2 BLDA: 77.6 MM HG (ref 80–100)
POTASSIUM BLD-SCNC: 4.2 MMOL/L (ref 3.5–5.2)
PROT SERPL-MCNC: 7.2 G/DL (ref 6–8.5)
RBC # BLD AUTO: 5.28 10*6/MM3 (ref 4.14–5.8)
RHINOVIRUS RNA SPEC NAA+PROBE: NOT DETECTED
RSV RNA NPH QL NAA+NON-PROBE: NOT DETECTED
SAO2 % BLDCOA: 95.9 % (ref 92–99)
SET MECH RESP RATE: 22
SODIUM BLD-SCNC: 138 MMOL/L (ref 136–145)
TROPONIN T SERPL-MCNC: <0.01 NG/ML (ref 0–0.03)
WBC NRBC COR # BLD: 13.78 10*3/MM3 (ref 3.4–10.8)
WHOLE BLOOD HOLD SPECIMEN: NORMAL
WHOLE BLOOD HOLD SPECIMEN: NORMAL

## 2019-02-11 PROCEDURE — 87798 DETECT AGENT NOS DNA AMP: CPT | Performed by: EMERGENCY MEDICINE

## 2019-02-11 PROCEDURE — 82962 GLUCOSE BLOOD TEST: CPT

## 2019-02-11 PROCEDURE — 36600 WITHDRAWAL OF ARTERIAL BLOOD: CPT

## 2019-02-11 PROCEDURE — 87486 CHLMYD PNEUM DNA AMP PROBE: CPT | Performed by: EMERGENCY MEDICINE

## 2019-02-11 PROCEDURE — 94799 UNLISTED PULMONARY SVC/PX: CPT

## 2019-02-11 PROCEDURE — 82803 BLOOD GASES ANY COMBINATION: CPT

## 2019-02-11 PROCEDURE — 93005 ELECTROCARDIOGRAM TRACING: CPT | Performed by: EMERGENCY MEDICINE

## 2019-02-11 PROCEDURE — 25010000002 ENOXAPARIN PER 10 MG: Performed by: INTERNAL MEDICINE

## 2019-02-11 PROCEDURE — 85025 COMPLETE CBC W/AUTO DIFF WBC: CPT

## 2019-02-11 PROCEDURE — 94640 AIRWAY INHALATION TREATMENT: CPT

## 2019-02-11 PROCEDURE — 71045 X-RAY EXAM CHEST 1 VIEW: CPT

## 2019-02-11 PROCEDURE — 83880 ASSAY OF NATRIURETIC PEPTIDE: CPT | Performed by: EMERGENCY MEDICINE

## 2019-02-11 PROCEDURE — 84484 ASSAY OF TROPONIN QUANT: CPT | Performed by: EMERGENCY MEDICINE

## 2019-02-11 PROCEDURE — 99284 EMERGENCY DEPT VISIT MOD MDM: CPT

## 2019-02-11 PROCEDURE — 87633 RESP VIRUS 12-25 TARGETS: CPT | Performed by: EMERGENCY MEDICINE

## 2019-02-11 PROCEDURE — 80053 COMPREHEN METABOLIC PANEL: CPT | Performed by: EMERGENCY MEDICINE

## 2019-02-11 PROCEDURE — 25010000002 METHYLPREDNISOLONE PER 125 MG: Performed by: NURSE PRACTITIONER

## 2019-02-11 PROCEDURE — 93010 ELECTROCARDIOGRAM REPORT: CPT | Performed by: INTERNAL MEDICINE

## 2019-02-11 PROCEDURE — 25010000002 METHYLPREDNISOLONE PER 40 MG: Performed by: INTERNAL MEDICINE

## 2019-02-11 PROCEDURE — 63710000001 INSULIN LISPRO (HUMAN) PER 5 UNITS: Performed by: INTERNAL MEDICINE

## 2019-02-11 PROCEDURE — 87581 M.PNEUMON DNA AMP PROBE: CPT | Performed by: EMERGENCY MEDICINE

## 2019-02-11 RX ORDER — NICOTINE POLACRILEX 4 MG
15 LOZENGE BUCCAL
Status: DISCONTINUED | OUTPATIENT
Start: 2019-02-11 | End: 2019-02-14 | Stop reason: HOSPADM

## 2019-02-11 RX ORDER — ALBUTEROL SULFATE 2.5 MG/3ML
2.5 SOLUTION RESPIRATORY (INHALATION)
Status: ACTIVE | OUTPATIENT
Start: 2019-02-11 | End: 2019-02-11

## 2019-02-11 RX ORDER — IPRATROPIUM BROMIDE AND ALBUTEROL SULFATE 2.5; .5 MG/3ML; MG/3ML
3 SOLUTION RESPIRATORY (INHALATION)
Status: DISCONTINUED | OUTPATIENT
Start: 2019-02-11 | End: 2019-02-14 | Stop reason: HOSPADM

## 2019-02-11 RX ORDER — IPRATROPIUM BROMIDE AND ALBUTEROL SULFATE 2.5; .5 MG/3ML; MG/3ML
3 SOLUTION RESPIRATORY (INHALATION) ONCE
Status: DISCONTINUED | OUTPATIENT
Start: 2019-02-11 | End: 2019-02-11

## 2019-02-11 RX ORDER — ALBUTEROL SULFATE 2.5 MG/3ML
2.5 SOLUTION RESPIRATORY (INHALATION) ONCE
Status: COMPLETED | OUTPATIENT
Start: 2019-02-11 | End: 2019-02-11

## 2019-02-11 RX ORDER — LEVOTHYROXINE SODIUM 112 UG/1
112 TABLET ORAL DAILY
Status: DISCONTINUED | OUTPATIENT
Start: 2019-02-11 | End: 2019-02-14 | Stop reason: HOSPADM

## 2019-02-11 RX ORDER — METHYLPREDNISOLONE SODIUM SUCCINATE 125 MG/2ML
125 INJECTION, POWDER, LYOPHILIZED, FOR SOLUTION INTRAMUSCULAR; INTRAVENOUS ONCE
Status: DISCONTINUED | OUTPATIENT
Start: 2019-02-11 | End: 2019-02-11

## 2019-02-11 RX ORDER — SODIUM CHLORIDE 0.9 % (FLUSH) 0.9 %
3 SYRINGE (ML) INJECTION EVERY 12 HOURS SCHEDULED
Status: DISCONTINUED | OUTPATIENT
Start: 2019-02-11 | End: 2019-02-14 | Stop reason: HOSPADM

## 2019-02-11 RX ORDER — DEXTROSE MONOHYDRATE 25 G/50ML
25 INJECTION, SOLUTION INTRAVENOUS
Status: DISCONTINUED | OUTPATIENT
Start: 2019-02-11 | End: 2019-02-14 | Stop reason: HOSPADM

## 2019-02-11 RX ORDER — AZITHROMYCIN 250 MG/1
250 TABLET, FILM COATED ORAL
Status: DISCONTINUED | OUTPATIENT
Start: 2019-02-11 | End: 2019-02-14 | Stop reason: HOSPADM

## 2019-02-11 RX ORDER — TOPIRAMATE 50 MG/1
50 TABLET, FILM COATED ORAL DAILY
Status: DISCONTINUED | OUTPATIENT
Start: 2019-02-11 | End: 2019-02-14 | Stop reason: HOSPADM

## 2019-02-11 RX ORDER — IPRATROPIUM BROMIDE AND ALBUTEROL SULFATE 2.5; .5 MG/3ML; MG/3ML
3 SOLUTION RESPIRATORY (INHALATION) ONCE
Status: COMPLETED | OUTPATIENT
Start: 2019-02-11 | End: 2019-02-11

## 2019-02-11 RX ORDER — GABAPENTIN 300 MG/1
300 CAPSULE ORAL EVERY 8 HOURS SCHEDULED
Status: DISCONTINUED | OUTPATIENT
Start: 2019-02-11 | End: 2019-02-14 | Stop reason: HOSPADM

## 2019-02-11 RX ORDER — ACETAMINOPHEN 500 MG
1000 TABLET ORAL ONCE
Status: COMPLETED | OUTPATIENT
Start: 2019-02-11 | End: 2019-02-11

## 2019-02-11 RX ORDER — SODIUM CHLORIDE 0.9 % (FLUSH) 0.9 %
3-10 SYRINGE (ML) INJECTION AS NEEDED
Status: DISCONTINUED | OUTPATIENT
Start: 2019-02-11 | End: 2019-02-14 | Stop reason: HOSPADM

## 2019-02-11 RX ORDER — SODIUM CHLORIDE 0.9 % (FLUSH) 0.9 %
10 SYRINGE (ML) INJECTION AS NEEDED
Status: DISCONTINUED | OUTPATIENT
Start: 2019-02-11 | End: 2019-02-14 | Stop reason: HOSPADM

## 2019-02-11 RX ORDER — ASPIRIN 81 MG/1
81 TABLET ORAL DAILY
Status: DISCONTINUED | OUTPATIENT
Start: 2019-02-11 | End: 2019-02-14 | Stop reason: HOSPADM

## 2019-02-11 RX ORDER — METHYLPREDNISOLONE SODIUM SUCCINATE 40 MG/ML
40 INJECTION, POWDER, LYOPHILIZED, FOR SOLUTION INTRAMUSCULAR; INTRAVENOUS EVERY 8 HOURS
Status: DISCONTINUED | OUTPATIENT
Start: 2019-02-11 | End: 2019-02-13

## 2019-02-11 RX ORDER — METHYLPREDNISOLONE SODIUM SUCCINATE 125 MG/2ML
125 INJECTION, POWDER, LYOPHILIZED, FOR SOLUTION INTRAMUSCULAR; INTRAVENOUS ONCE
Status: COMPLETED | OUTPATIENT
Start: 2019-02-11 | End: 2019-02-11

## 2019-02-11 RX ORDER — BENZONATATE 100 MG/1
100 CAPSULE ORAL EVERY 6 HOURS PRN
Status: DISCONTINUED | OUTPATIENT
Start: 2019-02-11 | End: 2019-02-14 | Stop reason: HOSPADM

## 2019-02-11 RX ORDER — LOSARTAN POTASSIUM 50 MG/1
50 TABLET ORAL
Status: DISCONTINUED | OUTPATIENT
Start: 2019-02-11 | End: 2019-02-14 | Stop reason: HOSPADM

## 2019-02-11 RX ADMIN — ALBUTEROL SULFATE 2.5 MG: 2.5 SOLUTION RESPIRATORY (INHALATION) at 10:14

## 2019-02-11 RX ADMIN — SODIUM CHLORIDE, PRESERVATIVE FREE 10 ML: 5 INJECTION INTRAVENOUS at 10:16

## 2019-02-11 RX ADMIN — ALBUTEROL SULFATE 2.5 MG: 2.5 SOLUTION RESPIRATORY (INHALATION) at 10:19

## 2019-02-11 RX ADMIN — SODIUM CHLORIDE, PRESERVATIVE FREE 3 ML: 5 INJECTION INTRAVENOUS at 22:03

## 2019-02-11 RX ADMIN — IPRATROPIUM BROMIDE AND ALBUTEROL SULFATE 3 ML: 2.5; .5 SOLUTION RESPIRATORY (INHALATION) at 10:17

## 2019-02-11 RX ADMIN — ENOXAPARIN SODIUM 40 MG: 40 INJECTION SUBCUTANEOUS at 21:59

## 2019-02-11 RX ADMIN — ACETAMINOPHEN 1000 MG: 500 TABLET, FILM COATED ORAL at 12:33

## 2019-02-11 RX ADMIN — INSULIN LISPRO 16 UNITS: 100 INJECTION, SOLUTION INTRAVENOUS; SUBCUTANEOUS at 22:58

## 2019-02-11 RX ADMIN — LOSARTAN POTASSIUM 50 MG: 50 TABLET, FILM COATED ORAL at 21:58

## 2019-02-11 RX ADMIN — METHYLPREDNISOLONE SODIUM SUCCINATE 40 MG: 40 INJECTION, POWDER, FOR SOLUTION INTRAMUSCULAR; INTRAVENOUS at 17:51

## 2019-02-11 RX ADMIN — TOPIRAMATE 50 MG: 50 TABLET, FILM COATED ORAL at 21:59

## 2019-02-11 RX ADMIN — IPRATROPIUM BROMIDE AND ALBUTEROL SULFATE 3 ML: 2.5; .5 SOLUTION RESPIRATORY (INHALATION) at 19:38

## 2019-02-11 RX ADMIN — BENZONATATE 100 MG: 100 CAPSULE ORAL at 22:54

## 2019-02-11 RX ADMIN — GABAPENTIN 300 MG: 300 CAPSULE ORAL at 21:58

## 2019-02-11 RX ADMIN — METHYLPREDNISOLONE SODIUM SUCCINATE 125 MG: 125 INJECTION, POWDER, FOR SOLUTION INTRAMUSCULAR; INTRAVENOUS at 10:16

## 2019-02-11 RX ADMIN — AZITHROMYCIN 250 MG: 250 TABLET, FILM COATED ORAL at 21:58

## 2019-02-11 NOTE — ED NOTES
Pt has been SOA with body aches and cough since Friday, worse today. Hx of COPD. Does not wear O2 at home, did neb this am, with no relief of s/s. Afebrile. Moderate respiratory distress, pt having abd and leg cramps after arrival. Tachycardic.      Karen Martinez, RN  02/11/19 [id number]

## 2019-02-11 NOTE — ED NOTES
Pt reports tylenol has not helped pain much. Body aches still present, still SOA, seated at side of bed is more comfortable for pt to breath.      Karen Martinez RN  02/11/19 [id number]

## 2019-02-11 NOTE — CONSULTS
"Group: [facility]         H/p  NOTE    Patient Identification:  Ricardo Pereira  48 y.o.  male  1970  [id number]                CC:     History of Present Illness:  48 -year-old gentleman known history of COPD REGAN noncompliant with his CPAP.  Patient is a former patient of Dr. Meza in the past and since then he was apparently seen by her PCP once or twice.  Patient presented to the emergency room with shortness of breath cough and congestion ongoing for the last 4 days.  Reports subjective fever and diffuse body aches also.  He reports coughing up purulent sputum.  He was last admitted to the hospital about a year ago with similar problem.  He was given some steroid and bronchodilators in the emergency room and still reports  significant cough and congestion with persistent wheezing.      Review of Systems  Constitutional: Positive for fever (subjective). Negative for activity change and appetite change.   HENT: Negative for congestion and sore throat.    Eyes: Negative.    Respiratory: Positive for cough (productive with brown sputum) and shortness of breath.    Cardiovascular: Negative for chest pain and leg swelling.   Gastrointestinal: Negative for abdominal pain, diarrhea and vomiting.   Endocrine: Negative.    Genitourinary: Negative for decreased urine volume and dysuria.   Musculoskeletal: Positive for myalgias (generalized). Negative for neck pain.   Skin: Negative for rash and wound.   Allergic/Immunologic: Negative.    Neurological: Negative for weakness, numbness and headaches.   Hematological: Negative.    Psychiatric/Behavioral: Negative.    All other systems reviewed and are negative.      Past Medical History:  Past Medical History:   Diagnosis Date   • Chronic pain     flank pain \" permenantly damaged muscle\"   • COPD (chronic obstructive pulmonary disease) (CMS/HCC)    • Diabetes mellitus (CMS/HCC)     Insulin pump   • Diabetic neuropathy (CMS/HCC)    • Disease of thyroid gland "     hypothyroid   • Diverticulitis of colon    • Fibromyalgia    • History of pancreatitis     X 2   • History of renal cell cancer     stage 4 , Rt kidney   • Hypertension    • Umbilical hernia        Past Surgical History:  Past Surgical History:   Procedure Laterality Date   • APPENDECTOMY     • COLONOSCOPY  2014    tics   • HERNIA REPAIR      umbilical   • NEPHRECTOMY Right    • VENTRAL/INCISIONAL HERNIA REPAIR N/A 5/11/2017    Procedure: VENTRAL/INCISIONAL HERNIA REPAIR LAPAROSCOPIC, RECURRENT INCISION, WITH MESH AND AHEDLYSIS;  Surgeon: Albin Bee MD;  Location: [facility];  Service:         Home Meds:  Medications Prior to Admission   Medication Sig Dispense Refill Last Dose   • arformoterol (BROVANA) 15 MCG/2ML nebulizer solution Take 2 mL by nebulization 2 (Two) Times a Day. 120 mL 0 Taking   • aspirin 81 MG EC tablet Take 1 tablet by mouth Daily. 90 tablet 1 Taking   • budesonide (PULMICORT) 0.5 MG/2ML nebulizer solution Take 2 mL by nebulization 2 (Two) Times a Day. 60 each 0 Taking   • FARXIGA 10 MG tablet TAKE 1 TABLET BY MOUTH IN THE MORNING 90 tablet 1    • fenofibrate (TRICOR) 145 MG tablet Take 1 tablet by mouth Daily. 30 tablet 4    • gabapentin (NEURONTIN) 800 MG tablet Take 1 tablet by mouth 3 (Three) Times a Day. 90 tablet 1    • glucose blood (RELION PRIME TEST) test strip USE STRIP TO TEST GLUCOSE 8 TIMES DAILY 250 each 5    • Insulin Pen Needle (RELION PEN NEEDLES) 32G X 4 MM misc Use to inject insulin 4 times daily 150 each 11 Taking   • ipratropium-albuterol (DUO-NEB) 0.5-2.5 mg/mL nebulizer Take 3 mL by nebulization Every 4 (Four) Hours As Needed for Wheezing or Shortness of Air. 360 mL 0 Taking   • levothyroxine (SYNTHROID) 112 MCG tablet Take 1 tablet by mouth Daily. On an empty stomach 30 tablet 4    • olmesartan (BENICAR) 20 MG tablet Take 1 tablet by mouth Daily. 30 tablet 4    • pantoprazole (PROTONIX) 40 MG EC tablet Take 40 mg by mouth daily.   Taking   • rosuvastatin  (CRESTOR) 20 MG tablet Take 1 tablet by mouth Every Night. 30 tablet 4    • topiramate (TOPAMAX) 50 MG tablet Take 50 mg by mouth 2 (Two) Times a Day.   Taking   • VASCEPA 1 g capsule capsule Take 2 g by mouth Daily. 120 capsule 5    • guaifenesin-dextromethorphan (MUCINEX DM)  MG tablet sustained-release 12 hour tablet Take 2 tablets by mouth 2 (Two) Times a Day As Needed (cough). 120 tablet 0 Taking   • l-methylfolate-algae-B6-B12 (METANX) 3-90.314-2-35 MG capsule capsule Take 1 capsule by mouth 2 (Two) Times a Day. 180 capsule 3 Taking   • NOVOLOG FLEXPEN 100 UNIT/ML solution pen-injector sc pen 15u AC with SS max 100u daily 10 pen 5    • RELION LANCETS MICRO-THIN 33G misc Test 3 times daily 100 each 11 Taking   • TRESIBA FLEXTOUCH 200 UNIT/ML solution pen-injector Inject 120 Units under the skin into the appropriate area as directed Daily With Breakfast. 6 pen 5    • vitamin D (ERGOCALCIFEROL) 43966 units capsule capsule Take 1 capsule by mouth 3 (Three) Times a Week. 32 capsule 3        Allergies:  Allergies   Allergen Reactions   • Penicillins Swelling       Social History:   Social History     Socioeconomic History   • Marital status:      Spouse name: Not on file   • Number of children: Not on file   • Years of education: Not on file   • Highest education level: Not on file   Social Needs   • Financial resource strain: Not on file   • Food insecurity - worry: Not on file   • Food insecurity - inability: Not on file   • Transportation needs - medical: Not on file   • Transportation needs - non-medical: Not on file   Occupational History   • Not on file   Tobacco Use   • Smoking status: Current Some Day Smoker     Types: Cigars   • Smokeless tobacco: Never Used   Substance and Sexual Activity   • Alcohol use: No   • Drug use: No   • Sexual activity: Defer   Other Topics Concern   • Not on file   Social History Narrative   • Not on file       Family History:  Family History   Family history  "unknown: Yes       Physical Exam:  /79 (BP Location: Left arm, Patient Position: Lying)   Pulse 80   Temp 97.3 °F (36.3 °C) (Oral)   Resp 22   Ht 177.8 cm (70\")   Wt 125 kg (275 lb 11.2 oz)   SpO2 95%   BMI 39.56 kg/m²  Body mass index is 39.56 kg/m². 95% 125 kg (275 lb 11.2 oz)  Physical Exam  Middle-aged gentleman resting comfortably no distress no labored breathing  Alert oriented x3  ENT Mallampati 4  Neck is large no bruit no adenopathy no thyromegaly  Chest diminished breath sounds with diffuse wheezing bilaterally all over  CV is regular rate and rhythm no murmurs no gallops  Abdomen is soft obese nontender no masses felt multiple scars noted  CNS no deficits  No joint deformities no skin rashes    LABS:  Lab Results   Component Value Date    CALCIUM 9.6 02/11/2019    PHOS 2.2 (L) 04/11/2016     Results from last 7 days   Lab Units 02/11/19  0957   SODIUM mmol/L 138   POTASSIUM mmol/L 4.2   CHLORIDE mmol/L 100   CO2 mmol/L 21.8*   BUN mg/dL 8   CREATININE mg/dL 1.13   GLUCOSE mg/dL 191*   CALCIUM mg/dL 9.6   WBC 10*3/mm3 13.78*   HEMOGLOBIN g/dL 17.8*   PLATELETS 10*3/mm3 197   ALT (SGPT) U/L 38   AST (SGOT) U/L 18   PROBNP pg/mL 35.6     Lab Results   Component Value Date    TROPONINT <0.010 02/11/2019     Results from last 7 days   Lab Units 02/11/19  0957   TROPONIN T ng/mL <0.010             Results from last 7 days   Lab Units 02/11/19  1149   PH, ARTERIAL pH units 7.436   PCO2, ARTERIAL mm Hg 36.2   PO2 ART mm Hg 77.6*   FLOW RATE lpm 2   MODALITY  Cannula   O2 SATURATION CALC % 95.9     Results from last 7 days   Lab Units 02/11/19  1004   ADENOVIRUS DETECTION BY PCR  Not Detected   CORONAVIRUS 229E  Not Detected   CORONAVIRUS HKU1  Not Detected   CORONAVIRUS NL63  Not Detected   CORONAVIRUS OC43  Not Detected   HUMAN METAPNEUMOVIRUS  Not Detected   HUMAN RHINOVIRUS/ENTEROVIRUS  Not Detected   INFLUENZA B PCR  Not Detected   PARAINFLUENZA 1  Not Detected   PARAINFLUENZA VIRUS 2  Not " Detected   PARAINFLUENZA VIRUS 3  Not Detected   PARAINFLUENZA VIRUS 4  Not Detected   BORDETELLA PERTUSSIS PCR  Not Detected   CHLAMYDOPHILA PNEUMONIAE PCR  Not Detected   MYCOPLAMA PNEUMO PCR  Not Detected   INFLUENZA A PCR  Not Detected   INFLUENZA A H3  Not Detected   INFLUENZA A H1  Not Detected   RSV, PCR  Not Detected             Lab Results   Component Value Date    TSH 2.150 01/22/2019     Estimated Creatinine Clearance: 108.4 mL/min (by C-G formula based on SCr of 1.13 mg/dL).         Imaging: I personally visualized the images of scans/x-rays performed within last 3 days.      Assessment:  Acute exacerbation of COPD  REGAN intolerant to CPAP  Morbid obesity  Leukocytosis  History of kidney cancer  Diabetes mellitus  Tobacco abuse      Recommendations:  At this point we have a gentleman with known history of COPD with ongoing tobacco abuse.  I would start patient on steroid bronchodilators.  Will start patient on p.o. Zithromax for purulent sputum ongoing.  Aggressive pulmonary toilet  Advised patient to quit smoking long-term  Chest x-ray and viral panel are negative at this time  Early ambulation will be encouraged  Quit smoking long-term  Weight loss encouraged  Long discussion with the patient would need to get back on his CPAP.  I do not see a recent sleep study done.  He would likely need a repeat sleep study and re-titration study at discharge            Sunil Carroll MD  2/11/2019  4:33 PM      Much of this encounter note is an electronic transcription/translation of spoken language to printed text using Dragon Software.

## 2019-02-11 NOTE — ED PROVIDER NOTES
EMERGENCY DEPARTMENT ENCOUNTER    CHIEF COMPLAINT  Chief Complaint: SOA  History given by: patient  History limited by: nothing  Room Number: 24/24  PMD: Simba Moore MD      HPI:  Pt is a 48 y.o. male who presents complaining of SOA that began four days ago and has been worsening since then. Pt also complains of a productive cough with brown  Sputum. He also complains of subjective fever and generalized myalgias, CP with cough, denies abd pain, swelling, changes in his medications/missed doses.  Pt states he is not on oxygen at home, does smoke and denies being on antibiotics/steroids recently.  He has a history of COPD.     Duration:  4 days  Onset: gradual  Timing: constant  Location: n/a  Radiation: n/a  Quality: SOA  Intensity/Severity: moderate  Progression: worsening  Associated Symptoms: productive cough with brown sputum, subjective fever, generalized myalgias  Aggravating Factors: none  Alleviating Factors: none  Previous Episodes: Pt has a h/x of COPD.  Treatment before arrival: none    PAST MEDICAL HISTORY  Active Ambulatory Problems     Diagnosis Date Noted   • Uncontrolled type 2 diabetes mellitus with complication, without long-term current use of insulin (CMS/[facility]) 08/29/2017   • Vitamin D deficiency 08/29/2017   • Chronic pancreatitis (CMS/[facility]) 08/29/2017   • Mixed hyperlipidemia 08/29/2017   • Abnormal endocrine laboratory test finding 08/29/2017   • Secondary hypothyroidism 08/29/2017   • Hyperglycemia 08/29/2017   • Essential hypertension 08/29/2017   • COPD (chronic obstructive pulmonary disease) (CMS/[facility]) 03/06/2018   • Acute exacerbation of chronic obstructive pulmonary disease (COPD) (CMS/[facility]) 03/06/2018   • Hypothyroidism 03/09/2018   • Morbid obesity due to excess calories (CMS/[facility]) 01/25/2019     Resolved Ambulatory Problems     Diagnosis Date Noted   • Pain of upper abdomen 04/10/2016   • Pancreatitis 04/10/2016   • Incisional hernia 05/11/2017   • MADINA (acute kidney injury)  (CMS/HCC) 03/07/2018     Past Medical History:   Diagnosis Date   • Chronic pain    • COPD (chronic obstructive pulmonary disease) (CMS/HCC)    • Diabetes mellitus (CMS/HCC)    • Diabetic neuropathy (CMS/HCC)    • Disease of thyroid gland    • Diverticulitis of colon    • Fibromyalgia    • History of pancreatitis    • History of renal cell cancer    • Hypertension    • Umbilical hernia        PAST SURGICAL HISTORY  Past Surgical History:   Procedure Laterality Date   • APPENDECTOMY     • COLONOSCOPY  2014    tics   • HERNIA REPAIR      umbilical   • NEPHRECTOMY Right    • VENTRAL/INCISIONAL HERNIA REPAIR N/A 5/11/2017    Procedure: VENTRAL/INCISIONAL HERNIA REPAIR LAPAROSCOPIC, RECURRENT INCISION, WITH MESH AND AHEDLYSIS;  Surgeon: Albin Bee MD;  Location: [facility] OR;  Service:        FAMILY HISTORY  Family History   Family history unknown: Yes       SOCIAL HISTORY  Social History     Socioeconomic History   • Marital status:      Spouse name: Not on file   • Number of children: Not on file   • Years of education: Not on file   • Highest education level: Not on file   Social Needs   • Financial resource strain: Not on file   • Food insecurity - worry: Not on file   • Food insecurity - inability: Not on file   • Transportation needs - medical: Not on file   • Transportation needs - non-medical: Not on file   Occupational History   • Not on file   Tobacco Use   • Smoking status: Current Some Day Smoker     Types: Cigars   • Smokeless tobacco: Never Used   Substance and Sexual Activity   • Alcohol use: No   • Drug use: No   • Sexual activity: Defer   Other Topics Concern   • Not on file   Social History Narrative   • Not on file       ALLERGIES  Penicillins    REVIEW OF SYSTEMS  Review of Systems   Constitutional: Positive for fever (subjective). Negative for activity change and appetite change.   HENT: Negative for congestion and sore throat.    Eyes: Negative.    Respiratory: Positive for cough  (productive with brown sputum) and shortness of breath.    Cardiovascular: Negative for chest pain and leg swelling.   Gastrointestinal: Negative for abdominal pain, diarrhea and vomiting.   Endocrine: Negative.    Genitourinary: Negative for decreased urine volume and dysuria.   Musculoskeletal: Positive for myalgias (generalized). Negative for neck pain.   Skin: Negative for rash and wound.   Allergic/Immunologic: Negative.    Neurological: Negative for weakness, numbness and headaches.   Hematological: Negative.    Psychiatric/Behavioral: Negative.    All other systems reviewed and are negative.      PHYSICAL EXAM  ED Triage Vitals   Temp Heart Rate Resp BP SpO2   02/11/19 0941 02/11/19 0941 02/11/19 0941 02/11/19 0958 02/11/19 0941   98.4 °F (36.9 °C) 117 28 167/89 96 %      Temp src Heart Rate Source Patient Position BP Location FiO2 (%)   02/11/19 0941 02/11/19 0941 -- -- --   Tympanic Monitor          Physical Exam   Constitutional: He is oriented to person, place, and time. He appears distressed (moderate).   HENT:   Head: Normocephalic and atraumatic.   Mouth/Throat: Oropharynx is clear and moist.   Eyes: EOM are normal. Pupils are equal, round, and reactive to light.   Neck: Normal range of motion. Neck supple. No JVD present.   Cardiovascular: Normal rate, regular rhythm, normal heart sounds and intact distal pulses.   Pulses:       Posterior tibial pulses are 2+ on the right side, and 2+ on the left side.   Pulmonary/Chest: Tachypnea (RR 28 at time of exam) noted. He is in respiratory distress. He has decreased breath sounds in the right lower field and the left lower field. He has wheezes.   Abdominal: Soft. Bowel sounds are normal. There is no tenderness. There is no rebound and no guarding.   Musculoskeletal: Normal range of motion. He exhibits no edema.   Neurological: He is alert and oriented to person, place, and time. He has normal sensation and normal strength.   Skin: Skin is warm and dry.    Psychiatric: Mood and affect normal.   Nursing note and vitals reviewed.      LAB RESULTS  Lab Results (last 24 hours)     Procedure Component Value Units Date/Time    CBC & Differential [[id number]] Collected:  02/11/19 0957    Specimen:  Blood Updated:  02/11/19 1010    Narrative:       The following orders were created for panel order CBC & Differential.  Procedure                               Abnormality         Status                     ---------                               -----------         ------                     CBC Auto Differential[[id number]]        Abnormal            Final result                 Please view results for these tests on the individual orders.    Comprehensive Metabolic Panel [[id number]]  (Abnormal) Collected:  02/11/19 0957    Specimen:  Blood Updated:  02/11/19 1032     Glucose 191 mg/dL      BUN 8 mg/dL      Creatinine 1.13 mg/dL      Sodium 138 mmol/L      Potassium 4.2 mmol/L      Chloride 100 mmol/L      CO2 21.8 mmol/L      Calcium 9.6 mg/dL      Total Protein 7.2 g/dL      Albumin 3.90 g/dL      ALT (SGPT) 38 U/L      AST (SGOT) 18 U/L      Alkaline Phosphatase 109 U/L      Total Bilirubin 0.4 mg/dL      eGFR Non African Amer 70 mL/min/1.73      Globulin 3.3 gm/dL      A/G Ratio 1.2 g/dL      BUN/Creatinine Ratio 7.1     Anion Gap 16.2 mmol/L     BNP [[id number]]  (Normal) Collected:  02/11/19 0957    Specimen:  Blood Updated:  02/11/19 1030     proBNP 35.6 pg/mL     Narrative:       Among patients with dyspnea, NT-proBNP is highly sensitive for the detection of acute congestive heart failure. In addition NT-proBNP of <300 pg/ml effectively rules out acute congestive heart failure with 99% negative predictive value.    Troponin [[id number]]  (Normal) Collected:  02/11/19 0957    Specimen:  Blood Updated:  02/11/19 1031     Troponin T <0.010 ng/mL     Narrative:       Troponin T Reference Range:  <= 0.03 ng/mL-   Negative for AMI  >0.03 ng/mL-     Abnormal for myocardial  necrosis.  Clinicians would have to utilize clinical acumen, EKG, Troponin and serial changes to determine if it is an Acute Myocardial Infarction or myocardial injury due to an underlying chronic condition.     CBC Auto Differential [[id number]]  (Abnormal) Collected:  02/11/19 0957    Specimen:  Blood Updated:  02/11/19 1010     WBC 13.78 10*3/mm3      RBC 5.28 10*6/mm3      Hemoglobin 17.8 g/dL      Hematocrit 50.0 %      MCV 94.7 fL      MCH 33.7 pg      MCHC 35.6 g/dL      RDW 13.5 %      RDW-SD 46.8 fl      MPV 11.2 fL      Platelets 197 10*3/mm3      Neutrophil % 69.0 %      Lymphocyte % 22.5 %      Monocyte % 7.6 %      Eosinophil % 0.7 %      Basophil % 0.2 %      Immature Grans % 0.4 %      Neutrophils, Absolute 9.50 10*3/mm3      Lymphocytes, Absolute 3.10 10*3/mm3      Monocytes, Absolute 1.05 10*3/mm3      Eosinophils, Absolute 0.10 10*3/mm3      Basophils, Absolute 0.03 10*3/mm3      Immature Grans, Absolute 0.05 10*3/mm3     Respiratory Panel, PCR - Swab, Nasopharynx [[id number]]  (Normal) Collected:  02/11/19 1004    Specimen:  Swab from Nasopharynx Updated:  02/11/19 1141     ADENOVIRUS, PCR Not Detected     Coronavirus 229E Not Detected     Coronavirus HKU1 Not Detected     Coronavirus NL63 Not Detected     Coronavirus OC43 Not Detected     Human Metapneumovirus Not Detected     Human Rhinovirus/Enterovirus Not Detected     Influenza B PCR Not Detected     Parainfluenza Virus 1 Not Detected     Parainfluenza Virus 2 Not Detected     Parainfluenza Virus 3 Not Detected     Parainfluenza Virus 4 Not Detected     Bordetella pertussis pcr Not Detected     Influenza A H1 2009 PCR Not Detected     Chlamydophila pneumoniae PCR Not Detected     Mycoplasma pneumo by PCR Not Detected     Influenza A PCR Not Detected     Influenza A H3 Not Detected     Influenza A H1 Not Detected     RSV, PCR Not Detected     Bordetella parapertussis PCR Not Detected          I ordered the above labs and reviewed the  results    RADIOLOGY  XR Chest 1 View   Final Result   Negative.       This report was finalized on 2/11/2019 11:04 AM by Dr. Thad Jaimes M.D.               I ordered the above noted radiological studies. Interpreted by radiologist. . Reviewed by me in PACS.       PROCEDURES  Procedures    EKG          EKG time: 1021  Rhythm/Rate: ST, 100s  P waves and DC: normal  QRS, axis: RAD   ST and T waves: nonspecific ST and T wave findings diffusely, limited by baseline artifact     Interpreted Contemporaneously by me, independently viewed  Minimally changed compared to prior 3/5/18    PROGRESS AND CONSULTS     0954  Ordered labs, EKG, and CXR for further evaluation.     1016  Ordered duo-neb, proventil, and solu-medrol to treat.    1216  Rechecked Pt who still has increased work of breathing/fatigue after brief ambulation. He still has tachypnea to 24BPM.  Informed him that his CXR shows nothing acute. Pt does not have hypoxia with ambulation. I advised Pt to stay for further COPD exacerbation.    1230  Placed call to pulmonology for admission. Ordered tylenol for pain.     1259  Discussed Pt's case with Dr. Becker who agrees to admit Pt to telemetry for further evaluation and treatment.     MEDICAL DECISION MAKING  Results were reviewed/discussed with the patient and they were also made aware of online access. Pt also made aware that some labs, such as cultures, will not be resulted during ER visit and follow up with PMD is necessary.     MDM  Number of Diagnoses or Management Options     Amount and/or Complexity of Data Reviewed  Clinical lab tests: ordered and reviewed (WBC=13.78)  Tests in the radiology section of CPT®: ordered and reviewed (CXR shows nothing acute.)  Tests in the medicine section of CPT®: ordered and reviewed (See EKG note.)           DIAGNOSIS  Final diagnoses:   COPD exacerbation (CMS/HCC)       DISPOSITION  ADMISSION    Discussed treatment plan and reason for admission with pt/family and  admitting physician.  Pt/family voiced understanding of the plan for admission for further testing/treatment as needed.         Latest Documented Vital Signs:  As of 1:14 PM  BP- 132/83 HR- 93 Temp- 98.4 °F (36.9 °C) (Tympanic) O2 sat- 94%    --  Documentation assistance provided by kimber Arvizu for Dr Jang.  Information recorded by the scribe was done at my direction and has been verified and validated by me.         Wendy Arvizu  02/11/19 1314       Muna Jang MD  02/12/19 [id number]

## 2019-02-12 LAB
GLUCOSE BLDC GLUCOMTR-MCNC: 210 MG/DL (ref 70–130)
GLUCOSE BLDC GLUCOMTR-MCNC: 277 MG/DL (ref 70–130)
GLUCOSE BLDC GLUCOMTR-MCNC: 354 MG/DL (ref 70–130)
GLUCOSE BLDC GLUCOMTR-MCNC: 381 MG/DL (ref 70–130)

## 2019-02-12 PROCEDURE — 93005 ELECTROCARDIOGRAM TRACING: CPT | Performed by: NURSE PRACTITIONER

## 2019-02-12 PROCEDURE — 25010000002 ENOXAPARIN PER 10 MG: Performed by: INTERNAL MEDICINE

## 2019-02-12 PROCEDURE — 94799 UNLISTED PULMONARY SVC/PX: CPT

## 2019-02-12 PROCEDURE — 93010 ELECTROCARDIOGRAM REPORT: CPT | Performed by: INTERNAL MEDICINE

## 2019-02-12 PROCEDURE — 63710000001 INSULIN LISPRO (HUMAN) PER 5 UNITS: Performed by: INTERNAL MEDICINE

## 2019-02-12 PROCEDURE — 99222 1ST HOSP IP/OBS MODERATE 55: CPT | Performed by: NURSE PRACTITIONER

## 2019-02-12 PROCEDURE — 25010000002 METHYLPREDNISOLONE PER 40 MG: Performed by: INTERNAL MEDICINE

## 2019-02-12 PROCEDURE — 82962 GLUCOSE BLOOD TEST: CPT

## 2019-02-12 RX ADMIN — ENOXAPARIN SODIUM 40 MG: 40 INJECTION SUBCUTANEOUS at 20:53

## 2019-02-12 RX ADMIN — IPRATROPIUM BROMIDE AND ALBUTEROL SULFATE 3 ML: 2.5; .5 SOLUTION RESPIRATORY (INHALATION) at 13:32

## 2019-02-12 RX ADMIN — SODIUM CHLORIDE, PRESERVATIVE FREE 3 ML: 5 INJECTION INTRAVENOUS at 20:54

## 2019-02-12 RX ADMIN — LOSARTAN POTASSIUM 50 MG: 50 TABLET, FILM COATED ORAL at 08:27

## 2019-02-12 RX ADMIN — ENOXAPARIN SODIUM 40 MG: 40 INJECTION SUBCUTANEOUS at 08:28

## 2019-02-12 RX ADMIN — GABAPENTIN 300 MG: 300 CAPSULE ORAL at 13:51

## 2019-02-12 RX ADMIN — SODIUM CHLORIDE, PRESERVATIVE FREE 3 ML: 5 INJECTION INTRAVENOUS at 08:29

## 2019-02-12 RX ADMIN — IPRATROPIUM BROMIDE AND ALBUTEROL SULFATE 3 ML: 2.5; .5 SOLUTION RESPIRATORY (INHALATION) at 09:27

## 2019-02-12 RX ADMIN — IPRATROPIUM BROMIDE AND ALBUTEROL SULFATE 3 ML: 2.5; .5 SOLUTION RESPIRATORY (INHALATION) at 21:39

## 2019-02-12 RX ADMIN — METHYLPREDNISOLONE SODIUM SUCCINATE 40 MG: 40 INJECTION, POWDER, FOR SOLUTION INTRAMUSCULAR; INTRAVENOUS at 16:30

## 2019-02-12 RX ADMIN — ASPIRIN 81 MG: 81 TABLET, DELAYED RELEASE ORAL at 08:28

## 2019-02-12 RX ADMIN — INSULIN LISPRO 12 UNITS: 100 INJECTION, SOLUTION INTRAVENOUS; SUBCUTANEOUS at 17:42

## 2019-02-12 RX ADMIN — METHYLPREDNISOLONE SODIUM SUCCINATE 40 MG: 40 INJECTION, POWDER, FOR SOLUTION INTRAMUSCULAR; INTRAVENOUS at 10:32

## 2019-02-12 RX ADMIN — TOPIRAMATE 50 MG: 50 TABLET, FILM COATED ORAL at 08:28

## 2019-02-12 RX ADMIN — METHYLPREDNISOLONE SODIUM SUCCINATE 40 MG: 40 INJECTION, POWDER, FOR SOLUTION INTRAMUSCULAR; INTRAVENOUS at 01:02

## 2019-02-12 RX ADMIN — IPRATROPIUM BROMIDE AND ALBUTEROL SULFATE 3 ML: 2.5; .5 SOLUTION RESPIRATORY (INHALATION) at 16:13

## 2019-02-12 RX ADMIN — GABAPENTIN 300 MG: 300 CAPSULE ORAL at 20:55

## 2019-02-12 RX ADMIN — AZITHROMYCIN 250 MG: 250 TABLET, FILM COATED ORAL at 08:28

## 2019-02-12 RX ADMIN — INSULIN LISPRO 12 UNITS: 100 INJECTION, SOLUTION INTRAVENOUS; SUBCUTANEOUS at 12:15

## 2019-02-12 RX ADMIN — INSULIN LISPRO 20 UNITS: 100 INJECTION, SOLUTION INTRAVENOUS; SUBCUTANEOUS at 20:53

## 2019-02-12 RX ADMIN — BENZONATATE 100 MG: 100 CAPSULE ORAL at 06:21

## 2019-02-12 RX ADMIN — INSULIN LISPRO 8 UNITS: 100 INJECTION, SOLUTION INTRAVENOUS; SUBCUTANEOUS at 08:29

## 2019-02-12 RX ADMIN — LEVOTHYROXINE SODIUM 112 MCG: 0.11 TABLET ORAL at 08:28

## 2019-02-12 RX ADMIN — GABAPENTIN 300 MG: 300 CAPSULE ORAL at 06:21

## 2019-02-12 NOTE — SIGNIFICANT NOTE
This note also relates to the following rows which could not be included:  SpO2 - Cannot attach notes to unvalidated device data    While sleeping found sats 86-87% upon entering room. PT may need Over night study for HS O2 need

## 2019-02-12 NOTE — PROGRESS NOTES
Discharge Planning Assessment  [facility]     Patient Name: Ricardo Pereira  MRN: [id number]  Today's Date: 2/12/2019    Admit Date: 2/11/2019    Discharge Needs Assessment     Row Name 02/12/19 1502       Living Environment    Lives With  child(kendra), adult;child(kendra), dependent;spouse    Name(s) of Who Lives With Patient  Lives with wife and children ages 14,16, and 24    Current Living Arrangements  home/apartment/condo    Primary Care Provided by  self    Provides Primary Care For  no one    Family Caregiver if Needed  spouse    Family Caregiver Names  Macrina watkins    Quality of Family Relationships  helpful;involved;supportive       Resource/Environmental Concerns    Resource/Environmental Concerns  none    Transportation Concerns  car, none       Transition Planning    Patient/Family Anticipates Transition to  home    Patient/Family Anticipated Services at Transition  none    Transportation Anticipated  family or friend will provide       Discharge Needs Assessment    Readmission Within the Last 30 Days  no previous admission in last 30 days    Concerns to be Addressed  no discharge needs identified    Equipment Currently Used at Home  cane, quad        Discharge Plan     Row Name 02/12/19 1506       Plan    Plan  return home with family    Patient/Family in Agreement with Plan  yes    Plan Comments  Spoke with patient in room.  Introduced self and explained role.  Facesheet, PCP and pharmacy verified.  Patient lives with his wife, Macrina, and 3 children ( ages 14, 16 and 24).  His only DME is a cane.  He has a c-pap, but does not use it.  He does not have a HH or SNF history.  Denies any needs and plans to return home.  CCP will follow. Judie Mccarthy RN        Destination      No service coordination in this encounter.      Durable Medical Equipment      No service coordination in this encounter.      Dialysis/Infusion      No service coordination in this encounter.      Home Medical Care      No service  coordination in this encounter.      Community Resources      No service coordination in this encounter.          Demographic Summary     Row Name 02/12/19 1501       General Information    Admission Type  inpatient    Arrived From  home    Required Notices Provided  Important Message from Medicare    Referral Source  admission list    Reason for Consult  discharge planning    Preferred Language  English        Functional Status     Row Name 02/12/19 1502       Functional Status    Usual Activity Tolerance  good    Current Activity Tolerance  moderate       Functional Status, IADL    Medications  independent    Meal Preparation  independent    Housekeeping  independent    Laundry  independent    Shopping  independent       Mental Status    General Appearance WDL  WDL       Mental Status Summary    Recent Changes in Mental Status/Cognitive Functioning  no changes       Employment/    Employment Status  disabled        Psychosocial    No documentation.       Abuse/Neglect    No documentation.       Legal    No documentation.       Substance Abuse    No documentation.       Patient Forms    No documentation.           Judie Mccarthy RN

## 2019-02-12 NOTE — PLAN OF CARE
Problem: Patient Care Overview  Goal: Plan of Care Review  Outcome: Ongoing (interventions implemented as appropriate)   02/12/19 6920   Coping/Psychosocial   Plan of Care Reviewed With patient   Plan of Care Review   Progress no change   OTHER   Outcome Summary VSS. Currently on RA. Lungs with insp and exp. wheezes. Cough productive tan. Appetite good. Will continue to monitor. 02/12/19       Problem: Chronic Obstructive Pulmonary Disease (Adult)  Goal: Signs and Symptoms of Listed Potential Problems Will be Absent, Minimized or Managed (Chronic Obstructive Pulmonary Disease)  Outcome: Ongoing (interventions implemented as appropriate)      Problem: Infection, Risk/Actual (Adult)  Goal: Identify Related Risk Factors and Signs and Symptoms  Outcome: Ongoing (interventions implemented as appropriate)    Goal: Infection Prevention/Resolution  Outcome: Ongoing (interventions implemented as appropriate)

## 2019-02-12 NOTE — CONSULTS
"    Patient Name: Ricardo Pereira  :1970  48 y.o.    Date of Admission: 2019  Date of Consultation:  19  Encounter Provider: MADHU Roblero  Place of Service: [facility] CARDIOLOGY  Referring Provider: Rogerio Wilburn,*  Patient Care Team:  Simba Moore MD as PCP - General (Internal Medicine)  Rosemarie Ariza APRN as PCP - Claims Attributed      Chief complaint: shortness of breath    Reason for consultation: SVT, presyncope    History of Present Illness: Ricardo Pereira is a 48 year old male with history of tobacco abuse, COPD, diabetes mellitus, HTN, and prior stroke. He has obstructive sleep apnea but is unable to tolerate cpap. He denies any prior cardiac history. He has never been evaluated by a cardiologist. He has a history of cough induced syncope that was diagnosed by a neurologist. He has had other episodes of syncope that did not occur with coughing.     Patient presented to the emergency room yesterday morning with complaints of shortness of breath and congestion. He was admitted for COPD exacerbation. Last night, patient was noted to have 3 beats of supraventricular ectopy x 2. It is difficult to discern if he was symptomatic with this. He describes having an \"episdoe\" similar to what he has had before. He starts to feel his arms shake. He is light headed and dizzy. This was all in the setting of a coughing spell. He denies any chest pain or pressure. He did not pass out. He looked up at the heart monitor and saw his HR was 158 and got nervous. He states he walks on a treadmill several times a week for 2.5 miles. He does not have any anginal type symptoms during this. He does have to stop due to neuropathy and leg pain.     Patient continues to smoke cigars. He does not drink alcohol. He drinks occasional caffeine (1-2 cups in the morning but not every morning). He does not know of a family history of premature coronary disease but is " "not close with his family.       Past Medical History:   Diagnosis Date   • Chronic pain     flank pain \" permenantly damaged muscle\"   • COPD (chronic obstructive pulmonary disease) (CMS/HCC)    • Diabetes mellitus (CMS/HCC)     Insulin pump   • Diabetic neuropathy (CMS/HCC)    • Disease of thyroid gland     hypothyroid   • Diverticulitis of colon    • Fibromyalgia    • History of pancreatitis     X 2   • History of renal cell cancer     stage 4 , Rt kidney   • Hypertension    • Umbilical hernia        Past Surgical History:   Procedure Laterality Date   • APPENDECTOMY     • COLONOSCOPY  2014    tics   • HERNIA REPAIR      umbilical   • NEPHRECTOMY Right    • VENTRAL/INCISIONAL HERNIA REPAIR N/A 5/11/2017    Procedure: VENTRAL/INCISIONAL HERNIA REPAIR LAPAROSCOPIC, RECURRENT INCISION, WITH MESH AND AHEDLYSIS;  Surgeon: Albin Bee MD;  Location: [facility];  Service:          Prior to Admission medications    Medication Sig Start Date End Date Taking? Authorizing Provider   arformoterol (BROVANA) 15 MCG/2ML nebulizer solution Take 2 mL by nebulization 2 (Two) Times a Day. 3/12/18  Yes Thad Tian MD   aspirin 81 MG EC tablet Take 1 tablet by mouth Daily. 12/4/17  Yes Rosemarie Ariza APRN   budesonide (PULMICORT) 0.5 MG/2ML nebulizer solution Take 2 mL by nebulization 2 (Two) Times a Day. 3/12/18  Yes Thad Tian MD   FARXIGA 10 MG tablet TAKE 1 TABLET BY MOUTH IN THE MORNING 1/25/19  Yes Rosemarie Ariza APRN   fenofibrate (TRICOR) 145 MG tablet Take 1 tablet by mouth Daily. 1/25/19 1/25/20 Yes Rosemarie Ariza APRN   gabapentin (NEURONTIN) 800 MG tablet Take 1 tablet by mouth 3 (Three) Times a Day. 1/25/19 1/25/20 Yes Rosemarie Ariza APRN   glucose blood (RELION PRIME TEST) test strip USE STRIP TO TEST GLUCOSE 8 TIMES DAILY 2/1/19  Yes Rosemarie Ariza APRN   Insulin Pen Needle (RELION PEN NEEDLES) 32G X 4 MM misc Use to inject insulin 4 times daily 9/11/18  Yes Rosemarie Ariza" MADHU ROBERTS   ipratropium-albuterol (DUO-NEB) 0.5-2.5 mg/mL nebulizer Take 3 mL by nebulization Every 4 (Four) Hours As Needed for Wheezing or Shortness of Air. 3/12/18  Yes Thad Tian MD   levothyroxine (SYNTHROID) 112 MCG tablet Take 1 tablet by mouth Daily. On an empty stomach 1/25/19  Yes Rosemarie Ariza APRN   olmesartan (BENICAR) 20 MG tablet Take 1 tablet by mouth Daily. 1/25/19 1/25/20 Yes Rosemarie Ariza APRN   pantoprazole (PROTONIX) 40 MG EC tablet Take 40 mg by mouth daily.   Yes ProviderIsmael MD   rosuvastatin (CRESTOR) 20 MG tablet Take 1 tablet by mouth Every Night. 1/25/19 1/25/20 Yes Rosemarie Ariza APRN   topiramate (TOPAMAX) 50 MG tablet Take 50 mg by mouth 2 (Two) Times a Day.   Yes ProviderIsmael MD   VASCEPA 1 g capsule capsule Take 2 g by mouth Daily. 1/25/19  Yes Rosemarie Ariza APRN   guaifenesin-dextromethorphan (MUCINEX DM)  MG tablet sustained-release 12 hour tablet Take 2 tablets by mouth 2 (Two) Times a Day As Needed (cough). 3/10/18   Thad Tian MD   l-methylfolate-algae-B6-B12 (METANX) 3-90.314-2-35 MG capsule capsule Take 1 capsule by mouth 2 (Two) Times a Day. 5/10/18   Avel Gottlieb MD   NOVOLOG FLEXPEN 100 UNIT/ML solution pen-injector sc pen 15u AC with SS max 100u daily 1/25/19   Rosemarie Ariza APRN   RELION LANCETS MICRO-THIN 33G misc Test 3 times daily 9/11/18   Rosemarie Ariza APRN   TRESIBA FLEXTOUCH 200 UNIT/ML solution pen-injector Inject 120 Units under the skin into the appropriate area as directed Daily With Breakfast. 1/25/19   Rosemarie Ariza APRN   vitamin D (ERGOCALCIFEROL) 65312 units capsule capsule Take 1 capsule by mouth 3 (Three) Times a Week. 1/25/19   Rosemarie Ariza APRN       Allergies   Allergen Reactions   • Penicillins Swelling       Social History     Socioeconomic History   • Marital status:      Spouse name: Not on file   • Number of children: Not on file   • Years of education: Not  on file   • Highest education level: Not on file   Tobacco Use   • Smoking status: Current Some Day Smoker     Types: Cigars   • Smokeless tobacco: Never Used   Substance and Sexual Activity   • Alcohol use: No   • Drug use: No   • Sexual activity: Defer       Family History   Family history unknown: Yes       REVIEW OF SYSTEMS:   All systems reviewed.  Pertinent positives identified in HPI.  All other systems are negative.      Objective:     Vitals:    02/12/19 1337 02/12/19 1400 02/12/19 1613 02/12/19 1617   BP:  156/76     BP Location:  Right arm     Patient Position:  Lying     Pulse: 92 98 87 90   Resp: 16 16 18 18   Temp:  98.3 °F (36.8 °C)     TempSrc:  Oral     SpO2: 93% 93% 93% 95%   Weight:       Height:         Body mass index is 39.56 kg/m².    Physical Exam:  Constitutional: He is oriented to person, place, and time. Older than stated age. He is in not acute distress.   Neck: No JVD present. Carotid bruit is not present.   Cardiovascular: Normal rate, regular rhythm and normal heart sounds.    Pulses:       Posterior tibial pulses are 2+ on the right side, and 2+ on the left side.   Pulmonary/Chest: Effort normal and breath sounds normal.   Abdominal: Soft. Normal appearance and bowel sounds are normal. There is no tenderness.   Musculoskeletal: Normal range of motion.        Right shoulder: He exhibits no deformity.        Left shoulder: He exhibits no deformity.   Neurological: He is alert and oriented to person, place, and time. He has normal strength.   Skin: Skin is warm, dry and intact. No rash noted.   Psychiatric: He has a normal mood and affect. His behavior is normal. Thought content normal.   Vitals reviewed      Lab Review:     Results from last 7 days   Lab Units 02/11/19  0957   SODIUM mmol/L 138   POTASSIUM mmol/L 4.2   CHLORIDE mmol/L 100   CO2 mmol/L 21.8*   BUN mg/dL 8   CREATININE mg/dL 1.13   CALCIUM mg/dL 9.6   BILIRUBIN mg/dL 0.4   ALK PHOS U/L 109   ALT (SGPT) U/L 38   AST (SGOT)  U/L 18   GLUCOSE mg/dL 191*     Results from last 7 days   Lab Units 02/11/19  0957   TROPONIN T ng/mL <0.010     Results from last 7 days   Lab Units 02/11/19  0957   WBC 10*3/mm3 13.78*   HEMOGLOBIN g/dL 17.8*   HEMATOCRIT % 50.0   PLATELETS 10*3/mm3 197                                  I personally viewed and interpreted the patient's EKG/Telemetry data.      Current Facility-Administered Medications:   •  aspirin EC tablet 81 mg, 81 mg, Oral, Daily, Sunil Carroll MD, 81 mg at 02/12/19 0828  •  azithromycin (ZITHROMAX) tablet 250 mg, 250 mg, Oral, Q24H, Sunil Carroll MD, 250 mg at 02/12/19 0828  •  benzonatate (TESSALON) capsule 100 mg, 100 mg, Oral, Q6H PRN, Sai Mei MD, 100 mg at 02/12/19 0621  •  dextrose (D50W) 25 g/ 50mL Intravenous Solution 25 g, 25 g, Intravenous, Q15 Min PRN, Sai Mei MD  •  dextrose (GLUTOSE) oral gel 15 g, 15 g, Oral, Q15 Min PRN, Sai Mei MD  •  enoxaparin (LOVENOX) syringe 40 mg, 40 mg, Subcutaneous, Q12H, Sunil Carroll MD, 40 mg at 02/12/19 0828  •  gabapentin (NEURONTIN) capsule 300 mg, 300 mg, Oral, Q8H, Sunil Carroll MD, 300 mg at 02/12/19 1351  •  glucagon (human recombinant) (GLUCAGEN DIAGNOSTIC) injection 1 mg, 1 mg, Subcutaneous, PRN, Sai Mei MD  •  [START ON 2/13/2019] Insulin Degludec solution pen-injector 120 Units, 120 Units, Subcutaneous, Daily With Breakfast, Rogerio Wilburn MD  •  insulin lispro (humaLOG) injection 0-24 Units, 0-24 Units, Subcutaneous, 4x Daily With Meals & Nightly, Sai Mei MD, 12 Units at 02/12/19 1215  •  ipratropium-albuterol (DUO-NEB) nebulizer solution 3 mL, 3 mL, Nebulization, 4x Daily - RT, Sunil Carroll MD, 3 mL at 02/12/19 1613  •  levothyroxine (SYNTHROID, LEVOTHROID) tablet 112 mcg, 112 mcg, Oral, Daily, Sunil Carroll MD, 112 mcg at 02/12/19 0828  •  losartan (COZAAR) tablet 50 mg, 50 mg, Oral, Q24H, Sunil Carroll MD, 50 mg at 02/12/19 0827  •   methylPREDNISolone sodium succinate (SOLU-Medrol) injection 40 mg, 40 mg, Intravenous, Q8H, Sunil Carroll MD, 40 mg at 02/12/19 1630  •  sodium chloride 0.9 % flush 10 mL, 10 mL, Intravenous, PRN, Muna Jang MD, 10 mL at 02/11/19 1016  •  sodium chloride 0.9 % flush 3 mL, 3 mL, Intravenous, Q12H, Sunil Carroll MD, 3 mL at 02/12/19 0829  •  sodium chloride 0.9 % flush 3-10 mL, 3-10 mL, Intravenous, PRN, Sunil Carroll MD  •  topiramate (TOPAMAX) tablet 50 mg, 50 mg, Oral, Daily, Sunil Carroll MD, 50 mg at 02/12/19 0828    Assessment and Plan:       1. Supraventricular ectopy - he has a host of symptoms and I think it is unlikely from these very brief runs. Likely benign and no additional testing is indicated. TSH was stable in January. I would continue to monitor on tele for sustained arrhythmias.   2. COPD with acute exacerbation - per pulmonary.   3. HTN - he is on losartan. SBP a little high and may need adjustments. Will follow.   4. Diabetes mellitus - requiring insulin, with circulatory complication.   5. Obstructive sleep apnea - unable to tolerate cpap.   6. Tobacco abuse - cessation encouraged. He states he is done this time.   7. History of stroke - he follows with a neurologist at [facility] .   8. History of syncope - he reports he has been diagnosed with cough induced syncope.     Chloe Cavazos, APRN  02/12/19  4:33 PM

## 2019-02-12 NOTE — PROGRESS NOTES
"[facility] PULMONARY CARE         Dr Barber Sayied   LOS: 1 day   Patient Care Team:  Simba Moore MD as PCP - General (Internal Medicine)  Rosemarie Ariza APRN as PCP - Claims Attributed    Chief Complaint: Exacerbation of COPD with underlying history of REGAN kidney cancer diabetes mellitus and morbid obesity    Interval History: He feels his respite status is better however still having bouts of cough.  Last night episode of SVT and patient reports possibly having presyncope.    REVIEW OF SYSTEMS:   CARDIOVASCULAR: No chest pain, chest pressure or chest discomfort. No palpitations or edema.   RESPIRATORY: Shortness of breath with exertion  GASTROINTESTINAL: No anorexia, nausea, vomiting or diarrhea. No abdominal pain or blood.   HEMATOLOGIC: No bleeding or bruising.     Ventilator/Non-Invasive Ventilation Settings (From admission, onward)    None            Vital Signs  Temp:  [97.8 °F (36.6 °C)-98.3 °F (36.8 °C)] 98.3 °F (36.8 °C)  Heart Rate:  [] 98  Resp:  [16-28] 16  BP: (148-156)/(76-94) 156/76    Intake/Output Summary (Last 24 hours) at 2/12/2019 1552  Last data filed at 2/12/2019 1400  Gross per 24 hour   Intake --   Output 1550 ml   Net -1550 ml     Flowsheet Rows      First Filed Value   Admission Height  177.8 cm (70\") Documented at 02/11/2019 0941   Admission Weight  125 kg (275 lb 11.2 oz) Documented at 02/11/2019 0941          Physical Exam:   General Appearance:    Alert, cooperative, in no acute distress   Lungs:    Diminished breath sounds rhonchi in the bases    Heart:    Regular rhythm and normal rate, normal S1 and S2, no            murmur, no gallop, no rub, no click   Chest Wall:    No abnormalities observed   Abdomen:     Normal bowel sounds, no masses, no organomegaly, soft        non-tender, non-distended, no guarding, no rebound                tenderness   Extremities:   Moves all extremities well, no edema, no cyanosis, no             redness     Results Review:  "       Results from last 7 days   Lab Units 02/11/19  0957   SODIUM mmol/L 138   POTASSIUM mmol/L 4.2   CHLORIDE mmol/L 100   CO2 mmol/L 21.8*   BUN mg/dL 8   CREATININE mg/dL 1.13   GLUCOSE mg/dL 191*   CALCIUM mg/dL 9.6     Results from last 7 days   Lab Units 02/11/19  0957   TROPONIN T ng/mL <0.010     Results from last 7 days   Lab Units 02/11/19  0957   WBC 10*3/mm3 13.78*   HEMOGLOBIN g/dL 17.8*   HEMATOCRIT % 50.0   PLATELETS 10*3/mm3 197                     Results from last 7 days   Lab Units 02/11/19  1149   PH, ARTERIAL pH units 7.436   PO2 ART mm Hg 77.6*   PCO2, ARTERIAL mm Hg 36.2   HCO3 ART mmol/L 24.4       I reviewed the patient's new clinical results.  I personally viewed and interpreted the patient's CXR        Medication Review:     aspirin 81 mg Oral Daily   azithromycin 250 mg Oral Q24H   enoxaparin 40 mg Subcutaneous Q12H   gabapentin 300 mg Oral Q8H   [START ON 2/13/2019] Insulin Degludec 120 Units Subcutaneous Daily With Breakfast   insulin lispro 0-24 Units Subcutaneous 4x Daily With Meals & Nightly   ipratropium-albuterol 3 mL Nebulization 4x Daily - RT   levothyroxine 112 mcg Oral Daily   losartan 50 mg Oral Q24H   methylPREDNISolone sodium succinate 40 mg Intravenous Q8H   sodium chloride 3 mL Intravenous Q12H   topiramate 50 mg Oral Daily            ASSESSMENT:   Acute exacerbation of COPD  Presyncope with SVT  REGAN intolerant to CPAP  Morbid obesity  Leukocytosis  History of kidney cancer  Diabetes mellitus  Tobacco abuse        PLAN:  Wean down steroids as tolerated  I will ask cardiology to see for SVT with associated presyncope  Leukocytosis likely reactive.  Will repeat in the morning  Continue bronchodilators and azithromycin  Ambulate  Wean down oxygen as tolerated  Educated patient on REGAN and treatment with CPAP    Sunil Carroll MD  02/12/19  3:52 PM

## 2019-02-13 LAB
GLUCOSE BLDC GLUCOMTR-MCNC: 223 MG/DL (ref 70–130)
GLUCOSE BLDC GLUCOMTR-MCNC: 281 MG/DL (ref 70–130)
GLUCOSE BLDC GLUCOMTR-MCNC: 310 MG/DL (ref 70–130)
GLUCOSE BLDC GLUCOMTR-MCNC: 408 MG/DL (ref 70–130)

## 2019-02-13 PROCEDURE — 94799 UNLISTED PULMONARY SVC/PX: CPT

## 2019-02-13 PROCEDURE — 63710000001 PREDNISONE PER 1 MG: Performed by: INTERNAL MEDICINE

## 2019-02-13 PROCEDURE — 99222 1ST HOSP IP/OBS MODERATE 55: CPT | Performed by: INTERNAL MEDICINE

## 2019-02-13 PROCEDURE — 63710000001 INSULIN GLARGINE PER 5 UNITS: Performed by: INTERNAL MEDICINE

## 2019-02-13 PROCEDURE — 63710000001 INSULIN LISPRO (HUMAN) PER 5 UNITS: Performed by: INTERNAL MEDICINE

## 2019-02-13 PROCEDURE — 25010000002 ENOXAPARIN PER 10 MG: Performed by: INTERNAL MEDICINE

## 2019-02-13 PROCEDURE — 25010000002 METHYLPREDNISOLONE PER 40 MG: Performed by: INTERNAL MEDICINE

## 2019-02-13 PROCEDURE — 99232 SBSQ HOSP IP/OBS MODERATE 35: CPT | Performed by: INTERNAL MEDICINE

## 2019-02-13 PROCEDURE — 82962 GLUCOSE BLOOD TEST: CPT

## 2019-02-13 RX ORDER — ACETAMINOPHEN 325 MG/1
650 TABLET ORAL EVERY 6 HOURS PRN
Status: DISCONTINUED | OUTPATIENT
Start: 2019-02-13 | End: 2019-02-14 | Stop reason: HOSPADM

## 2019-02-13 RX ORDER — PANTOPRAZOLE SODIUM 40 MG/1
40 TABLET, DELAYED RELEASE ORAL
Status: DISCONTINUED | OUTPATIENT
Start: 2019-02-13 | End: 2019-02-14 | Stop reason: HOSPADM

## 2019-02-13 RX ORDER — PANTOPRAZOLE SODIUM 40 MG/1
40 TABLET, DELAYED RELEASE ORAL
Status: DISCONTINUED | OUTPATIENT
Start: 2019-02-14 | End: 2019-02-13

## 2019-02-13 RX ORDER — PREDNISONE 20 MG/1
20 TABLET ORAL 2 TIMES DAILY WITH MEALS
Status: DISCONTINUED | OUTPATIENT
Start: 2019-02-13 | End: 2019-02-14 | Stop reason: HOSPADM

## 2019-02-13 RX ORDER — INSULIN GLARGINE 100 [IU]/ML
60 INJECTION, SOLUTION SUBCUTANEOUS EVERY 12 HOURS SCHEDULED
Status: DISCONTINUED | OUTPATIENT
Start: 2019-02-13 | End: 2019-02-14 | Stop reason: HOSPADM

## 2019-02-13 RX ORDER — INSULIN GLARGINE 100 [IU]/ML
40 INJECTION, SOLUTION SUBCUTANEOUS ONCE
Status: COMPLETED | OUTPATIENT
Start: 2019-02-13 | End: 2019-02-13

## 2019-02-13 RX ADMIN — ASPIRIN 81 MG: 81 TABLET, DELAYED RELEASE ORAL at 08:00

## 2019-02-13 RX ADMIN — IPRATROPIUM BROMIDE AND ALBUTEROL SULFATE 3 ML: 2.5; .5 SOLUTION RESPIRATORY (INHALATION) at 15:28

## 2019-02-13 RX ADMIN — GABAPENTIN 300 MG: 300 CAPSULE ORAL at 15:08

## 2019-02-13 RX ADMIN — INSULIN GLARGINE 40 UNITS: 100 INJECTION, SOLUTION SUBCUTANEOUS at 15:08

## 2019-02-13 RX ADMIN — SODIUM CHLORIDE, PRESERVATIVE FREE 3 ML: 5 INJECTION INTRAVENOUS at 21:24

## 2019-02-13 RX ADMIN — TOPIRAMATE 50 MG: 50 TABLET, FILM COATED ORAL at 08:00

## 2019-02-13 RX ADMIN — METHYLPREDNISOLONE SODIUM SUCCINATE 40 MG: 40 INJECTION, POWDER, FOR SOLUTION INTRAMUSCULAR; INTRAVENOUS at 09:28

## 2019-02-13 RX ADMIN — IPRATROPIUM BROMIDE AND ALBUTEROL SULFATE 3 ML: 2.5; .5 SOLUTION RESPIRATORY (INHALATION) at 07:05

## 2019-02-13 RX ADMIN — GABAPENTIN 300 MG: 300 CAPSULE ORAL at 05:36

## 2019-02-13 RX ADMIN — PANTOPRAZOLE SODIUM 40 MG: 40 TABLET, DELAYED RELEASE ORAL at 17:46

## 2019-02-13 RX ADMIN — INSULIN LISPRO 16 UNITS: 100 INJECTION, SOLUTION INTRAVENOUS; SUBCUTANEOUS at 17:25

## 2019-02-13 RX ADMIN — METHYLPREDNISOLONE SODIUM SUCCINATE 40 MG: 40 INJECTION, POWDER, FOR SOLUTION INTRAMUSCULAR; INTRAVENOUS at 00:40

## 2019-02-13 RX ADMIN — GABAPENTIN 300 MG: 300 CAPSULE ORAL at 21:24

## 2019-02-13 RX ADMIN — ENOXAPARIN SODIUM 40 MG: 40 INJECTION SUBCUTANEOUS at 08:01

## 2019-02-13 RX ADMIN — ACETAMINOPHEN 650 MG: 325 TABLET, FILM COATED ORAL at 05:36

## 2019-02-13 RX ADMIN — LOSARTAN POTASSIUM 50 MG: 50 TABLET, FILM COATED ORAL at 08:00

## 2019-02-13 RX ADMIN — INSULIN GLARGINE 60 UNITS: 100 INJECTION, SOLUTION SUBCUTANEOUS at 21:24

## 2019-02-13 RX ADMIN — INSULIN LISPRO 24 UNITS: 100 INJECTION, SOLUTION INTRAVENOUS; SUBCUTANEOUS at 11:51

## 2019-02-13 RX ADMIN — SODIUM CHLORIDE, PRESERVATIVE FREE 3 ML: 5 INJECTION INTRAVENOUS at 08:02

## 2019-02-13 RX ADMIN — INSULIN LISPRO 8 UNITS: 100 INJECTION, SOLUTION INTRAVENOUS; SUBCUTANEOUS at 08:14

## 2019-02-13 RX ADMIN — AZITHROMYCIN 250 MG: 250 TABLET, FILM COATED ORAL at 08:00

## 2019-02-13 RX ADMIN — INSULIN LISPRO 12 UNITS: 100 INJECTION, SOLUTION INTRAVENOUS; SUBCUTANEOUS at 21:24

## 2019-02-13 RX ADMIN — IPRATROPIUM BROMIDE AND ALBUTEROL SULFATE 3 ML: 2.5; .5 SOLUTION RESPIRATORY (INHALATION) at 10:59

## 2019-02-13 RX ADMIN — BENZONATATE 100 MG: 100 CAPSULE ORAL at 03:35

## 2019-02-13 RX ADMIN — PREDNISONE 20 MG: 20 TABLET ORAL at 17:25

## 2019-02-13 RX ADMIN — INSULIN LISPRO 20 UNITS: 100 INJECTION, SOLUTION INTRAVENOUS; SUBCUTANEOUS at 17:25

## 2019-02-13 RX ADMIN — LEVOTHYROXINE SODIUM 112 MCG: 0.11 TABLET ORAL at 08:00

## 2019-02-13 RX ADMIN — ENOXAPARIN SODIUM 40 MG: 40 INJECTION SUBCUTANEOUS at 21:24

## 2019-02-13 NOTE — PLAN OF CARE
"Problem: Patient Care Overview  Goal: Plan of Care Review  Outcome: Ongoing (interventions implemented as appropriate)   02/13/19 0412   Coping/Psychosocial   Plan of Care Reviewed With patient   Plan of Care Review   Progress no change   OTHER   Outcome Summary c.o h/a due to frequent \"coughing attacks\". thick, white sputum. Wheezes heard throughout lungs. on RA, Osats have stayed above 90%. PRN antitussive given w not much relief. BG monitored, running high, covered per orders. VSS otherwise. up ad sabiha, ambulated in room. will cont to monitor.      Goal: Individualization and Mutuality  Outcome: Ongoing (interventions implemented as appropriate)    Goal: Discharge Needs Assessment  Outcome: Ongoing (interventions implemented as appropriate)    Goal: Interprofessional Rounds/Family Conf  Outcome: Ongoing (interventions implemented as appropriate)      Problem: Chronic Obstructive Pulmonary Disease (Adult)  Goal: Signs and Symptoms of Listed Potential Problems Will be Absent, Minimized or Managed (Chronic Obstructive Pulmonary Disease)  Outcome: Ongoing (interventions implemented as appropriate)      Problem: Infection, Risk/Actual (Adult)  Goal: Identify Related Risk Factors and Signs and Symptoms  Outcome: Ongoing (interventions implemented as appropriate)    Goal: Infection Prevention/Resolution  Outcome: Ongoing (interventions implemented as appropriate)        "

## 2019-02-13 NOTE — PROGRESS NOTES
"[facility] PULMONARY CARE         Dr Barber Sayied   LOS: 2 days   Patient Care Team:  Simba Moore MD as PCP - General (Internal Medicine)  Rosemarie Ariza APRN as PCP - Claims Attributed    Chief Complaint: Exacerbation of COPD with underlying history of REGAN kidney cancer diabetes mellitus and morbid obesity    Interval History: He feels better.  No further episode of syncope reported.    REVIEW OF SYSTEMS:   CARDIOVASCULAR: No chest pain, chest pressure or chest discomfort. No palpitations or edema.   RESPIRATORY: Shortness of breath with exertion  GASTROINTESTINAL: No anorexia, nausea, vomiting or diarrhea. No abdominal pain or blood.   HEMATOLOGIC: No bleeding or bruising.     Ventilator/Non-Invasive Ventilation Settings (From admission, onward)    None            Vital Signs  Temp:  [97.6 °F (36.4 °C)-98.4 °F (36.9 °C)] 98.4 °F (36.9 °C)  Heart Rate:  [] 92  Resp:  [16-22] 16  BP: (126-156)/(76-90) 126/90    Intake/Output Summary (Last 24 hours) at 2/13/2019 1135  Last data filed at 2/12/2019 2000  Gross per 24 hour   Intake 240 ml   Output 1375 ml   Net -1135 ml     Flowsheet Rows      First Filed Value   Admission Height  177.8 cm (70\") Documented at 02/11/2019 0941   Admission Weight  125 kg (275 lb 11.2 oz) Documented at 02/11/2019 0941          Physical Exam:   General Appearance:    Alert, cooperative, in no acute distress   Lungs:    Diminished breath sounds rhonchi in the bases    Heart:    Regular rhythm and normal rate, normal S1 and S2, no            murmur, no gallop, no rub, no click   Chest Wall:    No abnormalities observed   Abdomen:     Normal bowel sounds, no masses, no organomegaly, soft        non-tender, non-distended, no guarding, no rebound                tenderness   Extremities:   Moves all extremities well, no edema, no cyanosis, no             redness     Results Review:        Results from last 7 days   Lab Units 02/11/19  0957   SODIUM mmol/L 138   POTASSIUM " mmol/L 4.2   CHLORIDE mmol/L 100   CO2 mmol/L 21.8*   BUN mg/dL 8   CREATININE mg/dL 1.13   GLUCOSE mg/dL 191*   CALCIUM mg/dL 9.6     Results from last 7 days   Lab Units 02/11/19  0957   TROPONIN T ng/mL <0.010     Results from last 7 days   Lab Units 02/11/19  0957   WBC 10*3/mm3 13.78*   HEMOGLOBIN g/dL 17.8*   HEMATOCRIT % 50.0   PLATELETS 10*3/mm3 197                     Results from last 7 days   Lab Units 02/11/19  1149   PH, ARTERIAL pH units 7.436   PO2 ART mm Hg 77.6*   PCO2, ARTERIAL mm Hg 36.2   HCO3 ART mmol/L 24.4       I reviewed the patient's new clinical results.  I personally viewed and interpreted the patient's CXR        Medication Review:     aspirin 81 mg Oral Daily   azithromycin 250 mg Oral Q24H   enoxaparin 40 mg Subcutaneous Q12H   gabapentin 300 mg Oral Q8H   Insulin Degludec 120 Units Subcutaneous Daily With Breakfast   insulin lispro 0-24 Units Subcutaneous 4x Daily With Meals & Nightly   ipratropium-albuterol 3 mL Nebulization 4x Daily - RT   levothyroxine 112 mcg Oral Daily   losartan 50 mg Oral Q24H   methylPREDNISolone sodium succinate 40 mg Intravenous Q8H   sodium chloride 3 mL Intravenous Q12H   topiramate 50 mg Oral Daily            ASSESSMENT:   Acute exacerbation of COPD  Presyncope with SVT  REGAN intolerant to CPAP  Morbid obesity  Leukocytosis  History of kidney cancer  Diabetes mellitus  Tobacco abuse        PLAN:  Wean down steroids as tolerated.  Switch to oral steroids today  Cardiology input noted.  Await echo results.  Leukocytosis likely reactive.  Will repeat in the morning  Continue bronchodilators and azithromycin  Ambulate  Wean down oxygen as tolerated  Educated patient on REGAN and treatment with CPAP  Will observe for another 24 hours on the monitor and if echo and everything else looks okay will possibly discharge in the morning    Sunil Carroll MD  02/13/19  11:35 AM

## 2019-02-13 NOTE — CONSULTS
"48 y.o.  Patient Care Team:  Simba Moore MD as PCP - General (Internal Medicine)  Rosemarie Ariza APRN as PCP - Claims Attributed      Chief Complaint   Patient presents with   • Shortness of Breath     pt reports s/s starting x3 days ago.    • Generalized Body Aches   • Cough     productive cough       HPI  48-year-old white male presents to the hospital with COPD exacerbation, initially been treated with IV steroids and has been started on oral steroids starting today. Endocrinology has been consulted for elevated blood sugar’s.  Patient sees Dr. Gottlieb and  Annita for the management of his diabetes feared for the management of his diabetes.    Type 2 dm-diagnosed more than 10 years ago, currently at home on Tresiba  120 units in the morning, Novolog 20 units with each meal, 1 unit for  20 above 140.   Check his blood sugar is 2-3 times today.  Average blood sugar - .  Did not have a recent Eye examination, not sure of the dm retinopathy.   Does have dm neuropathy.   No history of coronary artery disease, does have history of CVA, no history of CKD.    Here in the hospital patient was doing his own insulin-Tresiba 120 units in the morning he did not get the insulin as the pen needle broke.      Past Medical History:   Diagnosis Date   • Chronic pain     flank pain \" permenantly damaged muscle\"   • COPD (chronic obstructive pulmonary disease) (CMS/HCC)    • Diabetes mellitus (CMS/HCC)     Insulin pump   • Diabetic neuropathy (CMS/HCC)    • Disease of thyroid gland     hypothyroid   • Diverticulitis of colon    • Fibromyalgia    • History of pancreatitis     X 2   • History of renal cell cancer     stage 4 , Rt kidney   • Hypertension    • Umbilical hernia        Family History   Family history unknown: Yes       Social History     Socioeconomic History   • Marital status:      Spouse name: Not on file   • Number of children: Not on file   • Years of education: Not on file   • Highest " education level: Not on file   Social Needs   • Financial resource strain: Not on file   • Food insecurity - worry: Not on file   • Food insecurity - inability: Not on file   • Transportation needs - medical: Not on file   • Transportation needs - non-medical: Not on file   Occupational History   • Not on file   Tobacco Use   • Smoking status: Current Some Day Smoker     Types: Cigars   • Smokeless tobacco: Never Used   Substance and Sexual Activity   • Alcohol use: No   • Drug use: No   • Sexual activity: Defer   Other Topics Concern   • Not on file   Social History Narrative   • Not on file       Allergies   Allergen Reactions   • Penicillins Swelling         Current Facility-Administered Medications:   •  acetaminophen (TYLENOL) tablet 650 mg, 650 mg, Oral, Q6H PRN, Sai Mei MD, 650 mg at 02/13/19 0536  •  aspirin EC tablet 81 mg, 81 mg, Oral, Daily, Sunil Carroll MD, 81 mg at 02/13/19 0800  •  azithromycin (ZITHROMAX) tablet 250 mg, 250 mg, Oral, Q24H, Sunil Carroll MD, 250 mg at 02/13/19 0800  •  benzonatate (TESSALON) capsule 100 mg, 100 mg, Oral, Q6H PRN, Sai Mei MD, 100 mg at 02/13/19 0335  •  dextrose (D50W) 25 g/ 50mL Intravenous Solution 25 g, 25 g, Intravenous, Q15 Min PRN, Sai Mei MD  •  dextrose (GLUTOSE) oral gel 15 g, 15 g, Oral, Q15 Min PRN, Sai Mei MD  •  enoxaparin (LOVENOX) syringe 40 mg, 40 mg, Subcutaneous, Q12H, Sunil Carroll MD, 40 mg at 02/13/19 0801  •  gabapentin (NEURONTIN) capsule 300 mg, 300 mg, Oral, Q8H, Sunil Carroll MD, 300 mg at 02/13/19 0536  •  glucagon (human recombinant) (GLUCAGEN DIAGNOSTIC) injection 1 mg, 1 mg, Subcutaneous, PRN, Sai Mei MD  •  Insulin Degludec solution pen-injector 120 Units, 120 Units, Subcutaneous, Daily With Breakfast, Rogerio Wilburn MD, 120 Units at 02/13/19 0801  •  insulin lispro (humaLOG) injection 0-24 Units, 0-24 Units, Subcutaneous, 4x Daily With Meals & Nightly,  Sai Mei MD, 24 Units at 02/13/19 1151  •  ipratropium-albuterol (DUO-NEB) nebulizer solution 3 mL, 3 mL, Nebulization, 4x Daily - RT, Sunil Carroll MD, 3 mL at 02/13/19 1059  •  levothyroxine (SYNTHROID, LEVOTHROID) tablet 112 mcg, 112 mcg, Oral, Daily, Sunil Carroll MD, 112 mcg at 02/13/19 0800  •  losartan (COZAAR) tablet 50 mg, 50 mg, Oral, Q24H, Sunil Carroll MD, 50 mg at 02/13/19 0800  •  predniSONE (DELTASONE) tablet 20 mg, 20 mg, Oral, BID With Meals, Sunil Carroll MD  •  sodium chloride 0.9 % flush 10 mL, 10 mL, Intravenous, PRN, Muna Jang MD, 10 mL at 02/11/19 1016  •  sodium chloride 0.9 % flush 3 mL, 3 mL, Intravenous, Q12H, Sunil Carroll MD, 3 mL at 02/13/19 0802  •  sodium chloride 0.9 % flush 3-10 mL, 3-10 mL, Intravenous, PRN, Sunil Carroll MD  •  topiramate (TOPAMAX) tablet 50 mg, 50 mg, Oral, Daily, Sunil Carroll MD, 50 mg at 02/13/19 0800         Review of Systems   Constitutional: Positive for appetite change and fatigue.   Eyes: Negative for visual disturbance.   Respiratory: Positive for cough and shortness of breath.    Cardiovascular: Negative for palpitations.   Gastrointestinal: Negative for nausea and vomiting.   Endocrine: Negative for polydipsia and polyuria.   Musculoskeletal: Positive for arthralgias and myalgias.   Skin: Negative for pallor.   Neurological: Positive for weakness. Negative for numbness.   Psychiatric/Behavioral: Negative for agitation.     Objective     Vital Signs  Temp:  [97.5 °F (36.4 °C)-98.4 °F (36.9 °C)] 97.5 °F (36.4 °C)  Heart Rate:  [] 92  Resp:  [16-22] 18  BP: (126-135)/(77-90) 135/88    Physical Exam  Physical Exam   Constitutional: He is oriented to person, place, and time. He appears well-nourished.   obese   HENT:   Head: Normocephalic and atraumatic.   Wide neck   Eyes: Conjunctivae and EOM are normal.   Neck: Normal range of motion. Neck supple. Carotid bruit is not present. No thyromegaly present.   Acanthosis  nigricans   Cardiovascular: Normal rate and normal heart sounds.   Pulmonary/Chest: Effort normal. No stridor. No respiratory distress. He has wheezes.   Abdominal: Soft. Bowel sounds are normal. He exhibits no distension. There is no tenderness.   Central obesity   Musculoskeletal: He exhibits no edema or tenderness.   Neurological: He is alert and oriented to person, place, and time.   Skin: Skin is warm and dry.   Psychiatric: He has a normal mood and affect. His behavior is normal.   Vitals reviewed.      Results Review:    I reviewed the patient's new clinical results and summarized them in the HPI and in plan.  Glucose   Date/Time Value Ref Range Status   02/13/2019 1142 408 (H) 70 - 130 mg/dL Final   02/13/2019 0726 223 (H) 70 - 130 mg/dL Final   02/12/2019 2043 381 (H) 70 - 130 mg/dL Final   02/12/2019 1708 354 (H) 70 - 130 mg/dL Final   02/12/2019 1147 277 (H) 70 - 130 mg/dL Final   02/12/2019 0752 210 (H) 70 - 130 mg/dL Final   02/11/2019 2253 327 (H) 70 - 130 mg/dL Final   02/11/2019 2028 347 (H) 70 - 130 mg/dL Final   02/11/2019 1607 202 (H) 70 - 130 mg/dL Final     Lab Results (last 24 hours)     Procedure Component Value Units Date/Time    POC Glucose Once [[id number]]  (Abnormal) Collected:  02/13/19 1142    Specimen:  Blood Updated:  02/13/19 1145     Glucose 408 mg/dL     POC Glucose Once [[id number]]  (Abnormal) Collected:  02/13/19 0726    Specimen:  Blood Updated:  02/13/19 0749     Glucose 223 mg/dL     POC Glucose Once [[id number]]  (Abnormal) Collected:  02/12/19 2043    Specimen:  Blood Updated:  02/12/19 2044     Glucose 381 mg/dL     POC Glucose Once [[id number]]  (Abnormal) Collected:  02/12/19 1708    Specimen:  Blood Updated:  02/12/19 1710     Glucose 354 mg/dL           Imaging Results (last 24 hours)     ** No results found for the last 24 hours. **          Assessment/Plan     Active Hospital Problems    Diagnosis Date Noted   • COPD exacerbation (CMS/[facility]) [J44.1] 02/11/2019     "  Resolved Hospital Problems   No resolved problems to display.     Type 2 diabetes mellitus-uncontrolled  Complicated with IV and oral steroid usage  Currently on prednisone 20 mg oral twice a day.  Will discontinue tresiba  Discussed with the patient that we will start him on Lantus as that is what is available, In the hospital  Will start patient on Lantus 40 units ×1  Will start Lantus 60 units twice a day starting with the first dose tonight.  We will start Humalog 20 units 3 times daily before meals  We will cover with high-dose Humalog sliding scale 3 times daily before meals and at bedtime    Will check lipid panel, thyroid panel.    Hypothyroidism  Continue levothyroxine 112 mcg oral daily.    Discussed plan with nurse.     Thank you for your consultation.  Will follow the pt while in hospital.     Benton Dela Cruz MD.  02/13/19  2:16 PM      EMR Dragon / transcription disclaimer:    \"Dictated utilizing Dragon dictation\".   "

## 2019-02-13 NOTE — PROGRESS NOTES
Hospital Follow Up    LOS:  LOS: 2 days   Patient Name: Ricardo Pereira  Age/Sex: 48 y.o. male  : 1970  MRN: [id number]    Day of Service: 19   Length of Stay: 2  Encounter Provider: Jackson Chapman MD  Place of Service: [facility] CARDIOLOGY  Patient Care Team:  Simba Moore MD as PCP - General (Internal Medicine)  Rosemarie Ariza APRN as PCP - Claims Attributed    Subjective:     Chief Complaint: Atrial arrhythmia    Interval History: Patient is still very short of breath today and wheezing.  He smoked very extensively and just stopped.    Objective:     Objective:  Temp:  [97.6 °F (36.4 °C)-98.4 °F (36.9 °C)] 98.4 °F (36.9 °C)  Heart Rate:  [] 92  Resp:  [16-22] 16  BP: (126-156)/(76-90) 126/90     Intake/Output Summary (Last 24 hours) at 2019 1124  Last data filed at 2019  Gross per 24 hour   Intake 240 ml   Output 1375 ml   Net -1135 ml     Body mass index is 39.56 kg/m².      19  0941   Weight: 125 kg (275 lb 11.2 oz)     Weight change:       Physical Exam:   General : Alert, cooperative, in no acute distress.  Neuro: alert,cooperative and oriented  Lungs: Minimal air movement wheezing diffusely.  CV:: Regular rate and rhythm, normal S1 and S2, no murmurs, gallops or rubs.  ABD: Soft, nontender, non-distended. positive bowel sounds  Extr: No edema or cyanosis, moves all extremities    Lab Review:   Results from last 7 days   Lab Units 19  0957   SODIUM mmol/L 138   POTASSIUM mmol/L 4.2   CHLORIDE mmol/L 100   CO2 mmol/L 21.8*   BUN mg/dL 8   CREATININE mg/dL 1.13   GLUCOSE mg/dL 191*   CALCIUM mg/dL 9.6   AST (SGOT) U/L 18   ALT (SGPT) U/L 38     Results from last 7 days   Lab Units 19  0957   TROPONIN T ng/mL <0.010     Results from last 7 days   Lab Units 19  0957   WBC 10*3/mm3 13.78*   HEMOGLOBIN g/dL 17.8*   HEMATOCRIT % 50.0   PLATELETS 10*3/mm3 197                   Invalid input(s): LDLCALC  Results  from last 7 days   Lab Units 02/11/19  0957   PROBNP pg/mL 35.6         I reviewed the patient's new clinical results.  I personally viewed and interpreted the patient's EKG  Current Medications:   Scheduled Meds:  aspirin 81 mg Oral Daily   azithromycin 250 mg Oral Q24H   enoxaparin 40 mg Subcutaneous Q12H   gabapentin 300 mg Oral Q8H   Insulin Degludec 120 Units Subcutaneous Daily With Breakfast   insulin lispro 0-24 Units Subcutaneous 4x Daily With Meals & Nightly   ipratropium-albuterol 3 mL Nebulization 4x Daily - RT   levothyroxine 112 mcg Oral Daily   losartan 50 mg Oral Q24H   methylPREDNISolone sodium succinate 40 mg Intravenous Q8H   sodium chloride 3 mL Intravenous Q12H   topiramate 50 mg Oral Daily     Continuous Infusions:     Allergies:  Allergies   Allergen Reactions   • Penicillins Swelling       Assessment:       COPD exacerbation (CMS/[facility])        Plan:     1.  Atrial arrhythmias.  Probably secondary to COPD.  Done significant overnight.  Would check an echocardiogram for completeness sake however usually with improvement of the lung status most of these will spontaneously resolved.  2.  Hypertension blood pressure was overall okay last night continue the same follow-up as an outpatient.  3.  COPD  4.  History of stroke  5.  History of cough induced syncope.  Patient was coughing quite a bit when I walked in the room this morning I would not be surprised if he had cough induced syncope in the past.  6.  At this point nothing to add from a cardiovascular standpoint.  If echo is unremarkable will see patient on an as-needed basis please have him follow-up with me in 6-8 weeks.      Jackson Chapman MD  02/13/19  11:24 AM

## 2019-02-14 ENCOUNTER — APPOINTMENT (OUTPATIENT)
Dept: CARDIOLOGY | Facility: HOSPITAL | Age: 49
End: 2019-02-14

## 2019-02-14 VITALS
DIASTOLIC BLOOD PRESSURE: 91 MMHG | BODY MASS INDEX: 39.47 KG/M2 | TEMPERATURE: 97.5 F | OXYGEN SATURATION: 94 % | HEIGHT: 70 IN | RESPIRATION RATE: 18 BRPM | WEIGHT: 275.7 LBS | SYSTOLIC BLOOD PRESSURE: 154 MMHG | HEART RATE: 91 BPM

## 2019-02-14 LAB
AORTIC DIMENSIONLESS INDEX: 0.9 (DI)
BH CV ECHO MEAS - AO MEAN PG (FULL): 2 MMHG
BH CV ECHO MEAS - AO MEAN PG: 5 MMHG
BH CV ECHO MEAS - AO ROOT AREA (BSA CORRECTED): 1.5
BH CV ECHO MEAS - AO ROOT AREA: 9.6 CM^2
BH CV ECHO MEAS - AO ROOT DIAM: 3.5 CM
BH CV ECHO MEAS - AO V2 MEAN: 108 CM/SEC
BH CV ECHO MEAS - AO V2 VTI: 24.6 CM
BH CV ECHO MEAS - ASC AORTA: 2.8 CM
BH CV ECHO MEAS - AVA(I,A): 4 CM^2
BH CV ECHO MEAS - AVA(I,D): 4 CM^2
BH CV ECHO MEAS - BSA(HAYCOCK): 2.5 M^2
BH CV ECHO MEAS - BSA: 2.4 M^2
BH CV ECHO MEAS - BZI_BMI: 39.5 KILOGRAMS/M^2
BH CV ECHO MEAS - BZI_METRIC_HEIGHT: 177.8 CM
BH CV ECHO MEAS - BZI_METRIC_WEIGHT: 124.7 KG
BH CV ECHO MEAS - EDV(CUBED): 132.7 ML
BH CV ECHO MEAS - EDV(MOD-SP2): 106 ML
BH CV ECHO MEAS - EDV(MOD-SP4): 114 ML
BH CV ECHO MEAS - EDV(TEICH): 123.8 ML
BH CV ECHO MEAS - EF(CUBED): 77.5 %
BH CV ECHO MEAS - EF(MOD-BP): 59 %
BH CV ECHO MEAS - EF(MOD-SP2): 58.5 %
BH CV ECHO MEAS - EF(MOD-SP4): 61.4 %
BH CV ECHO MEAS - EF(TEICH): 69.4 %
BH CV ECHO MEAS - ESV(CUBED): 29.8 ML
BH CV ECHO MEAS - ESV(MOD-SP2): 44 ML
BH CV ECHO MEAS - ESV(MOD-SP4): 44 ML
BH CV ECHO MEAS - ESV(TEICH): 37.9 ML
BH CV ECHO MEAS - FS: 39.2 %
BH CV ECHO MEAS - IVS/LVPW: 1.3
BH CV ECHO MEAS - IVSD: 1.4 CM
BH CV ECHO MEAS - LAT PEAK E' VEL: 11 CM/SEC
BH CV ECHO MEAS - LV DIASTOLIC VOL/BSA (35-75): 47.7 ML/M^2
BH CV ECHO MEAS - LV MASS(C)D: 255.5 GRAMS
BH CV ECHO MEAS - LV MASS(C)DI: 106.9 GRAMS/M^2
BH CV ECHO MEAS - LV MEAN PG: 3 MMHG
BH CV ECHO MEAS - LV SYSTOLIC VOL/BSA (12-30): 18.4 ML/M^2
BH CV ECHO MEAS - LV V1 MEAN: 78.7 CM/SEC
BH CV ECHO MEAS - LV V1 VTI: 21.6 CM
BH CV ECHO MEAS - LVIDD: 5.1 CM
BH CV ECHO MEAS - LVIDS: 3.1 CM
BH CV ECHO MEAS - LVLD AP2: 8.3 CM
BH CV ECHO MEAS - LVLD AP4: 8.5 CM
BH CV ECHO MEAS - LVLS AP2: 6.4 CM
BH CV ECHO MEAS - LVLS AP4: 6.9 CM
BH CV ECHO MEAS - LVOT AREA (M): 4.5 CM^2
BH CV ECHO MEAS - LVOT AREA: 4.5 CM^2
BH CV ECHO MEAS - LVOT DIAM: 2.4 CM
BH CV ECHO MEAS - LVPWD: 1.1 CM
BH CV ECHO MEAS - MED PEAK E' VEL: 11 CM/SEC
BH CV ECHO MEAS - MV A DUR: 0.11 SEC
BH CV ECHO MEAS - MV A MAX VEL: 76.2 CM/SEC
BH CV ECHO MEAS - MV DEC SLOPE: 426 CM/SEC^2
BH CV ECHO MEAS - MV DEC TIME: 195 SEC
BH CV ECHO MEAS - MV E MAX VEL: 94.3 CM/SEC
BH CV ECHO MEAS - MV E/A: 1.2
BH CV ECHO MEAS - MV MEAN PG: 2 MMHG
BH CV ECHO MEAS - MV P1/2T MAX VEL: 96.8 CM/SEC
BH CV ECHO MEAS - MV P1/2T: 66.6 MSEC
BH CV ECHO MEAS - MV V2 MEAN: 68 CM/SEC
BH CV ECHO MEAS - MV V2 VTI: 28.7 CM
BH CV ECHO MEAS - MVA P1/2T LCG: 2.3 CM^2
BH CV ECHO MEAS - MVA(P1/2T): 3.3 CM^2
BH CV ECHO MEAS - MVA(VTI): 3.4 CM^2
BH CV ECHO MEAS - PA ACC SLOPE: 1616 CM/SEC^2
BH CV ECHO MEAS - PA ACC TIME: 0.09 SEC
BH CV ECHO MEAS - PA MAX PG: 8.9 MMHG
BH CV ECHO MEAS - PA PR(ACCEL): 37.6 MMHG
BH CV ECHO MEAS - PA V2 MAX: 149 CM/SEC
BH CV ECHO MEAS - PULM A REVS DUR: 0.11 SEC
BH CV ECHO MEAS - PULM A REVS VEL: 28.9 CM/SEC
BH CV ECHO MEAS - PULM DIAS VEL: 65.9 CM/SEC
BH CV ECHO MEAS - PULM S/D: 0.98
BH CV ECHO MEAS - PULM SYS VEL: 64.4 CM/SEC
BH CV ECHO MEAS - RAP SYSTOLE: 3 MMHG
BH CV ECHO MEAS - RV MEAN PG: 1 MMHG
BH CV ECHO MEAS - RV V1 MEAN: 54.4 CM/SEC
BH CV ECHO MEAS - RV V1 VTI: 16.1 CM
BH CV ECHO MEAS - SI(AO): 99 ML/M^2
BH CV ECHO MEAS - SI(CUBED): 43 ML/M^2
BH CV ECHO MEAS - SI(LVOT): 40.9 ML/M^2
BH CV ECHO MEAS - SI(MOD-SP2): 25.9 ML/M^2
BH CV ECHO MEAS - SI(MOD-SP4): 29.3 ML/M^2
BH CV ECHO MEAS - SI(TEICH): 35.9 ML/M^2
BH CV ECHO MEAS - SV(AO): 236.7 ML
BH CV ECHO MEAS - SV(CUBED): 102.9 ML
BH CV ECHO MEAS - SV(LVOT): 97.7 ML
BH CV ECHO MEAS - SV(MOD-SP2): 62 ML
BH CV ECHO MEAS - SV(MOD-SP4): 70 ML
BH CV ECHO MEAS - SV(TEICH): 85.9 ML
BH CV ECHO MEAS - TAPSE (>1.6): 2.9 CM2
BH CV ECHO MEASUREMENTS AVERAGE E/E' RATIO: 8.57
BH CV XLRA - TDI S': 14.6 CM/SEC
DEPRECATED RDW RBC AUTO: 47.4 FL (ref 37–54)
ERYTHROCYTE [DISTWIDTH] IN BLOOD BY AUTOMATED COUNT: 13.2 % (ref 12.3–15.4)
GLUCOSE BLDC GLUCOMTR-MCNC: 121 MG/DL (ref 70–130)
GLUCOSE BLDC GLUCOMTR-MCNC: 290 MG/DL (ref 70–130)
HCT VFR BLD AUTO: 50.1 % (ref 37.5–51)
HGB BLD-MCNC: 16.3 G/DL (ref 13–17.7)
LEFT ATRIUM VOLUME INDEX: 18 ML/M2
MCH RBC QN AUTO: 31.4 PG (ref 26.6–33)
MCHC RBC AUTO-ENTMCNC: 32.5 G/DL (ref 31.5–35.7)
MCV RBC AUTO: 96.5 FL (ref 79–97)
PLATELET # BLD AUTO: 203 10*3/MM3 (ref 140–450)
PMV BLD AUTO: 10.8 FL (ref 6–12)
RBC # BLD AUTO: 5.19 10*6/MM3 (ref 4.14–5.8)
WBC NRBC COR # BLD: 17.34 10*3/MM3 (ref 3.4–10.8)

## 2019-02-14 PROCEDURE — 99232 SBSQ HOSP IP/OBS MODERATE 35: CPT | Performed by: INTERNAL MEDICINE

## 2019-02-14 PROCEDURE — 63710000001 INSULIN LISPRO (HUMAN) PER 5 UNITS: Performed by: INTERNAL MEDICINE

## 2019-02-14 PROCEDURE — 94799 UNLISTED PULMONARY SVC/PX: CPT

## 2019-02-14 PROCEDURE — 93306 TTE W/DOPPLER COMPLETE: CPT | Performed by: INTERNAL MEDICINE

## 2019-02-14 PROCEDURE — 85027 COMPLETE CBC AUTOMATED: CPT | Performed by: INTERNAL MEDICINE

## 2019-02-14 PROCEDURE — 99231 SBSQ HOSP IP/OBS SF/LOW 25: CPT | Performed by: INTERNAL MEDICINE

## 2019-02-14 PROCEDURE — 63710000001 PREDNISONE PER 1 MG: Performed by: INTERNAL MEDICINE

## 2019-02-14 PROCEDURE — 63710000001 INSULIN GLARGINE PER 5 UNITS: Performed by: INTERNAL MEDICINE

## 2019-02-14 PROCEDURE — 93306 TTE W/DOPPLER COMPLETE: CPT

## 2019-02-14 PROCEDURE — 25010000002 ENOXAPARIN PER 10 MG: Performed by: INTERNAL MEDICINE

## 2019-02-14 PROCEDURE — 82962 GLUCOSE BLOOD TEST: CPT

## 2019-02-14 RX ORDER — AZITHROMYCIN 250 MG/1
TABLET, FILM COATED ORAL
Qty: 4 TABLET | Refills: 0 | Status: SHIPPED | OUTPATIENT
Start: 2019-02-15 | End: 2019-04-17 | Stop reason: ALTCHOICE

## 2019-02-14 RX ORDER — INSULIN GLARGINE 100 [IU]/ML
60 INJECTION, SOLUTION SUBCUTANEOUS EVERY 12 HOURS SCHEDULED
Qty: 100 UNITS | Refills: 12 | Status: SHIPPED | OUTPATIENT
Start: 2019-02-14 | End: 2019-04-17 | Stop reason: ALTCHOICE

## 2019-02-14 RX ORDER — PREDNISONE 10 MG/1
TABLET ORAL
Qty: 31 TABLET | Refills: 0 | Status: SHIPPED | OUTPATIENT
Start: 2019-02-14 | End: 2019-04-17 | Stop reason: ALTCHOICE

## 2019-02-14 RX ADMIN — AZITHROMYCIN 250 MG: 250 TABLET, FILM COATED ORAL at 09:45

## 2019-02-14 RX ADMIN — INSULIN GLARGINE 60 UNITS: 100 INJECTION, SOLUTION SUBCUTANEOUS at 09:46

## 2019-02-14 RX ADMIN — LEVOTHYROXINE SODIUM 112 MCG: 0.11 TABLET ORAL at 09:43

## 2019-02-14 RX ADMIN — SODIUM CHLORIDE, PRESERVATIVE FREE 3 ML: 5 INJECTION INTRAVENOUS at 09:43

## 2019-02-14 RX ADMIN — IPRATROPIUM BROMIDE AND ALBUTEROL SULFATE 3 ML: 2.5; .5 SOLUTION RESPIRATORY (INHALATION) at 12:36

## 2019-02-14 RX ADMIN — GABAPENTIN 300 MG: 300 CAPSULE ORAL at 05:24

## 2019-02-14 RX ADMIN — ASPIRIN 81 MG: 81 TABLET, DELAYED RELEASE ORAL at 09:45

## 2019-02-14 RX ADMIN — IPRATROPIUM BROMIDE AND ALBUTEROL SULFATE 3 ML: 2.5; .5 SOLUTION RESPIRATORY (INHALATION) at 08:49

## 2019-02-14 RX ADMIN — INSULIN LISPRO 20 UNITS: 100 INJECTION, SOLUTION INTRAVENOUS; SUBCUTANEOUS at 12:02

## 2019-02-14 RX ADMIN — INSULIN LISPRO 12 UNITS: 100 INJECTION, SOLUTION INTRAVENOUS; SUBCUTANEOUS at 12:02

## 2019-02-14 RX ADMIN — PREDNISONE 20 MG: 20 TABLET ORAL at 09:43

## 2019-02-14 RX ADMIN — LOSARTAN POTASSIUM 50 MG: 50 TABLET, FILM COATED ORAL at 09:42

## 2019-02-14 RX ADMIN — ENOXAPARIN SODIUM 40 MG: 40 INJECTION SUBCUTANEOUS at 12:02

## 2019-02-14 RX ADMIN — PANTOPRAZOLE SODIUM 40 MG: 40 TABLET, DELAYED RELEASE ORAL at 05:24

## 2019-02-14 RX ADMIN — TOPIRAMATE 50 MG: 50 TABLET, FILM COATED ORAL at 09:43

## 2019-02-14 NOTE — NURSING NOTE
Spoke with Dr. Jose De Jesus MD states pt to have lantus tonight 60 units , then resume his regular insulin regimen of triseba 120 units daily , and to give humalog 28 units with meals during his steriod treatment per pulm. Pt educated and avs was updated with new orders, pt d/cd to home

## 2019-02-14 NOTE — PLAN OF CARE
Problem: Patient Care Overview  Goal: Plan of Care Review  Outcome: Ongoing (interventions implemented as appropriate)   02/14/19 0255   Coping/Psychosocial   Plan of Care Reviewed With patient   Plan of Care Review   Progress improving   OTHER   Outcome Summary no c.o any discomfort tonight, less coughing and rested better than last night. BP slightly elevated, otherwise VSS. BG better tonight. will cont to monitor.     Goal: Individualization and Mutuality  Outcome: Ongoing (interventions implemented as appropriate)    Goal: Discharge Needs Assessment  Outcome: Ongoing (interventions implemented as appropriate)    Goal: Interprofessional Rounds/Family Conf  Outcome: Ongoing (interventions implemented as appropriate)      Problem: Chronic Obstructive Pulmonary Disease (Adult)  Goal: Signs and Symptoms of Listed Potential Problems Will be Absent, Minimized or Managed (Chronic Obstructive Pulmonary Disease)  Outcome: Ongoing (interventions implemented as appropriate)      Problem: Infection, Risk/Actual (Adult)  Goal: Identify Related Risk Factors and Signs and Symptoms  Outcome: Ongoing (interventions implemented as appropriate)    Goal: Infection Prevention/Resolution  Outcome: Ongoing (interventions implemented as appropriate)

## 2019-02-14 NOTE — DISCHARGE SUMMARY
PHYSICIAN DISCHARGE SUMMARY                                                                        [facility]    Patient Identification:  Name: Ricardo Pereira  Age: 48 y.o.  Sex: male  :  1970  MRN: [id number]  Primary Care Physician: Simba Moore MD    Admit date: 2019  Discharge date and time: No discharge date for patient encounter.   Discharged Condition: stable    Discharge Diagnoses:  COPD exacerbation (CMS/[facility])   Acute exacerbation of COPD  uncontrolled diabetes mellitus  Presyncope with SVT  REGAN intolerant to CPAP  Morbid obesity  Leukocytosis  History of kidney cancer  Diabetes mellitus  Tobacco abuse        Hospital Course: Ricardo Pereira presented to [facility]    Patient admitted with acute exacerbation of COPD with multiple medical problems and issues.  Respiratory viral panel was negative.  Treated with steroid bronchodilators and oral Zithromax.  Patient improved significantly.  Blood glucose was uncontrolled therefore endocrine was consulted and the switch patient to Lantus and NovoLog.  Patient also was having some SVT associated syncope/presyncope.  Cardiology was consulted and they recommended treatment of underlying lung disease.  2D echo was normal.  He will follow-up with cardiology in 8 weeks.  Patient also has REGAN intolerant to CPAP.  He will be scheduled for outpatient sleep study and follow-up after sleep study.  He wishes to get back on his CPAP machine.  Endocrine adjusted patient's blood glucose with Lantus and NovoLog.  Follow-up with endocrine in 4 weeks.  At this time patient is stable for discharge.  Consults:   IP CONSULT TO PULMONOLOGY  IP CONSULT TO CARDIOLOGY  IP CONSULT TO ENDOCRINOLOGY    Significant Diagnostic Studies:   [unfilled]    Discharge Exam:  Alert and oriented x 4, in NAD  Supple neck, midline trach  RRR, no m/r/g, no edema  Clear bilaterally, no  wheezing, nonlabored  No clubbing or cyanosis     Disposition:  Home    Patient Instructions:   [unfilled]  Follow-up Information     Simba Moore MD Follow up in 6 day(s).    Specialty:  Internal Medicine  Why:  Follow up on February 20th @ 9:00 AM  Contact information:  [address]  [place] [id number]  [phone]             Sunil Carroll MD Follow up in 6 week(s).    Specialties:  Pulmonary Disease, Sleep Medicine  Contact information:  [address]  [place] [address]  [phone]             Jackson Chapman MD Follow up in 8 week(s).    Specialty:  Cardiology  Contact information:  [address]  [place] [address]  [phone]             Benton Dela Cruz MD Follow up in 4 week(s).    Specialty:  Endocrinology  Contact information:  [address]  [facility] [id number] [phone]                 [unfilled]     Medication Reconciliation: Please see electronically completed Med Rec.    Total time spent discharging patient including evaluation, medication reconciliation, arranging follow up, and post hospitalization instructions and education total time exceeds 30 minutes.    Signed:  Sunil Carroll MD  2/14/2019  12:43 PM

## 2019-02-14 NOTE — PROGRESS NOTES
Hospital Follow Up    LOS:  LOS: 3 days   Patient Name: Ricardo Pereira  Age/Sex: 48 y.o. male  : 1970  MRN: [id number]    Day of Service: 19   Length of Stay: 3  Encounter Provider: Jackson Chapman MD  Place of Service: [facility] CARDIOLOGY  Patient Care Team:  Simba Moore MD as PCP - General (Internal Medicine)  Rosemarie Ariza APRN as PCP - Claims Attributed    Subjective:     Chief Complaint: Shortness of breath.    Interval History: Patient says he looks great feels great.    Objective:     Objective:  Temp:  [97.5 °F (36.4 °C)-98.5 °F (36.9 °C)] 97.5 °F (36.4 °C)  Heart Rate:  [88-92] 90  Resp:  [16-20] 18  BP: (135-154)/(84-91) 154/91     Intake/Output Summary (Last 24 hours) at 2019 0841  Last data filed at 2019 0527  Gross per 24 hour   Intake --   Output 2025 ml   Net -2025 ml     Body mass index is 39.56 kg/m².      19  0941   Weight: 125 kg (275 lb 11.2 oz)     Weight change:       Physical Exam:   General : Alert, cooperative, in no acute distress.  Neuro: alert,cooperative and oriented  Lungs: CTAB. Normal respiratory effort and rate.  CV:: Regular rate and rhythm, normal S1 and S2, no murmurs, gallops or rubs.  ABD: Soft, nontender, non-distended. positive bowel sounds  Extr: No edema or cyanosis, moves all extremities    Lab Review:   Results from last 7 days   Lab Units 19  0957   SODIUM mmol/L 138   POTASSIUM mmol/L 4.2   CHLORIDE mmol/L 100   CO2 mmol/L 21.8*   BUN mg/dL 8   CREATININE mg/dL 1.13   GLUCOSE mg/dL 191*   CALCIUM mg/dL 9.6   AST (SGOT) U/L 18   ALT (SGPT) U/L 38     Results from last 7 days   Lab Units 19  0957   TROPONIN T ng/mL <0.010     Results from last 7 days   Lab Units 19  0756 19  0957   WBC 10*3/mm3 17.34* 13.78*   HEMOGLOBIN g/dL 16.3 17.8*   HEMATOCRIT % 50.1 50.0   PLATELETS 10*3/mm3 203 197                   Invalid input(s): LDLCALC  Results from last 7 days   Lab  Units 02/11/19  0957   PROBNP pg/mL 35.6         I reviewed the patient's new clinical results.  I personally viewed and interpreted the patient's EKG  Current Medications:   Scheduled Meds:  aspirin 81 mg Oral Daily   azithromycin 250 mg Oral Q24H   enoxaparin 40 mg Subcutaneous Q12H   gabapentin 300 mg Oral Q8H   insulin glargine 60 Units Subcutaneous Q12H   insulin lispro 0-24 Units Subcutaneous 4x Daily With Meals & Nightly   insulin lispro 20 Units Subcutaneous TID With Meals   ipratropium-albuterol 3 mL Nebulization 4x Daily - RT   levothyroxine 112 mcg Oral Daily   losartan 50 mg Oral Q24H   pantoprazole 40 mg Oral Q AM   predniSONE 20 mg Oral BID With Meals   sodium chloride 3 mL Intravenous Q12H   topiramate 50 mg Oral Daily     Continuous Infusions:     Allergies:  Allergies   Allergen Reactions   • Penicillins Swelling       Assessment:       COPD exacerbation (CMS/[facility])        Plan:      1.  Atrial arrhythmias secondary to COPD exacerbation.  One sound great today minimal wheezing.  With that most of his atrial arrhythmias have resolved as expected.  2.  Hypertension  3.  If echo normal OK to discharge I will sign off have patient follow-up in 8 weeks in my office.      Jackson Chapman MD  02/14/19  8:41 AM

## 2019-02-14 NOTE — PROGRESS NOTES
48 y.o.   LOS: 3 days   Patient Care Team:  Simba Moore MD as PCP - General (Internal Medicine)  Rosemarie Ariza APRN as PCP - Claims Attributed    Chief Complaint:  High BG    Chief Complaint   Patient presents with   • Shortness of Breath     pt reports s/s starting x3 days ago.    • Generalized Body Aches   • Cough     productive cough       Subjective   Patient’s blood sugars have improved this morning. Fasting blood sugar this morning-121.    Interval History:    Review of Systems:   Review of Systems   Constitutional: Positive for appetite change and fatigue.   Eyes: Negative for visual disturbance.   Respiratory: Negative for shortness of breath.    Cardiovascular: Negative for palpitations.   Gastrointestinal: Negative for nausea and vomiting.   Endocrine: Negative for polydipsia and polyuria.   Musculoskeletal: Positive for arthralgias.   Skin: Negative for pallor.   Neurological: Positive for weakness. Negative for numbness.   Psychiatric/Behavioral: Negative for agitation.     Objective     Vital Signs   Temp:  [97.5 °F (36.4 °C)-98.5 °F (36.9 °C)] 97.5 °F (36.4 °C)  Heart Rate:  [84-91] 91  Resp:  [18-20] 18  BP: (146-154)/(84-91) 154/91    Physical Exam:  Physical Exam   Constitutional: He is oriented to person, place, and time. He appears well-nourished.   obese   HENT:   Head: Normocephalic and atraumatic.   Wide neck   Eyes: Conjunctivae and EOM are normal.   Neck: Normal range of motion. Neck supple. Carotid bruit is not present. No thyromegaly present.   Acanthosis nigricans   Cardiovascular: Normal rate and normal heart sounds.   Pulmonary/Chest: Effort normal and breath sounds normal. No stridor. No respiratory distress.   Abdominal: Soft. Bowel sounds are normal. He exhibits no distension. There is no tenderness.   Central obesity   Musculoskeletal: He exhibits no edema or tenderness.   Neurological: He is alert and oriented to person, place, and time.   Skin: Skin is warm and dry.    Psychiatric: He has a normal mood and affect. His behavior is normal.   Vitals reviewed.  Results Review:     I reviewed the patient's new clinical results and summarized them in subjective and in plan.      No results found for: GLUCOSE  Lab Results (last 24 hours)     Procedure Component Value Units Date/Time    POC Glucose Once [[id number]]  (Abnormal) Collected:  02/14/19 1150    Specimen:  Blood Updated:  02/14/19 1151     Glucose 290 mg/dL     CBC (No Diff) [[id number]]  (Abnormal) Collected:  02/14/19 0756    Specimen:  Blood Updated:  02/14/19 0832     WBC 17.34 10*3/mm3      RBC 5.19 10*6/mm3      Hemoglobin 16.3 g/dL      Hematocrit 50.1 %      MCV 96.5 fL      MCH 31.4 pg      MCHC 32.5 g/dL      RDW 13.2 %      RDW-SD 47.4 fl      MPV 10.8 fL      Platelets 203 10*3/mm3     POC Glucose Once [[id number]]  (Normal) Collected:  02/14/19 0812    Specimen:  Blood Updated:  02/14/19 0813     Glucose 121 mg/dL     POC Glucose Once [[id number]]  (Abnormal) Collected:  02/13/19 2044    Specimen:  Blood Updated:  02/13/19 2113     Glucose 281 mg/dL     POC Glucose Once [[id number]]  (Abnormal) Collected:  02/13/19 1658    Specimen:  Blood Updated:  02/13/19 1704     Glucose 310 mg/dL         Imaging Results (last 24 hours)     ** No results found for the last 24 hours. **          Medication Review: DONE      Current Facility-Administered Medications:   •  acetaminophen (TYLENOL) tablet 650 mg, 650 mg, Oral, Q6H PRN, Sai Mei MD, 650 mg at 02/13/19 0536  •  aspirin EC tablet 81 mg, 81 mg, Oral, Daily, Sunil Carroll MD, 81 mg at 02/14/19 0945  •  azithromycin (ZITHROMAX) tablet 250 mg, 250 mg, Oral, Q24H, Sunil Carroll MD, 250 mg at 02/14/19 0945  •  benzonatate (TESSALON) capsule 100 mg, 100 mg, Oral, Q6H PRN, Sai Mei MD, 100 mg at 02/13/19 0335  •  dextrose (D50W) 25 g/ 50mL Intravenous Solution 25 g, 25 g, Intravenous, Q15 Min PRN, Sai Mei MD  •  dextrose (GLUTOSE) oral gel  15 g, 15 g, Oral, Q15 Min PRN, Sai Mei MD  •  enoxaparin (LOVENOX) syringe 40 mg, 40 mg, Subcutaneous, Q12H, Sunil Carroll MD, 40 mg at 02/14/19 1202  •  gabapentin (NEURONTIN) capsule 300 mg, 300 mg, Oral, Q8H, Sunil Carroll MD, 300 mg at 02/14/19 0524  •  glucagon (human recombinant) (GLUCAGEN DIAGNOSTIC) injection 1 mg, 1 mg, Subcutaneous, PRN, Sai Mei MD  •  insulin glargine (LANTUS) injection 60 Units, 60 Units, Subcutaneous, Q12H, Benton Dela Cruz MD, 60 Units at 02/14/19 0946  •  insulin lispro (humaLOG) injection 0-24 Units, 0-24 Units, Subcutaneous, 4x Daily With Meals & Nightly, Sai Mei MD, 12 Units at 02/14/19 1202  •  insulin lispro (humaLOG) injection 20 Units, 20 Units, Subcutaneous, TID With Meals, Benton Dela Cruz MD, 20 Units at 02/14/19 1202  •  ipratropium-albuterol (DUO-NEB) nebulizer solution 3 mL, 3 mL, Nebulization, 4x Daily - RT, Sunil Carroll MD, 3 mL at 02/14/19 1236  •  levothyroxine (SYNTHROID, LEVOTHROID) tablet 112 mcg, 112 mcg, Oral, Daily, Sunil Carroll MD, 112 mcg at 02/14/19 0943  •  losartan (COZAAR) tablet 50 mg, 50 mg, Oral, Q24H, Sunil Carroll MD, 50 mg at 02/14/19 0942  •  pantoprazole (PROTONIX) EC tablet 40 mg, 40 mg, Oral, Q AM, Rogerio Wilburn MD, 40 mg at 02/14/19 0524  •  predniSONE (DELTASONE) tablet 20 mg, 20 mg, Oral, BID With Meals, Sunil Carroll MD, 20 mg at 02/14/19 0943  •  sodium chloride 0.9 % flush 10 mL, 10 mL, Intravenous, PRN, Muna Jang MD, 10 mL at 02/11/19 1016  •  sodium chloride 0.9 % flush 3 mL, 3 mL, Intravenous, Q12H, Sunil Carroll MD, 3 mL at 02/14/19 0943  •  sodium chloride 0.9 % flush 3-10 mL, 3-10 mL, Intravenous, PRN, Sunil Carroll MD  •  topiramate (TOPAMAX) tablet 50 mg, 50 mg, Oral, Daily, Sunil Carroll MD, 50 mg at 02/14/19 0943    Current Outpatient Medications:   •  arformoterol (BROVANA) 15 MCG/2ML nebulizer solution, Take 2 mL by nebulization 2 (Two) Times a Day., Disp:  120 mL, Rfl: 0  •  aspirin 81 MG EC tablet, Take 1 tablet by mouth Daily., Disp: 90 tablet, Rfl: 1  •  budesonide (PULMICORT) 0.5 MG/2ML nebulizer solution, Take 2 mL by nebulization 2 (Two) Times a Day., Disp: 60 each, Rfl: 0  •  fenofibrate (TRICOR) 145 MG tablet, Take 1 tablet by mouth Daily., Disp: 30 tablet, Rfl: 4  •  gabapentin (NEURONTIN) 800 MG tablet, Take 1 tablet by mouth 3 (Three) Times a Day., Disp: 90 tablet, Rfl: 1  •  glucose blood (RELION PRIME TEST) test strip, USE STRIP TO TEST GLUCOSE 8 TIMES DAILY, Disp: 250 each, Rfl: 5  •  Insulin Pen Needle (RELION PEN NEEDLES) 32G X 4 MM misc, Use to inject insulin 4 times daily, Disp: 150 each, Rfl: 11  •  ipratropium-albuterol (DUO-NEB) 0.5-2.5 mg/mL nebulizer, Take 3 mL by nebulization Every 4 (Four) Hours As Needed for Wheezing or Shortness of Air., Disp: 360 mL, Rfl: 0  •  levothyroxine (SYNTHROID) 112 MCG tablet, Take 1 tablet by mouth Daily. On an empty stomach, Disp: 30 tablet, Rfl: 4  •  olmesartan (BENICAR) 20 MG tablet, Take 1 tablet by mouth Daily., Disp: 30 tablet, Rfl: 4  •  pantoprazole (PROTONIX) 40 MG EC tablet, Take 40 mg by mouth daily., Disp: , Rfl:   •  rosuvastatin (CRESTOR) 20 MG tablet, Take 1 tablet by mouth Every Night., Disp: 30 tablet, Rfl: 4  •  topiramate (TOPAMAX) 50 MG tablet, Take 50 mg by mouth 2 (Two) Times a Day., Disp: , Rfl:   •  VASCEPA 1 g capsule capsule, Take 2 g by mouth Daily., Disp: 120 capsule, Rfl: 5  •  [START ON 2/15/2019] azithromycin (ZITHROMAX) 250 MG tablet, Take 2 tablets the first day, then 1 tablet daily for 4 days., Disp: 4 tablet, Rfl: 0  •  guaifenesin-dextromethorphan (MUCINEX DM)  MG tablet sustained-release 12 hour tablet, Take 2 tablets by mouth 2 (Two) Times a Day As Needed (cough)., Disp: 120 tablet, Rfl: 0  •  insulin glargine (LANTUS) 100 UNIT/ML injection, Inject 60 Units under the skin into the appropriate area as directed Every 12 (Twelve) Hours., Disp: 100 Units, Rfl: 12  •   "insulin lispro (humaLOG) 100 UNIT/ML injection, Inject 28 Units under the skin into the appropriate area as directed 3 (Three) Times a Day With Meals., Disp: 100 Units, Rfl: 12  •  l-methylfolate-algae-B6-B12 (METANX) 3-90.314-2-35 MG capsule capsule, Take 1 capsule by mouth 2 (Two) Times a Day., Disp: 180 capsule, Rfl: 3  •  predniSONE (DELTASONE) 10 MG tablet, Take 4 tabs daily x 3 days, then take 3 tabs daily x 3 days, then take 2 tabs daily x 3 days, then take 1 tab daily x 3 days, Disp: 31 tablet, Rfl: 0  •  RELION LANCETS MICRO-THIN 33G misc, Test 3 times daily, Disp: 100 each, Rfl: 11  •  vitamin D (ERGOCALCIFEROL) 46488 units capsule capsule, Take 1 capsule by mouth 3 (Three) Times a Week., Disp: 32 capsule, Rfl: 3    Assessment/Plan     Active Hospital Problems    Diagnosis Date Noted   • COPD exacerbation (CMS/[facility]) [J44.1] 02/11/2019      Resolved Hospital Problems   No resolved problems to display.     Type 2 diabetes mellitus-severely uncontrolled  Continue Lantus 60 units twice daily  Continue Humalog 20 units with each meal  Continue Humalog sliding scale 3 times daily before meals and at bedtime    Discharge instructions from endocrine  Resume tresiba 120 units daily  Increase Humalog to 28 units with each meal until the steroid influenza still present, once the patient starts noticing improved blood sugars would back off to 20 units 3 times a day.  Continue the Humalog sliding scale 3 times daily before meals and at bedtime  Continue Farxiga.    Follow-up with Dr. Gottlieb or diabetic nurse practitioner in the next 4 weeks.    Hyperlipidemia  Continue Crestor    Hypothyroidism  Continue levothyroxine 112 mcg oral daily.    Discussed plan with Nurse.     Benton Dela Cruz MD.  02/14/19  2:28 PM      EMR Dragon / transcription disclaimer:    \"Dictated utilizing Dragon dictaTION.    "

## 2019-02-15 ENCOUNTER — READMISSION MANAGEMENT (OUTPATIENT)
Dept: CALL CENTER | Facility: HOSPITAL | Age: 49
End: 2019-02-15

## 2019-02-16 NOTE — OUTREACH NOTE
Prep Survey      Responses   Facility patient discharged from?  [facility]   Is patient eligible?  Yes   Discharge diagnosis  COPD exacerbation, Tobacco abuse   Does the patient have one of the following disease processes/diagnoses(primary or secondary)?  COPD/Pneumonia   Does the patient have Home health ordered?  No   Is there a DME ordered?  No   Medication alerts for this patient  Insulin changes -see AVS   Prep survey completed?  Yes          Estefanía Rodriguez RN

## 2019-02-17 ENCOUNTER — READMISSION MANAGEMENT (OUTPATIENT)
Dept: CALL CENTER | Facility: HOSPITAL | Age: 49
End: 2019-02-17

## 2019-02-17 NOTE — OUTREACH NOTE
COPD/PN Week 1 Survey      Responses   Facility patient discharged from?  [facility]   Does the patient have one of the following disease processes/diagnoses(primary or secondary)?  COPD/Pneumonia   Is there a successful TCM telephone encounter documented?  No   Was the primary reason for admission:  COPD exacerbation   Week 1 attempt successful?  Yes   Call start time  1556   Call end time  1600   Discharge diagnosis  COPD exacerbation, Tobacco abuse   Is patient permission given to speak with other caregiver?  Yes   List who call center can speak with  Wife, Macrina Santos reviewed with patient/caregiver?  Yes   Is the patient having any side effects they believe may be caused by any medication additions or changes?  No   Does the patient have all medications ordered at discharge?  Yes   Is the patient taking all medications as directed (includes completed medication regime)?  Yes   Does the patient have a primary care provider?   Yes   Does the patient have an appointment with their PCP or pulmonologist within 7 days of discharge?  No   What is preventing the patient from scheduling follow up appointments within 7 days of discharge?  Haven't had time   Nursing Interventions  Verified appointment date/time/provider   Has the patient kept scheduled appointments due by today?  N/A   Comments  Will call tomorrow to make appts   Psychosocial issues?  No   Did the patient receive a copy of their discharge instructions?  Yes   Nursing interventions  Reviewed instructions with patient   What is the patient's perception of their health status since discharge?  Improving   Are the patient's immunizations up to date?   Yes   Is the patient/caregiver able to teach back the hierarchy of who to call/visit for symptoms/problems? PCP, Specialist, Home health nurse, Urgent Care, ED, 911  Yes   Is the patient able to teach back COPD zones?  Yes   Nursing interventions  Education provided on various zones   Patient reports what zone  on this call?  [facility]  Reports doing well, Breathing without shortness of breath, Usual activity and exercise level, Usual amount of phlegm/mucus without difficulty coughing up, Sleeping well, Appetite is good   [facility] interventions:  Continue regular exercise/diet plan, Do not smoke, Take daily medications, Use oxygen as prescribed, Avoid indoor/outdoor triggers   Week 1 call completed?  Yes   Wrap up additional comments  [facility]. Voice is hoarse and still has sore throat. Doing home neb treatments. Okay to speak with Macrina masterson, when we call.           Nikki Armstrong RN

## 2019-02-26 ENCOUNTER — READMISSION MANAGEMENT (OUTPATIENT)
Dept: CALL CENTER | Facility: HOSPITAL | Age: 49
End: 2019-02-26

## 2019-02-26 NOTE — OUTREACH NOTE
COPD/PN Week 2 Survey      Responses   Facility patient discharged from?  [place]   Does the patient have one of the following disease processes/diagnoses(primary or secondary)?  COPD/Pneumonia   Was the primary reason for admission:  COPD exacerbation   Week 2 attempt successful?  No   Unsuccessful attempts  Attempt 1          Davis Cabrera RN

## 2019-02-27 ENCOUNTER — READMISSION MANAGEMENT (OUTPATIENT)
Dept: CALL CENTER | Facility: HOSPITAL | Age: 49
End: 2019-02-27

## 2019-02-27 NOTE — OUTREACH NOTE
COPD/PN Week 2 Survey      Responses   Facility patient discharged from?  [place]   Does the patient have one of the following disease processes/diagnoses(primary or secondary)?  COPD/Pneumonia   Was the primary reason for admission:  COPD exacerbation   Week 2 attempt successful?  No   Unsuccessful attempts  Attempt 2          Pauly Escobar RN

## 2019-03-02 ENCOUNTER — READMISSION MANAGEMENT (OUTPATIENT)
Dept: CALL CENTER | Facility: HOSPITAL | Age: 49
End: 2019-03-02

## 2019-03-02 NOTE — OUTREACH NOTE
COPD/PN Week 3 Survey      Responses   Facility patient discharged from?  [facility]   Does the patient have one of the following disease processes/diagnoses(primary or secondary)?  COPD/Pneumonia   Was the primary reason for admission:  COPD exacerbation   Week 3 attempt successful?  No   Unsuccessful attempts  Attempt 1          Isaías Pedraza RN

## 2019-03-04 ENCOUNTER — READMISSION MANAGEMENT (OUTPATIENT)
Dept: CALL CENTER | Facility: HOSPITAL | Age: 49
End: 2019-03-04

## 2019-03-04 NOTE — OUTREACH NOTE
COPD/PN Week 3 Survey      Responses   Facility patient discharged from?  [place]   Does the patient have one of the following disease processes/diagnoses(primary or secondary)?  COPD/Pneumonia   Was the primary reason for admission:  COPD exacerbation   Week 3 attempt successful?  No   Unsuccessful attempts  Attempt 2 [No answer pt and spouse numbers]          Arti Bennett RN

## 2019-03-06 ENCOUNTER — APPOINTMENT (OUTPATIENT)
Dept: SLEEP MEDICINE | Facility: HOSPITAL | Age: 49
End: 2019-03-06

## 2019-03-25 DIAGNOSIS — R73.9 HYPERGLYCEMIA: ICD-10-CM

## 2019-03-25 DIAGNOSIS — E78.2 MIXED HYPERLIPIDEMIA: Primary | ICD-10-CM

## 2019-03-25 DIAGNOSIS — R68.89 ABNORMAL ENDOCRINE LABORATORY TEST FINDING: ICD-10-CM

## 2019-03-25 DIAGNOSIS — E03.8 SECONDARY HYPOTHYROIDISM: ICD-10-CM

## 2019-03-25 DIAGNOSIS — IMO0002 UNCONTROLLED TYPE 2 DIABETES MELLITUS WITH COMPLICATION, WITHOUT LONG-TERM CURRENT USE OF INSULIN: ICD-10-CM

## 2019-03-25 DIAGNOSIS — E06.3 HYPOTHYROIDISM DUE TO HASHIMOTO'S THYROIDITIS: Chronic | ICD-10-CM

## 2019-03-25 DIAGNOSIS — E03.8 HYPOTHYROIDISM DUE TO HASHIMOTO'S THYROIDITIS: Chronic | ICD-10-CM

## 2019-03-25 DIAGNOSIS — E55.9 VITAMIN D DEFICIENCY: ICD-10-CM

## 2019-03-28 ENCOUNTER — HOSPITAL ENCOUNTER (OUTPATIENT)
Dept: SLEEP MEDICINE | Facility: HOSPITAL | Age: 49
End: 2019-03-28

## 2019-04-03 ENCOUNTER — LAB (OUTPATIENT)
Dept: ENDOCRINOLOGY | Age: 49
End: 2019-04-03

## 2019-04-03 DIAGNOSIS — I10 ESSENTIAL HYPERTENSION: ICD-10-CM

## 2019-04-03 DIAGNOSIS — R68.89 ABNORMAL ENDOCRINE LABORATORY TEST FINDING: ICD-10-CM

## 2019-04-03 DIAGNOSIS — E03.8 SECONDARY HYPOTHYROIDISM: ICD-10-CM

## 2019-04-03 DIAGNOSIS — K86.1 OTHER CHRONIC PANCREATITIS (HCC): ICD-10-CM

## 2019-04-03 DIAGNOSIS — IMO0002 UNCONTROLLED TYPE 2 DIABETES MELLITUS WITH COMPLICATION, WITHOUT LONG-TERM CURRENT USE OF INSULIN: ICD-10-CM

## 2019-04-03 DIAGNOSIS — E55.9 VITAMIN D DEFICIENCY: ICD-10-CM

## 2019-04-03 DIAGNOSIS — E06.3 HYPOTHYROIDISM DUE TO HASHIMOTO'S THYROIDITIS: Chronic | ICD-10-CM

## 2019-04-03 DIAGNOSIS — E03.8 HYPOTHYROIDISM DUE TO HASHIMOTO'S THYROIDITIS: Chronic | ICD-10-CM

## 2019-04-03 DIAGNOSIS — E78.2 MIXED HYPERLIPIDEMIA: ICD-10-CM

## 2019-04-03 DIAGNOSIS — R73.9 HYPERGLYCEMIA: ICD-10-CM

## 2019-04-05 LAB
25(OH)D3+25(OH)D2 SERPL-MCNC: 18.5 NG/ML (ref 30–100)
ALBUMIN SERPL-MCNC: 4.6 G/DL (ref 3.5–5.2)
ALBUMIN/GLOB SERPL: 2.3 G/DL
ALP SERPL-CCNC: 88 U/L (ref 39–117)
ALT SERPL-CCNC: 51 U/L (ref 1–41)
AST SERPL-CCNC: 32 U/L (ref 1–40)
BILIRUB SERPL-MCNC: 0.5 MG/DL (ref 0.2–1.2)
BUN SERPL-MCNC: 11 MG/DL (ref 6–20)
BUN/CREAT SERPL: 9.2 (ref 7–25)
C PEPTIDE SERPL-MCNC: 7.2 NG/ML (ref 1.1–4.4)
CALCIUM SERPL-MCNC: 9.7 MG/DL (ref 8.6–10.5)
CHLORIDE SERPL-SCNC: 102 MMOL/L (ref 98–107)
CHOLEST SERPL-MCNC: 218 MG/DL (ref 0–200)
CO2 SERPL-SCNC: 27.2 MMOL/L (ref 22–29)
CREAT SERPL-MCNC: 1.2 MG/DL (ref 0.76–1.27)
GLOBULIN SER CALC-MCNC: 2 GM/DL
GLUCOSE SERPL-MCNC: 152 MG/DL (ref 65–99)
HBA1C MFR BLD: 8 % (ref 4.8–5.6)
HDLC SERPL-MCNC: 27 MG/DL (ref 40–60)
INTERPRETATION: NORMAL
LDLC SERPL CALC-MCNC: 135 MG/DL (ref 0–100)
Lab: NORMAL
MICROALBUMIN UR-MCNC: 12.9 UG/ML
POTASSIUM SERPL-SCNC: 4.5 MMOL/L (ref 3.5–5.2)
PROT SERPL-MCNC: 6.6 G/DL (ref 6–8.5)
SODIUM SERPL-SCNC: 141 MMOL/L (ref 136–145)
T3FREE SERPL-MCNC: 3.7 PG/ML (ref 2–4.4)
T4 FREE SERPL-MCNC: 1.37 NG/DL (ref 0.93–1.7)
T4 SERPL-MCNC: 8.81 MCG/DL (ref 4.5–11.7)
THYROGLOB AB SERPL-ACNC: <1 IU/ML
THYROGLOB SERPL-MCNC: 22.8 NG/ML
THYROGLOB SERPL-MCNC: NORMAL NG/ML
TRIGL SERPL-MCNC: 278 MG/DL (ref 0–150)
TSH SERPL DL<=0.005 MIU/L-ACNC: 2.71 MIU/ML (ref 0.27–4.2)
URATE SERPL-MCNC: 7.4 MG/DL (ref 3.4–7)
VLDLC SERPL CALC-MCNC: 55.6 MG/DL

## 2019-04-17 ENCOUNTER — OFFICE VISIT (OUTPATIENT)
Dept: ENDOCRINOLOGY | Age: 49
End: 2019-04-17

## 2019-04-17 VITALS
RESPIRATION RATE: 16 BRPM | BODY MASS INDEX: 40.97 KG/M2 | HEIGHT: 70 IN | SYSTOLIC BLOOD PRESSURE: 128 MMHG | DIASTOLIC BLOOD PRESSURE: 78 MMHG | WEIGHT: 286.2 LBS

## 2019-04-17 DIAGNOSIS — E03.8 SECONDARY HYPOTHYROIDISM: ICD-10-CM

## 2019-04-17 DIAGNOSIS — K86.1 OTHER CHRONIC PANCREATITIS (HCC): ICD-10-CM

## 2019-04-17 DIAGNOSIS — E08.43 DIABETIC AUTONOMIC NEUROPATHY ASSOCIATED WITH DIABETES MELLITUS DUE TO UNDERLYING CONDITION (HCC): ICD-10-CM

## 2019-04-17 DIAGNOSIS — E78.2 MIXED HYPERLIPIDEMIA: ICD-10-CM

## 2019-04-17 DIAGNOSIS — IMO0002 UNCONTROLLED TYPE 2 DIABETES MELLITUS WITH COMPLICATION, WITHOUT LONG-TERM CURRENT USE OF INSULIN: Primary | ICD-10-CM

## 2019-04-17 DIAGNOSIS — E06.3 HYPOTHYROIDISM DUE TO HASHIMOTO'S THYROIDITIS: Chronic | ICD-10-CM

## 2019-04-17 DIAGNOSIS — E03.8 HYPOTHYROIDISM DUE TO HASHIMOTO'S THYROIDITIS: Chronic | ICD-10-CM

## 2019-04-17 DIAGNOSIS — B35.1 TOENAIL FUNGUS: ICD-10-CM

## 2019-04-17 DIAGNOSIS — E55.9 VITAMIN D DEFICIENCY: ICD-10-CM

## 2019-04-17 DIAGNOSIS — E11.43 DIABETIC AUTONOMIC NEUROPATHY ASSOCIATED WITH TYPE 2 DIABETES MELLITUS (HCC): ICD-10-CM

## 2019-04-17 DIAGNOSIS — I10 ESSENTIAL HYPERTENSION: ICD-10-CM

## 2019-04-17 PROCEDURE — 99215 OFFICE O/P EST HI 40 MIN: CPT | Performed by: INTERNAL MEDICINE

## 2019-04-17 RX ORDER — LEVOTHYROXINE SODIUM 112 UG/1
112 TABLET ORAL DAILY
Qty: 30 TABLET | Refills: 4 | Status: SHIPPED | OUTPATIENT
Start: 2019-04-17 | End: 2019-08-21 | Stop reason: SDUPTHER

## 2019-04-17 RX ORDER — ICOSAPENT ETHYL 1000 MG/1
2 CAPSULE ORAL DAILY
Qty: 360 CAPSULE | Refills: 3 | Status: SHIPPED | OUTPATIENT
Start: 2019-04-17 | End: 2019-07-03

## 2019-04-17 RX ORDER — INSULIN DEGLUDEC 200 U/ML
INJECTION, SOLUTION SUBCUTANEOUS
Refills: 5 | COMMUNITY
Start: 2019-04-11 | End: 2019-04-17 | Stop reason: SDUPTHER

## 2019-04-17 RX ORDER — ROSUVASTATIN CALCIUM 40 MG/1
40 TABLET, COATED ORAL DAILY
Qty: 90 TABLET | Refills: 3 | Status: SHIPPED | OUTPATIENT
Start: 2019-04-17 | End: 2019-08-21 | Stop reason: SDUPTHER

## 2019-04-17 RX ORDER — INSULIN DEGLUDEC 200 U/ML
120 INJECTION, SOLUTION SUBCUTANEOUS
Qty: 6 PEN | Refills: 5 | Status: SHIPPED | OUTPATIENT
Start: 2019-04-17 | End: 2019-07-03

## 2019-04-17 RX ORDER — FENOFIBRATE 145 MG/1
145 TABLET, COATED ORAL DAILY
Qty: 90 TABLET | Refills: 3 | Status: SHIPPED | OUTPATIENT
Start: 2019-04-17 | End: 2019-08-21 | Stop reason: SDUPTHER

## 2019-04-17 RX ORDER — GABAPENTIN 800 MG/1
800 TABLET ORAL 3 TIMES DAILY
Qty: 270 TABLET | Refills: 1 | Status: SHIPPED | OUTPATIENT
Start: 2019-04-17 | End: 2019-08-21 | Stop reason: SDUPTHER

## 2019-04-17 RX ORDER — EMPAGLIFLOZIN 25 MG/1
1 TABLET, FILM COATED ORAL DAILY
Qty: 30 TABLET | Refills: 5 | Status: SHIPPED | OUTPATIENT
Start: 2019-04-17 | End: 2019-07-03

## 2019-04-17 RX ORDER — OLMESARTAN MEDOXOMIL 20 MG/1
20 TABLET ORAL DAILY
Qty: 90 TABLET | Refills: 3 | Status: SHIPPED | OUTPATIENT
Start: 2019-04-17 | End: 2019-06-05

## 2019-04-17 RX ORDER — INSULIN ASPART 100 [IU]/ML
INJECTION, SOLUTION INTRAVENOUS; SUBCUTANEOUS
Qty: 5 PEN | Refills: 5 | Status: SHIPPED | OUTPATIENT
Start: 2019-04-17 | End: 2019-07-03 | Stop reason: SDUPTHER

## 2019-04-25 ENCOUNTER — RESULTS ENCOUNTER (OUTPATIENT)
Dept: ENDOCRINOLOGY | Age: 49
End: 2019-04-25

## 2019-04-25 DIAGNOSIS — E08.43 DIABETIC AUTONOMIC NEUROPATHY ASSOCIATED WITH DIABETES MELLITUS DUE TO UNDERLYING CONDITION (HCC): ICD-10-CM

## 2019-04-30 ENCOUNTER — RESULTS ENCOUNTER (OUTPATIENT)
Dept: ENDOCRINOLOGY | Age: 49
End: 2019-04-30

## 2019-04-30 DIAGNOSIS — E03.8 SECONDARY HYPOTHYROIDISM: ICD-10-CM

## 2019-04-30 DIAGNOSIS — IMO0002 UNCONTROLLED TYPE 2 DIABETES MELLITUS WITH COMPLICATION, WITHOUT LONG-TERM CURRENT USE OF INSULIN: ICD-10-CM

## 2019-06-05 RX ORDER — TELMISARTAN 40 MG/1
40 TABLET ORAL DAILY
Qty: 90 TABLET | Refills: 3 | Status: SHIPPED | OUTPATIENT
Start: 2019-06-05 | End: 2019-07-03 | Stop reason: ALTCHOICE

## 2019-07-03 ENCOUNTER — APPOINTMENT (OUTPATIENT)
Dept: CT IMAGING | Facility: HOSPITAL | Age: 49
End: 2019-07-03

## 2019-07-03 ENCOUNTER — APPOINTMENT (OUTPATIENT)
Dept: GENERAL RADIOLOGY | Facility: HOSPITAL | Age: 49
End: 2019-07-03

## 2019-07-03 ENCOUNTER — HOSPITAL ENCOUNTER (INPATIENT)
Facility: HOSPITAL | Age: 49
LOS: 3 days | Discharge: HOME | End: 2019-07-06
Attending: EMERGENCY MEDICINE | Admitting: INTERNAL MEDICINE

## 2019-07-03 DIAGNOSIS — R74.8 ELEVATED LIVER ENZYMES: ICD-10-CM

## 2019-07-03 DIAGNOSIS — E11.01 HYPEROSMOLAR (NONKETOTIC) COMA (HCC): Primary | ICD-10-CM

## 2019-07-03 DIAGNOSIS — K57.92 DIVERTICULITIS: ICD-10-CM

## 2019-07-03 LAB
ALBUMIN SERPL-MCNC: 3.7 G/DL (ref 3.5–5.2)
ALBUMIN/GLOB SERPL: 1.2 G/DL
ALP SERPL-CCNC: 200 U/L (ref 39–117)
ALT SERPL W P-5'-P-CCNC: 84 U/L (ref 1–41)
ANION GAP SERPL CALCULATED.3IONS-SCNC: 15.1 MMOL/L (ref 5–15)
ANION GAP SERPL CALCULATED.3IONS-SCNC: 20.2 MMOL/L (ref 5–15)
ARTERIAL PATENCY WRIST A: POSITIVE
AST SERPL-CCNC: 62 U/L (ref 1–40)
ATMOSPHERIC PRESS: 751.4 MMHG
BASE EXCESS BLDA CALC-SCNC: -2 MMOL/L (ref 0–2)
BASOPHILS # BLD AUTO: 0.04 10*3/MM3 (ref 0–0.2)
BASOPHILS NFR BLD AUTO: 0.5 % (ref 0–1.5)
BDY SITE: ABNORMAL
BILIRUB SERPL-MCNC: 0.3 MG/DL (ref 0.2–1.2)
BILIRUB UR QL STRIP: NEGATIVE
BUN BLD-MCNC: 16 MG/DL (ref 6–20)
BUN BLD-MCNC: 20 MG/DL (ref 6–20)
BUN/CREAT SERPL: 15.1 (ref 7–25)
BUN/CREAT SERPL: 16.1 (ref 7–25)
CALCIUM SPEC-SCNC: 9.4 MG/DL (ref 8.6–10.5)
CALCIUM SPEC-SCNC: 9.5 MG/DL (ref 8.6–10.5)
CHLORIDE SERPL-SCNC: 84 MMOL/L (ref 98–107)
CHLORIDE SERPL-SCNC: 93 MMOL/L (ref 98–107)
CLARITY UR: CLEAR
CO2 SERPL-SCNC: 19.8 MMOL/L (ref 22–29)
CO2 SERPL-SCNC: 22.9 MMOL/L (ref 22–29)
COLOR UR: YELLOW
CREAT BLD-MCNC: 1.06 MG/DL (ref 0.76–1.27)
CREAT BLD-MCNC: 1.24 MG/DL (ref 0.76–1.27)
DEPRECATED RDW RBC AUTO: 41.7 FL (ref 37–54)
EOSINOPHIL # BLD AUTO: 0.12 10*3/MM3 (ref 0–0.4)
EOSINOPHIL NFR BLD AUTO: 1.6 % (ref 0.3–6.2)
ERYTHROCYTE [DISTWIDTH] IN BLOOD BY AUTOMATED COUNT: 12.7 % (ref 12.3–15.4)
GFR SERPL CREATININE-BSD FRML MDRD: 63 ML/MIN/1.73
GFR SERPL CREATININE-BSD FRML MDRD: 75 ML/MIN/1.73
GLOBULIN UR ELPH-MCNC: 3 GM/DL
GLUCOSE BLD-MCNC: 314 MG/DL (ref 65–99)
GLUCOSE BLD-MCNC: 885 MG/DL (ref 65–99)
GLUCOSE BLDC GLUCOMTR-MCNC: 265 MG/DL (ref 70–130)
GLUCOSE BLDC GLUCOMTR-MCNC: 280 MG/DL (ref 70–130)
GLUCOSE BLDC GLUCOMTR-MCNC: 283 MG/DL (ref 70–130)
GLUCOSE BLDC GLUCOMTR-MCNC: 296 MG/DL (ref 70–130)
GLUCOSE BLDC GLUCOMTR-MCNC: 338 MG/DL (ref 70–130)
GLUCOSE BLDC GLUCOMTR-MCNC: 421 MG/DL (ref 70–130)
GLUCOSE BLDC GLUCOMTR-MCNC: 542 MG/DL (ref 70–130)
GLUCOSE UR STRIP-MCNC: ABNORMAL MG/DL
HBA1C MFR BLD: 11.4 % (ref 4.8–5.6)
HCO3 BLDA-SCNC: 22.3 MMOL/L (ref 22–28)
HCT VFR BLD AUTO: 44.2 % (ref 37.5–51)
HGB BLD-MCNC: 15.7 G/DL (ref 13–17.7)
HGB UR QL STRIP.AUTO: NEGATIVE
HOROWITZ INDEX BLD+IHG-RTO: 21 %
IMM GRANULOCYTES # BLD AUTO: 0.04 10*3/MM3 (ref 0–0.05)
IMM GRANULOCYTES NFR BLD AUTO: 0.5 % (ref 0–0.5)
KETONES UR QL STRIP: NEGATIVE
LEUKOCYTE ESTERASE UR QL STRIP.AUTO: NEGATIVE
LYMPHOCYTES # BLD AUTO: 1.96 10*3/MM3 (ref 0.7–3.1)
LYMPHOCYTES NFR BLD AUTO: 25.8 % (ref 19.6–45.3)
MAGNESIUM SERPL-MCNC: 1.9 MG/DL (ref 1.6–2.6)
MCH RBC QN AUTO: 32.4 PG (ref 26.6–33)
MCHC RBC AUTO-ENTMCNC: 35.5 G/DL (ref 31.5–35.7)
MCV RBC AUTO: 91.3 FL (ref 79–97)
MODALITY: ABNORMAL
MONOCYTES # BLD AUTO: 0.42 10*3/MM3 (ref 0.1–0.9)
MONOCYTES NFR BLD AUTO: 5.5 % (ref 5–12)
NEUTROPHILS # BLD AUTO: 5.01 10*3/MM3 (ref 1.7–7)
NEUTROPHILS NFR BLD AUTO: 66.1 % (ref 42.7–76)
NITRITE UR QL STRIP: NEGATIVE
NRBC BLD AUTO-RTO: 0 /100 WBC (ref 0–0.2)
O2 A-A PPRESDIFF RESPIRATORY: 0.7 MMHG
OSMOLALITY SERPL: 300 MOSM/KG (ref 275–300)
PCO2 BLDA: 36.1 MM HG (ref 35–45)
PH BLDA: 7.4 PH UNITS (ref 7.35–7.45)
PH UR STRIP.AUTO: 6.5 [PH] (ref 5–8)
PHOSPHATE SERPL-MCNC: 3.3 MG/DL (ref 2.5–4.5)
PLATELET # BLD AUTO: 134 10*3/MM3 (ref 140–450)
PMV BLD AUTO: 13.9 FL (ref 6–12)
PO2 BLDA: 82.2 MM HG (ref 80–100)
POTASSIUM BLD-SCNC: 3.7 MMOL/L (ref 3.5–5.2)
POTASSIUM BLD-SCNC: 5.1 MMOL/L (ref 3.5–5.2)
PROT SERPL-MCNC: 6.7 G/DL (ref 6–8.5)
PROT UR QL STRIP: NEGATIVE
RBC # BLD AUTO: 4.84 10*6/MM3 (ref 4.14–5.8)
SAO2 % BLDCOA: 96.1 % (ref 92–99)
SODIUM BLD-SCNC: 124 MMOL/L (ref 136–145)
SODIUM BLD-SCNC: 131 MMOL/L (ref 136–145)
SP GR UR STRIP: >=1.03 (ref 1–1.03)
TOTAL RATE: 24 BREATHS/MINUTE
TROPONIN T SERPL-MCNC: <0.01 NG/ML (ref 0–0.03)
UROBILINOGEN UR QL STRIP: ABNORMAL
WBC NRBC COR # BLD: 7.59 10*3/MM3 (ref 3.4–10.8)

## 2019-07-03 PROCEDURE — 83930 ASSAY OF BLOOD OSMOLALITY: CPT | Performed by: INTERNAL MEDICINE

## 2019-07-03 PROCEDURE — 93005 ELECTROCARDIOGRAM TRACING: CPT | Performed by: INTERNAL MEDICINE

## 2019-07-03 PROCEDURE — 36600 WITHDRAWAL OF ARTERIAL BLOOD: CPT

## 2019-07-03 PROCEDURE — 25010000002 HYDROMORPHONE PER 4 MG: Performed by: EMERGENCY MEDICINE

## 2019-07-03 PROCEDURE — 25010000002 LEVOFLOXACIN PER 250 MG: Performed by: EMERGENCY MEDICINE

## 2019-07-03 PROCEDURE — 74176 CT ABD & PELVIS W/O CONTRAST: CPT

## 2019-07-03 PROCEDURE — 25010000002 ONDANSETRON PER 1 MG: Performed by: EMERGENCY MEDICINE

## 2019-07-03 PROCEDURE — 85025 COMPLETE CBC W/AUTO DIFF WBC: CPT | Performed by: EMERGENCY MEDICINE

## 2019-07-03 PROCEDURE — 71045 X-RAY EXAM CHEST 1 VIEW: CPT

## 2019-07-03 PROCEDURE — 82962 GLUCOSE BLOOD TEST: CPT

## 2019-07-03 PROCEDURE — 82803 BLOOD GASES ANY COMBINATION: CPT

## 2019-07-03 PROCEDURE — 80053 COMPREHEN METABOLIC PANEL: CPT | Performed by: EMERGENCY MEDICINE

## 2019-07-03 PROCEDURE — 84100 ASSAY OF PHOSPHORUS: CPT | Performed by: INTERNAL MEDICINE

## 2019-07-03 PROCEDURE — 25810000003 SODIUM CHLORIDE 0.9 % WITH KCL 20 MEQ 20-0.9 MEQ/L-% SOLUTION: Performed by: EMERGENCY MEDICINE

## 2019-07-03 PROCEDURE — 84484 ASSAY OF TROPONIN QUANT: CPT | Performed by: INTERNAL MEDICINE

## 2019-07-03 PROCEDURE — 94799 UNLISTED PULMONARY SVC/PX: CPT

## 2019-07-03 PROCEDURE — 81003 URINALYSIS AUTO W/O SCOPE: CPT | Performed by: EMERGENCY MEDICINE

## 2019-07-03 PROCEDURE — 25010000002 HYDROMORPHONE PER 4 MG: Performed by: INTERNAL MEDICINE

## 2019-07-03 PROCEDURE — 93010 ELECTROCARDIOGRAM REPORT: CPT | Performed by: INTERNAL MEDICINE

## 2019-07-03 PROCEDURE — 83735 ASSAY OF MAGNESIUM: CPT | Performed by: INTERNAL MEDICINE

## 2019-07-03 PROCEDURE — 25010000002 ENOXAPARIN PER 10 MG: Performed by: INTERNAL MEDICINE

## 2019-07-03 PROCEDURE — 99285 EMERGENCY DEPT VISIT HI MDM: CPT

## 2019-07-03 PROCEDURE — 83036 HEMOGLOBIN GLYCOSYLATED A1C: CPT | Performed by: INTERNAL MEDICINE

## 2019-07-03 RX ORDER — SODIUM CHLORIDE AND POTASSIUM CHLORIDE 150; 450 MG/100ML; MG/100ML
250 INJECTION, SOLUTION INTRAVENOUS CONTINUOUS PRN
Status: DISCONTINUED | OUTPATIENT
Start: 2019-07-03 | End: 2019-07-04

## 2019-07-03 RX ORDER — SODIUM CHLORIDE 9 MG/ML
10 INJECTION, SOLUTION INTRAVENOUS CONTINUOUS PRN
Status: DISCONTINUED | OUTPATIENT
Start: 2019-07-03 | End: 2019-07-04

## 2019-07-03 RX ORDER — CALCIUM CARBONATE 200(500)MG
2 TABLET,CHEWABLE ORAL 3 TIMES DAILY PRN
Status: DISCONTINUED | OUTPATIENT
Start: 2019-07-03 | End: 2019-07-06 | Stop reason: HOSPADM

## 2019-07-03 RX ORDER — SODIUM CHLORIDE 0.9 % (FLUSH) 0.9 %
10 SYRINGE (ML) INJECTION AS NEEDED
Status: DISCONTINUED | OUTPATIENT
Start: 2019-07-03 | End: 2019-07-06 | Stop reason: HOSPADM

## 2019-07-03 RX ORDER — PANTOPRAZOLE SODIUM 40 MG/1
40 TABLET, DELAYED RELEASE ORAL
Status: DISCONTINUED | OUTPATIENT
Start: 2019-07-03 | End: 2019-07-06 | Stop reason: HOSPADM

## 2019-07-03 RX ORDER — IPRATROPIUM BROMIDE AND ALBUTEROL SULFATE 2.5; .5 MG/3ML; MG/3ML
3 SOLUTION RESPIRATORY (INHALATION) EVERY 4 HOURS PRN
Status: DISCONTINUED | OUTPATIENT
Start: 2019-07-03 | End: 2019-07-06 | Stop reason: HOSPADM

## 2019-07-03 RX ORDER — TOPIRAMATE 50 MG/1
50 TABLET, FILM COATED ORAL EVERY 12 HOURS SCHEDULED
Status: DISCONTINUED | OUTPATIENT
Start: 2019-07-03 | End: 2019-07-06 | Stop reason: HOSPADM

## 2019-07-03 RX ORDER — BUDESONIDE 0.5 MG/2ML
0.5 INHALANT ORAL
Status: DISCONTINUED | OUTPATIENT
Start: 2019-07-03 | End: 2019-07-06 | Stop reason: HOSPADM

## 2019-07-03 RX ORDER — HYDROCODONE BITARTRATE AND ACETAMINOPHEN 5; 325 MG/1; MG/1
1 TABLET ORAL EVERY 6 HOURS PRN
Status: DISCONTINUED | OUTPATIENT
Start: 2019-07-03 | End: 2019-07-06 | Stop reason: HOSPADM

## 2019-07-03 RX ORDER — POTASSIUM CHLORIDE 7.45 MG/ML
10 INJECTION INTRAVENOUS
Status: DISCONTINUED | OUTPATIENT
Start: 2019-07-03 | End: 2019-07-06 | Stop reason: HOSPADM

## 2019-07-03 RX ORDER — LEVOFLOXACIN 5 MG/ML
500 INJECTION, SOLUTION INTRAVENOUS EVERY 24 HOURS
Status: DISCONTINUED | OUTPATIENT
Start: 2019-07-04 | End: 2019-07-06

## 2019-07-03 RX ORDER — POTASSIUM CHLORIDE 1.5 G/1.77G
40 POWDER, FOR SOLUTION ORAL AS NEEDED
Status: DISCONTINUED | OUTPATIENT
Start: 2019-07-03 | End: 2019-07-06 | Stop reason: HOSPADM

## 2019-07-03 RX ORDER — HYDROMORPHONE HYDROCHLORIDE 1 MG/ML
0.5 INJECTION, SOLUTION INTRAMUSCULAR; INTRAVENOUS; SUBCUTANEOUS ONCE
Status: COMPLETED | OUTPATIENT
Start: 2019-07-03 | End: 2019-07-03

## 2019-07-03 RX ORDER — SODIUM CHLORIDE AND POTASSIUM CHLORIDE 300; 900 MG/100ML; MG/100ML
250 INJECTION, SOLUTION INTRAVENOUS CONTINUOUS PRN
Status: DISCONTINUED | OUTPATIENT
Start: 2019-07-03 | End: 2019-07-04

## 2019-07-03 RX ORDER — ASPIRIN 81 MG/1
81 TABLET ORAL DAILY
Status: DISCONTINUED | OUTPATIENT
Start: 2019-07-03 | End: 2019-07-06 | Stop reason: HOSPADM

## 2019-07-03 RX ORDER — POTASSIUM CHLORIDE 750 MG/1
40 CAPSULE, EXTENDED RELEASE ORAL AS NEEDED
Status: DISCONTINUED | OUTPATIENT
Start: 2019-07-03 | End: 2019-07-06 | Stop reason: HOSPADM

## 2019-07-03 RX ORDER — SODIUM CHLORIDE 9 MG/ML
250 INJECTION, SOLUTION INTRAVENOUS CONTINUOUS PRN
Status: DISCONTINUED | OUTPATIENT
Start: 2019-07-03 | End: 2019-07-04

## 2019-07-03 RX ORDER — MORPHINE SULFATE 2 MG/ML
2 INJECTION, SOLUTION INTRAMUSCULAR; INTRAVENOUS EVERY 4 HOURS PRN
Status: DISCONTINUED | OUTPATIENT
Start: 2019-07-03 | End: 2019-07-06 | Stop reason: HOSPADM

## 2019-07-03 RX ORDER — IPRATROPIUM BROMIDE AND ALBUTEROL SULFATE 2.5; .5 MG/3ML; MG/3ML
3 SOLUTION RESPIRATORY (INHALATION)
Status: DISCONTINUED | OUTPATIENT
Start: 2019-07-03 | End: 2019-07-04

## 2019-07-03 RX ORDER — LEVOFLOXACIN 5 MG/ML
500 INJECTION, SOLUTION INTRAVENOUS ONCE
Status: COMPLETED | OUTPATIENT
Start: 2019-07-03 | End: 2019-07-03

## 2019-07-03 RX ORDER — ONDANSETRON 4 MG/1
4 TABLET, FILM COATED ORAL EVERY 6 HOURS PRN
Status: DISCONTINUED | OUTPATIENT
Start: 2019-07-03 | End: 2019-07-06 | Stop reason: HOSPADM

## 2019-07-03 RX ORDER — LEVOTHYROXINE SODIUM 112 UG/1
112 TABLET ORAL
Status: DISCONTINUED | OUTPATIENT
Start: 2019-07-03 | End: 2019-07-06 | Stop reason: HOSPADM

## 2019-07-03 RX ORDER — SODIUM CHLORIDE 0.9 % (FLUSH) 0.9 %
10 SYRINGE (ML) INJECTION ONCE AS NEEDED
Status: DISCONTINUED | OUTPATIENT
Start: 2019-07-03 | End: 2019-07-04

## 2019-07-03 RX ORDER — SODIUM CHLORIDE 0.9 % (FLUSH) 0.9 %
3-10 SYRINGE (ML) INJECTION AS NEEDED
Status: DISCONTINUED | OUTPATIENT
Start: 2019-07-03 | End: 2019-07-06 | Stop reason: HOSPADM

## 2019-07-03 RX ORDER — ACETAMINOPHEN 325 MG/1
650 TABLET ORAL EVERY 4 HOURS PRN
Status: DISCONTINUED | OUTPATIENT
Start: 2019-07-03 | End: 2019-07-06 | Stop reason: HOSPADM

## 2019-07-03 RX ORDER — DEXTROSE, SODIUM CHLORIDE, AND POTASSIUM CHLORIDE 5; .45; .15 G/100ML; G/100ML; G/100ML
250 INJECTION INTRAVENOUS CONTINUOUS PRN
Status: DISCONTINUED | OUTPATIENT
Start: 2019-07-03 | End: 2019-07-04

## 2019-07-03 RX ORDER — ICOSAPENT ETHYL 1000 MG/1
2 CAPSULE ORAL DAILY
COMMUNITY
End: 2019-08-21 | Stop reason: SDUPTHER

## 2019-07-03 RX ORDER — ACETAMINOPHEN 650 MG/1
650 SUPPOSITORY RECTAL EVERY 4 HOURS PRN
Status: DISCONTINUED | OUTPATIENT
Start: 2019-07-03 | End: 2019-07-06 | Stop reason: HOSPADM

## 2019-07-03 RX ORDER — ONDANSETRON 2 MG/ML
4 INJECTION INTRAMUSCULAR; INTRAVENOUS ONCE
Status: COMPLETED | OUTPATIENT
Start: 2019-07-03 | End: 2019-07-03

## 2019-07-03 RX ORDER — DEXTROSE AND SODIUM CHLORIDE 5; .45 G/100ML; G/100ML
250 INJECTION, SOLUTION INTRAVENOUS CONTINUOUS PRN
Status: DISCONTINUED | OUTPATIENT
Start: 2019-07-03 | End: 2019-07-04

## 2019-07-03 RX ORDER — SODIUM CHLORIDE 0.9 % (FLUSH) 0.9 %
3 SYRINGE (ML) INJECTION EVERY 12 HOURS SCHEDULED
Status: DISCONTINUED | OUTPATIENT
Start: 2019-07-03 | End: 2019-07-06 | Stop reason: HOSPADM

## 2019-07-03 RX ORDER — LEVOFLOXACIN 5 MG/ML
500 INJECTION, SOLUTION INTRAVENOUS EVERY 24 HOURS
Status: DISCONTINUED | OUTPATIENT
Start: 2019-07-03 | End: 2019-07-03

## 2019-07-03 RX ORDER — HYDROMORPHONE HYDROCHLORIDE 1 MG/ML
0.5 INJECTION, SOLUTION INTRAMUSCULAR; INTRAVENOUS; SUBCUTANEOUS
Status: DISCONTINUED | OUTPATIENT
Start: 2019-07-03 | End: 2019-07-06 | Stop reason: HOSPADM

## 2019-07-03 RX ORDER — DEXTROSE, SODIUM CHLORIDE, AND POTASSIUM CHLORIDE 5; .45; .3 G/100ML; G/100ML; G/100ML
250 INJECTION INTRAVENOUS CONTINUOUS PRN
Status: DISCONTINUED | OUTPATIENT
Start: 2019-07-03 | End: 2019-07-04

## 2019-07-03 RX ORDER — SODIUM CHLORIDE AND POTASSIUM CHLORIDE 150; 900 MG/100ML; MG/100ML
250 INJECTION, SOLUTION INTRAVENOUS CONTINUOUS PRN
Status: DISCONTINUED | OUTPATIENT
Start: 2019-07-03 | End: 2019-07-04

## 2019-07-03 RX ORDER — DEXTROSE MONOHYDRATE 25 G/50ML
12.5 INJECTION, SOLUTION INTRAVENOUS AS NEEDED
Status: DISCONTINUED | OUTPATIENT
Start: 2019-07-03 | End: 2019-07-04

## 2019-07-03 RX ORDER — ONDANSETRON 2 MG/ML
4 INJECTION INTRAMUSCULAR; INTRAVENOUS EVERY 6 HOURS PRN
Status: DISCONTINUED | OUTPATIENT
Start: 2019-07-03 | End: 2019-07-06 | Stop reason: HOSPADM

## 2019-07-03 RX ORDER — SENNA AND DOCUSATE SODIUM 50; 8.6 MG/1; MG/1
2 TABLET, FILM COATED ORAL NIGHTLY
Status: DISCONTINUED | OUTPATIENT
Start: 2019-07-03 | End: 2019-07-06 | Stop reason: HOSPADM

## 2019-07-03 RX ORDER — GABAPENTIN 400 MG/1
400 CAPSULE ORAL EVERY 8 HOURS SCHEDULED
Status: DISCONTINUED | OUTPATIENT
Start: 2019-07-03 | End: 2019-07-06 | Stop reason: HOSPADM

## 2019-07-03 RX ORDER — ARFORMOTEROL TARTRATE 15 UG/2ML
15 SOLUTION RESPIRATORY (INHALATION)
Status: DISCONTINUED | OUTPATIENT
Start: 2019-07-03 | End: 2019-07-06 | Stop reason: HOSPADM

## 2019-07-03 RX ORDER — OLMESARTAN MEDOXOMIL 20 MG/1
20 TABLET ORAL DAILY
COMMUNITY
End: 2020-07-27

## 2019-07-03 RX ORDER — SODIUM CHLORIDE 450 MG/100ML
250 INJECTION, SOLUTION INTRAVENOUS CONTINUOUS PRN
Status: DISCONTINUED | OUTPATIENT
Start: 2019-07-03 | End: 2019-07-04

## 2019-07-03 RX ADMIN — ASPIRIN 81 MG: 81 TABLET, COATED ORAL at 19:20

## 2019-07-03 RX ADMIN — SODIUM CHLORIDE 13 UNITS/HR: 9 INJECTION, SOLUTION INTRAVENOUS at 15:49

## 2019-07-03 RX ADMIN — POTASSIUM CHLORIDE AND SODIUM CHLORIDE 250 ML/HR: 900; 150 INJECTION, SOLUTION INTRAVENOUS at 18:11

## 2019-07-03 RX ADMIN — LEVOFLOXACIN 500 MG: 5 INJECTION, SOLUTION INTRAVENOUS at 16:52

## 2019-07-03 RX ADMIN — ENOXAPARIN SODIUM 40 MG: 40 INJECTION SUBCUTANEOUS at 18:10

## 2019-07-03 RX ADMIN — HYDROMORPHONE HYDROCHLORIDE 0.5 MG: 1 INJECTION, SOLUTION INTRAMUSCULAR; INTRAVENOUS; SUBCUTANEOUS at 20:15

## 2019-07-03 RX ADMIN — HYDROMORPHONE HYDROCHLORIDE 0.5 MG: 1 INJECTION, SOLUTION INTRAMUSCULAR; INTRAVENOUS; SUBCUTANEOUS at 17:57

## 2019-07-03 RX ADMIN — HYDROMORPHONE HYDROCHLORIDE 0.5 MG: 1 INJECTION, SOLUTION INTRAMUSCULAR; INTRAVENOUS; SUBCUTANEOUS at 13:08

## 2019-07-03 RX ADMIN — PANTOPRAZOLE SODIUM 40 MG: 40 TABLET, DELAYED RELEASE ORAL at 18:10

## 2019-07-03 RX ADMIN — HYDROCODONE BITARTRATE AND ACETAMINOPHEN 1 TABLET: 5; 325 TABLET ORAL at 19:24

## 2019-07-03 RX ADMIN — METRONIDAZOLE 500 MG: 500 INJECTION, SOLUTION INTRAVENOUS at 18:12

## 2019-07-03 RX ADMIN — POTASSIUM CHLORIDE AND SODIUM CHLORIDE 250 ML/HR: 900; 150 INJECTION, SOLUTION INTRAVENOUS at 22:45

## 2019-07-03 RX ADMIN — ONDANSETRON HYDROCHLORIDE 4 MG: 2 SOLUTION INTRAMUSCULAR; INTRAVENOUS at 13:07

## 2019-07-03 RX ADMIN — HYDROMORPHONE HYDROCHLORIDE 0.5 MG: 1 INJECTION, SOLUTION INTRAMUSCULAR; INTRAVENOUS; SUBCUTANEOUS at 15:13

## 2019-07-03 RX ADMIN — GABAPENTIN 400 MG: 400 CAPSULE ORAL at 18:10

## 2019-07-03 RX ADMIN — Medication 2 TABLET: at 18:01

## 2019-07-03 RX ADMIN — SODIUM CHLORIDE 1000 ML: 9 INJECTION, SOLUTION INTRAVENOUS at 16:31

## 2019-07-03 RX ADMIN — SODIUM CHLORIDE 1000 ML: 9 INJECTION, SOLUTION INTRAVENOUS at 13:03

## 2019-07-03 RX ADMIN — LEVOTHYROXINE SODIUM 112 MCG: 0.11 TABLET ORAL at 19:20

## 2019-07-03 NOTE — ED TRIAGE NOTES
Patient presents to ED for increased thirst, vomiting, left flank pain x4-5 days. Patient is diabetic and blood glucose around 280

## 2019-07-03 NOTE — H&P
"[facility]    CC: flank pain    HPI:  Mr. Pereira is a 47yo WM with DMI.  He presents to the ER today with sudden onset of 8/10, sharp left flank pain.  Started 4 days ago, has been present continuously, getting worse.  It is worse with drinking.  He reports no allievating factors.  He has had increased thrist, urinary frequency and n/v as well.  Evaluation in er shows acute divertiulitis and HONK. ICU admission has been requested.    Past Medical History:   Diagnosis Date   • Chronic pain     flank pain \" permenantly damaged muscle\"   • COPD (chronic obstructive pulmonary disease) (CMS/HCC)    • Diabetes mellitus (CMS/HCC)     Insulin pump   • Diabetic neuropathy (CMS/HCC)    • Disease of thyroid gland     hypothyroid   • Diverticulitis of colon    • Fibromyalgia    • History of pancreatitis     X 2   • History of renal cell cancer     stage 4 , Rt kidney   • Hypertension    • Obstructive sleep apnea    • Pre-syncope    • SVT (supraventricular tachycardia) (CMS/HCC)    • Type 2 diabetes mellitus (CMS/HCC)    • Umbilical hernia      Social History     Socioeconomic History   • Marital status:      Spouse name: Not on file   • Number of children: Not on file   • Years of education: Not on file   • Highest education level: Not on file   Tobacco Use   • Smoking status: Former Smoker     Types: Cigars   • Smokeless tobacco: Never Used   Substance and Sexual Activity   • Alcohol use: No   • Drug use: No   • Sexual activity: Defer     Family History   Family history unknown: Yes     MEDS: reviewed as per chart  ALL: PCN  ROS: reviewed below  Constitutional: Negative.  Negative for fever.        (+) increased thrist   HENT: Negative.  Negative for sore throat.    Eyes: Negative.    Respiratory: Positive for cough (moderate).    Cardiovascular: Positive for leg swelling (bilateral). Negative for chest pain.   Gastrointestinal: Positive for nausea and vomiting.   Genitourinary: Positive for dysuria, flank " pain (L) and frequency.   Musculoskeletal: Negative for back pain.        (+) BLE pain   Skin: Negative.  Negative for rash.   Neurological: Negative.  Negative for headaches.   All other systems reviewed and are negative.    Vital Sign Min/Max for last 24 hours  Temp  Min: 98.5 °F (36.9 °C)  Max: 98.5 °F (36.9 °C)   BP  Min: 138/90  Max: 172/106   Pulse  Min: 101  Max: 119   Resp  Min: 24  Max: 24   SpO2  Min: 89 %  Max: 95 %   No Data Recorded   Weight  Min: 131 kg (289 lb 4.8 oz)  Max: 131 kg (289 lb 4.8 oz)     GEN:  , appears ill, obese, AxOx3  HEENT: PERRL, EOMI, no icterus, mmm, no jvd, trachea midline, neck supple, normal conjunctiva; no palpable thyroid nodules  CHEST: CTA bilat, no wheezes, no crackles, no use of accessory muscles  CV: tachycardia, no m/g/r  ABD: soft, mildly tender, nd +bs, no hepatosplenomegaly  -exam limited by obesity  EXT: no c/c/e  SKIN: no rashes, no xanthomas, nl turgor  LYMPH: no palpable cervical or supraclavicular lymphadenopathy  NEURO: CN 2-12 intact and symmetric bilaterally  PSYCH: nl affect, nl orientation, nl judgement, nl mood  MSK: no kyphoscoliosis, 5/5 strength ue and le bilaterally with good rom.    Labs: 7/3: reviewed:  Glucose 885  Bun 20  Cr 1.2  Na 124  k 5.1  Bicarb 19  Alt 84  ast 62  Alk phos 200  Wbc 7.59 (66%neutrophils)  hgb 15.7  plts 134  7.398/36/82  U/a: +glucose no ketones    Ct abd/pelvis: reviewed: diverticulitis     A/P:  1. HONK -- insulin and iv fluids  2. Acute diverticulitis -- antibiotics  3. Sepsis -- iv fluids  4. Hyponatremia -- likely pseudo, monitor  5. Mildly elevated liver enzymes -- monitor  6. Obesity  7. DMII with hyperglycemia  8. Copd -- continue bronchodilators  9. Hypothyroidism -- continue synthroid

## 2019-07-03 NOTE — PROGRESS NOTES
Clinical Pharmacy Services: Medication History    Ricardo Pereira is a 48 y.o. male presenting to [facility] for   Chief Complaint   Patient presents with   • Vomiting   • Flank Pain     left       He  has a past medical history of Chronic pain, COPD (chronic obstructive pulmonary disease) (CMS/HCC), Diabetes mellitus (CMS/HCC), Diabetic neuropathy (CMS/HCC), Disease of thyroid gland, Diverticulitis of colon, Fibromyalgia, History of pancreatitis, History of renal cell cancer, Hypertension, Obstructive sleep apnea, Pre-syncope, SVT (supraventricular tachycardia) (CMS/HCC), Type 2 diabetes mellitus (CMS/[facility]), and Umbilical hernia.    Allergies as of 07/03/2019 - Reviewed 07/03/2019   Allergen Reaction Noted   • Penicillins Swelling 04/10/2016       Medication information was obtained from: Patient  Pharmacy and Phone Number: [facility] [phone]    Prior to Admission Medications     Prescriptions Last Dose Informant Patient Reported? Taking?    arformoterol (BROVANA) 15 MCG/2ML nebulizer solution  Self No Yes    Take 2 mL by nebulization 2 (Two) Times a Day.    aspirin 81 MG EC tablet  Self No Yes    Take 1 tablet by mouth Daily.    budesonide (PULMICORT) 0.5 MG/2ML nebulizer solution  Self No Yes    Take 2 mL by nebulization 2 (Two) Times a Day.    Dapagliflozin Propanediol (FARXIGA) 10 MG tablet  Self Yes Yes    Take 1 tablet by mouth Daily.    Empagliflozin (JARDIANCE) 25 MG tablet  Self Yes Yes    Take 1 tablet by mouth Daily.    fenofibrate (TRICOR) 145 MG tablet  Self No Yes    Take 1 tablet by mouth Daily.    gabapentin (NEURONTIN) 800 MG tablet  Self No Yes    Take 1 tablet by mouth 3 (Three) Times a Day.    icosapent ethyl (VASCEPA) 1 g capsule capsule  Self Yes Yes    Take 2 g by mouth Daily.    insulin aspart (novoLOG FLEXPEN) 100 UNIT/ML solution pen-injector sc pen  Self Yes Yes    Inject 20 Units under the skin into the appropriate area as directed 3 (Three) Times a Day With Meals.     Insulin Degludec (TRESIBA FLEXTOUCH) 200 UNIT/ML solution pen-injector  Self Yes Yes    Inject 120 Units under the skin into the appropriate area as directed Every Morning.    levothyroxine (SYNTHROID) 112 MCG tablet  Self No Yes    Take 1 tablet by mouth Daily. On an empty stomach    olmesartan (BENICAR) 20 MG tablet  Self Yes Yes    Take 20 mg by mouth Daily.    pantoprazole (PROTONIX) 40 MG EC tablet  Self Yes Yes    Take 40 mg by mouth daily.    rosuvastatin (CRESTOR) 40 MG tablet  Self No Yes    Take 1 tablet by mouth Daily.    topiramate (TOPAMAX) 50 MG tablet  Self Yes Yes    Take 50 mg by mouth 2 (Two) Times a Day.    ipratropium-albuterol (DUO-NEB) 0.5-2.5 mg/mL nebulizer  Self No No    Take 3 mL by nebulization Every 4 (Four) Hours As Needed for Wheezing or Shortness of Air.            Medication notes: Patient states that he has not needed to use any of his medication for the nebulizer for several months. Telmisartan removed, patient is now taking Olmesartan. Farxiga added per patient medication bottles from home.    This medication list is complete to the best of my knowledge as of 7/3/2019    Please call if questions.    Jesi Francois, Medication History Technician  7/3/2019 4:16 PM

## 2019-07-03 NOTE — ED PROVIDER NOTES
" EMERGENCY DEPARTMENT ENCOUNTER    CHIEF COMPLAINT  Chief Complaint: flank pain  History given by: patient  History limited by: none  Room Number: 21/21  PMD: Simba Moore MD      HPI:  Pt is a 48 y.o. male with Hx of DM (Novalog 25 units x3 daily with Tresiba), who presents complaining of 8/10, \"sharp\" L flank pain onset about 4 days ago. The pain is worsened by drinking. Additionally, he reports increased thirst, increased urinary frequency, and dysuria. He confirms, nausea, vomiting, BLE swelling, moderate cough, and BLE pain. Denies fever, HA, CP, SOA. There are no other complaints. Former smoker as of 5 months ago. No illicit drug use.     Pt is s/p R nephrectomy.     PAST MEDICAL HISTORY  Active Ambulatory Problems     Diagnosis Date Noted   • Uncontrolled type 2 diabetes mellitus with complication, without long-term current use of insulin (CMS/[facility]) 08/29/2017   • Vitamin D deficiency 08/29/2017   • Chronic pancreatitis (CMS/[facility]) 08/29/2017   • Mixed hyperlipidemia 08/29/2017   • Abnormal endocrine laboratory test finding 08/29/2017   • Secondary hypothyroidism 08/29/2017   • Hyperglycemia 08/29/2017   • Essential hypertension 08/29/2017   • COPD (chronic obstructive pulmonary disease) (CMS/[facility]) 03/06/2018   • Acute exacerbation of chronic obstructive pulmonary disease (COPD) (CMS/[facility]) 03/06/2018   • Hypothyroidism 03/09/2018   • Morbid obesity due to excess calories (CMS/[facility]) 01/25/2019   • COPD exacerbation (CMS/[facility]) 02/11/2019     Resolved Ambulatory Problems     Diagnosis Date Noted   • Pain of upper abdomen 04/10/2016   • Pancreatitis 04/10/2016   • Incisional hernia 05/11/2017   • MADINA (acute kidney injury) (CMS/[facility]) 03/07/2018     Past Medical History:   Diagnosis Date   • Chronic pain    • COPD (chronic obstructive pulmonary disease) (CMS/[facility])    • Diabetes mellitus (CMS/[facility])    • Diabetic neuropathy (CMS/[facility])    • Disease of thyroid gland    • Diverticulitis of colon    • Fibromyalgia    • " History of pancreatitis    • History of renal cell cancer    • Hypertension    • Obstructive sleep apnea    • Pre-syncope    • SVT (supraventricular tachycardia) (CMS/HCC)    • Type 2 diabetes mellitus (CMS/HCC)    • Umbilical hernia        PAST SURGICAL HISTORY  Past Surgical History:   Procedure Laterality Date   • APPENDECTOMY     • COLONOSCOPY  2014    tics   • HERNIA REPAIR      umbilical   • NEPHRECTOMY Right    • VENTRAL/INCISIONAL HERNIA REPAIR N/A 5/11/2017    Procedure: VENTRAL/INCISIONAL HERNIA REPAIR LAPAROSCOPIC, RECURRENT INCISION, WITH MESH AND AHEDLYSIS;  Surgeon: Albin Bee MD;  Location: [facility] OR;  Service:        FAMILY HISTORY  Family History   Family history unknown: Yes       SOCIAL HISTORY  Social History     Socioeconomic History   • Marital status:      Spouse name: Not on file   • Number of children: Not on file   • Years of education: Not on file   • Highest education level: Not on file   Tobacco Use   • Smoking status: Former Smoker     Types: Cigars   • Smokeless tobacco: Never Used   Substance and Sexual Activity   • Alcohol use: No   • Drug use: No   • Sexual activity: Defer       ALLERGIES  Penicillins    REVIEW OF SYSTEMS  Review of Systems   Constitutional: Negative.  Negative for fever.        (+) increased thrist   HENT: Negative.  Negative for sore throat.    Eyes: Negative.    Respiratory: Positive for cough (moderate).    Cardiovascular: Positive for leg swelling (bilateral). Negative for chest pain.   Gastrointestinal: Positive for nausea and vomiting.   Genitourinary: Positive for dysuria, flank pain (L) and frequency.   Musculoskeletal: Negative for back pain.        (+) BLE pain   Skin: Negative.  Negative for rash.   Neurological: Negative.  Negative for headaches.   All other systems reviewed and are negative.      PHYSICAL EXAM  ED Triage Vitals [07/03/19 1206]   Temp Heart Rate Resp BP SpO2   98.5 °F (36.9 °C) 119 24 (!) 172/106 95 %      Temp src  Heart Rate Source Patient Position BP Location FiO2 (%)   Tympanic -- -- -- --       Physical Exam   Constitutional: No distress.   HENT:   Head: Normocephalic and atraumatic.   Mouth/Throat: Mucous membranes are dry.   Eyes: Conjunctivae are normal.   Neck: Normal range of motion.   Cardiovascular: Regular rhythm. Tachycardia present.   Pulmonary/Chest: No respiratory distress. He has wheezes (expiratory, bilateral).   91% O2 on RA   Abdominal: There is no tenderness.   Mild TTP L flank.    Musculoskeletal: He exhibits no edema or tenderness.   Neurological: He is alert.   Skin: No rash noted.   Nursing note and vitals reviewed.      LAB RESULTS  Lab Results (last 24 hours)     Procedure Component Value Units Date/Time    Urinalysis With Microscopic If Indicated (No Culture) - Urine, Clean Catch [[id number]]  (Abnormal) Collected:  07/03/19 1258    Specimen:  Urine, Clean Catch Updated:  07/03/19 1312     Color, UA Yellow     Appearance, UA Clear     pH, UA 6.5     Specific Gravity, UA >=1.030     Glucose, UA >=1000 mg/dL (3+)     Ketones, UA Negative     Bilirubin, UA Negative     Blood, UA Negative     Protein, UA Negative     Leuk Esterase, UA Negative     Nitrite, UA Negative     Urobilinogen, UA 0.2 E.U./dL    Narrative:       Urine microscopic not indicated.    Blood Gas, Arterial [[id number]]  (Abnormal) Collected:  07/03/19 1301    Specimen:  Arterial Blood Updated:  07/03/19 1303     Site Arterial: right radial     Renard's Test Positive     pH, Arterial 7.398 pH units      pCO2, Arterial 36.1 mm Hg      pO2, Arterial 82.2 mm Hg      HCO3, Arterial 22.3 mmol/L      Base Excess, Arterial -2.0 mmol/L      O2 Saturation Calculated 96.1 %      A-a Gradiant 0.7 mmHg      Barometric Pressure for Blood Gas 751.4 mmHg      Modality Room Air     FIO2 21 %      Rate 24 Breaths/minute     CBC & Differential [[id number]] Collected:  07/03/19 1303    Specimen:  Blood Updated:  07/03/19 1406    Narrative:       The  following orders were created for panel order CBC & Differential.  Procedure                               Abnormality         Status                     ---------                               -----------         ------                     CBC Auto Differential[[id number]]        Abnormal            Final result                 Please view results for these tests on the individual orders.    Comprehensive Metabolic Panel [[id number]]  (Abnormal) Collected:  07/03/19 1303    Specimen:  Blood Updated:  07/03/19 1455     Glucose 885 mg/dL      BUN 20 mg/dL      Creatinine 1.24 mg/dL      Sodium 124 mmol/L      Potassium 5.1 mmol/L      Chloride 84 mmol/L      CO2 19.8 mmol/L      Calcium 9.4 mg/dL      Total Protein 6.7 g/dL      Albumin 3.70 g/dL      ALT (SGPT) 84 U/L      AST (SGOT) 62 U/L      Comment: Specimen hemolyzed.  Results may be affected.        Alkaline Phosphatase 200 U/L      Total Bilirubin 0.3 mg/dL      eGFR Non African Amer 63 mL/min/1.73      Globulin 3.0 gm/dL      A/G Ratio 1.2 g/dL      BUN/Creatinine Ratio 16.1     Anion Gap 20.2 mmol/L     Narrative:       GFR Normal >60  Chronic Kidney Disease <60  Kidney Failure <15    CBC Auto Differential [[id number]]  (Abnormal) Collected:  07/03/19 1303    Specimen:  Blood Updated:  07/03/19 1406     WBC 7.59 10*3/mm3      RBC 4.84 10*6/mm3      Hemoglobin 15.7 g/dL      Hematocrit 44.2 %      MCV 91.3 fL      MCH 32.4 pg      MCHC 35.5 g/dL      RDW 12.7 %      RDW-SD 41.7 fl      MPV 13.9 fL      Platelets 134 10*3/mm3      Neutrophil % 66.1 %      Lymphocyte % 25.8 %      Monocyte % 5.5 %      Eosinophil % 1.6 %      Basophil % 0.5 %      Immature Grans % 0.5 %      Neutrophils, Absolute 5.01 10*3/mm3      Lymphocytes, Absolute 1.96 10*3/mm3      Monocytes, Absolute 0.42 10*3/mm3      Eosinophils, Absolute 0.12 10*3/mm3      Basophils, Absolute 0.04 10*3/mm3      Immature Grans, Absolute 0.04 10*3/mm3      nRBC 0.0 /100 WBC           I ordered the above  labs and reviewed the results    RADIOLOGY  CT Abdomen Pelvis Without Contrast   Preliminary Result   1. There is mild acute diverticulitis at the proximal sigmoid colon.   2. Hepatic steatosis.   3. No left nephroureterolithiasis or hydronephrosis bilaterally.       Discussed with Dr. Madrigal.               I ordered the above noted radiological studies. Interpreted by radiologist. Discussed with radiologist (Dr. Cervantes). Reviewed by me in PACS.       PROCEDURES  Critical Care  Performed by: Isaías Madrigal MD  Authorized by: Isaías Madrigal MD     Critical care provider statement:     Critical care time (minutes):  35    Critical care time was exclusive of:  Separately billable procedures and treating other patients    Critical care was necessary to treat or prevent imminent or life-threatening deterioration of the following conditions:  Metabolic crisis    Critical care was time spent personally by me on the following activities:  Development of treatment plan with patient or surrogate, discussions with consultants, evaluation of patient's response to treatment, examination of patient, obtaining history from patient or surrogate, ordering and performing treatments and interventions, ordering and review of laboratory studies, ordering and review of radiographic studies, pulse oximetry, re-evaluation of patient's condition and review of old charts              PROGRESS AND CONSULTS  ED Course as of Jul 03 1559   Wed Jul 03, 2019   1401 Discussed CT scan with Dr. Lilly Cervantes who reports a mild uncomplicated sigmoid diverticulitis as well as some chronic postsurgical changes.  [DB]      ED Course User Index  [DB] Isaías Madrigal MD     1250- Ordered CT abd, labs, IVF. Ordered Zofran and Dilaudid for pt nausea.     1447- Rechecked pt. Pt is resting comfortably. Notified pt of his lab and CT abd results. Discussed the plan to wait for the CMP results. Pt understands and agrees with the plan, all questions  answered.    1519- CMP resulted and reviewed. HHS ordered. Placed call to A.    1520- Rechecked pt. Pt is resting comfortably. Notified pt of his CMP results. Discussed the plan to admit the pt for further evaluation and treatment. Pt understands and agrees with the plan, all questions answered.    1538- Discussed pt with Dr. Fung ([facility]). She asks that pt be admitted to ICU.     1541- Placed call to Pulmonology.     1552- Discussed pt with Dr. Jim (Pulmonology). He agrees to admit the pt to the ICU.     1602- Ordered Levaquin and Flagyl to treat diverticulitis.     MEDICAL DECISION MAKING  Results were reviewed/discussed with the patient and they were also made aware of online access. Pt also made aware that some labs, such as cultures, will not be resulted during ER visit and follow up with PMD is necessary.     MDM  Number of Diagnoses or Management Options  Diverticulitis:   Hyperosmolar (nonketotic) coma (CMS/HCC):      Amount and/or Complexity of Data Reviewed  Clinical lab tests: ordered and reviewed (Glucose 885)  Tests in the radiology section of CPT®: ordered and reviewed (CT abd- mild uncomplicated sigmoid diverticulitis)  Discussion of test results with the performing providers: yes (Dr. Cervantes (Radiologist))  Decide to obtain previous medical records or to obtain history from someone other than the patient: yes  Discuss the patient with other providers: yes (Dr. Jim (ICU))  Independent visualization of images, tracings, or specimens: yes    Critical Care  Total time providing critical care: 30-74 minutes    Patient Progress  Patient progress: stable         DIAGNOSIS  Final diagnoses:   Diverticulitis   Hyperosmolar (nonketotic) coma (CMS/HCC)       DISPOSITION  ADMISSION    Discussed treatment plan and reason for admission with pt/family and admitting physician.  Pt/family voiced understanding of the plan for admission for further testing/treatment as needed.     Latest Documented Vital  Signs:  As of 3:59 PM  BP- 138/90 HR- 102 Temp- 98.5 °F (36.9 °C) (Tympanic) O2 sat- (!) 89%    --  Documentation assistance provided by kimber Otero for Dr. Rohan MD.  Information recorded by the scribe was done at my direction and has been verified and validated by me.     Nestor Otero  07/03/19 3183       Isaías Madrigal MD  07/03/19 5048

## 2019-07-04 LAB
ALBUMIN SERPL-MCNC: 3.5 G/DL (ref 3.5–5.2)
ALBUMIN/GLOB SERPL: 1.2 G/DL
ALP SERPL-CCNC: 137 U/L (ref 39–117)
ALT SERPL W P-5'-P-CCNC: 95 U/L (ref 1–41)
ANION GAP SERPL CALCULATED.3IONS-SCNC: 12.1 MMOL/L (ref 5–15)
ANION GAP SERPL CALCULATED.3IONS-SCNC: 12.4 MMOL/L (ref 5–15)
ANION GAP SERPL CALCULATED.3IONS-SCNC: 15.7 MMOL/L (ref 5–15)
AST SERPL-CCNC: 95 U/L (ref 1–40)
BASOPHILS # BLD AUTO: 0.03 10*3/MM3 (ref 0–0.2)
BASOPHILS NFR BLD AUTO: 0.4 % (ref 0–1.5)
BILIRUB SERPL-MCNC: 0.4 MG/DL (ref 0.2–1.2)
BUN BLD-MCNC: 11 MG/DL (ref 6–20)
BUN BLD-MCNC: 13 MG/DL (ref 6–20)
BUN BLD-MCNC: 14 MG/DL (ref 6–20)
BUN/CREAT SERPL: 12.8 (ref 7–25)
BUN/CREAT SERPL: 13.3 (ref 7–25)
BUN/CREAT SERPL: 14.3 (ref 7–25)
CALCIUM SPEC-SCNC: 8 MG/DL (ref 8.6–10.5)
CALCIUM SPEC-SCNC: 9.1 MG/DL (ref 8.6–10.5)
CALCIUM SPEC-SCNC: 9.5 MG/DL (ref 8.6–10.5)
CHLORIDE SERPL-SCNC: 100 MMOL/L (ref 98–107)
CHLORIDE SERPL-SCNC: 104 MMOL/L (ref 98–107)
CHLORIDE SERPL-SCNC: 97 MMOL/L (ref 98–107)
CO2 SERPL-SCNC: 20.6 MMOL/L (ref 22–29)
CO2 SERPL-SCNC: 21.3 MMOL/L (ref 22–29)
CO2 SERPL-SCNC: 21.9 MMOL/L (ref 22–29)
CREAT BLD-MCNC: 0.86 MG/DL (ref 0.76–1.27)
CREAT BLD-MCNC: 0.98 MG/DL (ref 0.76–1.27)
CREAT BLD-MCNC: 0.98 MG/DL (ref 0.76–1.27)
DEPRECATED RDW RBC AUTO: 41.1 FL (ref 37–54)
EOSINOPHIL # BLD AUTO: 0.19 10*3/MM3 (ref 0–0.4)
EOSINOPHIL NFR BLD AUTO: 2.4 % (ref 0.3–6.2)
ERYTHROCYTE [DISTWIDTH] IN BLOOD BY AUTOMATED COUNT: 12.4 % (ref 12.3–15.4)
GFR SERPL CREATININE-BSD FRML MDRD: 82 ML/MIN/1.73
GFR SERPL CREATININE-BSD FRML MDRD: 82 ML/MIN/1.73
GFR SERPL CREATININE-BSD FRML MDRD: 96 ML/MIN/1.73
GLOBULIN UR ELPH-MCNC: 3 GM/DL
GLUCOSE BLD-MCNC: 175 MG/DL (ref 65–99)
GLUCOSE BLD-MCNC: 238 MG/DL (ref 65–99)
GLUCOSE BLD-MCNC: 241 MG/DL (ref 65–99)
GLUCOSE BLDC GLUCOMTR-MCNC: 170 MG/DL (ref 70–130)
GLUCOSE BLDC GLUCOMTR-MCNC: 188 MG/DL (ref 70–130)
GLUCOSE BLDC GLUCOMTR-MCNC: 192 MG/DL (ref 70–130)
GLUCOSE BLDC GLUCOMTR-MCNC: 207 MG/DL (ref 70–130)
GLUCOSE BLDC GLUCOMTR-MCNC: 216 MG/DL (ref 70–130)
GLUCOSE BLDC GLUCOMTR-MCNC: 224 MG/DL (ref 70–130)
GLUCOSE BLDC GLUCOMTR-MCNC: 226 MG/DL (ref 70–130)
GLUCOSE BLDC GLUCOMTR-MCNC: 241 MG/DL (ref 70–130)
GLUCOSE BLDC GLUCOMTR-MCNC: 243 MG/DL (ref 70–130)
GLUCOSE BLDC GLUCOMTR-MCNC: 246 MG/DL (ref 70–130)
GLUCOSE BLDC GLUCOMTR-MCNC: 246 MG/DL (ref 70–130)
GLUCOSE BLDC GLUCOMTR-MCNC: 301 MG/DL (ref 70–130)
GLUCOSE BLDC GLUCOMTR-MCNC: 348 MG/DL (ref 70–130)
GLUCOSE BLDC GLUCOMTR-MCNC: 353 MG/DL (ref 70–130)
HCT VFR BLD AUTO: 42 % (ref 37.5–51)
HGB BLD-MCNC: 14.5 G/DL (ref 13–17.7)
IMM GRANULOCYTES # BLD AUTO: 0.04 10*3/MM3 (ref 0–0.05)
IMM GRANULOCYTES NFR BLD AUTO: 0.5 % (ref 0–0.5)
LYMPHOCYTES # BLD AUTO: 2.09 10*3/MM3 (ref 0.7–3.1)
LYMPHOCYTES NFR BLD AUTO: 26.4 % (ref 19.6–45.3)
MAGNESIUM SERPL-MCNC: 1.7 MG/DL (ref 1.6–2.6)
MAGNESIUM SERPL-MCNC: 1.7 MG/DL (ref 1.6–2.6)
MAGNESIUM SERPL-MCNC: 2 MG/DL (ref 1.6–2.6)
MCH RBC QN AUTO: 31.4 PG (ref 26.6–33)
MCHC RBC AUTO-ENTMCNC: 34.5 G/DL (ref 31.5–35.7)
MCV RBC AUTO: 90.9 FL (ref 79–97)
MONOCYTES # BLD AUTO: 0.62 10*3/MM3 (ref 0.1–0.9)
MONOCYTES NFR BLD AUTO: 7.8 % (ref 5–12)
NEUTROPHILS # BLD AUTO: 4.96 10*3/MM3 (ref 1.7–7)
NEUTROPHILS NFR BLD AUTO: 62.5 % (ref 42.7–76)
NRBC BLD AUTO-RTO: 0 /100 WBC (ref 0–0.2)
OSMOLALITY SERPL: 294 MOSM/KG (ref 275–300)
PHOSPHATE SERPL-MCNC: 2.6 MG/DL (ref 2.5–4.5)
PHOSPHATE SERPL-MCNC: 3.1 MG/DL (ref 2.5–4.5)
PHOSPHATE SERPL-MCNC: 3.8 MG/DL (ref 2.5–4.5)
PLATELET # BLD AUTO: 142 10*3/MM3 (ref 140–450)
PMV BLD AUTO: 12.3 FL (ref 6–12)
POTASSIUM BLD-SCNC: 3.6 MMOL/L (ref 3.5–5.2)
POTASSIUM BLD-SCNC: 3.8 MMOL/L (ref 3.5–5.2)
POTASSIUM BLD-SCNC: 4.1 MMOL/L (ref 3.5–5.2)
PROT SERPL-MCNC: 6.5 G/DL (ref 6–8.5)
RBC # BLD AUTO: 4.62 10*6/MM3 (ref 4.14–5.8)
SODIUM BLD-SCNC: 131 MMOL/L (ref 136–145)
SODIUM BLD-SCNC: 137 MMOL/L (ref 136–145)
SODIUM BLD-SCNC: 137 MMOL/L (ref 136–145)
WBC NRBC COR # BLD: 7.93 10*3/MM3 (ref 3.4–10.8)

## 2019-07-04 PROCEDURE — 84100 ASSAY OF PHOSPHORUS: CPT | Performed by: INTERNAL MEDICINE

## 2019-07-04 PROCEDURE — 85025 COMPLETE CBC W/AUTO DIFF WBC: CPT | Performed by: INTERNAL MEDICINE

## 2019-07-04 PROCEDURE — 80053 COMPREHEN METABOLIC PANEL: CPT | Performed by: INTERNAL MEDICINE

## 2019-07-04 PROCEDURE — 25010000002 MORPHINE PER 10 MG: Performed by: INTERNAL MEDICINE

## 2019-07-04 PROCEDURE — 25010000002 MAGNESIUM SULFATE IN D5W 1G/100ML (PREMIX) 1-5 GM/100ML-% SOLUTION: Performed by: INTERNAL MEDICINE

## 2019-07-04 PROCEDURE — 25810000003 SODIUM CHLORIDE 0.9 % WITH KCL 20 MEQ 20-0.9 MEQ/L-% SOLUTION: Performed by: EMERGENCY MEDICINE

## 2019-07-04 PROCEDURE — 25010000002 LEVOFLOXACIN PER 250 MG: Performed by: INTERNAL MEDICINE

## 2019-07-04 PROCEDURE — 82962 GLUCOSE BLOOD TEST: CPT

## 2019-07-04 PROCEDURE — 63710000001 INSULIN GLARGINE PER 5 UNITS: Performed by: INTERNAL MEDICINE

## 2019-07-04 PROCEDURE — 83735 ASSAY OF MAGNESIUM: CPT | Performed by: INTERNAL MEDICINE

## 2019-07-04 PROCEDURE — 25010000002 ENOXAPARIN PER 10 MG: Performed by: INTERNAL MEDICINE

## 2019-07-04 PROCEDURE — 63710000001 INSULIN LISPRO (HUMAN) PER 5 UNITS: Performed by: INTERNAL MEDICINE

## 2019-07-04 PROCEDURE — 25010000002 HYDROMORPHONE PER 4 MG: Performed by: INTERNAL MEDICINE

## 2019-07-04 PROCEDURE — 83930 ASSAY OF BLOOD OSMOLALITY: CPT | Performed by: INTERNAL MEDICINE

## 2019-07-04 RX ORDER — MAGNESIUM SULFATE 1 G/100ML
1 INJECTION INTRAVENOUS AS NEEDED
Status: DISCONTINUED | OUTPATIENT
Start: 2019-07-04 | End: 2019-07-06 | Stop reason: HOSPADM

## 2019-07-04 RX ORDER — NICOTINE POLACRILEX 4 MG
15 LOZENGE BUCCAL
Status: DISCONTINUED | OUTPATIENT
Start: 2019-07-04 | End: 2019-07-06 | Stop reason: HOSPADM

## 2019-07-04 RX ORDER — INSULIN GLARGINE 100 [IU]/ML
120 INJECTION, SOLUTION SUBCUTANEOUS ONCE
Status: COMPLETED | OUTPATIENT
Start: 2019-07-04 | End: 2019-07-04

## 2019-07-04 RX ORDER — LOSARTAN POTASSIUM 50 MG/1
50 TABLET ORAL
Status: DISCONTINUED | OUTPATIENT
Start: 2019-07-04 | End: 2019-07-06 | Stop reason: HOSPADM

## 2019-07-04 RX ORDER — SODIUM CHLORIDE 9 MG/ML
150 INJECTION, SOLUTION INTRAVENOUS CONTINUOUS
Status: DISCONTINUED | OUTPATIENT
Start: 2019-07-04 | End: 2019-07-06 | Stop reason: HOSPADM

## 2019-07-04 RX ORDER — DEXTROSE MONOHYDRATE 25 G/50ML
25 INJECTION, SOLUTION INTRAVENOUS
Status: DISCONTINUED | OUTPATIENT
Start: 2019-07-04 | End: 2019-07-06 | Stop reason: HOSPADM

## 2019-07-04 RX ORDER — MAGNESIUM SULFATE HEPTAHYDRATE 40 MG/ML
2 INJECTION, SOLUTION INTRAVENOUS AS NEEDED
Status: DISCONTINUED | OUTPATIENT
Start: 2019-07-04 | End: 2019-07-06 | Stop reason: HOSPADM

## 2019-07-04 RX ADMIN — INSULIN LISPRO 8 UNITS: 100 INJECTION, SOLUTION INTRAVENOUS; SUBCUTANEOUS at 12:01

## 2019-07-04 RX ADMIN — INSULIN LISPRO 16 UNITS: 100 INJECTION, SOLUTION INTRAVENOUS; SUBCUTANEOUS at 20:05

## 2019-07-04 RX ADMIN — LEVOTHYROXINE SODIUM 112 MCG: 0.11 TABLET ORAL at 06:54

## 2019-07-04 RX ADMIN — GABAPENTIN 400 MG: 400 CAPSULE ORAL at 22:01

## 2019-07-04 RX ADMIN — LEVOFLOXACIN 500 MG: 5 INJECTION, SOLUTION INTRAVENOUS at 15:03

## 2019-07-04 RX ADMIN — SODIUM CHLORIDE 15 UNITS/HR: 9 INJECTION, SOLUTION INTRAVENOUS at 07:56

## 2019-07-04 RX ADMIN — SODIUM CHLORIDE 150 ML/HR: 9 INJECTION, SOLUTION INTRAVENOUS at 16:43

## 2019-07-04 RX ADMIN — METRONIDAZOLE 500 MG: 500 INJECTION, SOLUTION INTRAVENOUS at 01:22

## 2019-07-04 RX ADMIN — POTASSIUM CHLORIDE AND SODIUM CHLORIDE 250 ML/HR: 900; 150 INJECTION, SOLUTION INTRAVENOUS at 05:55

## 2019-07-04 RX ADMIN — GABAPENTIN 400 MG: 400 CAPSULE ORAL at 13:09

## 2019-07-04 RX ADMIN — LOSARTAN POTASSIUM 50 MG: 50 TABLET, FILM COATED ORAL at 10:32

## 2019-07-04 RX ADMIN — HYDROCODONE BITARTRATE AND ACETAMINOPHEN 1 TABLET: 5; 325 TABLET ORAL at 05:35

## 2019-07-04 RX ADMIN — ASPIRIN 81 MG: 81 TABLET, COATED ORAL at 08:00

## 2019-07-04 RX ADMIN — HYDROMORPHONE HYDROCHLORIDE 0.5 MG: 1 INJECTION, SOLUTION INTRAMUSCULAR; INTRAVENOUS; SUBCUTANEOUS at 18:10

## 2019-07-04 RX ADMIN — TOPIRAMATE 50 MG: 50 TABLET, FILM COATED ORAL at 08:00

## 2019-07-04 RX ADMIN — MORPHINE SULFATE 2 MG: 2 INJECTION, SOLUTION INTRAMUSCULAR; INTRAVENOUS at 19:48

## 2019-07-04 RX ADMIN — METRONIDAZOLE 500 MG: 500 INJECTION, SOLUTION INTRAVENOUS at 09:12

## 2019-07-04 RX ADMIN — SODIUM CHLORIDE 11 UNITS/HR: 9 INJECTION, SOLUTION INTRAVENOUS at 01:21

## 2019-07-04 RX ADMIN — Medication 2 TABLET: at 03:26

## 2019-07-04 RX ADMIN — PANTOPRAZOLE SODIUM 40 MG: 40 TABLET, DELAYED RELEASE ORAL at 06:54

## 2019-07-04 RX ADMIN — HYDROMORPHONE HYDROCHLORIDE 0.5 MG: 1 INJECTION, SOLUTION INTRAMUSCULAR; INTRAVENOUS; SUBCUTANEOUS at 23:30

## 2019-07-04 RX ADMIN — ENOXAPARIN SODIUM 40 MG: 40 INJECTION SUBCUTANEOUS at 17:02

## 2019-07-04 RX ADMIN — METRONIDAZOLE 500 MG: 500 INJECTION, SOLUTION INTRAVENOUS at 17:02

## 2019-07-04 RX ADMIN — INSULIN GLARGINE 120 UNITS: 100 INJECTION, SOLUTION SUBCUTANEOUS at 10:56

## 2019-07-04 RX ADMIN — GABAPENTIN 400 MG: 400 CAPSULE ORAL at 06:54

## 2019-07-04 RX ADMIN — INSULIN LISPRO 20 UNITS: 100 INJECTION, SOLUTION INTRAVENOUS; SUBCUTANEOUS at 17:02

## 2019-07-04 RX ADMIN — HYDROMORPHONE HYDROCHLORIDE 0.5 MG: 1 INJECTION, SOLUTION INTRAMUSCULAR; INTRAVENOUS; SUBCUTANEOUS at 13:13

## 2019-07-04 RX ADMIN — SODIUM CHLORIDE 150 ML/HR: 9 INJECTION, SOLUTION INTRAVENOUS at 09:14

## 2019-07-04 RX ADMIN — HYDROMORPHONE HYDROCHLORIDE 0.5 MG: 1 INJECTION, SOLUTION INTRAMUSCULAR; INTRAVENOUS; SUBCUTANEOUS at 02:20

## 2019-07-04 RX ADMIN — MAGNESIUM SULFATE HEPTAHYDRATE 1 G: 1 INJECTION, SOLUTION INTRAVENOUS at 01:26

## 2019-07-04 RX ADMIN — SODIUM CHLORIDE, PRESERVATIVE FREE 3 ML: 5 INJECTION INTRAVENOUS at 08:00

## 2019-07-04 RX ADMIN — SODIUM CHLORIDE 150 ML/HR: 9 INJECTION, SOLUTION INTRAVENOUS at 23:34

## 2019-07-04 RX ADMIN — POTASSIUM CHLORIDE, DEXTROSE MONOHYDRATE AND SODIUM CHLORIDE 250 ML/HR: 150; 5; 450 INJECTION, SOLUTION INTRAVENOUS at 01:22

## 2019-07-04 RX ADMIN — HYDROMORPHONE HYDROCHLORIDE 0.5 MG: 1 INJECTION, SOLUTION INTRAMUSCULAR; INTRAVENOUS; SUBCUTANEOUS at 00:09

## 2019-07-04 RX ADMIN — TOPIRAMATE 50 MG: 50 TABLET, FILM COATED ORAL at 20:05

## 2019-07-04 NOTE — PLAN OF CARE
Problem: Patient Care Overview  Goal: Plan of Care Review  Outcome: Ongoing (interventions implemented as appropriate)   07/04/19 1722   Coping/Psychosocial   Plan of Care Reviewed With patient;spouse   Plan of Care Review   Progress improving   OTHER   Outcome Summary VSS throughout shift. Insulin gtt was d/c this AM & pt is on sliding scale. BS have been 200-300's. He was able to start eating today. He also walked around the unit a couple of times. Dilaudid given x1 for c/o L flank pain. Pt can possibly transfer to the floor. Will continue to monitor per orders.        Problem: Pain, Chronic (Adult)  Goal: Identify Related Risk Factors and Signs and Symptoms  Outcome: Outcome(s) achieved Date Met: 07/04/19 07/04/19 1722   Pain, Chronic (Adult)   Signs and Symptoms (Chronic Pain) BADLs/IADLs, reluctance/inability to perform;fatigue/weakness;questions meaning of pain;verbalization of pain descriptors;verbalization of pain/discomfort for a prolonged time period     Goal: Acceptable Pain/Comfort Level and Functional Ability  Outcome: Ongoing (interventions implemented as appropriate)   07/04/19 1722   Pain, Chronic (Adult)   Acceptable Pain/Comfort Level and Functional Ability making progress toward outcome       Problem: Skin Injury Risk (Adult)  Goal: Identify Related Risk Factors and Signs and Symptoms  Outcome: Outcome(s) achieved Date Met: 07/04/19 07/04/19 1722   Skin Injury Risk (Adult)   Related Risk Factors (Skin Injury Risk) critical care admission;medication     Goal: Skin Health and Integrity  Outcome: Ongoing (interventions implemented as appropriate)      Problem: Fall Risk (Adult)  Goal: Identify Related Risk Factors and Signs and Symptoms  Outcome: Outcome(s) achieved Date Met: 07/04/19 07/04/19 1722   Fall Risk (Adult)   Related Risk Factors (Fall Risk) environment unfamiliar;sleep pattern alteration   Signs and Symptoms (Fall Risk) presence of risk factors     Goal: Absence of Fall  Outcome:  Ongoing (interventions implemented as appropriate)   07/04/19 0077   Fall Risk (Adult)   Absence of Fall making progress toward outcome

## 2019-07-04 NOTE — PROGRESS NOTES
[facility]  [phone]  Riki Collier MD    Subjective:  LOS: 1    Pain in his sides has improved. No diarrhea prior to admit but ++ emesis. Now getting hungry again. Has dm for several years. Has deven but intolerant, saw Dr. Carroll in past. No further n/v or abdominal pain. States he sleeps too restless to tolerate CPAP device. Hx nephrectomy for RCC.    Objective   Vital Signs past 24hrs    Temp range: Temp (24hrs), Av.1 °F (36.7 °C), Min:97.6 °F (36.4 °C), Max:98.5 °F (36.9 °C)    BP range: BP: (128-178)/() 153/101  Pulse range: Heart Rate:  [] 86  Resp rate range: Resp:  [24] 24    Device (Oxygen Therapy): nasal cannulaFlow (L/min):  [2] 2  Oxygen range:SpO2:  [80 %-97 %] 94 %      131 kg (287 lb 14.7 oz); Body mass index is 41.31 kg/m².    Intake/Output Summary (Last 24 hours) at 2019 0902  Last data filed at 2019 0224  Gross per 24 hour   Intake 3558 ml   Output 3675 ml   Net -117 ml       Physical Exam   HENT:   Head: Normocephalic and atraumatic.   Eyes: Pupils are equal, round, and reactive to light.   Cardiovascular: Normal rate and regular rhythm.   No murmur heard.  Pulmonary/Chest: He has no wheezes. He has no rales.   Abdominal: Soft. Bowel sounds are normal. There is no tenderness.   Musculoskeletal: He exhibits no edema.   Neurological: He is alert.   Nursing note and vitals reviewed.    Results Review:    I have reviewed the laboratory and imaging data since the last note by [facility] physician.  My annotations are noted in assessment and plan.    Medication Review:  I have reviewed the current MAR.  My annotations are noted in assessment and plan.      dextrose 5 % and sodium chloride 0.45 % 250 mL/hr    dextrose 5 % and sodium chloride 0.45 % with KCl 20 mEq/L 250 mL/hr Last Rate: Stopped (19 0557)   dextrose 5 % and sodium chloride 0.45 % with KCl 40 mEq/L 250 mL/hr    insulin 13 Units/hr Last Rate: 15 Units/hr (19 6733)   custom IV KCl infusion builder  250 mL/hr    sodium chloride 250 mL/hr    sodium chloride 0.45 % with KCl 20 mEq 250 mL/hr    sodium chloride 250 mL/hr    sodium chloride 10 mL/hr    sodium chloride 0.9 % with KCl 20 mEq 250 mL/hr Last Rate: 250 mL/hr (07/04/19 0555)   sodium chloride 0.9 % with KCl 40 mEq/L 250 mL/hr      Plan   PCCM Problems  HONK  Acute diverticulitis  Relevant Medical Diagnoses  REGAN, intolerant  DM2  COPD without exac  Morbid obesity  Nephrectomy for RCC  HTN    Plan of Treatment  Keep ivf. Taper off insulin drip. SC scale and home meds. Diet.    Continue abx for acute diverticulitis. Finish out 7 days.    Advised to readdress sleep apnea with Dr. Carroll.    Resume home bp meds.    Recheck liver enz tomorrow.    Transfer out of ICU later today if stable.    Riki Collier MD  07/04/19  9:02 AM    Part of this note may be an electronic transcription/translation of spoken language to printed text using the Dragon Dictation System.

## 2019-07-04 NOTE — PROGRESS NOTES
Discharge Planning Assessment  [facility]     Patient Name: Ricardo Pereira  MRN: [id number]  Today's Date: 7/4/2019    Admit Date: 7/3/2019    Discharge Needs Assessment     Row Name 07/04/19 0811       Living Environment    Lives With  child(kendra), adult;child(kendra), dependent;spouse    Current Living Arrangements  home/apartment/condo    Potentially Unsafe Housing Conditions  unable to assess    Primary Care Provided by  self;spouse/significant other    Provides Primary Care For  no one    Family Caregiver if Needed  spouse    Quality of Family Relationships  unable to assess    Able to Return to Prior Arrangements  yes       Resource/Environmental Concerns    Resource/Environmental Concerns  none    Transportation Concerns  car, none       Transition Planning    Patient/Family Anticipates Transition to  home with family    Patient/Family Anticipated Services at Transition  none    Transportation Anticipated  family or friend will provide       Discharge Needs Assessment    Readmission Within the Last 30 Days  no previous admission in last 30 days    Concerns to be Addressed  discharge planning;basic needs    Equipment Currently Used at Home  cane, straight;glucometer    Anticipated Changes Related to Illness  none    Equipment Needed After Discharge  none        Discharge Plan     Row Name 07/04/19 0813       Plan    Plan  Home  Declines  referrral  Encouraged pt to have HH at NH    Plan Comments  IMM noted CCP spoke with patient at bedside to discuss discharge planning.  CCP role explained.  Face sheet verified.  His wife Macrina, [phone]  is his emergency contact.  His PCP is Dr Simba Moore.     Pt lives in house with his wife one adult child and two dependent children.   He is independent with ADL's.  He uses a cane to ambulate.   He has no history of Home Health. He has no history of physical rehab   He obtains his medications from [facility] pharmacy at [facility].  Plan is Home   Pt declines   referral  Encouraged him and explained benefits of HH nursing  Pt agrees to consider it CCP following          Destination      No service coordination in this encounter.      Durable Medical Equipment      No service coordination in this encounter.      Dialysis/Infusion      No service coordination in this encounter.      Home Medical Care      No service coordination in this encounter.      Therapy      No service coordination in this encounter.      Community Resources      No service coordination in this encounter.          Demographic Summary    No documentation.       Functional Status    No documentation.       Psychosocial    No documentation.       Abuse/Neglect    No documentation.       Legal    No documentation.       Substance Abuse    No documentation.       Patient Forms    No documentation.           Sirisha Kate RN

## 2019-07-04 NOTE — PLAN OF CARE
Problem: Patient Care Overview  Goal: Plan of Care Review  Outcome: Ongoing (interventions implemented as appropriate)   07/04/19 0607   Coping/Psychosocial   Plan of Care Reviewed With patient   Plan of Care Review   Progress improving   OTHER   Outcome Summary Pt admitted to CCU for HSS on insulin gtt. Insulin gtt remains on overnight; IVF adjusted per labs. HSS resolved at 1956; Dr. Mei informed and instructed to remain on gtt overnight r/t high insulin requirements. 575 UO, a few ice chips given. Tums given x2 for heartburn that pt has frequently at home, normally relieved with milk. Frequent bilateral flank pain. Pain at baseline for pt is 6, complaining of pain from 8-9. Norco x2, 1.5 Dilaudid given in total. 1g Mag replaced and K replaced in fluids. Pt A&O, otherwise VSS. Will CTM.        Problem: Pain, Chronic (Adult)  Goal: Identify Related Risk Factors and Signs and Symptoms  Outcome: Ongoing (interventions implemented as appropriate)    Goal: Acceptable Pain/Comfort Level and Functional Ability  Outcome: Ongoing (interventions implemented as appropriate)      Problem: Skin Injury Risk (Adult)  Goal: Identify Related Risk Factors and Signs and Symptoms  Outcome: Ongoing (interventions implemented as appropriate)    Goal: Skin Health and Integrity  Outcome: Ongoing (interventions implemented as appropriate)      Problem: Fall Risk (Adult)  Goal: Identify Related Risk Factors and Signs and Symptoms  Outcome: Ongoing (interventions implemented as appropriate)    Goal: Absence of Fall  Outcome: Ongoing (interventions implemented as appropriate)

## 2019-07-05 LAB
ALBUMIN SERPL-MCNC: 3.2 G/DL (ref 3.5–5.2)
ALBUMIN/GLOB SERPL: 1.3 G/DL
ALP SERPL-CCNC: 117 U/L (ref 39–117)
ALT SERPL W P-5'-P-CCNC: 87 U/L (ref 1–41)
ANION GAP SERPL CALCULATED.3IONS-SCNC: 9.4 MMOL/L (ref 5–15)
AST SERPL-CCNC: 85 U/L (ref 1–40)
BILIRUB SERPL-MCNC: 0.2 MG/DL (ref 0.2–1.2)
BUN BLD-MCNC: 11 MG/DL (ref 6–20)
BUN/CREAT SERPL: 10.3 (ref 7–25)
CALCIUM SPEC-SCNC: 8 MG/DL (ref 8.6–10.5)
CHLORIDE SERPL-SCNC: 104 MMOL/L (ref 98–107)
CO2 SERPL-SCNC: 19.6 MMOL/L (ref 22–29)
CREAT BLD-MCNC: 1.07 MG/DL (ref 0.76–1.27)
DEPRECATED RDW RBC AUTO: 43.9 FL (ref 37–54)
ERYTHROCYTE [DISTWIDTH] IN BLOOD BY AUTOMATED COUNT: 12.5 % (ref 12.3–15.4)
GFR SERPL CREATININE-BSD FRML MDRD: 74 ML/MIN/1.73
GLOBULIN UR ELPH-MCNC: 2.5 GM/DL
GLUCOSE BLD-MCNC: 316 MG/DL (ref 65–99)
GLUCOSE BLDC GLUCOMTR-MCNC: 234 MG/DL (ref 70–130)
GLUCOSE BLDC GLUCOMTR-MCNC: 272 MG/DL (ref 70–130)
GLUCOSE BLDC GLUCOMTR-MCNC: 307 MG/DL (ref 70–130)
GLUCOSE BLDC GLUCOMTR-MCNC: 389 MG/DL (ref 70–130)
HCT VFR BLD AUTO: 39.5 % (ref 37.5–51)
HGB BLD-MCNC: 13.5 G/DL (ref 13–17.7)
MAGNESIUM SERPL-MCNC: 1.9 MG/DL (ref 1.6–2.6)
MCH RBC QN AUTO: 32.8 PG (ref 26.6–33)
MCHC RBC AUTO-ENTMCNC: 34.2 G/DL (ref 31.5–35.7)
MCV RBC AUTO: 95.9 FL (ref 79–97)
PHOSPHATE SERPL-MCNC: 2.1 MG/DL (ref 2.5–4.5)
PLATELET # BLD AUTO: 119 10*3/MM3 (ref 140–450)
PMV BLD AUTO: 12.6 FL (ref 6–12)
POTASSIUM BLD-SCNC: 4.2 MMOL/L (ref 3.5–5.2)
PROT SERPL-MCNC: 5.7 G/DL (ref 6–8.5)
RBC # BLD AUTO: 4.12 10*6/MM3 (ref 4.14–5.8)
SODIUM BLD-SCNC: 133 MMOL/L (ref 136–145)
WBC NRBC COR # BLD: 5.37 10*3/MM3 (ref 3.4–10.8)

## 2019-07-05 PROCEDURE — 84100 ASSAY OF PHOSPHORUS: CPT | Performed by: INTERNAL MEDICINE

## 2019-07-05 PROCEDURE — 83735 ASSAY OF MAGNESIUM: CPT | Performed by: INTERNAL MEDICINE

## 2019-07-05 PROCEDURE — 80053 COMPREHEN METABOLIC PANEL: CPT | Performed by: INTERNAL MEDICINE

## 2019-07-05 PROCEDURE — 94799 UNLISTED PULMONARY SVC/PX: CPT

## 2019-07-05 PROCEDURE — 63710000001 INSULIN LISPRO (HUMAN) PER 5 UNITS: Performed by: INTERNAL MEDICINE

## 2019-07-05 PROCEDURE — 25010000002 LEVOFLOXACIN PER 250 MG: Performed by: INTERNAL MEDICINE

## 2019-07-05 PROCEDURE — 82962 GLUCOSE BLOOD TEST: CPT

## 2019-07-05 PROCEDURE — 25010000002 ENOXAPARIN PER 10 MG: Performed by: INTERNAL MEDICINE

## 2019-07-05 PROCEDURE — 85027 COMPLETE CBC AUTOMATED: CPT | Performed by: INTERNAL MEDICINE

## 2019-07-05 RX ADMIN — METRONIDAZOLE 500 MG: 500 INJECTION, SOLUTION INTRAVENOUS at 17:25

## 2019-07-05 RX ADMIN — INSULIN LISPRO 8 UNITS: 100 INJECTION, SOLUTION INTRAVENOUS; SUBCUTANEOUS at 17:02

## 2019-07-05 RX ADMIN — ASPIRIN 81 MG: 81 TABLET, COATED ORAL at 08:34

## 2019-07-05 RX ADMIN — GABAPENTIN 400 MG: 400 CAPSULE ORAL at 06:36

## 2019-07-05 RX ADMIN — LEVOFLOXACIN 500 MG: 5 INJECTION, SOLUTION INTRAVENOUS at 16:23

## 2019-07-05 RX ADMIN — INSULIN LISPRO 20 UNITS: 100 INJECTION, SOLUTION INTRAVENOUS; SUBCUTANEOUS at 08:34

## 2019-07-05 RX ADMIN — TOPIRAMATE 50 MG: 50 TABLET, FILM COATED ORAL at 21:09

## 2019-07-05 RX ADMIN — TOPIRAMATE 50 MG: 50 TABLET, FILM COATED ORAL at 08:35

## 2019-07-05 RX ADMIN — GABAPENTIN 400 MG: 400 CAPSULE ORAL at 14:16

## 2019-07-05 RX ADMIN — LEVOTHYROXINE SODIUM 112 MCG: 0.11 TABLET ORAL at 06:36

## 2019-07-05 RX ADMIN — SODIUM CHLORIDE 150 ML/HR: 9 INJECTION, SOLUTION INTRAVENOUS at 06:25

## 2019-07-05 RX ADMIN — INSULIN LISPRO 12 UNITS: 100 INJECTION, SOLUTION INTRAVENOUS; SUBCUTANEOUS at 12:12

## 2019-07-05 RX ADMIN — INSULIN LISPRO 16 UNITS: 100 INJECTION, SOLUTION INTRAVENOUS; SUBCUTANEOUS at 21:09

## 2019-07-05 RX ADMIN — METRONIDAZOLE 500 MG: 500 INJECTION, SOLUTION INTRAVENOUS at 02:55

## 2019-07-05 RX ADMIN — LOSARTAN POTASSIUM 50 MG: 50 TABLET, FILM COATED ORAL at 08:34

## 2019-07-05 RX ADMIN — METRONIDAZOLE 500 MG: 500 INJECTION, SOLUTION INTRAVENOUS at 09:53

## 2019-07-05 RX ADMIN — PANTOPRAZOLE SODIUM 40 MG: 40 TABLET, DELAYED RELEASE ORAL at 06:35

## 2019-07-05 RX ADMIN — ENOXAPARIN SODIUM 40 MG: 40 INJECTION SUBCUTANEOUS at 17:02

## 2019-07-05 NOTE — PLAN OF CARE
Problem: Patient Care Overview  Goal: Plan of Care Review  Outcome: Ongoing (interventions implemented as appropriate)   07/05/19 2782   Coping/Psychosocial   Plan of Care Reviewed With patient   Plan of Care Review   Progress improving   OTHER   Outcome Summary VSS, on 2L NC while asleep for desats to the low 80s. Pt tolerating food and drinks without increased pain. SSI given. 2.5L UO, less episodes of frequency tonight. IV pain medication x2 for left flank pain. AM labs pending, will CTM. Tele OF awaiting bed.        Problem: Pain, Chronic (Adult)  Goal: Acceptable Pain/Comfort Level and Functional Ability  Outcome: Ongoing (interventions implemented as appropriate)      Problem: Skin Injury Risk (Adult)  Goal: Skin Health and Integrity  Outcome: Ongoing (interventions implemented as appropriate)      Problem: Fall Risk (Adult)  Goal: Absence of Fall  Outcome: Ongoing (interventions implemented as appropriate)

## 2019-07-05 NOTE — PROGRESS NOTES
Continued Stay Note  [facility]     Patient Name: Ricardo Pereira  MRN: [id number]  Today's Date: 7/5/2019    Admit Date: 7/3/2019    Discharge Plan     Row Name 07/05/19 1411       Plan    Plan  Home     Plan Comments  Spoke with pt at bedside. He plans to return home at dc and denies any discharge planning needs. Pt wants to go home and is ready for dc. IM notice given. CCP to follow. JChasteenRN/CCP         Discharge Codes    No documentation.             Karen Layton RN

## 2019-07-05 NOTE — PLAN OF CARE
Problem: Patient Care Overview  Goal: Plan of Care Review  Outcome: Ongoing (interventions implemented as appropriate)   07/05/19 2969   Coping/Psychosocial   Plan of Care Reviewed With patient   Plan of Care Review   Progress no change   OTHER   Outcome Summary IVF cont. Denies pain or nausea. SS insulin as ordered. Cont to monitor.       Problem: Pain, Chronic (Adult)  Goal: Acceptable Pain/Comfort Level and Functional Ability  Outcome: Ongoing (interventions implemented as appropriate)      Problem: Skin Injury Risk (Adult)  Goal: Skin Health and Integrity  Outcome: Ongoing (interventions implemented as appropriate)      Problem: Fall Risk (Adult)  Goal: Absence of Fall  Outcome: Ongoing (interventions implemented as appropriate)

## 2019-07-05 NOTE — CONSULTS
"Diabetes Education  Assessment/Teaching    Patient Name:  Ricardo Pereira  YOB: 1970  MRN: [id number]  Admit Date:  7/3/2019      Assessment Date:  7/5/2019    Most Recent Value   General Information    Referral From:  Database [A1C 11.4%]   Height  177.8 cm (70\")   Height Method  Stated   Weight  133 kg (294 lb 1.5 oz)   Weight Method  Bed scale   Pregnancy Assessment   Diabetes History   What type of diabetes do you have?  Type 2   Length of Diabetes Diagnosis  10 + years   Current DM knowledge  fair   Do you test your blood sugar at home?  yes   Have you had low blood sugar? (<70mg/dl)  yes   How often do you have low blood sugar?  rare   Have you had high blood sugar? (>140mg/dl)  yes   How often do you have high blood sugar?  occasionally   How would you rate your diabetes control?  good   Have You Felt Down, Depressed or Hopeless?  yes   Have You Felt Little Interest or Pleasure in Doing Things?  yes   What makes it difficult for you to take care of your diabetes or yourself?  -- [\"I have my diabetes managed well at home and every time I come in here they get it all wrong\"]   Education Preferences   What areas of diabetes would you like to learn about?  avoiding high blood sugar, stress/coping skills   Nutrition Information   Assessment Topics   Healthy Eating - Assessment  N/A-unable to assess   Being Active - Assessment  N/A- unable to assess   Taking Medication - Assessment  Competent   Problem Solving - Assessment  Needs education   Reducing Risk - Assessment  Needs education   Healthy Coping - Assessment  Needs education   Monitoring - Assessment  Competent   DM Goals   Taking Medication - Goal  0-7 days from discharge   Problem Solving - Goal  0-7 days from discharge   Monitoring - Goal  0-7 days from discharge            Most Recent Value   DM Education Needs   Meter  Has own   Frequency of Testing  3 times a day   Medication  Oral, Insulin [farxiga,jardience,tresiba and novolog]   Problem " Solving  Hyperglycemia, Signs, Symptoms, Treatment   Healthy Coping  Appropriate, Frustrated [pt very irritated with his diabetes care in hospital]   Discharge Plan  Home   Motivation  Moderate   Teaching Method  Discussion, Explanation, Teach back   Patient Response  Verbalized understanding            Other Comments:  Discussed with pt his diabetes management. He states he has much better control of his diabetes and then when he comes in hospital they do not do what is needed with his medicines to help manage his blood sugars.we discussed what he does at home. He also sees Rosemarie Ariza at [facility].He state she takes his meds regularly and eats very healthy.        Electronically signed by:  Lulu Elizabeth RN  07/05/19 4:02 PM

## 2019-07-06 VITALS
HEART RATE: 81 BPM | OXYGEN SATURATION: 98 % | DIASTOLIC BLOOD PRESSURE: 88 MMHG | BODY MASS INDEX: 42.09 KG/M2 | TEMPERATURE: 97.8 F | WEIGHT: 294 LBS | HEIGHT: 70 IN | SYSTOLIC BLOOD PRESSURE: 141 MMHG | RESPIRATION RATE: 18 BRPM

## 2019-07-06 PROBLEM — K57.92 ACUTE DIVERTICULITIS: Status: ACTIVE | Noted: 2019-07-06

## 2019-07-06 PROBLEM — Z90.5 H/O UNILATERAL NEPHRECTOMY: Status: ACTIVE | Noted: 2019-07-06

## 2019-07-06 PROBLEM — G47.33 OSA (OBSTRUCTIVE SLEEP APNEA): Status: ACTIVE | Noted: 2019-07-06

## 2019-07-06 PROBLEM — E11.9 DM2 (DIABETES MELLITUS, TYPE 2): Status: ACTIVE | Noted: 2017-08-29

## 2019-07-06 PROBLEM — Z85.528 HISTORY OF RENAL CELL CARCINOMA: Status: ACTIVE | Noted: 2019-07-06

## 2019-07-06 LAB
GLUCOSE BLDC GLUCOMTR-MCNC: 240 MG/DL (ref 70–130)
GLUCOSE BLDC GLUCOMTR-MCNC: 245 MG/DL (ref 70–130)

## 2019-07-06 PROCEDURE — 82962 GLUCOSE BLOOD TEST: CPT

## 2019-07-06 PROCEDURE — 63710000001 INSULIN LISPRO (HUMAN) PER 5 UNITS: Performed by: INTERNAL MEDICINE

## 2019-07-06 RX ORDER — METRONIDAZOLE 500 MG/1
500 TABLET ORAL EVERY 8 HOURS SCHEDULED
Qty: 7 TABLET | Refills: 0 | Status: SHIPPED | OUTPATIENT
Start: 2019-07-06 | End: 2019-07-09

## 2019-07-06 RX ORDER — METRONIDAZOLE 500 MG/1
500 TABLET ORAL EVERY 8 HOURS SCHEDULED
Status: DISCONTINUED | OUTPATIENT
Start: 2019-07-06 | End: 2019-07-06 | Stop reason: HOSPADM

## 2019-07-06 RX ORDER — LEVOFLOXACIN 500 MG/1
500 TABLET, FILM COATED ORAL EVERY 24 HOURS
Status: DISCONTINUED | OUTPATIENT
Start: 2019-07-06 | End: 2019-07-06 | Stop reason: HOSPADM

## 2019-07-06 RX ORDER — LEVOFLOXACIN 500 MG/1
500 TABLET, FILM COATED ORAL EVERY 24 HOURS
Qty: 3 TABLET | Refills: 0 | Status: SHIPPED | OUTPATIENT
Start: 2019-07-06 | End: 2019-07-09

## 2019-07-06 RX ADMIN — METRONIDAZOLE 500 MG: 500 INJECTION, SOLUTION INTRAVENOUS at 10:00

## 2019-07-06 RX ADMIN — SODIUM CHLORIDE 150 ML/HR: 9 INJECTION, SOLUTION INTRAVENOUS at 00:08

## 2019-07-06 RX ADMIN — ASPIRIN 81 MG: 81 TABLET, COATED ORAL at 08:01

## 2019-07-06 RX ADMIN — INSULIN LISPRO 8 UNITS: 100 INJECTION, SOLUTION INTRAVENOUS; SUBCUTANEOUS at 11:29

## 2019-07-06 RX ADMIN — GABAPENTIN 400 MG: 400 CAPSULE ORAL at 01:12

## 2019-07-06 RX ADMIN — INSULIN LISPRO 8 UNITS: 100 INJECTION, SOLUTION INTRAVENOUS; SUBCUTANEOUS at 07:59

## 2019-07-06 RX ADMIN — PANTOPRAZOLE SODIUM 40 MG: 40 TABLET, DELAYED RELEASE ORAL at 06:58

## 2019-07-06 RX ADMIN — GABAPENTIN 400 MG: 400 CAPSULE ORAL at 06:58

## 2019-07-06 RX ADMIN — METRONIDAZOLE 500 MG: 500 INJECTION, SOLUTION INTRAVENOUS at 01:12

## 2019-07-06 RX ADMIN — SODIUM CHLORIDE, PRESERVATIVE FREE 3 ML: 5 INJECTION INTRAVENOUS at 09:29

## 2019-07-06 RX ADMIN — LOSARTAN POTASSIUM 50 MG: 50 TABLET, FILM COATED ORAL at 08:01

## 2019-07-06 RX ADMIN — LEVOTHYROXINE SODIUM 112 MCG: 0.11 TABLET ORAL at 06:58

## 2019-07-06 RX ADMIN — SODIUM CHLORIDE 150 ML/HR: 9 INJECTION, SOLUTION INTRAVENOUS at 10:00

## 2019-07-06 RX ADMIN — TOPIRAMATE 50 MG: 50 TABLET, FILM COATED ORAL at 08:01

## 2019-07-06 NOTE — DISCHARGE SUMMARY
"Date of Admission: 7/3/2019  Date of Discharge:  7/6/2019    Discharge Diagnosis:    HONK  Acute diverticulitis  Relevant Medical Diagnoses  REGAN, intolerant  DM2  COPD without exac  Morbid obesity  Nephrectomy for RCC  HTN    Hospital Course    Presenting Problem/History of Present Illness    HPI:  Mr. Pereira is a 47yo WM with DMI.  He presents to the ER today with sudden onset of 8/10, sharp left flank pain.  Started 4 days ago, has been present continuously, getting worse.  It is worse with drinking.  He reports no allievating factors.  He has had increased thrist, urinary frequency and n/v as well.  Evaluation in er shows acute divertiulitis and HONK. ICU admission has been requested.    Subsequent Course of Management    48-year-old male came in with pain in his sides.  CT of the abdomen showed possible acute diverticulitis.  Patient had evidence for hyperosmolar nonketotic.  Given IV fluids and insulin.  His condition improved and he is very eager to go home today.  He was moved out of the ICU.  He had some elevated liver enzymes that were coming down.  He will need a follow-up liver enzyme check with his primary care physician which will be ordered.  He otherwise looks well and will be discharged home today. Finish out flagyl and levaquin for acute diverticulitis.    Examination on Date of Discharge    /95 (BP Location: Right arm, Patient Position: Lying)   Pulse 83   Temp 97.9 °F (36.6 °C) (Oral)   Resp 18   Ht 177.8 cm (70\")   Wt 133 kg (294 lb)   SpO2 98%   BMI 42.18 kg/m²     Physical Exam   Constitutional: He appears well-developed.   Cardiovascular: Normal rate and regular rhythm.   No murmur heard.  Pulmonary/Chest: He has no wheezes. He has no rales.   Abdominal: Soft. Bowel sounds are normal. There is no tenderness.   Musculoskeletal: He exhibits no edema.   Neurological: He is alert.   Nursing note and vitals reviewed.      Test Results    Results for orders placed during the hospital " encounter of 02/11/19   Adult Transthoracic Echo Complete W/ Cont if Necessary Per Protocol    Narrative · Calculated EF = 59.0%.  · Left ventricular systolic function is normal.  · Normal echo          Results from last 7 days   Lab Units 07/03/19  1301   PH, ARTERIAL pH units 7.398   PCO2, ARTERIAL mm Hg 36.1   PO2 ART mm Hg 82.2   MODALITY  Room Air   O2 SATURATION CALC % 96.1       Results from last 7 days   Lab Units 07/05/19  0406 07/04/19  0417 07/03/19  1303   WBC 10*3/mm3 5.37 7.93 7.59   HEMOGLOBIN g/dL 13.5 14.5 15.7   HEMATOCRIT % 39.5 42.0 44.2   PLATELETS 10*3/mm3 119* 142 134*       Results from last 7 days   Lab Units 07/05/19  0406 07/04/19  0805 07/04/19  0417 07/04/19  0013 07/03/19  1956 07/03/19  1303   SODIUM mmol/L 133* 137 131* 137 131* 124*   POTASSIUM mmol/L 4.2 4.1 3.6 3.8 3.7 5.1   CHLORIDE mmol/L 104 104 97* 100 93* 84*   CO2 mmol/L 19.6* 20.6* 21.9* 21.3* 22.9 19.8*   BUN mg/dL 11 11 13 14 16 20   CREATININE mg/dL 1.07 0.86 0.98 0.98 1.06 1.24       Results from last 7 days   Lab Units 07/03/19 1956   TROPONIN T ng/mL <0.010       Ct Abdomen Pelvis Without Contrast    Result Date: 7/3/2019  CT ABDOMEN AND PELVIS WITHOUT IV CONTRAST  HISTORY: 48-year-old male with left flank pain. Right nephrectomy in the past for renal cell carcinoma. Appendectomy and hernia repair in the past as well.  TECHNIQUE: Radiation dose reduction techniques were utilized, including automated exposure control and exposure modulation based on body size. 3 mm images were obtained through the abdomen and pelvis without the administration of IV contrast. Compared with previous CT from 05/02/2017.  FINDINGS: There are no left renal or ureteral stones and there is no hydronephrosis bilaterally. There is very mild acute diverticulitis at the proximal sigmoid colon. There is slight stranding adjacent to a thickened diverticulum, but there is no fluid or fluid collection. There is a moderate degree of sigmoid  diverticulosis. There is at least a moderate degree of fatty infiltration of the liver, stable. There is significant weakening of the right lateral abdominal wall musculature, stable appearance. Ventral hernia repair changes are noted. The gallbladder is contracted. Noncontrasted pancreas, adrenals, and left kidney appear unremarkable.      1. There is mild acute diverticulitis at the proximal sigmoid colon. 2. Hepatic steatosis. 3. No left nephroureterolithiasis or hydronephrosis bilaterally.  Discussed with Dr. Madrigal.  This report was finalized on 7/3/2019 4:39 PM by Dr. Lilly Cervantes M.D.      Xr Chest 1 View    Result Date: 7/3/2019  XR CHEST 1 VW-  HISTORY: Male who is 48 years-old,  COPD  TECHNIQUE: Frontal view of the chest  COMPARISON: 02/11/2019  FINDINGS: Heart, mediastinum and pulmonary vasculature are unremarkable. Minimal likely atelectasis at the left base. No pleural effusion, or pneumothorax. Mild pulmonary hyperinflation suggests COPD. No acute osseous process.      Minimal likely atelectasis at the left base. COPD change.  This report was finalized on 7/3/2019 8:25 PM by Dr. Tong Wong M.D.           Discharge Instructions      Dietary Orders (From admission, onward)    Start     Ordered    07/04/19 0909  Diet Regular; Consistent Carbohydrate  Diet Effective Now     Question Answer Comment   Diet Texture / Consistency Regular    Common Modifiers Consistent Carbohydrate        07/04/19 0908                  Your medication list      START taking these medications      Instructions Last Dose Given Next Dose Due   levoFLOXacin 500 MG tablet  Commonly known as:  LEVAQUIN      Take 1 tablet by mouth Daily for 3 doses.       metroNIDAZOLE 500 MG tablet  Commonly known as:  FLAGYL      Take 1 tablet by mouth Every 8 (Eight) Hours for 7 doses.          CONTINUE taking these medications      Instructions Last Dose Given Next Dose Due   arformoterol 15 MCG/2ML nebulizer solution  Commonly known as:   BROVANA      Take 2 mL by nebulization 2 (Two) Times a Day.       aspirin 81 MG EC tablet      Take 1 tablet by mouth Daily.       budesonide 0.5 MG/2ML nebulizer solution  Commonly known as:  PULMICORT      Take 2 mL by nebulization 2 (Two) Times a Day.       FARXIGA 10 MG tablet  Generic drug:  Dapagliflozin Propanediol      Take 1 tablet by mouth Daily.       fenofibrate 145 MG tablet  Commonly known as:  TRICOR      Take 1 tablet by mouth Daily.       gabapentin 800 MG tablet  Commonly known as:  NEURONTIN      Take 1 tablet by mouth 3 (Three) Times a Day.       insulin aspart 100 UNIT/ML solution pen-injector sc pen  Commonly known as:  novoLOG FLEXPEN      Inject 20 Units under the skin into the appropriate area as directed 3 (Three) Times a Day With Meals.       ipratropium-albuterol 0.5-2.5 mg/3 ml nebulizer  Commonly known as:  DUO-NEB      Take 3 mL by nebulization Every 4 (Four) Hours As Needed for Wheezing or Shortness of Air.       JARDIANCE 25 MG tablet  Generic drug:  Empagliflozin      Take 1 tablet by mouth Daily.       levothyroxine 112 MCG tablet  Commonly known as:  SYNTHROID      Take 1 tablet by mouth Daily. On an empty stomach       olmesartan 20 MG tablet  Commonly known as:  BENICAR      Take 20 mg by mouth Daily.       pantoprazole 40 MG EC tablet  Commonly known as:  PROTONIX      Take 40 mg by mouth daily.       rosuvastatin 40 MG tablet  Commonly known as:  CRESTOR      Take 1 tablet by mouth Daily.       topiramate 50 MG tablet  Commonly known as:  TOPAMAX      Take 50 mg by mouth 2 (Two) Times a Day.       TRESIBA FLEXTOUCH 200 UNIT/ML solution pen-injector  Generic drug:  Insulin Degludec      Inject 120 Units under the skin into the appropriate area as directed Every Morning.       VASCEPA 1 g capsule capsule  Generic drug:  icosapent ethyl      Take 2 g by mouth Daily.             Where to Get Your Medications      These medications were sent to [facility] [address] [place] [address]  ([facility], [place] - [address] - [phone]  - [phone] FX  [address] [facility] [place] ([facility] [address]    Phone:  [phone]   · levoFLOXacin 500 MG tablet  · metroNIDAZOLE 500 MG tablet       Additional Instructions for the Follow-ups that You Need to Schedule     Comprehensive Metabolic Panel    Jul 13, 2019 (Approximate)      Fax results to dr. neri    Order Comments:  Fax results to dr. neri                Condition on Discharge:  Carlos Collier MD  07/06/19  1:36 PM    Time: I spent over 30mins in the discharge planning of this patient.    Some of this encounter note is an electronic transcription/translation of spoken language to printed text.

## 2019-07-06 NOTE — PLAN OF CARE
Problem: Patient Care Overview  Goal: Plan of Care Review  Outcome: Ongoing (interventions implemented as appropriate)   07/06/19 0589   Coping/Psychosocial   Plan of Care Reviewed With patient   Plan of Care Review   Progress improving   OTHER   Outcome Summary Has slept intervals. Denies pain. BG cont to be elevated covered withh ss as ordered. VSS. No other changes       Problem: Pain, Chronic (Adult)  Goal: Acceptable Pain/Comfort Level and Functional Ability  Outcome: Ongoing (interventions implemented as appropriate)      Problem: Skin Injury Risk (Adult)  Goal: Skin Health and Integrity  Outcome: Ongoing (interventions implemented as appropriate)      Problem: Fall Risk (Adult)  Goal: Absence of Fall  Outcome: Ongoing (interventions implemented as appropriate)

## 2019-07-06 NOTE — PLAN OF CARE
Problem: Patient Care Overview  Goal: Plan of Care Review  Outcome: Outcome(s) achieved Date Met: 07/06/19    Goal: Individualization and Mutuality  Outcome: Outcome(s) achieved Date Met: 07/06/19    Goal: Discharge Needs Assessment  Outcome: Outcome(s) achieved Date Met: 07/06/19    Goal: Interprofessional Rounds/Family Conf  Outcome: Outcome(s) achieved Date Met: 07/06/19      Problem: Skin Injury Risk (Adult)  Goal: Skin Health and Integrity  Outcome: Outcome(s) achieved Date Met: 07/06/19

## 2019-07-07 ENCOUNTER — READMISSION MANAGEMENT (OUTPATIENT)
Dept: CALL CENTER | Facility: HOSPITAL | Age: 49
End: 2019-07-07

## 2019-07-07 NOTE — OUTREACH NOTE
Prep Survey      Responses   Facility patient discharged from?  [facility]   Is patient eligible?  Yes   Discharge diagnosis  Hyperosmolar (nonketotic) coma   Does the patient have one of the following disease processes/diagnoses(primary or secondary)?  Other   Does the patient have Home health ordered?  No   Is there a DME ordered?  No   Prep survey completed?  Yes          Ana Bartholomew RN

## 2019-07-08 NOTE — PROGRESS NOTES
Case Management Discharge Note    Final Note: Pt dc'd home    Destination      No service has been selected for the patient.      Durable Medical Equipment      No service has been selected for the patient.      Dialysis/Infusion      No service has been selected for the patient.      Home Medical Care      No service has been selected for the patient.      Therapy      No service has been selected for the patient.      Community Resources      No service has been selected for the patient.        Transportation Services  Private: Car    Final Discharge Disposition Code: 01 - home or self-care

## 2019-07-09 ENCOUNTER — READMISSION MANAGEMENT (OUTPATIENT)
Dept: CALL CENTER | Facility: HOSPITAL | Age: 49
End: 2019-07-09

## 2019-07-09 NOTE — OUTREACH NOTE
Medical Week 1 Survey      Responses   Facility patient discharged from?  [facility]   Does the patient have one of the following disease processes/diagnoses(primary or secondary)?  Other   Is there a successful TCM telephone encounter documented?  No   Week 1 attempt successful?  No   Unsuccessful attempts  Attempt 1          Jessica Osborn RN

## 2019-07-11 ENCOUNTER — READMISSION MANAGEMENT (OUTPATIENT)
Dept: CALL CENTER | Facility: HOSPITAL | Age: 49
End: 2019-07-11

## 2019-07-11 NOTE — OUTREACH NOTE
Medical Week 1 Survey      Responses   Facility patient discharged from?  [place]   Does the patient have one of the following disease processes/diagnoses(primary or secondary)?  Other   Is there a successful TCM telephone encounter documented?  No   Week 1 attempt successful?  No          Kenyatta Rios RN

## 2019-07-15 ENCOUNTER — OFFICE VISIT (OUTPATIENT)
Dept: FAMILY MEDICINE CLINIC | Facility: CLINIC | Age: 49
End: 2019-07-15

## 2019-07-15 ENCOUNTER — READMISSION MANAGEMENT (OUTPATIENT)
Dept: CALL CENTER | Facility: HOSPITAL | Age: 49
End: 2019-07-15

## 2019-07-15 VITALS
WEIGHT: 283 LBS | HEART RATE: 92 BPM | OXYGEN SATURATION: 96 % | SYSTOLIC BLOOD PRESSURE: 130 MMHG | TEMPERATURE: 99.2 F | HEIGHT: 70 IN | DIASTOLIC BLOOD PRESSURE: 72 MMHG | BODY MASS INDEX: 40.52 KG/M2

## 2019-07-15 DIAGNOSIS — Z09 HOSPITAL DISCHARGE FOLLOW-UP: Primary | ICD-10-CM

## 2019-07-15 DIAGNOSIS — I10 ESSENTIAL HYPERTENSION: ICD-10-CM

## 2019-07-15 DIAGNOSIS — Z79.4 TYPE 2 DIABETES MELLITUS WITH STAGE 3 CHRONIC KIDNEY DISEASE, WITH LONG-TERM CURRENT USE OF INSULIN (HCC): ICD-10-CM

## 2019-07-15 DIAGNOSIS — K57.92 ACUTE DIVERTICULITIS: ICD-10-CM

## 2019-07-15 DIAGNOSIS — E11.22 TYPE 2 DIABETES MELLITUS WITH STAGE 3 CHRONIC KIDNEY DISEASE, WITH LONG-TERM CURRENT USE OF INSULIN (HCC): ICD-10-CM

## 2019-07-15 DIAGNOSIS — L84 FOOT CALLUS: ICD-10-CM

## 2019-07-15 DIAGNOSIS — B35.1 ONYCHOMYCOSIS: ICD-10-CM

## 2019-07-15 DIAGNOSIS — N18.30 TYPE 2 DIABETES MELLITUS WITH STAGE 3 CHRONIC KIDNEY DISEASE, WITH LONG-TERM CURRENT USE OF INSULIN (HCC): ICD-10-CM

## 2019-07-15 DIAGNOSIS — E87.1 HYPONATREMIA: ICD-10-CM

## 2019-07-15 PROBLEM — E11.40 DIABETIC NEUROPATHY: Status: ACTIVE | Noted: 2019-07-15

## 2019-07-15 PROCEDURE — 99495 TRANSJ CARE MGMT MOD F2F 14D: CPT | Performed by: INTERNAL MEDICINE

## 2019-07-15 NOTE — OUTREACH NOTE
Medical Week 2 Survey      Responses   Facility patient discharged from?  [facility]   Does the patient have one of the following disease processes/diagnoses(primary or secondary)?  Other   Week 2 attempt successful?  No   Unsuccessful attempts  Attempt 1          Dexter Birmingham RN

## 2019-07-15 NOTE — PROGRESS NOTES
"Subjective   Ricardo Pereira is a 48 y.o. male.     Chief Complaint   Patient presents with   • Diabetes     [facility] 07/04 - 07/06   • Diverticulitis       History of Present Illness     Ricardo Pereira is a 48 y.o. male who presents for a transitional care management visit.    Within 48 business hours after discharge our office contacted him via telephone to coordinate his care and needs.      I reviewed and discussed the details of that call along with the discharge summary, hospital problems, inpatient lab results, inpatient diagnostic studies, and consultation reports with Ricardo.     Current outpatient and discharge medications have been reconciled for the patient.    No flowsheet data found.  Risk for Readmission (LACE) Score: 12 (7/6/2019  6:00 AM)    Patient was admitted to ICU at [facility] 7/3/19-7/6/19 and diagnosed with diverticulitis, hyponatremia (discharged 133), DKA uncontrolled DM.  Had echo that was normal, CT abd showing diverticulosis.  Discharged on levaquin and flagyll.  Restarted on the home insulin and follow up with endocrinology in 3 weeks.  Has completed the antibiotic and feels better but home BS running higher than normal and was 242 this morning.  The breathing is doing well and COPD is stable.  Feeling much better with no pain, N, V, rashes.  The following portions of the patient's history were reviewed and updated as appropriate: allergies, current medications, past family history, past medical history, past social history, past surgical history and problem list.    Past Medical History:   Diagnosis Date   • Anxiety    • Cancer (CMS/[facility])    • Chronic pain     flank pain \" permenantly damaged muscle\"   • COPD (chronic obstructive pulmonary disease) (CMS/HCC)    • Depression    • Diabetes mellitus (CMS/HCC)     Insulin pump   • Diabetic neuropathy (CMS/HCC)    • Disease of thyroid gland     hypothyroid   • Diverticulitis of colon    • Fibromyalgia    • History of pancreatitis "     X 2   • History of renal cell cancer     stage 4 , Rt kidney   • Hyperlipidemia    • Hypertension    • Obstructive sleep apnea    • Pre-syncope    • Stroke (CMS/HCC)    • SVT (supraventricular tachycardia) (CMS/HCC)    • Type 2 diabetes mellitus (CMS/HCC)    • Umbilical hernia        Past Surgical History:   Procedure Laterality Date   • APPENDECTOMY     • COLONOSCOPY  2014    tics   • HERNIA REPAIR      umbilical   • NEPHRECTOMY Right    • VENTRAL/INCISIONAL HERNIA REPAIR N/A 5/11/2017    Procedure: VENTRAL/INCISIONAL HERNIA REPAIR LAPAROSCOPIC, RECURRENT INCISION, WITH MESH AND AHEDLYSIS;  Surgeon: Albni Bee MD;  Location: [facility] OR;  Service:        Family History   Family history unknown: Yes       Social History     Socioeconomic History   • Marital status:      Spouse name: Not on file   • Number of children: Not on file   • Years of education: Not on file   • Highest education level: Not on file   Tobacco Use   • Smoking status: Former Smoker     Types: Cigars   • Smokeless tobacco: Never Used   Substance and Sexual Activity   • Alcohol use: Yes   • Drug use: No   • Sexual activity: Defer       Review of Systems   Constitutional: Positive for unexpected weight loss. Negative for activity change, appetite change, chills and fatigue.   HENT: Negative for congestion, ear pain, hearing loss and voice change.    Eyes: Negative for blurred vision, double vision and visual disturbance.   Respiratory: Negative for cough, chest tightness and wheezing.    Cardiovascular: Negative for chest pain, palpitations and leg swelling.   Gastrointestinal: Negative for abdominal distention, abdominal pain, blood in stool, constipation, diarrhea, nausea, vomiting, GERD and indigestion.   Musculoskeletal: Negative for arthralgias, back pain and neck pain.   Neurological: Negative for dizziness, tremors, seizures, syncope, weakness and confusion.   Hematological: Negative for adenopathy.    Psychiatric/Behavioral: Negative for agitation, behavioral problems, suicidal ideas and depressed mood.       Objective   Vitals:    07/15/19 0834   BP: 130/72   Pulse: 92   Temp: 99.2 °F (37.3 °C)   SpO2: 96%     Body mass index is 40.61 kg/m².  Physical Exam   Constitutional: Vital signs are normal. He appears well-developed and well-nourished. He appears cachectic.  Non-toxic appearance. He does not have a sickly appearance. He does not appear ill. No distress. He is obese.  HENT:   Head: Normocephalic and atraumatic.   Eyes: Conjunctivae, EOM and lids are normal. Pupils are equal, round, and reactive to light.   Cardiovascular: Normal rate, regular rhythm, normal heart sounds and normal pulses.   Pulmonary/Chest: No apnea, no tachypnea and no bradypnea. No respiratory distress. He has no decreased breath sounds. He has no wheezes. He has no rhonchi.   Abdominal: Soft. Bowel sounds are normal. He exhibits no distension, no ascites and no mass. There is no tenderness.   Musculoskeletal: Normal range of motion.    Diabetic foot exam performed: dorsum of both great toes with callus and onychomycosis  in left 1st-3rd and left 1st -3rd  toenails.   During the foot exam he had a monofilament test performed.    Neurological Sensory Findings - Altered hot/cold right ankle/foot discrimination and altered hot/cold left ankle/foot discrimination. Altered sharp/dull right ankle/foot discrimination and altered sharp/dull left ankle/foot discrimination. Right ankle/foot altered proprioception and left ankle/foot altered proprioception.  Vascular Status -  His right foot exhibits normal foot vasculature  and no edema. His left foot exhibits normal foot vasculature  and no edema.  Neurological: He is alert.   Skin: Capillary refill takes less than 2 seconds. Turgor is normal.         Assessment/Plan   Ricardo was seen today for diabetes and diverticulitis.    Diagnoses and all orders for this visit:    Hospital discharge  follow-up    Acute diverticulitis    Hyponatremia    Type 2 diabetes mellitus with stage 3 chronic kidney disease, with long-term current use of insulin (CMS/[facility])  -     Ambulatory Referral to Podiatry    Essential hypertension    Onychomycosis  -     Ambulatory Referral to Podiatry    Foot callus  -     Ambulatory Referral to Podiatry        Continue the current medications and follow up with endocrinology with the labs as ordered in the chart.  Follow the home BS.  Call or follow up if recurring or new problems.    Will refer to podiatry and no refills are needed at this time.

## 2019-07-16 ENCOUNTER — READMISSION MANAGEMENT (OUTPATIENT)
Dept: CALL CENTER | Facility: HOSPITAL | Age: 49
End: 2019-07-16

## 2019-07-16 NOTE — OUTREACH NOTE
Medical Week 2 Survey      Responses   Facility patient discharged from?  [place]   Does the patient have one of the following disease processes/diagnoses(primary or secondary)?  Other   Week 2 attempt successful?  No   Unsuccessful attempts  Attempt 2          Shelley Brewer RN

## 2019-08-02 ENCOUNTER — RESULTS ENCOUNTER (OUTPATIENT)
Dept: ENDOCRINOLOGY | Age: 49
End: 2019-08-02

## 2019-08-02 DIAGNOSIS — I10 ESSENTIAL HYPERTENSION: ICD-10-CM

## 2019-08-02 DIAGNOSIS — E55.9 VITAMIN D DEFICIENCY: ICD-10-CM

## 2019-08-02 DIAGNOSIS — E03.8 SECONDARY HYPOTHYROIDISM: ICD-10-CM

## 2019-08-02 DIAGNOSIS — E03.8 HYPOTHYROIDISM DUE TO HASHIMOTO'S THYROIDITIS: Chronic | ICD-10-CM

## 2019-08-02 DIAGNOSIS — IMO0002 UNCONTROLLED TYPE 2 DIABETES MELLITUS WITH COMPLICATION, WITHOUT LONG-TERM CURRENT USE OF INSULIN: ICD-10-CM

## 2019-08-02 DIAGNOSIS — E08.43 DIABETIC AUTONOMIC NEUROPATHY ASSOCIATED WITH DIABETES MELLITUS DUE TO UNDERLYING CONDITION (HCC): ICD-10-CM

## 2019-08-02 DIAGNOSIS — E11.43 DIABETIC AUTONOMIC NEUROPATHY ASSOCIATED WITH TYPE 2 DIABETES MELLITUS (HCC): ICD-10-CM

## 2019-08-02 DIAGNOSIS — K86.1 OTHER CHRONIC PANCREATITIS (HCC): ICD-10-CM

## 2019-08-02 DIAGNOSIS — E78.2 MIXED HYPERLIPIDEMIA: ICD-10-CM

## 2019-08-02 DIAGNOSIS — E06.3 HYPOTHYROIDISM DUE TO HASHIMOTO'S THYROIDITIS: Chronic | ICD-10-CM

## 2019-08-02 DIAGNOSIS — B35.1 TOENAIL FUNGUS: ICD-10-CM

## 2019-08-06 DIAGNOSIS — E03.8 SECONDARY HYPOTHYROIDISM: ICD-10-CM

## 2019-08-06 DIAGNOSIS — E55.9 VITAMIN D DEFICIENCY: ICD-10-CM

## 2019-08-06 DIAGNOSIS — N18.30 TYPE 2 DIABETES MELLITUS WITH STAGE 3 CHRONIC KIDNEY DISEASE, WITH LONG-TERM CURRENT USE OF INSULIN (HCC): ICD-10-CM

## 2019-08-06 DIAGNOSIS — E11.22 TYPE 2 DIABETES MELLITUS WITH STAGE 3 CHRONIC KIDNEY DISEASE, WITH LONG-TERM CURRENT USE OF INSULIN (HCC): ICD-10-CM

## 2019-08-06 DIAGNOSIS — E78.2 MIXED HYPERLIPIDEMIA: Primary | ICD-10-CM

## 2019-08-06 DIAGNOSIS — Z79.4 TYPE 2 DIABETES MELLITUS WITH STAGE 3 CHRONIC KIDNEY DISEASE, WITH LONG-TERM CURRENT USE OF INSULIN (HCC): ICD-10-CM

## 2019-08-07 ENCOUNTER — LAB (OUTPATIENT)
Dept: ENDOCRINOLOGY | Age: 49
End: 2019-08-07

## 2019-08-07 DIAGNOSIS — N18.30 TYPE 2 DIABETES MELLITUS WITH STAGE 3 CHRONIC KIDNEY DISEASE, WITH LONG-TERM CURRENT USE OF INSULIN (HCC): ICD-10-CM

## 2019-08-07 DIAGNOSIS — E03.8 SECONDARY HYPOTHYROIDISM: ICD-10-CM

## 2019-08-07 DIAGNOSIS — E78.2 MIXED HYPERLIPIDEMIA: ICD-10-CM

## 2019-08-07 DIAGNOSIS — E11.22 TYPE 2 DIABETES MELLITUS WITH STAGE 3 CHRONIC KIDNEY DISEASE, WITH LONG-TERM CURRENT USE OF INSULIN (HCC): ICD-10-CM

## 2019-08-07 DIAGNOSIS — E55.9 VITAMIN D DEFICIENCY: ICD-10-CM

## 2019-08-07 DIAGNOSIS — Z79.4 TYPE 2 DIABETES MELLITUS WITH STAGE 3 CHRONIC KIDNEY DISEASE, WITH LONG-TERM CURRENT USE OF INSULIN (HCC): ICD-10-CM

## 2019-08-08 LAB
25(OH)D3+25(OH)D2 SERPL-MCNC: 26.3 NG/ML (ref 30–100)
ALBUMIN SERPL-MCNC: 4.4 G/DL (ref 3.5–5.2)
ALBUMIN/GLOB SERPL: 1.6 G/DL
ALP SERPL-CCNC: 104 U/L (ref 39–117)
ALT SERPL-CCNC: 61 U/L (ref 1–41)
AST SERPL-CCNC: 46 U/L (ref 1–40)
BILIRUB SERPL-MCNC: 0.3 MG/DL (ref 0.2–1.2)
BUN SERPL-MCNC: 13 MG/DL (ref 6–20)
BUN/CREAT SERPL: 12 (ref 7–25)
C PEPTIDE SERPL-MCNC: 9.7 NG/ML (ref 1.1–4.4)
CALCIUM SERPL-MCNC: 9.8 MG/DL (ref 8.6–10.5)
CHLORIDE SERPL-SCNC: 100 MMOL/L (ref 98–107)
CHOLEST SERPL-MCNC: 200 MG/DL (ref 0–200)
CO2 SERPL-SCNC: 26.6 MMOL/L (ref 22–29)
CREAT SERPL-MCNC: 1.08 MG/DL (ref 0.76–1.27)
FT4I SERPL CALC-MCNC: 2.6 (ref 1.2–4.9)
GLOBULIN SER CALC-MCNC: 2.7 GM/DL
GLUCOSE SERPL-MCNC: 266 MG/DL (ref 65–99)
HBA1C MFR BLD: 10.1 % (ref 4.8–5.6)
HDLC SERPL-MCNC: 21 MG/DL (ref 40–60)
INTERPRETATION: NORMAL
INTERPRETATION: NORMAL
LDLC SERPL CALC-MCNC: ABNORMAL MG/DL
Lab: NORMAL
Lab: NORMAL
MICROALBUMIN UR-MCNC: <3 UG/ML
POTASSIUM SERPL-SCNC: 4.7 MMOL/L (ref 3.5–5.2)
PROT SERPL-MCNC: 7.1 G/DL (ref 6–8.5)
SODIUM SERPL-SCNC: 139 MMOL/L (ref 136–145)
T3FREE SERPL-MCNC: 3 PG/ML (ref 2–4.4)
T3RU NFR SERPL: 27 % (ref 24–39)
T4 FREE SERPL-MCNC: 1.52 NG/DL (ref 0.93–1.7)
T4 SERPL-MCNC: 9.5 UG/DL (ref 4.5–12)
TRIGL SERPL-MCNC: 625 MG/DL (ref 0–150)
TSH SERPL DL<=0.005 MIU/L-ACNC: 2.47 UIU/ML (ref 0.45–4.5)
VLDLC SERPL CALC-MCNC: ABNORMAL MG/DL

## 2019-08-21 ENCOUNTER — OFFICE VISIT (OUTPATIENT)
Dept: ENDOCRINOLOGY | Age: 49
End: 2019-08-21

## 2019-08-21 VITALS
BODY MASS INDEX: 40.37 KG/M2 | HEIGHT: 70 IN | SYSTOLIC BLOOD PRESSURE: 124 MMHG | WEIGHT: 282 LBS | OXYGEN SATURATION: 99 % | DIASTOLIC BLOOD PRESSURE: 78 MMHG | HEART RATE: 92 BPM

## 2019-08-21 DIAGNOSIS — E08.43 DIABETIC AUTONOMIC NEUROPATHY ASSOCIATED WITH DIABETES MELLITUS DUE TO UNDERLYING CONDITION (HCC): ICD-10-CM

## 2019-08-21 DIAGNOSIS — E55.9 VITAMIN D DEFICIENCY: ICD-10-CM

## 2019-08-21 DIAGNOSIS — E03.8 SECONDARY HYPOTHYROIDISM: ICD-10-CM

## 2019-08-21 DIAGNOSIS — IMO0002 UNCONTROLLED TYPE 2 DIABETES MELLITUS WITH COMPLICATION, WITHOUT LONG-TERM CURRENT USE OF INSULIN: Primary | ICD-10-CM

## 2019-08-21 DIAGNOSIS — E03.8 HYPOTHYROIDISM DUE TO HASHIMOTO'S THYROIDITIS: ICD-10-CM

## 2019-08-21 DIAGNOSIS — E06.3 HYPOTHYROIDISM DUE TO HASHIMOTO'S THYROIDITIS: ICD-10-CM

## 2019-08-21 DIAGNOSIS — E78.2 MIXED HYPERLIPIDEMIA: ICD-10-CM

## 2019-08-21 DIAGNOSIS — K86.1 OTHER CHRONIC PANCREATITIS (HCC): ICD-10-CM

## 2019-08-21 DIAGNOSIS — I10 ESSENTIAL HYPERTENSION: ICD-10-CM

## 2019-08-21 PROCEDURE — 99214 OFFICE O/P EST MOD 30 MIN: CPT | Performed by: NURSE PRACTITIONER

## 2019-08-21 RX ORDER — ROSUVASTATIN CALCIUM 40 MG/1
40 TABLET, COATED ORAL DAILY
Qty: 90 TABLET | Refills: 3 | Status: SHIPPED | OUTPATIENT
Start: 2019-08-21 | End: 2020-01-08 | Stop reason: SDUPTHER

## 2019-08-21 RX ORDER — FENOFIBRATE 145 MG/1
145 TABLET, COATED ORAL DAILY
Qty: 90 TABLET | Refills: 3 | Status: SHIPPED | OUTPATIENT
Start: 2019-08-21 | End: 2020-08-20

## 2019-08-21 RX ORDER — FLASH GLUCOSE SENSOR
1 KIT MISCELLANEOUS CONTINUOUS
Qty: 3 EACH | Refills: 6 | Status: SHIPPED | OUTPATIENT
Start: 2019-08-21 | End: 2019-08-22 | Stop reason: SDUPTHER

## 2019-08-21 RX ORDER — ICOSAPENT ETHYL 1000 MG/1
2 CAPSULE ORAL 2 TIMES DAILY WITH MEALS
Qty: 360 CAPSULE | Refills: 1 | Status: SHIPPED | OUTPATIENT
Start: 2019-08-21 | End: 2020-05-14 | Stop reason: SDUPTHER

## 2019-08-21 RX ORDER — LEVOTHYROXINE SODIUM 112 UG/1
112 TABLET ORAL DAILY
Qty: 30 TABLET | Refills: 4 | Status: SHIPPED | OUTPATIENT
Start: 2019-08-21 | End: 2020-04-10 | Stop reason: SDUPTHER

## 2019-08-21 RX ORDER — INSULIN HUMAN 500 [IU]/ML
20 INJECTION, SOLUTION SUBCUTANEOUS
Qty: 6 PEN | Refills: 4 | Status: SHIPPED | OUTPATIENT
Start: 2019-08-21 | End: 2019-08-26 | Stop reason: SDUPTHER

## 2019-08-21 RX ORDER — GABAPENTIN 800 MG/1
800 TABLET ORAL 3 TIMES DAILY
Qty: 90 TABLET | Refills: 2 | Status: SHIPPED | OUTPATIENT
Start: 2019-08-21 | End: 2020-05-14 | Stop reason: SDUPTHER

## 2019-08-21 RX ORDER — FLASH GLUCOSE SENSOR
1 KIT MISCELLANEOUS ONCE
Qty: 1 DEVICE | Refills: 0 | Status: SHIPPED | OUTPATIENT
Start: 2019-08-21 | End: 2019-08-22 | Stop reason: SDUPTHER

## 2019-08-21 NOTE — PROGRESS NOTES
Ricardo Pereira  presents to the office  for the follow-up appointment for Type 2 diabetes mellitus.     The diabete's condition location is throughout the system with the  clinical course has fluctuated, the severity is Mild, and the modifying/allievating factors are insulin.  Medications and  Dosages were  reviewed with Ricardo Pereira and suggested that compliance most of the time.    The patient reports associated symptoms of hyperglycemia have been irritability and associated symptoms of hypoglycemia have been fatigue and irritability, with their  hypoglycemia threshold for symptoms is 80 mg/dl .     The patient is currently on insulin.     Compliance with blood glucose monitoring: good.     Meal panning: The patient is using avoidance of concentrated sweets.    The patient is currently taking home blood tests - Blood glucose testing: 3 times daily, that are:  before each meal   basal insulIn  Tresiba U200 120 units in AM   Novolog U100 20 units with meals plus 1 unit for every 20 over 140     Last instructions given were:  In summary I saw and examined this 48-year-old gentleman for above-mentioned problems.  I reviewed his laboratory evaluation of April 3, 2019 and provided him with a hard copy of it.  His hemoglobin A1c is 8.00 and his total cholesterol and LDL are 218 and 135 respectively with a triglyceride of 278 and a very low vitamin D of 18.5.  I asked him to purchase vitamin D3 5000 units and take 2 capsules daily.  I am also adding Jardiance 10 mg daily for 4 weeks with samples and 25 mg every morning moving forward.  We will also refer him to podiatrist for treating his toenail fungus as well as prescribing for him diabetic shoes if he needs it.  This office visit lasted 45 minutes with 25 minutes of this being spent on face-to-face counseling education and planning his care moving forward.  He will continue rest of his prescriptions and will see MsMaxwell Rosemarie Ariza in 4 months or sooner if needed with  laboratory evaluation prior to each office visit.    Home blood glucose testing daily: 3  FB to 290 mg/dl  before lunch 150 to 280 mg/dl  before dinner/supper 170 to 250 mg/dl    Last reported blood glucose readings are: None.    Patterns reported per patient are that he was hospitalized in July and his BS have been running high ever since then.          The following portions of the patient's history were reviewed and updated as appropriate: current medications, past family history, past medical history, past social history, past surgical history and problem list.      Current Outpatient Medications:   •  arformoterol (BROVANA) 15 MCG/2ML nebulizer solution, Take 2 mL by nebulization 2 (Two) Times a Day., Disp: 120 mL, Rfl: 0  •  aspirin 81 MG EC tablet, Take 1 tablet by mouth Daily., Disp: 90 tablet, Rfl: 1  •  budesonide (PULMICORT) 0.5 MG/2ML nebulizer solution, Take 2 mL by nebulization 2 (Two) Times a Day., Disp: 60 each, Rfl: 0  •  Empagliflozin (JARDIANCE) 25 MG tablet, Take 25 mg by mouth Daily., Disp: 30 tablet, Rfl: 4  •  fenofibrate (TRICOR) 145 MG tablet, Take 1 tablet by mouth Daily., Disp: 90 tablet, Rfl: 3  •  gabapentin (NEURONTIN) 800 MG tablet, Take 1 tablet by mouth 3 (Three) Times a Day., Disp: 90 tablet, Rfl: 2  •  icosapent ethyl (VASCEPA) 1 g capsule capsule, Take 2 g by mouth 2 (Two) Times a Day With Meals., Disp: 360 capsule, Rfl: 1  •  ipratropium-albuterol (DUO-NEB) 0.5-2.5 mg/mL nebulizer, Take 3 mL by nebulization Every 4 (Four) Hours As Needed for Wheezing or Shortness of Air., Disp: 360 mL, Rfl: 0  •  levothyroxine (SYNTHROID) 112 MCG tablet, Take 1 tablet by mouth Daily. On an empty stomach, Disp: 30 tablet, Rfl: 4  •  olmesartan (BENICAR) 20 MG tablet, Take 20 mg by mouth Daily., Disp: , Rfl:   •  pantoprazole (PROTONIX) 40 MG EC tablet, Take 40 mg by mouth daily., Disp: , Rfl:   •  rosuvastatin (CRESTOR) 40 MG tablet, Take 1 tablet by mouth Daily., Disp: 90 tablet, Rfl: 3  •   topiramate (TOPAMAX) 50 MG tablet, Take 50 mg by mouth 2 (Two) Times a Day., Disp: , Rfl:   •  Continuous Blood Gluc  (FREESTYLE LOVE READER) device, 1 Device 1 (One) Time for 1 dose., Disp: 1 Device, Rfl: 0  •  Continuous Blood Gluc Sensor (FREESTYLE LOVE SENSOR SYSTEM), 1 Device Continuous for 14 days. One sensor every 14 days, Disp: 3 each, Rfl: 6  •  HUMULIN R U-500 KWIKPEN 500 UNIT/ML solution pen-injector CONCENTRATED injection, Inject 0.04 mL under the skin into the appropriate area as directed 3 (Three) Times a Day Before Meals. Titrate as instructed max 200u daily, Disp: 6 pen, Rfl: 4  •  Insulin Glargine 300 UNIT/ML solution pen-injector, Inject 110 Units under the skin into the appropriate area as directed Daily. Inject 200 units, Disp: 12 pen, Rfl: 4    Patient Active Problem List    Diagnosis   • Hospital discharge follow-up [Z09]   • Hyponatremia [E87.1]   • Diabetic neuropathy (CMS/HCC) [E11.40]   • Onychomycosis [B35.1]   • Acute diverticulitis [K57.92]   • REGAN (obstructive sleep apnea) [G47.33]   • History of renal cell carcinoma [Z85.528]   • H/O unilateral nephrectomy [Z90.5]   • Hyperosmolar (nonketotic) coma (CMS/HCC) [E11.01]   • COPD exacerbation (CMS/HCC) [J44.1]   • Morbid obesity due to excess calories (CMS/HCC) [E66.01]   • Hypothyroidism [E03.9]   • COPD (chronic obstructive pulmonary disease) (CMS/HCC) [J44.9]   • Acute exacerbation of chronic obstructive pulmonary disease (COPD) (CMS/HCC) [J44.1]   • DM2 (diabetes mellitus, type 2) (CMS/HCC) [E11.9]   • Vitamin D deficiency [E55.9]   • Chronic pancreatitis (CMS/HCC) [K86.1]   • Mixed hyperlipidemia [E78.2]   • Abnormal endocrine laboratory test finding [R68.89]   • Secondary hypothyroidism [E03.8]   • Hyperglycemia [R73.9]   • Essential hypertension [I10]       Review of Systems   A comprehensive review of the 14 systems was negative except of listed below:  Constitutional: fatigue  Respiratory: positive for dyspnea at rest  "and dyspnea on exertion  Endocrine: hyperglycemia     Objective:     Wt Readings from Last 3 Encounters:   08/21/19 128 kg (282 lb)   07/15/19 128 kg (283 lb)   07/06/19 133 kg (294 lb)     Temp Readings from Last 3 Encounters:   07/15/19 99.2 °F (37.3 °C) (Oral)   07/06/19 97.8 °F (36.6 °C) (Oral)   02/14/19 97.5 °F (36.4 °C) (Oral)     BP Readings from Last 3 Encounters:   08/21/19 124/78   07/15/19 130/72   07/06/19 141/88     Pulse Readings from Last 3 Encounters:   08/21/19 92   07/15/19 92   07/06/19 81        /78   Pulse 92   Ht 177.8 cm (70\")   Wt 128 kg (282 lb)   SpO2 99%   BMI 40.46 kg/m²        Physical Exam   Constitutional: He is oriented to person, place, and time. He appears well-developed and well-nourished. No distress.   HENT:   Head: Normocephalic and atraumatic.   Eyes: EOM are normal. Pupils are equal, round, and reactive to light.   Neck: Normal range of motion. Neck supple. No thyromegaly present.   Cardiovascular: Normal rate, regular rhythm, normal heart sounds and intact distal pulses.   No murmur heard.  Pulmonary/Chest: Effort normal. He has wheezes.   Abdominal: Soft. Bowel sounds are normal.   Musculoskeletal: Normal range of motion.   Neurological: He is alert and oriented to person, place, and time.   Skin: Skin is warm and dry. Capillary refill takes 2 to 3 seconds. He is not diaphoretic.   Psychiatric: He has a normal mood and affect. His behavior is normal. Judgment and thought content normal.   Nursing note and vitals reviewed.          Lab Review  Results for orders placed or performed in visit on 08/07/19   Lipid Panel   Result Value Ref Range    Total Cholesterol 200 0 - 200 mg/dL    Triglycerides 625 (H) 0 - 150 mg/dL    HDL Cholesterol 21 (L) 40 - 60 mg/dL    VLDL Cholesterol CANCELED mg/dL    LDL Cholesterol  CANCELED mg/dL   Thyroid Panel With TSH   Result Value Ref Range    TSH 2.470 0.450 - 4.500 uIU/mL    T4, Total 9.5 4.5 - 12.0 ug/dL    T3 Uptake 27 24 - 39 " %    Free Thyroxine Index 2.6 1.2 - 4.9   T3, Free   Result Value Ref Range    T3, Free 3.0 2.0 - 4.4 pg/mL   T4, Free   Result Value Ref Range    Free T4 1.52 0.93 - 1.70 ng/dL   Vitamin D 25 Hydroxy   Result Value Ref Range    25 Hydroxy, Vitamin D 26.3 (L) 30.0 - 100.0 ng/ml   MicroAlbumin, Urine, Random - Urine, Clean Catch   Result Value Ref Range    Microalbumin, Urine <3.0 Not Estab. ug/mL   Hemoglobin A1c   Result Value Ref Range    Hemoglobin A1C 10.10 (H) 4.80 - 5.60 %   C-Peptide   Result Value Ref Range    C-Peptide 9.7 (H) 1.1 - 4.4 ng/mL   Comprehensive Metabolic Panel   Result Value Ref Range    Glucose 266 (H) 65 - 99 mg/dL    BUN 13 6 - 20 mg/dL    Creatinine 1.08 0.76 - 1.27 mg/dL    eGFR Non African Am 74 >60 mL/min/1.73    eGFR African Am 89 >60 mL/min/1.73    BUN/Creatinine Ratio 12.0 7.0 - 25.0    Sodium 139 136 - 145 mmol/L    Potassium 4.7 3.5 - 5.2 mmol/L    Chloride 100 98 - 107 mmol/L    Total CO2 26.6 22.0 - 29.0 mmol/L    Calcium 9.8 8.6 - 10.5 mg/dL    Total Protein 7.1 6.0 - 8.5 g/dL    Albumin 4.40 3.50 - 5.20 g/dL    Globulin 2.7 gm/dL    A/G Ratio 1.6 g/dL    Total Bilirubin 0.3 0.2 - 1.2 mg/dL    Alkaline Phosphatase 104 39 - 117 U/L    AST (SGOT) 46 (H) 1 - 40 U/L    ALT (SGPT) 61 (H) 1 - 41 U/L   [facility] CKD Program   Result Value Ref Range    Interpretation Note    Cardiovascular Risk Assessment   Result Value Ref Range    Interpretation Note     PDF Image Not applicable    Diabetes Patient Education   Result Value Ref Range    PDF Image Not applicable            Assessment:   Ricardo was seen today for follow-up.    Diagnoses and all orders for this visit:    Uncontrolled type 2 diabetes mellitus with complication, without long-term current use of insulin (CMS/[facility])  -     Insulin Glargine 300 UNIT/ML solution pen-injector; Inject 110 Units under the skin into the appropriate area as directed Daily. Inject 200 units  -     HUMULIN R U-500 KWIKPEN 500 UNIT/ML solution  pen-injector CONCENTRATED injection; Inject 0.04 mL under the skin into the appropriate area as directed 3 (Three) Times a Day Before Meals. Titrate as instructed max 200u daily  -     Empagliflozin (JARDIANCE) 25 MG tablet; Take 25 mg by mouth Daily.  -     Continuous Blood Gluc  (FREESTYLE LOVE READER) device; 1 Device 1 (One) Time for 1 dose.  -     Continuous Blood Gluc Sensor (FREESTYLE LOVE SENSOR SYSTEM); 1 Device Continuous for 14 days. One sensor every 14 days  -     Fructosamine; Future  -     Cortisol; Future  -     Lipoprotein NMR; Future  -     LDL Cholesterol, Direct; Future  -     Comprehensive Metabolic Panel; Future  -     Hemoglobin A1c; Future  -     Microalbumin / Creatinine Urine Ratio - Urine, Clean Catch; Future  -     Vitamin D 25 Hydroxy; Future  -     Uric Acid; Future  -     TSH; Future  -     T4, Free; Future  -     Thyroid Antibodies; Future  -     T3, Free; Future    Hypothyroidism due to Hashimoto's thyroiditis  -     Comprehensive Metabolic Panel; Future    Essential hypertension  -     Comprehensive Metabolic Panel; Future    Other chronic pancreatitis (CMS/HCC)  -     Comprehensive Metabolic Panel; Future    Diabetic autonomic neuropathy associated with diabetes mellitus due to underlying condition (CMS/HCC)  -     gabapentin (NEURONTIN) 800 MG tablet; Take 1 tablet by mouth 3 (Three) Times a Day.  -     Comprehensive Metabolic Panel; Future    Vitamin D deficiency  -     Comprehensive Metabolic Panel; Future  -     Vitamin D 25 Hydroxy; Future    Mixed hyperlipidemia  -     fenofibrate (TRICOR) 145 MG tablet; Take 1 tablet by mouth Daily.  -     rosuvastatin (CRESTOR) 40 MG tablet; Take 1 tablet by mouth Daily.  -     icosapent ethyl (VASCEPA) 1 g capsule capsule; Take 2 g by mouth 2 (Two) Times a Day With Meals.  -     Lipoprotein NMR; Future  -     LDL Cholesterol, Direct; Future  -     Comprehensive Metabolic Panel; Future    Secondary hypothyroidism  -      levothyroxine (SYNTHROID) 112 MCG tablet; Take 1 tablet by mouth Daily. On an empty stomach  -     Comprehensive Metabolic Panel; Future  -     TSH; Future  -     T4, Free; Future  -     Thyroid Antibodies; Future  -     T3, Free; Future          Plan:   In summary/ Medication changes: I met with this patient is metabolically stable and doing well at this time.  Laboratory testing was reviewed dated 8.7.2019, discussed with questions and answers completed.  Discussed and formulate a treatment plan with patient, patient verbally stated understood all instructions.    1. Diagnoses and all orders for this visit:    Uncontrolled type 2 diabetes mellitus with complication, without long-term current use of insulin (CMS/[facility])- chronic, uncontrolled.  Medication changes were as follows  Stop tresiba    Start toujeo at 100 units in AM increase 2u daily if fasting is over 100mg/dl    Stop the novolog    Start humulin u500 at 20u before each meal.  140-149: 1 units  150-159: 1 units  160-169: 1 units  170-179: 2 units  180-189: 2 units  190-199: 3 units  200-220: 3 units  220+   4 units    Continue the Jardiance    Instructions:  home blood tests -  Blood glucose testing: 3-4 times daily, that are:  fasting- 1st thing in morning before eating or drinking  before each meal and 1 or 2 hours after meal  bedtime  anytime you feel symptoms of hyperglycemia or hypoglycemia (high or low blood sugars)    -     Insulin Glargine 300 UNIT/ML solution pen-injector; Inject 110 Units under the skin into the appropriate area as directed Daily. Inject 200 units  -     HUMULIN R U-500 KWIKPEN 500 UNIT/ML solution pen-injector CONCENTRATED injection; Inject 0.04 mL under the skin into the appropriate area as directed 3 (Three) Times a Day Before Meals. Titrate as instructed max 200u daily  -     Empagliflozin (JARDIANCE) 25 MG tablet; Take 25 mg by mouth Daily.  -     Continuous Blood Gluc  (FREESTYLE LOVE READER) device; 1 Device 1  (One) Time for 1 dose.  -     Continuous Blood Gluc Sensor (FREESTYLE LOVE SENSOR SYSTEM); 1 Device Continuous for 14 days. One sensor every 14 days    Hypothyroidism due to Hashimoto's thyroiditis- chronic, stable no medication changes at this time. Refills prescribed. Future labs ordered for upcoming appointment and assessment.       Essential hypertension- chronic, stable no medication changes at this time. Refills prescribed. Future labs ordered for upcoming appointment and assessment.        Other chronic pancreatitis (CMS/[facility])- follow up recent hospitalizations - will treat with above changes.     Diabetic autonomic neuropathy associated with diabetes mellitus due to underlying condition (CMS/[facility])- chronic, stable no medication changes at this time. Refills prescribed. Future labs ordered for upcoming appointment and assessment.   -     gabapentin (NEURONTIN) 800 MG tablet; Take 1 tablet by mouth 3 (Three) Times a Day.     Vitamin D deficiency-  chronic, uncontrolled.  Medication changes were as follows   Instructed patient to take 20,000 OTC daily     Mixed hyperlipidemia- chronic, uncontrolled.  Medication changes were as follows increased vascepa to 2 cap twice daily. Will work up for familial hyperlipidemia with repatha or praluent    -     fenofibrate (TRICOR) 145 MG tablet; Take 1 tablet by mouth Daily.  -     rosuvastatin (CRESTOR) 40 MG tablet; Take 1 tablet by mouth Daily.  -     icosapent ethyl (VASCEPA) 1 g capsule capsule; Take 2 g by mouth 2 (Two) Times a Day With Meals.             Education:  interpretation of lab results, blood sugar goals, complications of diabetes mellitus, hypoglycemia prevention and treatment, exercise, illness management, self-monitoring of blood glucose skills, nutrition, carbohydrate counting, site rotation, use of insulin pen, insulin adjustments, self-injection of insulin, use of sliding scale/correction formula and use of insulin: carb ratio        The total face to  face time spent was  25 minutes  with additional education given: 14 minutes (greater than 50% of the total time) was spent with counseling and coordination of care on: SMBG with goals, Side effects profiles with medications, medication use and purposes,    Return in about 3 months (around 11/21/2019), or if symptoms worsen or fail to improve, for Recheck. 3 months with Rosemarie-2 weeks prior for labs        Dragon transcription disclaimer     Much of this encounter note is an electronic transcription/translation of spoken language to printed text. The electronic translation of spoken language may permit erroneous, or at times, nonsensical words or phrases to be inadvertently transcribed. Although I have reviewed the note for such errors, some may still exist.

## 2019-08-21 NOTE — PATIENT INSTRUCTIONS
Stop tresiba    Start toujeo at 100 units in AM increase 2u daily if fasting is over 100mg/dl    Stop the novolog    Start humulin u500 at 20u before each meal.  140-149: 1 units  150-159: 1 units  160-169: 1 units  170-179: 2 units  180-189: 2 units  190-199: 3 units  200-220: 3 units  220+   4 units    Continue the Jardiance    Instructions:  home blood tests -  Blood glucose testing: 3-4 times daily, that are:  fasting- 1st thing in morning before eating or drinking  before each meal and 1 or 2 hours after meal  bedtime  anytime you feel symptoms of hyperglycemia or hypoglycemia (high or low blood sugars)    Increase vascepa 1g - to 2 cap twice daily

## 2019-08-22 DIAGNOSIS — IMO0002 UNCONTROLLED TYPE 2 DIABETES MELLITUS WITH COMPLICATION, WITHOUT LONG-TERM CURRENT USE OF INSULIN: ICD-10-CM

## 2019-08-22 RX ORDER — FLASH GLUCOSE SENSOR
1 KIT MISCELLANEOUS CONTINUOUS
Qty: 2 DEVICE | Refills: 5 | Status: SHIPPED | OUTPATIENT
Start: 2019-08-22 | End: 2019-08-22 | Stop reason: SDUPTHER

## 2019-08-22 RX ORDER — FLASH GLUCOSE SENSOR
KIT MISCELLANEOUS
Qty: 2 EACH | Refills: 6 | Status: SHIPPED | OUTPATIENT
Start: 2019-08-22 | End: 2020-05-14 | Stop reason: CLARIF

## 2019-08-22 RX ORDER — FLASH GLUCOSE SENSOR
1 KIT MISCELLANEOUS ONCE
Qty: 1 DEVICE | Refills: 0 | Status: SHIPPED | OUTPATIENT
Start: 2019-08-22 | End: 2019-08-22

## 2019-08-26 DIAGNOSIS — IMO0002 UNCONTROLLED TYPE 2 DIABETES MELLITUS WITH COMPLICATION, WITHOUT LONG-TERM CURRENT USE OF INSULIN: ICD-10-CM

## 2019-08-26 RX ORDER — INSULIN HUMAN 500 [IU]/ML
20 INJECTION, SOLUTION SUBCUTANEOUS
Qty: 6 PEN | Refills: 4 | Status: SHIPPED | OUTPATIENT
Start: 2019-08-26 | End: 2020-05-08 | Stop reason: SDUPTHER

## 2019-08-27 ENCOUNTER — TELEPHONE (OUTPATIENT)
Dept: ENDOCRINOLOGY | Age: 49
End: 2019-08-27

## 2019-08-27 NOTE — TELEPHONE ENCOUNTER
Spoke with patient and explained all of the new instructions. Asked patient to repeat instructions to ensure understanding.     ----- Message from MADHU Malagon sent at 8/26/2019  5:10 PM EDT -----  140-149: 5 units  150-159: 5 units  160-169: 5 units  170-179: 10 units  180-189: 10 units  190-199: 15 units  200-220: 15 units  220+   20 units     ----- Message -----  From: Codi Fung MA  Sent: 8/26/2019   3:57 PM  To: MADHU Malagon    I spoke with patient and he is asking about the sliding scale. He states that the U500 pen only has increments of 5. The sliding scale he got is in increments of 1. He is asking how he is supposed to convert that. Please advise.     ----- Message -----  From: Rosemarie Ariza APRN  Sent: 8/26/2019   2:38 PM  To: Codi Fung MA    Stop the toujeo  Re-ordered and calculated the U500.  Patient is to take 40 units before each meal with the same titration.  Patient is to call in blood glucose levels in approximately 1 week.    ----- Message -----  From: Codi Fung MA  Sent: 8/23/2019  11:12 AM  To: MADHU Malagon        ----- Message -----  From: Una Bruno MA  Sent: 8/23/2019  10:26 AM  To: Codi Fung MA    Pa denied for toujeo. Pt needs to try badaglar or levemir

## 2019-11-21 ENCOUNTER — RESULTS ENCOUNTER (OUTPATIENT)
Dept: ENDOCRINOLOGY | Age: 49
End: 2019-11-21

## 2019-11-21 DIAGNOSIS — K86.1 OTHER CHRONIC PANCREATITIS (HCC): ICD-10-CM

## 2019-11-21 DIAGNOSIS — I10 ESSENTIAL HYPERTENSION: ICD-10-CM

## 2019-11-21 DIAGNOSIS — E08.43 DIABETIC AUTONOMIC NEUROPATHY ASSOCIATED WITH DIABETES MELLITUS DUE TO UNDERLYING CONDITION (HCC): ICD-10-CM

## 2019-11-21 DIAGNOSIS — E03.8 SECONDARY HYPOTHYROIDISM: ICD-10-CM

## 2019-11-21 DIAGNOSIS — E78.2 MIXED HYPERLIPIDEMIA: ICD-10-CM

## 2019-11-21 DIAGNOSIS — IMO0002 UNCONTROLLED TYPE 2 DIABETES MELLITUS WITH COMPLICATION, WITHOUT LONG-TERM CURRENT USE OF INSULIN: ICD-10-CM

## 2019-11-21 DIAGNOSIS — E55.9 VITAMIN D DEFICIENCY: ICD-10-CM

## 2019-11-21 DIAGNOSIS — E06.3 HYPOTHYROIDISM DUE TO HASHIMOTO'S THYROIDITIS: ICD-10-CM

## 2019-11-21 DIAGNOSIS — E03.8 HYPOTHYROIDISM DUE TO HASHIMOTO'S THYROIDITIS: ICD-10-CM

## 2019-11-25 ENCOUNTER — OFFICE VISIT (OUTPATIENT)
Dept: FAMILY MEDICINE CLINIC | Facility: CLINIC | Age: 49
End: 2019-11-25

## 2019-11-25 VITALS
DIASTOLIC BLOOD PRESSURE: 64 MMHG | WEIGHT: 277.6 LBS | BODY MASS INDEX: 39.74 KG/M2 | OXYGEN SATURATION: 95 % | HEIGHT: 70 IN | TEMPERATURE: 98.5 F | HEART RATE: 98 BPM | SYSTOLIC BLOOD PRESSURE: 106 MMHG

## 2019-11-25 DIAGNOSIS — R10.9 CHRONIC RIGHT FLANK PAIN: ICD-10-CM

## 2019-11-25 DIAGNOSIS — M54.50 SEVERE LUMBAR PAIN: Primary | ICD-10-CM

## 2019-11-25 DIAGNOSIS — M54.10 BACK PAIN WITH RADICULOPATHY: ICD-10-CM

## 2019-11-25 DIAGNOSIS — Z23 NEED FOR IMMUNIZATION AGAINST INFLUENZA: ICD-10-CM

## 2019-11-25 DIAGNOSIS — G89.29 CHRONIC RIGHT FLANK PAIN: ICD-10-CM

## 2019-11-25 PROCEDURE — 90686 IIV4 VACC NO PRSV 0.5 ML IM: CPT | Performed by: INTERNAL MEDICINE

## 2019-11-25 PROCEDURE — 90471 IMMUNIZATION ADMIN: CPT | Performed by: INTERNAL MEDICINE

## 2019-11-25 PROCEDURE — 99214 OFFICE O/P EST MOD 30 MIN: CPT | Performed by: INTERNAL MEDICINE

## 2019-11-25 RX ORDER — PEN NEEDLE, DIABETIC 32GX 5/32"
NEEDLE, DISPOSABLE MISCELLANEOUS
Refills: 11 | COMMUNITY
Start: 2019-08-29 | End: 2020-01-08 | Stop reason: SDUPTHER

## 2019-11-25 RX ORDER — BACLOFEN 10 MG/1
10 TABLET ORAL 3 TIMES DAILY
Qty: 30 TABLET | Refills: 0 | Status: SHIPPED | OUTPATIENT
Start: 2019-11-25 | End: 2020-05-14

## 2019-11-25 RX ORDER — METHYLPREDNISOLONE 4 MG/1
TABLET ORAL
Qty: 21 TABLET | Refills: 0 | Status: SHIPPED | OUTPATIENT
Start: 2019-11-25 | End: 2020-05-14 | Stop reason: ALTCHOICE
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
## 2020-01-08 ENCOUNTER — TELEPHONE (OUTPATIENT)
Dept: ENDOCRINOLOGY | Age: 50
End: 2020-01-08

## 2020-01-08 DIAGNOSIS — E78.2 MIXED HYPERLIPIDEMIA: ICD-10-CM

## 2020-01-08 RX ORDER — PEN NEEDLE, DIABETIC 32GX 5/32"
NEEDLE, DISPOSABLE MISCELLANEOUS
Qty: 400 EACH | Refills: 0 | Status: SHIPPED | OUTPATIENT
Start: 2020-01-08 | End: 2020-03-25 | Stop reason: SDUPTHER

## 2020-01-08 RX ORDER — ROSUVASTATIN CALCIUM 40 MG/1
40 TABLET, COATED ORAL DAILY
Qty: 90 TABLET | Refills: 0 | Status: SHIPPED | OUTPATIENT
Start: 2020-01-08 | End: 2020-05-14 | Stop reason: SDUPTHER

## 2020-01-08 NOTE — TELEPHONE ENCOUNTER
Patient was to see ricky in jan  He has appt with dayan in may already scheduled    Pharmacy has sent over request   For rosuvastatin 40 mg  Also for his pen needles    Send to [facility]  90 day supply    He is trying to get in to see someone in jan or feb   I dont see any openings    If there is a problem with sending medication in please call him   He states that he has been trying to get the medication  For a week     He has been out for a week     Pharmacy says he needs approval for the script    Patient [phone]

## 2020-03-09 ENCOUNTER — OFFICE VISIT (OUTPATIENT)
Dept: FAMILY MEDICINE CLINIC | Facility: CLINIC | Age: 50
End: 2020-03-09

## 2020-03-09 VITALS
HEART RATE: 100 BPM | WEIGHT: 266 LBS | TEMPERATURE: 96.6 F | SYSTOLIC BLOOD PRESSURE: 110 MMHG | OXYGEN SATURATION: 95 % | HEIGHT: 70 IN | BODY MASS INDEX: 38.08 KG/M2 | DIASTOLIC BLOOD PRESSURE: 76 MMHG

## 2020-03-09 DIAGNOSIS — I73.9 CLAUDICATION (HCC): ICD-10-CM

## 2020-03-09 DIAGNOSIS — J40 BRONCHITIS: ICD-10-CM

## 2020-03-09 DIAGNOSIS — Z00.00 ENCOUNTER FOR MEDICARE ANNUAL WELLNESS EXAM: Primary | ICD-10-CM

## 2020-03-09 DIAGNOSIS — I10 ESSENTIAL HYPERTENSION: ICD-10-CM

## 2020-03-09 PROCEDURE — G0439 PPPS, SUBSEQ VISIT: HCPCS | Performed by: FAMILY MEDICINE

## 2020-03-09 PROCEDURE — 99214 OFFICE O/P EST MOD 30 MIN: CPT | Performed by: FAMILY MEDICINE

## 2020-03-09 RX ORDER — PANTOPRAZOLE SODIUM 40 MG/1
40 TABLET, DELAYED RELEASE ORAL DAILY
Qty: 90 TABLET | Refills: 0 | Status: SHIPPED | OUTPATIENT
Start: 2020-03-09 | End: 2020-07-27

## 2020-03-09 RX ORDER — AZITHROMYCIN 250 MG/1
TABLET, FILM COATED ORAL
Qty: 6 TABLET | Refills: 0 | Status: SHIPPED | OUTPATIENT
Start: 2020-03-09 | End: 2020-05-14
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
## 2020-03-09 NOTE — PROGRESS NOTES
The ABCs of the Annual Wellness Visit  Subsequent Medicare Wellness Visit    Chief Complaint   Patient presents with   • Cough     C/o cough and chest congestion, tightness in chest. Started using a breathing treatment to help with wheezing. This started last week. Pt was taking care of sick son, and tends to get sick easily. Hx of bronchitis.        Subjective   History of Present Illness:  Ricardo Pereira is a 49 y.o. male who presents for a Subsequent Medicare Wellness Visit and  See above.  .  He has a week long hx of cough, congestion and sinus pain.  He is coughing green and brown phlegm low grade fever yesterday.  His son is sick and has similar issues.  He gets bronchitis easily with his hx of copd.   Using otc meds and not really helping much  Hypertension-no chest pain, blurry vision, headaches or palpitations..    He may have some claudication in both legs but was told by neurologist that it was not neuropathy.        HEALTH RISK ASSESSMENT    Recent Hospitalizations:  No hospitalization(s) within the last year.    Current Medical Providers:  Patient Care Team:  Simba Moore MD as PCP - General (Internal Medicine)    Smoking Status:  Social History     Tobacco Use   Smoking Status Former Smoker   • Types: Cigars   Smokeless Tobacco Never Used       Alcohol Consumption:  Social History     Substance and Sexual Activity   Alcohol Use Yes       Depression Screen:   PHQ-2/PHQ-9 Depression Screening 3/9/2020   Little interest or pleasure in doing things 0   Feeling down, depressed, or hopeless 0   Total Score 0       Fall Risk Screen:  STEADI Fall Risk Assessment has not been completed.    Health Habits and Functional and Cognitive Screening:  Functional & Cognitive Status 3/9/2020   Do you have difficulty preparing food and eating? No   Do you have difficulty bathing yourself, getting dressed or grooming yourself? No   Do you have difficulty using the toilet? No   Do you have difficulty moving around from  place to place? No   Do you have trouble with steps or getting out of a bed or a chair? No   Current Diet Well Balanced Diet   Dental Exam Up to date   Eye Exam Up to date   Exercise (times per week) 2 times per week   Current Exercise Activities Include Walking   Do you need help using the phone?  No   Are you deaf or do you have serious difficulty hearing?  No   Do you need help with transportation? No   Do you need help shopping? No   Do you need help preparing meals?  No   Do you need help with housework?  No   Do you need help with laundry? No   Do you need help taking your medications? No   Do you need help managing money? No   Do you ever drive or ride in a car without wearing a seat belt? No   Have you felt unusual stress, anger or loneliness in the last month? No   Who do you live with? Spouse   If you need help, do you have trouble finding someone available to you? No   Have you been bothered in the last four weeks by sexual problems? No   Do you have difficulty concentrating, remembering or making decisions? No         Does the patient have evidence of cognitive impairment? No    Asprin use counseling:Taking ASA appropriately as indicated    Age-appropriate Screening Schedule:  Refer to the list below for future screening recommendations based on patient's age, sex and/or medical conditions. Orders for these recommended tests are listed in the plan section. The patient has been provided with a written plan.    Health Maintenance   Topic Date Due   • TDAP/TD VACCINES (1 - Tdap) 08/21/1983   • DIABETIC FOOT EXAM  06/17/2016   • DIABETIC EYE EXAM  06/17/2016   • PNEUMOCOCCAL VACCINE (19-64 HIGHEST RISK) (2 of 3 - PCV13) 05/12/2018   • HEMOGLOBIN A1C  02/07/2020   • LIPID PANEL  08/07/2020   • URINE MICROALBUMIN  08/07/2020   • INFLUENZA VACCINE  Completed          The following portions of the patient's history were reviewed and updated as appropriate: allergies, current medications, past family history,  past medical history, past social history, past surgical history and problem list.    Outpatient Medications Prior to Visit   Medication Sig Dispense Refill   • arformoterol (BROVANA) 15 MCG/2ML nebulizer solution Take 2 mL by nebulization 2 (Two) Times a Day. 120 mL 0   • aspirin 81 MG EC tablet Take 1 tablet by mouth Daily. 90 tablet 1   • baclofen (LIORESAL) 10 MG tablet Take 1 tablet by mouth 3 (Three) Times a Day. 30 tablet 0   • BD PEN NEEDLE AUDREY U/F 32G X 4 MM misc Use 4 times a day 400 each 0   • budesonide (PULMICORT) 0.5 MG/2ML nebulizer solution Take 2 mL by nebulization 2 (Two) Times a Day. 60 each 0   • Continuous Blood Gluc Sensor ([employer] SENSOR SYSTEM) One sensor every 14 days 2 each 6   • Empagliflozin (JARDIANCE) 25 MG tablet Take 25 mg by mouth Daily. 30 tablet 4   • fenofibrate (TRICOR) 145 MG tablet Take 1 tablet by mouth Daily. 90 tablet 3   • gabapentin (NEURONTIN) 800 MG tablet Take 1 tablet by mouth 3 (Three) Times a Day. 90 tablet 2   • HUMULIN R U-500 KWIKPEN 500 UNIT/ML solution pen-injector CONCENTRATED injection Inject 0.04 mL under the skin into the appropriate area as directed 3 (Three) Times a Day Before Meals. 40u AC, titration max 150u daily 6 pen 4   • icosapent ethyl (VASCEPA) 1 g capsule capsule Take 2 g by mouth 2 (Two) Times a Day With Meals. 360 capsule 1   • ipratropium-albuterol (DUO-NEB) 0.5-2.5 mg/mL nebulizer Take 3 mL by nebulization Every 4 (Four) Hours As Needed for Wheezing or Shortness of Air. 360 mL 0   • levothyroxine (SYNTHROID) 112 MCG tablet Take 1 tablet by mouth Daily. On an empty stomach 30 tablet 4   • methylPREDNISolone (MEDROL, CY,) 4 MG tablet Take as directed on package instructions. 21 tablet 0   • olmesartan (BENICAR) 20 MG tablet Take 20 mg by mouth Daily.     • pantoprazole (PROTONIX) 40 MG EC tablet Take 40 mg by mouth daily.     • rosuvastatin (CRESTOR) 40 MG tablet Take 1 tablet by mouth Daily. 90 tablet 0   • topiramate (TOPAMAX) 50 MG  "tablet Take 50 mg by mouth 2 (Two) Times a Day.       No facility-administered medications prior to visit.        Patient Active Problem List   Diagnosis   • DM2 (diabetes mellitus, type 2) (CMS/[facility])   • Vitamin D deficiency   • Chronic pancreatitis (CMS/[facility])   • Mixed hyperlipidemia   • Abnormal endocrine laboratory test finding   • Secondary hypothyroidism   • Hyperglycemia   • Essential hypertension   • COPD (chronic obstructive pulmonary disease) (CMS/[facility])   • Acute exacerbation of chronic obstructive pulmonary disease (COPD) (CMS/[facility])   • Hypothyroidism   • Morbid obesity due to excess calories (CMS/[facility])   • COPD exacerbation (CMS/[facility])   • Hyperosmolar (nonketotic) coma (CMS/[facility])   • Acute diverticulitis   • REGAN (obstructive sleep apnea)   • History of renal cell carcinoma   • H/O unilateral nephrectomy   • Hospital discharge follow-up   • Hyponatremia   • Diabetic neuropathy (CMS/[facility])   • Onychomycosis   • Severe lumbar pain   • Back pain with radiculopathy   • Chronic right flank pain   • Bronchitis   • Encounter for Medicare annual wellness exam   • Claudication (CMS/[facility])       Advanced Care Planning:  ACP discussion was held with the patient during this visit. Patient does not have an advance directive, information provided.    Review of Systems   All other systems reviewed and are negative.      Compared to one year ago, the patient feels his physical health is the same.  Compared to one year ago, the patient feels his mental health is the same.    Reviewed chart for potential of high risk medication in the elderly: yes  Reviewed chart for potential of harmful drug interactions in the elderly:yes    Objective         Vitals:    03/09/20 0959   BP: 110/76   Pulse: 100   Temp: 96.6 °F (35.9 °C)   SpO2: 95%   Weight: 121 kg (266 lb)   Height: 177.8 cm (70\")       Body mass index is 38.17 kg/m².  Discussed the patient's BMI with him. The BMI is above average; BMI management plan is completed.    Physical Exam "   Constitutional: He appears well-developed and well-nourished.   Neck: Normal range of motion. Neck supple.   Cardiovascular: Normal rate, regular rhythm and normal heart sounds.   Pulmonary/Chest: Effort normal. No respiratory distress. He has wheezes.   Exp wheezing and course bs.   Nursing note and vitals reviewed.            Assessment/Plan   Medicare Risks and Personalized Health Plan  CMS Preventative Services Quick Reference  Advance Directive Discussion    The above risks/problems have been discussed with the patient.  Pertinent information has been shared with the patient in the After Visit Summary.  Follow up plans and orders are seen below in the Assessment/Plan Section.    Diagnoses and all orders for this visit:    1. Encounter for Medicare annual wellness exam (Primary)    2. Bronchitis  -     azithromycin (ZITHROMAX) 250 MG tablet; Take 2 tablets the first day, then 1 tablet daily for 4 days.  Dispense: 6 tablet; Refill: 0    3. Claudication (CMS/[facility])  -     Doppler Ankle Brachial Index Single Level CAR; Future    4. Essential hypertension      Follow Up:  No follow-ups on file.   MWE done and patient to work on diet and exercise for Preventive Counseling.   Blank living will given to pt for him and his wife.   An After Visit Summary and PPPS were given to the patient.  Will order alexandro  Symptomatic treatment and otc meds and fluids and rest.  Follow up if no better.    Start abx.  Warning signs discussed.

## 2020-03-25 RX ORDER — PEN NEEDLE, DIABETIC 32GX 5/32"
NEEDLE, DISPOSABLE MISCELLANEOUS
Qty: 400 EACH | Refills: 0 | Status: SHIPPED | OUTPATIENT
Start: 2020-03-25 | End: 2020-03-27 | Stop reason: SDUPTHER

## 2020-03-27 RX ORDER — PEN NEEDLE, DIABETIC 32GX 5/32"
NEEDLE, DISPOSABLE MISCELLANEOUS
Qty: 400 EACH | Refills: 0 | Status: SHIPPED | OUTPATIENT
Start: 2020-03-27 | End: 2020-05-14 | Stop reason: SDUPTHER

## 2020-04-10 DIAGNOSIS — E03.8 SECONDARY HYPOTHYROIDISM: ICD-10-CM

## 2020-04-10 RX ORDER — LEVOTHYROXINE SODIUM 112 UG/1
112 TABLET ORAL DAILY
Qty: 30 TABLET | Refills: 4 | Status: SHIPPED | OUTPATIENT
Start: 2020-04-10 | End: 2020-07-27 | Stop reason: SDUPTHER

## 2020-04-27 ENCOUNTER — HOSPITAL ENCOUNTER (OUTPATIENT)
Dept: CARDIOLOGY | Facility: HOSPITAL | Age: 50
Discharge: HOME | End: 2020-04-27
Admitting: FAMILY MEDICINE

## 2020-04-27 DIAGNOSIS — I73.9 CLAUDICATION (HCC): ICD-10-CM

## 2020-04-27 LAB
BH CV LOWER ARTERIAL LEFT ABI RATIO: 1.01
BH CV LOWER ARTERIAL LEFT DORSALIS PEDIS SYS MAX: 141 MMHG
BH CV LOWER ARTERIAL LEFT GREAT TOE SYS MAX: 162 MMHG
BH CV LOWER ARTERIAL LEFT POST TIBIAL SYS MAX: 133 MMHG
BH CV LOWER ARTERIAL LEFT TBI RATIO: 1.16
BH CV LOWER ARTERIAL RIGHT ABI RATIO: 1.09
BH CV LOWER ARTERIAL RIGHT DORSALIS PEDIS SYS MAX: 150 MMHG
BH CV LOWER ARTERIAL RIGHT GREAT TOE SYS MAX: 165 MMHG
BH CV LOWER ARTERIAL RIGHT POST TIBIAL SYS MAX: 153 MMHG
BH CV LOWER ARTERIAL RIGHT TBI RATIO: 1.18
UPPER ARTERIAL LEFT ARM BRACHIAL SYS MAX: 137 MMHG
UPPER ARTERIAL RIGHT ARM BRACHIAL SYS MAX: 140 MMHG

## 2020-04-27 PROCEDURE — 93922 UPR/L XTREMITY ART 2 LEVELS: CPT

## 2020-05-08 DIAGNOSIS — IMO0002 UNCONTROLLED TYPE 2 DIABETES MELLITUS WITH COMPLICATION, WITHOUT LONG-TERM CURRENT USE OF INSULIN: ICD-10-CM

## 2020-05-08 RX ORDER — INSULIN HUMAN 500 [IU]/ML
20 INJECTION, SOLUTION SUBCUTANEOUS
Qty: 6 PEN | Refills: 4 | Status: SHIPPED | OUTPATIENT
Start: 2020-05-08 | End: 2020-05-14

## 2020-05-14 ENCOUNTER — OFFICE VISIT (OUTPATIENT)
Dept: ENDOCRINOLOGY | Age: 50
End: 2020-05-14

## 2020-05-14 DIAGNOSIS — IMO0002 UNCONTROLLED TYPE 2 DIABETES MELLITUS WITH COMPLICATION, WITHOUT LONG-TERM CURRENT USE OF INSULIN: ICD-10-CM

## 2020-05-14 DIAGNOSIS — E55.9 VITAMIN D DEFICIENCY: ICD-10-CM

## 2020-05-14 DIAGNOSIS — E66.01 MORBID OBESITY DUE TO EXCESS CALORIES (HCC): ICD-10-CM

## 2020-05-14 DIAGNOSIS — E08.43 DIABETIC AUTONOMIC NEUROPATHY ASSOCIATED WITH DIABETES MELLITUS DUE TO UNDERLYING CONDITION (HCC): ICD-10-CM

## 2020-05-14 DIAGNOSIS — I10 ESSENTIAL HYPERTENSION: ICD-10-CM

## 2020-05-14 DIAGNOSIS — Z79.4 TYPE 2 DIABETES MELLITUS WITH STAGE 3 CHRONIC KIDNEY DISEASE, WITH LONG-TERM CURRENT USE OF INSULIN (HCC): Primary | ICD-10-CM

## 2020-05-14 DIAGNOSIS — E11.22 TYPE 2 DIABETES MELLITUS WITH STAGE 3 CHRONIC KIDNEY DISEASE, WITH LONG-TERM CURRENT USE OF INSULIN (HCC): Primary | ICD-10-CM

## 2020-05-14 DIAGNOSIS — E78.2 MIXED HYPERLIPIDEMIA: ICD-10-CM

## 2020-05-14 DIAGNOSIS — N18.30 TYPE 2 DIABETES MELLITUS WITH STAGE 3 CHRONIC KIDNEY DISEASE, WITH LONG-TERM CURRENT USE OF INSULIN (HCC): Primary | ICD-10-CM

## 2020-05-14 DIAGNOSIS — E06.3 HYPOTHYROIDISM DUE TO HASHIMOTO'S THYROIDITIS: Chronic | ICD-10-CM

## 2020-05-14 DIAGNOSIS — E03.8 HYPOTHYROIDISM DUE TO HASHIMOTO'S THYROIDITIS: Chronic | ICD-10-CM

## 2020-05-14 PROCEDURE — 99443 PR PHYS/QHP TELEPHONE EVALUATION 21-30 MIN: CPT | Performed by: INTERNAL MEDICINE

## 2020-05-14 RX ORDER — INSULIN DEGLUDEC 200 U/ML
120 INJECTION, SOLUTION SUBCUTANEOUS
Qty: 18 PEN | Refills: 3 | Status: SHIPPED | OUTPATIENT
Start: 2020-05-14 | End: 2020-08-14 | Stop reason: SDUPTHER

## 2020-05-14 RX ORDER — SITAGLIPTIN 100 MG/1
100 TABLET, FILM COATED ORAL DAILY
Qty: 90 TABLET | Refills: 3 | Status: SHIPPED | OUTPATIENT
Start: 2020-05-14 | End: 2021-06-03

## 2020-05-14 RX ORDER — DULOXETIN HYDROCHLORIDE 60 MG/1
60 CAPSULE, DELAYED RELEASE ORAL DAILY
Qty: 90 CAPSULE | Refills: 3 | Status: SHIPPED | OUTPATIENT
Start: 2020-05-14 | End: 2021-03-02 | Stop reason: ALTCHOICE

## 2020-05-14 RX ORDER — PEN NEEDLE, DIABETIC 32GX 5/32"
NEEDLE, DISPOSABLE MISCELLANEOUS
Qty: 400 EACH | Refills: 1 | Status: SHIPPED | OUTPATIENT
Start: 2020-05-14 | End: 2021-11-10

## 2020-05-14 RX ORDER — GABAPENTIN 800 MG/1
800 TABLET ORAL 3 TIMES DAILY
Qty: 90 TABLET | Refills: 5 | Status: SHIPPED | OUTPATIENT
Start: 2020-05-14 | End: 2021-05-14

## 2020-05-14 RX ORDER — ICOSAPENT ETHYL 1000 MG/1
2 CAPSULE ORAL 2 TIMES DAILY WITH MEALS
Qty: 360 CAPSULE | Refills: 3 | Status: SHIPPED | OUTPATIENT
Start: 2020-05-14 | End: 2021-10-29 | Stop reason: SDUPTHER

## 2020-05-14 RX ORDER — EMPAGLIFLOZIN 25 MG/1
1 TABLET, FILM COATED ORAL DAILY
Qty: 90 TABLET | Refills: 3 | Status: SHIPPED | OUTPATIENT
Start: 2020-05-14 | End: 2021-03-02 | Stop reason: SINTOL

## 2020-05-14 RX ORDER — ROSUVASTATIN CALCIUM 40 MG/1
40 TABLET, COATED ORAL DAILY
Qty: 90 TABLET | Refills: 3 | Status: SHIPPED | OUTPATIENT
Start: 2020-05-14 | End: 2021-06-12

## 2020-05-14 NOTE — PROGRESS NOTES
Subjective   Ricardo Pereira is a 49 y.o. male seen for follow up for DM2. No lab review. Patient is checking BG 4 times a day. He states that Rosemarie was suppose to start him on Margareth but he never received it. He states that he has cut soda out completely and lost 22 lbs. He states that his BG is still running high.     History of Present Illness this is a 49-year-old gentleman known patient with type 2 diabetes hypertension and dyslipidemia as well as vitamin D deficiency and hypothyroidism with peripheral neuropathy.  He has recovered from a bout of acute bronchitis for which he took antibiotics and feels a lot better.  Beyond that he has had no significant health problem for which to go to the emergency room or hospital.  He said his blood glucose mostly run above 200 and despite the fact that he has lost 22 pounds he still has hyperglycemia.    Allergies   Allergen Reactions   • Penicillins Swelling       Current Outpatient Medications:   •  arformoterol (BROVANA) 15 MCG/2ML nebulizer solution, Take 2 mL by nebulization 2 (Two) Times a Day., Disp: 120 mL, Rfl: 0  •  aspirin 81 MG EC tablet, Take 1 tablet by mouth Daily., Disp: 90 tablet, Rfl: 1  •  BD PEN NEEDLE AUDREY U/F 32G X 4 MM misc, Use 4 times a day, Disp: 400 each, Rfl: 0  •  budesonide (PULMICORT) 0.5 MG/2ML nebulizer solution, Take 2 mL by nebulization 2 (Two) Times a Day., Disp: 60 each, Rfl: 0  •  Empagliflozin (JARDIANCE) 25 MG tablet, Take 25 mg by mouth Daily., Disp: 30 tablet, Rfl: 4  •  fenofibrate (TRICOR) 145 MG tablet, Take 1 tablet by mouth Daily., Disp: 90 tablet, Rfl: 3  •  gabapentin (NEURONTIN) 800 MG tablet, Take 1 tablet by mouth 3 (Three) Times a Day., Disp: 90 tablet, Rfl: 2  •  HUMULIN R U-500 KWIKPEN 500 UNIT/ML solution pen-injector CONCENTRATED injection, Inject 0.04 mL under the skin into the appropriate area as directed 3 (Three) Times a Day Before Meals. 40u AC, titration max 150u daily, Disp: 6 pen, Rfl: 4  •  icosapent ethyl  (VASCEPA) 1 g capsule capsule, Take 2 g by mouth 2 (Two) Times a Day With Meals., Disp: 360 capsule, Rfl: 1  •  ipratropium-albuterol (DUO-NEB) 0.5-2.5 mg/mL nebulizer, Take 3 mL by nebulization Every 4 (Four) Hours As Needed for Wheezing or Shortness of Air., Disp: 360 mL, Rfl: 0  •  levothyroxine (Synthroid) 112 MCG tablet, Take 1 tablet by mouth Daily. On an empty stomach, Disp: 30 tablet, Rfl: 4  •  olmesartan (BENICAR) 20 MG tablet, Take 20 mg by mouth Daily., Disp: , Rfl:   •  pantoprazole (PROTONIX) 40 MG EC tablet, Take 1 tablet by mouth Daily., Disp: 90 tablet, Rfl: 0  •  rosuvastatin (CRESTOR) 40 MG tablet, Take 1 tablet by mouth Daily., Disp: 90 tablet, Rfl: 0  The above list of medications were reviewed and reconciled and accepted.    The following portions of the patient's history were reviewed and updated as appropriate: allergies, current medications, past family history, past medical history, past social history, past surgical history and problem list.    Review of Systems   Constitutional: Negative.    HENT: Negative.    Eyes: Negative.    Respiratory: Negative.    Cardiovascular: Negative.    Gastrointestinal: Negative.    Endocrine: Negative.    Genitourinary: Negative.    Musculoskeletal: Negative.    Skin: Negative.    Allergic/Immunologic: Negative.    Neurological: Negative.    Hematological: Negative.    Psychiatric/Behavioral: Negative.    The above review of system was reviewed, corroborated and accepted.    Objective   Physical Exam  As this was a telephone encounter no physical exam was done however he was alert and oriented and seem to suffer from pain and numbness in his feet diabetic neuropathy.  He was coherent and articulate.    Assessment/Plan   Ricardo was seen today for diabetes.    Diagnoses and all orders for this visit:    Type 2 diabetes mellitus with stage 3 chronic kidney disease, with long-term current use of insulin (CMS/[facility])  -     T3, Free; Future  -     T4 & TSH  ([facility]); Future  -     T4, Free; Future  -     Thyroglobulin With Anti-TG; Future  -     Uric Acid; Future  -     Vitamin D 25 Hydroxy; Future  -     Comprehensive Metabolic Panel; Future  -     C-Peptide; Future  -     Hemoglobin A1c; Future  -     MicroAlbumin, Urine, Random - Urine, Clean Catch; Future  -     NMR LipoProfile; Future  -     T4 & TSH ([facility]); Future  -     T3, Free; Future  -     T4, Free; Future  -     Uric Acid; Future  -     Vitamin D 25 Hydroxy; Future  -     Comprehensive Metabolic Panel; Future  -     C-Peptide; Future  -     MicroAlbumin, Urine, Random - Urine, Clean Catch; Future  -     NMR LipoProfile; Future    Hypothyroidism due to Hashimoto's thyroiditis  -     T3, Free; Future  -     T4 & TSH ([facility]); Future  -     T4, Free; Future  -     Thyroglobulin With Anti-TG; Future  -     Uric Acid; Future  -     Vitamin D 25 Hydroxy; Future  -     Comprehensive Metabolic Panel; Future  -     C-Peptide; Future  -     Hemoglobin A1c; Future  -     MicroAlbumin, Urine, Random - Urine, Clean Catch; Future  -     NMR LipoProfile; Future  -     T4 & TSH ([facility]); Future  -     T3, Free; Future  -     T4, Free; Future  -     Uric Acid; Future  -     Vitamin D 25 Hydroxy; Future  -     Comprehensive Metabolic Panel; Future  -     C-Peptide; Future  -     MicroAlbumin, Urine, Random - Urine, Clean Catch; Future  -     NMR LipoProfile; Future    Mixed hyperlipidemia  -     rosuvastatin (CRESTOR) 40 MG tablet; Take 1 tablet by mouth Daily.  -     T3, Free; Future  -     T4 & TSH ([facility]); Future  -     T4, Free; Future  -     Thyroglobulin With Anti-TG; Future  -     Uric Acid; Future  -     Vitamin D 25 Hydroxy; Future  -     Comprehensive Metabolic Panel; Future  -     C-Peptide; Future  -     Hemoglobin A1c; Future  -     MicroAlbumin, Urine, Random - Urine, Clean Catch; Future  -     NMR LipoProfile; Future  -     T4 & TSH ([facility]); Future  -     T3, Free; Future  -     T4, Free; Future  -      Uric Acid; Future  -     Vitamin D 25 Hydroxy; Future  -     Comprehensive Metabolic Panel; Future  -     C-Peptide; Future  -     MicroAlbumin, Urine, Random - Urine, Clean Catch; Future  -     NMR LipoProfile; Future  -     VASCEPA 1 g capsule capsule; Take 2 g by mouth 2 (Two) Times a Day With Meals.    Essential hypertension  -     T3, Free; Future  -     T4 & TSH ([facility]); Future  -     T4, Free; Future  -     Thyroglobulin With Anti-TG; Future  -     Uric Acid; Future  -     Vitamin D 25 Hydroxy; Future  -     Comprehensive Metabolic Panel; Future  -     C-Peptide; Future  -     Hemoglobin A1c; Future  -     MicroAlbumin, Urine, Random - Urine, Clean Catch; Future  -     NMR LipoProfile; Future  -     T4 & TSH ([facility]); Future  -     T3, Free; Future  -     T4, Free; Future  -     Uric Acid; Future  -     Vitamin D 25 Hydroxy; Future  -     Comprehensive Metabolic Panel; Future  -     C-Peptide; Future  -     MicroAlbumin, Urine, Random - Urine, Clean Catch; Future  -     NMR LipoProfile; Future    Vitamin D deficiency  -     T3, Free; Future  -     T4 & TSH ([facility]); Future  -     T4, Free; Future  -     Thyroglobulin With Anti-TG; Future  -     Uric Acid; Future  -     Vitamin D 25 Hydroxy; Future  -     Comprehensive Metabolic Panel; Future  -     C-Peptide; Future  -     Hemoglobin A1c; Future  -     MicroAlbumin, Urine, Random - Urine, Clean Catch; Future  -     NMR LipoProfile; Future  -     T4 & TSH ([facility]); Future  -     T3, Free; Future  -     T4, Free; Future  -     Uric Acid; Future  -     Vitamin D 25 Hydroxy; Future  -     Comprehensive Metabolic Panel; Future  -     C-Peptide; Future  -     MicroAlbumin, Urine, Random - Urine, Clean Catch; Future  -     NMR LipoProfile; Future    Morbid obesity due to excess calories (CMS/HCC)  -     T3, Free; Future  -     T4 & TSH ([facility]); Future  -     T4, Free; Future  -     Thyroglobulin With Anti-TG; Future  -     Uric Acid; Future  -     Vitamin D  25 Hydroxy; Future  -     Comprehensive Metabolic Panel; Future  -     C-Peptide; Future  -     Hemoglobin A1c; Future  -     MicroAlbumin, Urine, Random - Urine, Clean Catch; Future  -     NMR LipoProfile; Future  -     T4 & TSH ([facility]); Future  -     T3, Free; Future  -     T4, Free; Future  -     Uric Acid; Future  -     Vitamin D 25 Hydroxy; Future  -     Comprehensive Metabolic Panel; Future  -     C-Peptide; Future  -     MicroAlbumin, Urine, Random - Urine, Clean Catch; Future  -     NMR LipoProfile; Future    Uncontrolled type 2 diabetes mellitus with complication, without long-term current use of insulin (CMS/[facility])  -     JARDIANCE 25 MG tablet; Take 25 mg by mouth Daily.  -     T3, Free; Future  -     T4 & TSH ([facility]); Future  -     T4, Free; Future  -     Thyroglobulin With Anti-TG; Future  -     Uric Acid; Future  -     Vitamin D 25 Hydroxy; Future  -     Comprehensive Metabolic Panel; Future  -     C-Peptide; Future  -     Hemoglobin A1c; Future  -     MicroAlbumin, Urine, Random - Urine, Clean Catch; Future  -     NMR LipoProfile; Future  -     T4 & TSH ([facility]); Future  -     T3, Free; Future  -     T4, Free; Future  -     Uric Acid; Future  -     Vitamin D 25 Hydroxy; Future  -     Comprehensive Metabolic Panel; Future  -     C-Peptide; Future  -     MicroAlbumin, Urine, Random - Urine, Clean Catch; Future  -     NMR LipoProfile; Future    Diabetic autonomic neuropathy associated with diabetes mellitus due to underlying condition (CMS/[facility])  -     gabapentin (Neurontin) 800 MG tablet; Take 1 tablet by mouth 3 (Three) Times a Day.  -     T3, Free; Future  -     T4 & TSH ([facility]); Future  -     T4, Free; Future  -     Thyroglobulin With Anti-TG; Future  -     Uric Acid; Future  -     Vitamin D 25 Hydroxy; Future  -     Comprehensive Metabolic Panel; Future  -     C-Peptide; Future  -     Hemoglobin A1c; Future  -     MicroAlbumin, Urine, Random - Urine, Clean Catch; Future  -     NMR LipoProfile;  Future  -     T4 & TSH ([facility]); Future  -     T3, Free; Future  -     T4, Free; Future  -     Uric Acid; Future  -     Vitamin D 25 Hydroxy; Future  -     Comprehensive Metabolic Panel; Future  -     C-Peptide; Future  -     MicroAlbumin, Urine, Random - Urine, Clean Catch; Future  -     NMR LipoProfile; Future    Other orders  -     TRESIBA FLEXTOUCH 200 UNIT/ML solution pen-injector pen injection; Inject 120 Units under the skin into the appropriate area as directed Daily With Breakfast.  -     JANUVIA 100 MG tablet; Take 1 tablet by mouth Daily.  -     DULoxetine (CYMBALTA) 60 MG capsule; Take 1 capsule by mouth Daily.  -     BD PEN NEEDLE AUDREY U/F 32G X 4 MM misc; Use 4 times a day      In summary I had a telephone encounter with this 49-year-old gentleman for above-mentioned problems.  I reviewed his laboratory evaluation of 8/7/2019 and at this point will go ahead and order extensive laboratory evaluation and once the results come back we will go ahead and call for any possible modification or new medications.  I am stopping his mealtime U5 100 insulin and started him on Tresiba 70 units every morning and instructed him to increase the dose by 2 units every 3 days if fasting blood glucose is greater than 110 up to 220 units every morning.  Additionally I will continue his Jardiance 25 mg every morning and add the Januvia 100 mg every morning.  Also for his neuropathy I am adding Cymbalta 60 mg daily.  This office visit which was a telephone encounter with getting a detailed history and going over patient's previous labs as well as medications and reorganizing his plan of care moving forward was 40 minutes.

## 2020-05-15 ENCOUNTER — RESULTS ENCOUNTER (OUTPATIENT)
Dept: ENDOCRINOLOGY | Age: 50
End: 2020-05-15

## 2020-05-15 DIAGNOSIS — N18.30 TYPE 2 DIABETES MELLITUS WITH STAGE 3 CHRONIC KIDNEY DISEASE, WITH LONG-TERM CURRENT USE OF INSULIN (HCC): ICD-10-CM

## 2020-05-15 DIAGNOSIS — I10 ESSENTIAL HYPERTENSION: ICD-10-CM

## 2020-05-15 DIAGNOSIS — E78.2 MIXED HYPERLIPIDEMIA: ICD-10-CM

## 2020-05-15 DIAGNOSIS — E03.8 HYPOTHYROIDISM DUE TO HASHIMOTO'S THYROIDITIS: Chronic | ICD-10-CM

## 2020-05-15 DIAGNOSIS — Z79.4 TYPE 2 DIABETES MELLITUS WITH STAGE 3 CHRONIC KIDNEY DISEASE, WITH LONG-TERM CURRENT USE OF INSULIN (HCC): ICD-10-CM

## 2020-05-15 DIAGNOSIS — E06.3 HYPOTHYROIDISM DUE TO HASHIMOTO'S THYROIDITIS: Chronic | ICD-10-CM

## 2020-05-15 DIAGNOSIS — E11.22 TYPE 2 DIABETES MELLITUS WITH STAGE 3 CHRONIC KIDNEY DISEASE, WITH LONG-TERM CURRENT USE OF INSULIN (HCC): ICD-10-CM

## 2020-05-15 DIAGNOSIS — E55.9 VITAMIN D DEFICIENCY: ICD-10-CM

## 2020-05-15 DIAGNOSIS — E08.43 DIABETIC AUTONOMIC NEUROPATHY ASSOCIATED WITH DIABETES MELLITUS DUE TO UNDERLYING CONDITION (HCC): ICD-10-CM

## 2020-05-15 DIAGNOSIS — IMO0002 UNCONTROLLED TYPE 2 DIABETES MELLITUS WITH COMPLICATION, WITHOUT LONG-TERM CURRENT USE OF INSULIN: ICD-10-CM

## 2020-05-15 DIAGNOSIS — E66.01 MORBID OBESITY DUE TO EXCESS CALORIES (HCC): ICD-10-CM

## 2020-05-19 NOTE — PROGRESS NOTES
S/W PT TO SCHEDULE F/U LAB AND PT WILL SCHEDULE NEXT APPT WITH DR LEE WHEN HE COMES TO OFFICE FOR LABS.

## 2020-05-21 ENCOUNTER — LAB (OUTPATIENT)
Dept: ENDOCRINOLOGY | Age: 50
End: 2020-05-21

## 2020-05-21 DIAGNOSIS — E03.8 HYPOTHYROIDISM DUE TO HASHIMOTO'S THYROIDITIS: Chronic | ICD-10-CM

## 2020-05-21 DIAGNOSIS — E78.2 MIXED HYPERLIPIDEMIA: ICD-10-CM

## 2020-05-21 DIAGNOSIS — Z79.4 TYPE 2 DIABETES MELLITUS WITH STAGE 3 CHRONIC KIDNEY DISEASE, WITH LONG-TERM CURRENT USE OF INSULIN (HCC): ICD-10-CM

## 2020-05-21 DIAGNOSIS — E55.9 VITAMIN D DEFICIENCY: ICD-10-CM

## 2020-05-21 DIAGNOSIS — E08.43 DIABETIC AUTONOMIC NEUROPATHY ASSOCIATED WITH DIABETES MELLITUS DUE TO UNDERLYING CONDITION (HCC): ICD-10-CM

## 2020-05-21 DIAGNOSIS — E06.3 HYPOTHYROIDISM DUE TO HASHIMOTO'S THYROIDITIS: Chronic | ICD-10-CM

## 2020-05-21 DIAGNOSIS — I10 ESSENTIAL HYPERTENSION: ICD-10-CM

## 2020-05-21 DIAGNOSIS — N18.30 TYPE 2 DIABETES MELLITUS WITH STAGE 3 CHRONIC KIDNEY DISEASE, WITH LONG-TERM CURRENT USE OF INSULIN (HCC): ICD-10-CM

## 2020-05-21 DIAGNOSIS — IMO0002 UNCONTROLLED TYPE 2 DIABETES MELLITUS WITH COMPLICATION, WITHOUT LONG-TERM CURRENT USE OF INSULIN: ICD-10-CM

## 2020-05-21 DIAGNOSIS — E11.22 TYPE 2 DIABETES MELLITUS WITH STAGE 3 CHRONIC KIDNEY DISEASE, WITH LONG-TERM CURRENT USE OF INSULIN (HCC): ICD-10-CM

## 2020-05-21 DIAGNOSIS — E66.01 MORBID OBESITY DUE TO EXCESS CALORIES (HCC): ICD-10-CM

## 2020-05-22 LAB
25(OH)D3+25(OH)D2 SERPL-MCNC: 27.6 NG/ML (ref 30–100)
ALBUMIN SERPL-MCNC: 4.5 G/DL (ref 3.5–5.2)
ALBUMIN/GLOB SERPL: 1.7 G/DL
ALP SERPL-CCNC: 88 U/L (ref 39–117)
ALT SERPL-CCNC: 39 U/L (ref 1–41)
AST SERPL-CCNC: 28 U/L (ref 1–40)
BILIRUB SERPL-MCNC: 0.5 MG/DL (ref 0.2–1.2)
BUN SERPL-MCNC: 12 MG/DL (ref 6–20)
BUN/CREAT SERPL: 11 (ref 7–25)
C PEPTIDE SERPL-MCNC: 5.7 NG/ML (ref 1.1–4.4)
CALCIUM SERPL-MCNC: 9.9 MG/DL (ref 8.6–10.5)
CHLORIDE SERPL-SCNC: 100 MMOL/L (ref 98–107)
CHOLEST SERPL-MCNC: 196 MG/DL (ref 100–199)
CO2 SERPL-SCNC: 23.8 MMOL/L (ref 22–29)
CREAT SERPL-MCNC: 1.09 MG/DL (ref 0.76–1.27)
GLOBULIN SER CALC-MCNC: 2.7 GM/DL
GLUCOSE SERPL-MCNC: 169 MG/DL (ref 65–99)
HBA1C MFR BLD: 9.73 % (ref 4.8–5.6)
HDL SERPL-SCNC: 17.9 UMOL/L
HDLC SERPL-MCNC: 20 MG/DL
INTERPRETATION: NORMAL
LDL SERPL QN: 19.9 NM
LDL SERPL-SCNC: 2349 NMOL/L
LDL SMALL SERPL-SCNC: 1844 NMOL/L
LDLC SERPL CALC-MCNC: 119 MG/DL (ref 0–99)
Lab: NORMAL
MICROALBUMIN UR-MCNC: 9.8 UG/ML
POTASSIUM SERPL-SCNC: 4.5 MMOL/L (ref 3.5–5.2)
PROT SERPL-MCNC: 7.2 G/DL (ref 6–8.5)
SODIUM SERPL-SCNC: 138 MMOL/L (ref 136–145)
T3FREE SERPL-MCNC: 3.1 PG/ML (ref 2–4.4)
T4 FREE SERPL-MCNC: 1.64 NG/DL (ref 0.93–1.7)
T4 SERPL-MCNC: 9.9 MCG/DL (ref 4.5–11.7)
TRIGL SERPL-MCNC: 284 MG/DL (ref 0–149)
TSH SERPL DL<=0.005 MIU/L-ACNC: 1.62 UIU/ML (ref 0.27–4.2)
URATE SERPL-MCNC: 6 MG/DL (ref 3.4–7)

## 2020-06-18 ENCOUNTER — OFFICE VISIT (OUTPATIENT)
Dept: FAMILY MEDICINE CLINIC | Facility: CLINIC | Age: 50
End: 2020-06-18

## 2020-06-18 VITALS
BODY MASS INDEX: 36.65 KG/M2 | WEIGHT: 256 LBS | HEIGHT: 70 IN | DIASTOLIC BLOOD PRESSURE: 88 MMHG | TEMPERATURE: 97.9 F | SYSTOLIC BLOOD PRESSURE: 122 MMHG | OXYGEN SATURATION: 96 % | HEART RATE: 88 BPM

## 2020-06-18 DIAGNOSIS — L02.212 ABSCESS OF BACK: Primary | ICD-10-CM

## 2020-06-18 DIAGNOSIS — Z12.11 SCREENING FOR COLON CANCER: ICD-10-CM

## 2020-06-18 PROCEDURE — 99213 OFFICE O/P EST LOW 20 MIN: CPT | Performed by: INTERNAL MEDICINE

## 2020-06-18 RX ORDER — SULFAMETHOXAZOLE AND TRIMETHOPRIM 800; 160 MG/1; MG/1
1 TABLET ORAL 2 TIMES DAILY
Qty: 20 TABLET | Refills: 0 | Status: SHIPPED | OUTPATIENT
Start: 2020-06-18 | End: 2021-03-02

## 2020-06-18 RX ORDER — ALBUTEROL SULFATE 90 UG/1
2 AEROSOL, METERED RESPIRATORY (INHALATION) EVERY 4 HOURS PRN
Qty: 1 INHALER | Refills: 1 | Status: SHIPPED | OUTPATIENT
Start: 2020-06-18 | End: 2021-11-10

## 2020-06-18 NOTE — PROGRESS NOTES
"Abelardo Pereira is a 49 y.o. male.     Chief Complaint   Patient presents with   • Cyst     back       History of Present Illness   2 months of a small knot on the right shoulder blade area then 2 weeks has enlarged and very tender with no drainage.  The area is now very tender red and he does not want to have anything touch it.    Home blood sugars running in the 130's with the current regimen with endocrinology.  Is due for repeat colonoscopy.    The following portions of the patient's history were reviewed and updated as appropriate: allergies, current medications, past family history, past medical history, past social history, past surgical history and problem list.    Depression Screen:  PHQ-2/PHQ-9 Depression Screening 3/9/2020   Little interest or pleasure in doing things 0   Feeling down, depressed, or hopeless 0   Total Score 0       Past Medical History:   Diagnosis Date   • Abdominal hernia    • Abdominal pain, acute    • Abdominal pain, LLQ    • Abnormal brain scan    • Abnormal involuntary movements    • Acute pancreatitis    • Allergic rhinitis    • Anxiety    • Arthritis of right glenohumeral joint    • Asthma    • Bilateral carpal tunnel syndrome    • BMI 38.0-38.9,adult    • Burn of left upper extremity, second degree, initial encounter    • Cancer (CMS/HCC)    • Chronic generalized abdominal pain    • Chronic pain     flank pain \" permenantly damaged muscle\"   • Common migraine without aura    • COPD (chronic obstructive pulmonary disease) (CMS/HCC)    • COPD exacerbation (CMS/HCC)    • Cough syncope    • Depression    • Diabetes mellitus (CMS/HCC)     Insulin pump   • Diabetic neuropathy (CMS/HCC)    • Diabetic peripheral neuropathy (CMS/HCC)    • Disease of thyroid gland     hypothyroid   • Diverticulitis of colon    • Elevated blood sugar level    • Elevated transaminase level    • Encounter for immunization    • Fibromyalgia    • Gait disturbance    • GERD (gastroesophageal reflux " disease)    • Hepatic steatosis    • History of pancreatitis     X 2   • History of renal cell cancer     stage 4 , Rt kidney   • Hospital discharge follow-up    • Hyperlipidemia    • Hypertension    • Hypertriglyceridemia    • IBS (irritable bowel syndrome)    • Incomplete tear of rotator cuff    • Intractable chronic migraine without aura and with status migrainosus    • Late effects of cerebrovascular disease    • Left ankle pain    • Left ankle sprain    • Left foot pain    • Left-sided chest pain    • Leg pain    • Low vitamin D level    • Lumbar strain    • Medicare annual wellness visit, initial    • Migraine, chronic, without aura, intractable    • Nausea and vomiting    • Numbness    • Obstructive sleep apnea    • Photophobia    • Pre-syncope    • Recurrent renal cell carcinoma of kidney (CMS/HCC)    • Renal failure (ARF), acute on chronic (CMS/HCC)    • Right shoulder pain    • Sciatica without lumbago    • Sciatica, left side    • Severe obesity (CMS/HCC)     Severe obesity (BMI 35.0-39.9)   • Shortness of breath    • Sleep apnea    • Stroke (CMS/HCC)     Stroke, lacunar   • SVT (supraventricular tachycardia) (CMS/HCC)    • Syncope and collapse    • Type 2 diabetes mellitus (CMS/HCC)    • Umbilical hernia    • Viral URI with cough        Past Surgical History:   Procedure Laterality Date   • APPENDECTOMY     • COLONOSCOPY  10/27/2014    tics   • HERNIA REPAIR      umbilical   • INGUINAL HERNIA REPAIR     • NEPHRECTOMY Right    • VENTRAL/INCISIONAL HERNIA REPAIR N/A 5/11/2017    Procedure: VENTRAL/INCISIONAL HERNIA REPAIR LAPAROSCOPIC, RECURRENT INCISION, WITH MESH AND AHEDLYSIS;  Surgeon: Albin Bee MD;  Location: [facility];  Service:        Family History   Problem Relation Age of Onset   • Asthma Other    • Bipolar disorder Other    • COPD Other    • Depression Other    • Lupus Other          systemic lupus erythematosus   • Arthritis Other        Social History     Socioeconomic History   •  Marital status:      Spouse name: Not on file   • Number of children: Not on file   • Years of education: Not on file   • Highest education level: Not on file   Tobacco Use   • Smoking status: Former Smoker     Types: Cigars   • Smokeless tobacco: Never Used   Substance and Sexual Activity   • Alcohol use: Yes   • Drug use: No   • Sexual activity: Defer       Current Outpatient Medications   Medication Sig Dispense Refill   • arformoterol (BROVANA) 15 MCG/2ML nebulizer solution Take 2 mL by nebulization 2 (Two) Times a Day. 120 mL 0   • aspirin 81 MG EC tablet Take 1 tablet by mouth Daily. 90 tablet 1   • BD PEN NEEDLE AUDREY U/F 32G X 4 MM misc Use 4 times a day 400 each 1   • budesonide (PULMICORT) 0.5 MG/2ML nebulizer solution Take 2 mL by nebulization 2 (Two) Times a Day. 60 each 0   • DULoxetine (CYMBALTA) 60 MG capsule Take 1 capsule by mouth Daily. 90 capsule 3   • fenofibrate (TRICOR) 145 MG tablet Take 1 tablet by mouth Daily. 90 tablet 3   • gabapentin (Neurontin) 800 MG tablet Take 1 tablet by mouth 3 (Three) Times a Day. 90 tablet 5   • ipratropium-albuterol (DUO-NEB) 0.5-2.5 mg/mL nebulizer Take 3 mL by nebulization Every 4 (Four) Hours As Needed for Wheezing or Shortness of Air. 360 mL 0   • JANUVIA 100 MG tablet Take 1 tablet by mouth Daily. 90 tablet 3   • JARDIANCE 25 MG tablet Take 25 mg by mouth Daily. 90 tablet 3   • levothyroxine (Synthroid) 112 MCG tablet Take 1 tablet by mouth Daily. On an empty stomach 30 tablet 4   • olmesartan (BENICAR) 20 MG tablet Take 20 mg by mouth Daily.     • pantoprazole (PROTONIX) 40 MG EC tablet Take 1 tablet by mouth Daily. 90 tablet 0   • rosuvastatin (CRESTOR) 40 MG tablet Take 1 tablet by mouth Daily. 90 tablet 3   • TRESIBA FLEXTOUCH 200 UNIT/ML solution pen-injector pen injection Inject 120 Units under the skin into the appropriate area as directed Daily With Breakfast. 18 pen 3   • VASCEPA 1 g capsule capsule Take 2 g by mouth 2 (Two) Times a Day With  "Meals. 360 capsule 3   • sulfamethoxazole-trimethoprim (BACTRIM DS,SEPTRA DS) 800-160 MG per tablet Take 1 tablet by mouth 2 (Two) Times a Day. 20 tablet 0     No current facility-administered medications for this visit.        Review of Systems   Constitutional: Negative for activity change, appetite change, fatigue, fever, unexpected weight gain and unexpected weight loss.   HENT: Negative for nosebleeds, rhinorrhea, trouble swallowing and voice change.    Eyes: Negative for visual disturbance.   Respiratory: Negative for cough, chest tightness, shortness of breath and wheezing.    Cardiovascular: Negative for chest pain, palpitations and leg swelling.   Gastrointestinal: Positive for abdominal pain. Negative for blood in stool, constipation, diarrhea, nausea, vomiting, GERD and indigestion.   Genitourinary: Negative for dysuria, frequency and hematuria.   Musculoskeletal: Positive for back pain. Negative for arthralgias and myalgias.   Skin: Negative for rash and bruise.        Right upper back above the shoulder blade with 3 cm red, extremely tender fluctuant mass with small white head centrally.   Neurological: Positive for numbness. Negative for dizziness, tremors, weakness, light-headedness, headache and memory problem.   Hematological: Negative for adenopathy. Does not bruise/bleed easily.   Psychiatric/Behavioral: Negative for sleep disturbance and depressed mood. The patient is not nervous/anxious.        Objective   /88 (BP Location: Left arm, Patient Position: Sitting, Cuff Size: Large Adult)   Pulse 88   Temp 97.9 °F (36.6 °C) (Temporal)   Ht 177.8 cm (70\")   Wt 116 kg (256 lb)   SpO2 96%   BMI 36.73 kg/m²     Physical Exam   Constitutional: He is oriented to person, place, and time. He appears well-developed and well-nourished. No distress.   HENT:   Head: Normocephalic and atraumatic.   Pulmonary/Chest: Effort normal and breath sounds normal.   Abdominal:   Right flank with large bulge at " the incision site with mild tenderness no induration with the apparent hernia that is very very large.    Neurological: He is alert and oriented to person, place, and time.   Skin: He is not diaphoretic.   Right upper back above the shoulder blade with 3 cm red, extremely tender fluctuant mass with small white head centrally.         Recent Results (from the past 2016 hour(s))   Doppler Ankle Brachial Index Single Level CAR    Collection Time: 04/27/20 11:43 AM   Result Value Ref Range    RIGHT DORSALIS PEDIS SYS  mmHg    RIGHT POST TIBIAL SYS  mmHg    RIGHT GREAT TOE SYS  mmHg    RIGHT NILSON RATIO 1.09     RIGHT TBI RATIO 1.18     LEFT DORSALIS PEDIS SYS  mmHg    LEFT POST TIBIAL SYS  mmHg    LEFT GREAT TOE SYS  mmHg    LEFT NILSON RATIO 1.01     LEFT TBI RATIO 1.16     Upper arterial right arm brachial sys max 140 mmHg    Upper arterial left arm brachial sys max 137 mmHg   NMR LipoProfile    Collection Time: 05/21/20  8:14 AM   Result Value Ref Range    LDL-P 2,349 (H) <1,000 nmol/L    LDL-C 119 (H) 0 - 99 mg/dL    HDL-C 20 (L) >39 mg/dL    Triglycerides 284 (H) 0 - 149 mg/dL    Total Cholesterol 196 100 - 199 mg/dL    HDL-P (Total) 17.9 (L) >=30.5 umol/L    Small LDL-P 1,844 (H) <=527 nmol/L    LDL Size 19.9 (L) >20.5 nm   MicroAlbumin, Urine, Random - Urine, Clean Catch    Collection Time: 05/21/20  8:14 AM   Result Value Ref Range    Microalbumin, Urine 9.8 Not Estab. ug/mL   C-Peptide    Collection Time: 05/21/20  8:14 AM   Result Value Ref Range    C-Peptide 5.7 (H) 1.1 - 4.4 ng/mL   Comprehensive Metabolic Panel    Collection Time: 05/21/20  8:14 AM   Result Value Ref Range    Glucose 169 (H) 65 - 99 mg/dL    BUN 12 6 - 20 mg/dL    Creatinine 1.09 0.76 - 1.27 mg/dL    eGFR Non African Am 73 >60 mL/min/1.73    eGFR African Am 88 >60 mL/min/1.73    BUN/Creatinine Ratio 11.0 7.0 - 25.0    Sodium 138 136 - 145 mmol/L    Potassium 4.5 3.5 - 5.2 mmol/L    Chloride 100 98 - 107  mmol/L    Total CO2 23.8 22.0 - 29.0 mmol/L    Calcium 9.9 8.6 - 10.5 mg/dL    Total Protein 7.2 6.0 - 8.5 g/dL    Albumin 4.50 3.50 - 5.20 g/dL    Globulin 2.7 gm/dL    A/G Ratio 1.7 g/dL    Total Bilirubin 0.5 0.2 - 1.2 mg/dL    Alkaline Phosphatase 88 39 - 117 U/L    AST (SGOT) 28 1 - 40 U/L    ALT (SGPT) 39 1 - 41 U/L   Vitamin D 25 Hydroxy    Collection Time: 05/21/20  8:14 AM   Result Value Ref Range    25 Hydroxy, Vitamin D 27.6 (L) 30.0 - 100.0 ng/ml   Uric Acid    Collection Time: 05/21/20  8:14 AM   Result Value Ref Range    Uric Acid 6.0 3.4 - 7.0 mg/dL   T4, Free    Collection Time: 05/21/20  8:14 AM   Result Value Ref Range    Free T4 1.64 0.93 - 1.70 ng/dL   T3, Free    Collection Time: 05/21/20  8:14 AM   Result Value Ref Range    T3, Free 3.1 2.0 - 4.4 pg/mL   T4 & TSH ([facility])    Collection Time: 05/21/20  8:14 AM   Result Value Ref Range    TSH 1.620 0.270 - 4.200 uIU/mL    T4, Total 9.90 4.50 - 11.70 mcg/dL   [facility] CKD [facility]    Collection Time: 05/21/20  8:14 AM   Result Value Ref Range    Interpretation Note    Hemoglobin A1c    Collection Time: 05/21/20  8:14 AM   Result Value Ref Range    Hemoglobin A1C 9.73 (H) 4.80 - 5.60 %   Diabetes Patient Education    Collection Time: 05/21/20  8:14 AM   Result Value Ref Range    PDF Image Not applicable        Incision & Drainage  Date/Time: 6/18/2020 9:28 AM  Performed by: Simba Moore MD  Authorized by: Simba Moore MD   Type: abscess  Location: back.  Anesthesia: local infiltration    Anesthesia:  Local Anesthetic: lidocaine 1% with epinephrine    Sedation:  Patient sedated: no    Scalpel size: 11  Needle gauge: 20  Incision type: single straight  Drainage: purulent  Wound treatment: wound left open  Packing material: none  Patient tolerance: Patient tolerated the procedure well with no immediate complications          Assessment/Plan   Ricardo was seen today for cyst.    Diagnoses and all orders for this visit:    Abscess of  back    Screening for colon cancer  -     Ambulatory Referral For Screening Colonoscopy    Other orders  -     Incision & Drainage  -     sulfamethoxazole-trimethoprim (BACTRIM DS,SEPTRA DS) 800-160 MG per tablet; Take 1 tablet by mouth 2 (Two) Times a Day.    Discussed with patient that he likely had a cyst that has become infected developing an abscess.  The area was drained quite well and tolerated well.  Patient was given Bactrim twice daily and discussed on routine care of the wound.  If he has persisting issues or recurring episode then will refer for surgery to remove.  Patient is due for screening colonoscopy secondary to past history of rectosigmoid polyp.  Will order today.           Answers for HPI/ROS submitted by the patient on 6/15/2020   What is the primary reason for your visit?: Other  Please describe your symptoms.: Large bump on right shoulder blade  Have you had these symptoms before?: No  How long have you been having these symptoms?: 1-2 weeks

## 2020-07-27 DIAGNOSIS — E03.8 SECONDARY HYPOTHYROIDISM: ICD-10-CM

## 2020-07-27 RX ORDER — LEVOTHYROXINE SODIUM 112 UG/1
112 TABLET ORAL DAILY
Qty: 30 TABLET | Refills: 4 | Status: SHIPPED | OUTPATIENT
Start: 2020-07-27 | End: 2021-07-15

## 2020-07-27 RX ORDER — PANTOPRAZOLE SODIUM 40 MG/1
TABLET, DELAYED RELEASE ORAL
Qty: 90 TABLET | Refills: 0 | Status: SHIPPED | OUTPATIENT
Start: 2020-07-27 | End: 2020-11-09

## 2020-07-27 RX ORDER — OLMESARTAN MEDOXOMIL 20 MG/1
TABLET ORAL
Qty: 90 TABLET | Refills: 0 | Status: SHIPPED | OUTPATIENT
Start: 2020-07-27 | End: 2020-11-10 | Stop reason: SDUPTHER

## 2020-08-14 RX ORDER — INSULIN DEGLUDEC 200 U/ML
120 INJECTION, SOLUTION SUBCUTANEOUS
Qty: 18 PEN | Refills: 3 | Status: SHIPPED | OUTPATIENT
Start: 2020-08-14 | End: 2021-06-03 | Stop reason: SDUPTHER

## 2020-10-19 RX ORDER — OLMESARTAN MEDOXOMIL 20 MG/1
TABLET ORAL
Qty: 90 TABLET | Refills: 0 | OUTPATIENT
Start: 2020-10-19

## 2020-10-20 ENCOUNTER — HOSPITAL ENCOUNTER (EMERGENCY)
Facility: HOSPITAL | Age: 50
Discharge: LEFT BEFORE DISCHARGE | End: 2020-10-20

## 2020-10-20 VITALS — RESPIRATION RATE: 15 BRPM | OXYGEN SATURATION: 95 % | HEART RATE: 114 BPM | TEMPERATURE: 97.6 F

## 2020-11-09 RX ORDER — PANTOPRAZOLE SODIUM 40 MG/1
TABLET, DELAYED RELEASE ORAL
Qty: 90 TABLET | Refills: 0 | Status: SHIPPED | OUTPATIENT
Start: 2020-11-09 | End: 2021-05-14

## 2020-11-10 DIAGNOSIS — I10 ESSENTIAL HYPERTENSION: Primary | ICD-10-CM

## 2020-11-10 RX ORDER — OLMESARTAN MEDOXOMIL 20 MG/1
20 TABLET ORAL DAILY
Qty: 90 TABLET | Refills: 0 | Status: SHIPPED | OUTPATIENT
Start: 2020-11-10 | End: 2021-04-14

## 2020-11-10 RX ORDER — OLMESARTAN MEDOXOMIL 20 MG/1
TABLET ORAL
Qty: 90 TABLET | Refills: 0 | OUTPATIENT
Start: 2020-11-10

## 2020-12-02 ENCOUNTER — OFFICE VISIT (OUTPATIENT)
Dept: FAMILY MEDICINE CLINIC | Facility: CLINIC | Age: 50
End: 2020-12-02

## 2020-12-02 VITALS
HEART RATE: 90 BPM | TEMPERATURE: 97.8 F | DIASTOLIC BLOOD PRESSURE: 82 MMHG | BODY MASS INDEX: 35.93 KG/M2 | WEIGHT: 251 LBS | HEIGHT: 70 IN | OXYGEN SATURATION: 97 % | SYSTOLIC BLOOD PRESSURE: 142 MMHG

## 2020-12-02 DIAGNOSIS — E55.9 VITAMIN D DEFICIENCY: ICD-10-CM

## 2020-12-02 DIAGNOSIS — E06.3 HYPOTHYROIDISM DUE TO HASHIMOTO'S THYROIDITIS: Chronic | ICD-10-CM

## 2020-12-02 DIAGNOSIS — I10 ESSENTIAL HYPERTENSION: ICD-10-CM

## 2020-12-02 DIAGNOSIS — J44.9 CHRONIC OBSTRUCTIVE PULMONARY DISEASE, UNSPECIFIED COPD TYPE (HCC): ICD-10-CM

## 2020-12-02 DIAGNOSIS — Z79.4 TYPE 2 DIABETES MELLITUS WITH STAGE 3 CHRONIC KIDNEY DISEASE, WITH LONG-TERM CURRENT USE OF INSULIN, UNSPECIFIED WHETHER STAGE 3A OR 3B CKD (HCC): Primary | ICD-10-CM

## 2020-12-02 DIAGNOSIS — E03.8 HYPOTHYROIDISM DUE TO HASHIMOTO'S THYROIDITIS: Chronic | ICD-10-CM

## 2020-12-02 DIAGNOSIS — E11.22 TYPE 2 DIABETES MELLITUS WITH STAGE 3 CHRONIC KIDNEY DISEASE, WITH LONG-TERM CURRENT USE OF INSULIN, UNSPECIFIED WHETHER STAGE 3A OR 3B CKD (HCC): Primary | ICD-10-CM

## 2020-12-02 DIAGNOSIS — E78.2 MIXED HYPERLIPIDEMIA: ICD-10-CM

## 2020-12-02 DIAGNOSIS — N18.30 TYPE 2 DIABETES MELLITUS WITH STAGE 3 CHRONIC KIDNEY DISEASE, WITH LONG-TERM CURRENT USE OF INSULIN, UNSPECIFIED WHETHER STAGE 3A OR 3B CKD (HCC): Primary | ICD-10-CM

## 2020-12-02 DIAGNOSIS — E11.42 DIABETIC POLYNEUROPATHY ASSOCIATED WITH TYPE 2 DIABETES MELLITUS (HCC): ICD-10-CM

## 2020-12-02 PROCEDURE — 90686 IIV4 VACC NO PRSV 0.5 ML IM: CPT | Performed by: INTERNAL MEDICINE

## 2020-12-02 PROCEDURE — G0008 ADMIN INFLUENZA VIRUS VAC: HCPCS | Performed by: INTERNAL MEDICINE

## 2020-12-02 PROCEDURE — 99214 OFFICE O/P EST MOD 30 MIN: CPT | Performed by: INTERNAL MEDICINE

## 2020-12-02 RX ORDER — NEBULIZER ACCESSORIES
1 KIT MISCELLANEOUS TAKE AS DIRECTED
Qty: 1 EACH | Refills: 2 | Status: SHIPPED | OUTPATIENT
Start: 2020-12-02 | End: 2021-11-10

## 2020-12-02 RX ORDER — ALBUTEROL SULFATE 1.25 MG/3ML
1 SOLUTION RESPIRATORY (INHALATION) EVERY 4 HOURS PRN
Qty: 120 VIAL | Refills: 12 | Status: SHIPPED | OUTPATIENT
Start: 2020-12-02 | End: 2021-11-10

## 2020-12-02 RX ORDER — ERGOCALCIFEROL 1.25 MG/1
50000 CAPSULE ORAL WEEKLY
COMMUNITY
End: 2021-10-29 | Stop reason: SDUPTHER

## 2020-12-03 LAB
25(OH)D3+25(OH)D2 SERPL-MCNC: 22.1 NG/ML (ref 30–100)
ALBUMIN SERPL-MCNC: 4.2 G/DL (ref 3.5–5.2)
ALBUMIN/GLOB SERPL: 1.8 G/DL
ALP SERPL-CCNC: 112 U/L (ref 39–117)
ALT SERPL-CCNC: 56 U/L (ref 1–41)
AST SERPL-CCNC: 36 U/L (ref 1–40)
BILIRUB SERPL-MCNC: 0.4 MG/DL (ref 0–1.2)
BUN SERPL-MCNC: 11 MG/DL (ref 6–20)
BUN/CREAT SERPL: 10.2 (ref 7–25)
CALCIUM SERPL-MCNC: 9.6 MG/DL (ref 8.6–10.5)
CHLORIDE SERPL-SCNC: 101 MMOL/L (ref 98–107)
CHOLEST SERPL-MCNC: 212 MG/DL (ref 0–200)
CO2 SERPL-SCNC: 26.6 MMOL/L (ref 22–29)
CREAT SERPL-MCNC: 1.08 MG/DL (ref 0.76–1.27)
GLOBULIN SER CALC-MCNC: 2.4 GM/DL
GLUCOSE SERPL-MCNC: 171 MG/DL (ref 65–99)
HBA1C MFR BLD: 9.9 % (ref 4.8–5.6)
HDLC SERPL-MCNC: 27 MG/DL (ref 40–60)
LDLC SERPL CALC-MCNC: 124 MG/DL (ref 0–100)
POTASSIUM SERPL-SCNC: 5.2 MMOL/L (ref 3.5–5.2)
PROT SERPL-MCNC: 6.6 G/DL (ref 6–8.5)
SODIUM SERPL-SCNC: 138 MMOL/L (ref 136–145)
T4 FREE SERPL-MCNC: 1.28 NG/DL (ref 0.93–1.7)
TRIGL SERPL-MCNC: 343 MG/DL (ref 0–150)
TSH SERPL DL<=0.005 MIU/L-ACNC: 2.28 UIU/ML (ref 0.27–4.2)
VLDLC SERPL CALC-MCNC: 61 MG/DL (ref 5–40)

## 2021-01-08 NOTE — PROGRESS NOTES
"Abelardo Pereira is a 48 y.o. male seen for follow up for DM2, HTN, lab review. Patient is checking BG 3-4 times a day. No BG readings. He states that he has quit smoking. He can not take vitamin d RX due to cost.     History of Present Illness this is a 48-year-old gentleman known patient with type 2 diabetes hypertension and dyslipidemia as well as vitamin D deficiency and hypothyroidism as well as chronic pancreatitis and obesity.  During the month of February he spent some time in the hospital because of acute bronchitis and pneumonia.  As a result of this he has decided to quit smoking once and for all.    /78   Resp 16   Ht 177.8 cm (70\")   Wt 130 kg (286 lb 3.2 oz)   BMI 41.07 kg/m²      Allergies   Allergen Reactions   • Penicillins Swelling       Current Outpatient Medications:   •  arformoterol (BROVANA) 15 MCG/2ML nebulizer solution, Take 2 mL by nebulization 2 (Two) Times a Day., Disp: 120 mL, Rfl: 0  •  aspirin 81 MG EC tablet, Take 1 tablet by mouth Daily., Disp: 90 tablet, Rfl: 1  •  budesonide (PULMICORT) 0.5 MG/2ML nebulizer solution, Take 2 mL by nebulization 2 (Two) Times a Day., Disp: 60 each, Rfl: 0  •  fenofibrate (TRICOR) 145 MG tablet, Take 1 tablet by mouth Daily., Disp: 30 tablet, Rfl: 4  •  gabapentin (NEURONTIN) 800 MG tablet, Take 1 tablet by mouth 3 (Three) Times a Day., Disp: 90 tablet, Rfl: 1  •  glucose blood (RELION PRIME TEST) test strip, USE STRIP TO TEST GLUCOSE 8 TIMES DAILY, Disp: 250 each, Rfl: 5  •  insulin aspart (novoLOG FLEXPEN) 100 UNIT/ML solution pen-injector sc pen, Inject 20 Units under the skin into the appropriate area as directed 3 (Three) Times a Day With Meals., Disp: , Rfl:   •  Insulin Pen Needle (RELION PEN NEEDLES) 32G X 4 MM misc, Use to inject insulin 4 times daily, Disp: 150 each, Rfl: 11  •  ipratropium-albuterol (DUO-NEB) 0.5-2.5 mg/mL nebulizer, Take 3 mL by nebulization Every 4 (Four) Hours As Needed for Wheezing or Shortness of " Patient called looking for cholestrol medication that he and Sofi had talked about from his visit this week.     'However from the previous message it looks like this is going through insurance for approval.    [phone]       Air., Disp: 360 mL, Rfl: 0  •  levothyroxine (SYNTHROID) 112 MCG tablet, Take 1 tablet by mouth Daily. On an empty stomach, Disp: 30 tablet, Rfl: 4  •  olmesartan (BENICAR) 20 MG tablet, Take 1 tablet by mouth Daily., Disp: 30 tablet, Rfl: 4  •  pantoprazole (PROTONIX) 40 MG EC tablet, Take 40 mg by mouth daily., Disp: , Rfl:   •  RELION LANCETS MICRO-THIN 33G misc, Test 3 times daily, Disp: 100 each, Rfl: 11  •  rosuvastatin (CRESTOR) 20 MG tablet, Take 1 tablet by mouth Every Night., Disp: 30 tablet, Rfl: 4  •  topiramate (TOPAMAX) 50 MG tablet, Take 50 mg by mouth 2 (Two) Times a Day., Disp: , Rfl:   •  TRESIBA FLEXTOUCH 200 UNIT/ML solution pen-injector, , Disp: , Rfl: 5  •  VASCEPA 1 g capsule capsule, Take 2 g by mouth Daily., Disp: 120 capsule, Rfl: 5    The following portions of the patient's history were reviewed and updated as appropriate: allergies, current medications, past family history, past medical history, past social history, past surgical history and problem list.    Review of Systems   Constitutional: Negative.    HENT: Negative.    Eyes: Negative.    Respiratory: Negative.    Cardiovascular: Negative.    Gastrointestinal: Negative.    Endocrine: Negative.    Genitourinary: Negative.    Musculoskeletal: Negative.    Skin: Negative.    Allergic/Immunologic: Negative.    Neurological: Negative.    Hematological: Negative.    Psychiatric/Behavioral: Negative.        Objective   Physical Exam   Constitutional: He is oriented to person, place, and time. He appears well-developed and well-nourished. No distress.   HENT:   Head: Normocephalic and atraumatic.   Right Ear: External ear normal.   Left Ear: External ear normal.   Nose: Nose normal.   Mouth/Throat: Oropharynx is clear and moist. No oropharyngeal exudate.   Eyes: Conjunctivae and EOM are normal. Pupils are equal, round, and reactive to light. Right eye exhibits no discharge. Left eye exhibits no discharge. No scleral icterus.   Neck: Normal  range of motion. Neck supple. No JVD present. No tracheal deviation present. No thyromegaly present.   Cardiovascular: Normal rate, regular rhythm, normal heart sounds and intact distal pulses. Exam reveals no gallop and no friction rub.   No murmur heard.  Pulmonary/Chest: Effort normal and breath sounds normal. No stridor. No respiratory distress. He has no wheezes. He has no rales. He exhibits no tenderness.   Bilateral rhonchi and wheezing.   Abdominal: Soft. Bowel sounds are normal. He exhibits no distension and no mass. There is no tenderness. There is no rebound and no guarding. No hernia.   Musculoskeletal: Normal range of motion. He exhibits no edema, tenderness or deformity.   Painful to touch both feet and multiple toenail fungus involvement.   Lymphadenopathy:     He has no cervical adenopathy.   Neurological: He is alert and oriented to person, place, and time. He displays normal reflexes. No cranial nerve deficit or sensory deficit. He exhibits normal muscle tone. Coordination normal.   Skin: Skin is warm and dry. No rash noted. He is not diaphoretic. No erythema. No pallor.   Psychiatric: He has a normal mood and affect. His behavior is normal. Judgment and thought content normal.   Nursing note and vitals reviewed.        Assessment/Plan   Diagnoses and all orders for this visit:    Uncontrolled type 2 diabetes mellitus with complication, without long-term current use of insulin (CMS/[facility])  -     Ambulatory Referral to Podiatry  -     T3, Free; Future  -     T4 & TSH ([facility]); Future  -     T4, Free; Future  -     Uric Acid; Future  -     Vitamin D 25 Hydroxy; Future  -     Comprehensive Metabolic Panel; Future  -     C-Peptide; Future  -     Hemoglobin A1c; Future  -     Lipid Panel; Future  -     MicroAlbumin, Urine, Random - Urine, Clean Catch; Future    Hypothyroidism due to Hashimoto's thyroiditis  -     T3, Free; Future  -     T4 & TSH ([facility]); Future  -     T4, Free; Future  -     Uric Acid;  Future  -     Vitamin D 25 Hydroxy; Future  -     Comprehensive Metabolic Panel; Future  -     C-Peptide; Future  -     Hemoglobin A1c; Future  -     Lipid Panel; Future  -     MicroAlbumin, Urine, Random - Urine, Clean Catch; Future    Mixed hyperlipidemia  -     fenofibrate (TRICOR) 145 MG tablet; Take 1 tablet by mouth Daily.  -     T3, Free; Future  -     T4 & TSH ([facility]); Future  -     T4, Free; Future  -     Uric Acid; Future  -     Vitamin D 25 Hydroxy; Future  -     Comprehensive Metabolic Panel; Future  -     C-Peptide; Future  -     Hemoglobin A1c; Future  -     Lipid Panel; Future  -     MicroAlbumin, Urine, Random - Urine, Clean Catch; Future    Essential hypertension  -     T3, Free; Future  -     T4 & TSH ([facility]); Future  -     T4, Free; Future  -     Uric Acid; Future  -     Vitamin D 25 Hydroxy; Future  -     Comprehensive Metabolic Panel; Future  -     C-Peptide; Future  -     Hemoglobin A1c; Future  -     Lipid Panel; Future  -     MicroAlbumin, Urine, Random - Urine, Clean Catch; Future    Vitamin D deficiency  -     T3, Free; Future  -     T4 & TSH ([facility]); Future  -     T4, Free; Future  -     Uric Acid; Future  -     Vitamin D 25 Hydroxy; Future  -     Comprehensive Metabolic Panel; Future  -     C-Peptide; Future  -     Hemoglobin A1c; Future  -     Lipid Panel; Future  -     MicroAlbumin, Urine, Random - Urine, Clean Catch; Future    Other chronic pancreatitis (CMS/HCC)  -     T3, Free; Future  -     T4 & TSH ([facility]); Future  -     T4, Free; Future  -     Uric Acid; Future  -     Vitamin D 25 Hydroxy; Future  -     Comprehensive Metabolic Panel; Future  -     C-Peptide; Future  -     Hemoglobin A1c; Future  -     Lipid Panel; Future  -     MicroAlbumin, Urine, Random - Urine, Clean Catch; Future    Diabetic autonomic neuropathy associated with diabetes mellitus due to underlying condition (CMS/HCC)  -     VASCEPA 1 g capsule capsule; Take 2 g by mouth Daily.  -     olmesartan  (BENICAR) 20 MG tablet; Take 1 tablet by mouth Daily.  -     gabapentin (NEURONTIN) 800 MG tablet; Take 1 tablet by mouth 3 (Three) Times a Day.  -     T3, Free; Future  -     T4 & TSH ([facility]); Future  -     T4, Free; Future  -     Uric Acid; Future  -     Vitamin D 25 Hydroxy; Future  -     Comprehensive Metabolic Panel; Future  -     C-Peptide; Future  -     Hemoglobin A1c; Future  -     Lipid Panel; Future  -     MicroAlbumin, Urine, Random - Urine, Clean Catch; Future    Secondary hypothyroidism  -     levothyroxine (SYNTHROID) 112 MCG tablet; Take 1 tablet by mouth Daily. On an empty stomach  -     T3, Free; Future  -     T4 & TSH ([facility]); Future  -     T4, Free; Future  -     Uric Acid; Future  -     Vitamin D 25 Hydroxy; Future  -     Comprehensive Metabolic Panel; Future  -     C-Peptide; Future  -     Hemoglobin A1c; Future  -     Lipid Panel; Future  -     MicroAlbumin, Urine, Random - Urine, Clean Catch; Future    Diabetic autonomic neuropathy associated with type 2 diabetes mellitus (CMS/HCC)  -     Ambulatory Referral to Podiatry  -     T3, Free; Future  -     T4 & TSH ([facility]); Future  -     T4, Free; Future  -     Uric Acid; Future  -     Vitamin D 25 Hydroxy; Future  -     Comprehensive Metabolic Panel; Future  -     C-Peptide; Future  -     Hemoglobin A1c; Future  -     Lipid Panel; Future  -     MicroAlbumin, Urine, Random - Urine, Clean Catch; Future    Toenail fungus  -     Ambulatory Referral to Podiatry  -     T3, Free; Future  -     T4 & TSH ([facility]); Future  -     T4, Free; Future  -     Uric Acid; Future  -     Vitamin D 25 Hydroxy; Future  -     Comprehensive Metabolic Panel; Future  -     C-Peptide; Future  -     Hemoglobin A1c; Future  -     Lipid Panel; Future  -     MicroAlbumin, Urine, Random - Urine, Clean Catch; Future    Other orders  -     TRESIBA FLEXTOUCH 200 UNIT/ML solution pen-injector; Inject 120 Units under the skin into the appropriate area as directed Daily With  Breakfast.  -     rosuvastatin (CRESTOR) 40 MG tablet; Take 1 tablet by mouth Daily.  -     NOVOLOG FLEXPEN 100 UNIT/ML solution pen-injector sc pen; 20 units before each meal  -     JARDIANCE 25 MG tablet; Take 25 mg by mouth Daily.        In summary I saw and examined this 48-year-old gentleman for above-mentioned problems.  I reviewed his laboratory evaluation of April 3, 2019 and provided him with a hard copy of it.  His hemoglobin A1c is 8.00 and his total cholesterol and LDL are 218 and 135 respectively with a triglyceride of 278 and a very low vitamin D of 18.5.  I asked him to purchase vitamin D3 5000 units and take 2 capsules daily.  I am also adding Jardiance 10 mg daily for 4 weeks with samples and 25 mg every morning moving forward.  We will also refer him to podiatrist for treating his toenail fungus as well as prescribing for him diabetic shoes if he needs it.  This office visit lasted 45 minutes with 25 minutes of this being spent on face-to-face counseling education and planning his care moving forward.  He will continue rest of his prescriptions and will see Ms. Rosemarie Annita in 4 months or sooner if needed with laboratory evaluation prior to each office visit.

## 2021-02-05 ENCOUNTER — TELEPHONE (OUTPATIENT)
Dept: FAMILY MEDICINE CLINIC | Facility: CLINIC | Age: 51
End: 2021-02-05

## 2021-02-05 NOTE — TELEPHONE ENCOUNTER
PATIENT TESTED POSITIVE FOR COVID IS HAVING THE FOLLOWING SYSTEM:    * LOST OF TASTE/SMELL  * FATIGUE    HE IS EXTREMELY SCARED ABOUT HAVING COVID SINCE HE HAS COPD A DIABETIC AND CANCER SURVIVOR.   PLEASE ADVISE    PATIENT CALL BACK #:  [phone]    PHARMACY: [facility] [address] [place] [address] ([facility]), [place] - [address] [facility] [phone] [facility] [phone] FX

## 2021-02-08 ENCOUNTER — OFFICE VISIT (OUTPATIENT)
Dept: FAMILY MEDICINE CLINIC | Facility: CLINIC | Age: 51
End: 2021-02-08

## 2021-02-08 DIAGNOSIS — U07.1 COVID-19 VIRUS INFECTION: Primary | ICD-10-CM

## 2021-02-08 DIAGNOSIS — F41.9 ANXIETY: ICD-10-CM

## 2021-02-08 PROCEDURE — 99443 PR PHYS/QHP TELEPHONE EVALUATION 21-30 MIN: CPT | Performed by: INTERNAL MEDICINE

## 2021-02-08 NOTE — TELEPHONE ENCOUNTER
[facility] [place] testing site there. Called health dept to find out a little more info, no luck. Patient is on schedule for phone call
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
## 2021-02-18 DIAGNOSIS — E03.8 SECONDARY HYPOTHYROIDISM: ICD-10-CM

## 2021-02-18 DIAGNOSIS — E11.22 TYPE 2 DIABETES MELLITUS WITH STAGE 3 CHRONIC KIDNEY DISEASE, WITH LONG-TERM CURRENT USE OF INSULIN, UNSPECIFIED WHETHER STAGE 3A OR 3B CKD (HCC): ICD-10-CM

## 2021-02-18 DIAGNOSIS — E03.8 HYPOTHYROIDISM DUE TO HASHIMOTO'S THYROIDITIS: Primary | Chronic | ICD-10-CM

## 2021-02-18 DIAGNOSIS — Z79.4 TYPE 2 DIABETES MELLITUS WITH STAGE 3 CHRONIC KIDNEY DISEASE, WITH LONG-TERM CURRENT USE OF INSULIN, UNSPECIFIED WHETHER STAGE 3A OR 3B CKD (HCC): ICD-10-CM

## 2021-02-18 DIAGNOSIS — R73.9 HYPERGLYCEMIA: ICD-10-CM

## 2021-02-18 DIAGNOSIS — E78.2 MIXED HYPERLIPIDEMIA: ICD-10-CM

## 2021-02-18 DIAGNOSIS — E55.9 VITAMIN D DEFICIENCY: ICD-10-CM

## 2021-02-18 DIAGNOSIS — E06.3 HYPOTHYROIDISM DUE TO HASHIMOTO'S THYROIDITIS: Primary | Chronic | ICD-10-CM

## 2021-02-18 DIAGNOSIS — N18.30 TYPE 2 DIABETES MELLITUS WITH STAGE 3 CHRONIC KIDNEY DISEASE, WITH LONG-TERM CURRENT USE OF INSULIN, UNSPECIFIED WHETHER STAGE 3A OR 3B CKD (HCC): ICD-10-CM

## 2021-02-23 ENCOUNTER — LAB (OUTPATIENT)
Dept: ENDOCRINOLOGY | Age: 51
End: 2021-02-23

## 2021-02-23 DIAGNOSIS — E11.22 TYPE 2 DIABETES MELLITUS WITH STAGE 3 CHRONIC KIDNEY DISEASE, WITH LONG-TERM CURRENT USE OF INSULIN, UNSPECIFIED WHETHER STAGE 3A OR 3B CKD (HCC): ICD-10-CM

## 2021-02-23 DIAGNOSIS — R73.9 HYPERGLYCEMIA: ICD-10-CM

## 2021-02-23 DIAGNOSIS — E55.9 VITAMIN D DEFICIENCY: ICD-10-CM

## 2021-02-23 DIAGNOSIS — Z79.4 TYPE 2 DIABETES MELLITUS WITH STAGE 3 CHRONIC KIDNEY DISEASE, WITH LONG-TERM CURRENT USE OF INSULIN, UNSPECIFIED WHETHER STAGE 3A OR 3B CKD (HCC): ICD-10-CM

## 2021-02-23 DIAGNOSIS — E78.2 MIXED HYPERLIPIDEMIA: ICD-10-CM

## 2021-02-23 DIAGNOSIS — E03.8 HYPOTHYROIDISM DUE TO HASHIMOTO'S THYROIDITIS: Chronic | ICD-10-CM

## 2021-02-23 DIAGNOSIS — E03.8 SECONDARY HYPOTHYROIDISM: ICD-10-CM

## 2021-02-23 DIAGNOSIS — E06.3 HYPOTHYROIDISM DUE TO HASHIMOTO'S THYROIDITIS: Chronic | ICD-10-CM

## 2021-02-23 DIAGNOSIS — N18.30 TYPE 2 DIABETES MELLITUS WITH STAGE 3 CHRONIC KIDNEY DISEASE, WITH LONG-TERM CURRENT USE OF INSULIN, UNSPECIFIED WHETHER STAGE 3A OR 3B CKD (HCC): ICD-10-CM

## 2021-02-24 LAB
25(OH)D3+25(OH)D2 SERPL-MCNC: 72.1 NG/ML (ref 30–100)
ALBUMIN SERPL-MCNC: 4.3 G/DL (ref 3.5–5.2)
ALBUMIN/GLOB SERPL: 1.7 G/DL
ALP SERPL-CCNC: 96 U/L (ref 39–117)
ALT SERPL-CCNC: 43 U/L (ref 1–41)
AST SERPL-CCNC: 36 U/L (ref 1–40)
BILIRUB SERPL-MCNC: 0.5 MG/DL (ref 0–1.2)
BUN SERPL-MCNC: 11 MG/DL (ref 6–20)
BUN/CREAT SERPL: 12.4 (ref 7–25)
C PEPTIDE SERPL-MCNC: 7.4 NG/ML (ref 1.1–4.4)
CALCIUM SERPL-MCNC: 9.7 MG/DL (ref 8.6–10.5)
CHLORIDE SERPL-SCNC: 103 MMOL/L (ref 98–107)
CHOLEST SERPL-MCNC: 230 MG/DL (ref 0–200)
CO2 SERPL-SCNC: 25.3 MMOL/L (ref 22–29)
CREAT SERPL-MCNC: 0.89 MG/DL (ref 0.76–1.27)
GLOBULIN SER CALC-MCNC: 2.5 GM/DL
GLUCOSE SERPL-MCNC: 132 MG/DL (ref 65–99)
HBA1C MFR BLD: 8.5 % (ref 4.8–5.6)
HDLC SERPL-MCNC: 26 MG/DL (ref 40–60)
INTERPRETATION: NORMAL
LDLC SERPL CALC-MCNC: 147 MG/DL (ref 0–100)
Lab: NORMAL
MICROALBUMIN UR-MCNC: 22.3 UG/ML
POTASSIUM SERPL-SCNC: 4.4 MMOL/L (ref 3.5–5.2)
PROT SERPL-MCNC: 6.8 G/DL (ref 6–8.5)
SODIUM SERPL-SCNC: 138 MMOL/L (ref 136–145)
T3FREE SERPL-MCNC: 3.4 PG/ML (ref 2–4.4)
T4 FREE SERPL-MCNC: 1.4 NG/DL (ref 0.93–1.7)
T4 SERPL-MCNC: 9.26 MCG/DL (ref 4.5–11.7)
TRIGL SERPL-MCNC: 305 MG/DL (ref 0–150)
TSH SERPL DL<=0.005 MIU/L-ACNC: 1.82 UIU/ML (ref 0.27–4.2)
VLDLC SERPL CALC-MCNC: 57 MG/DL (ref 5–40)

## 2021-03-02 ENCOUNTER — OFFICE VISIT (OUTPATIENT)
Dept: ENDOCRINOLOGY | Age: 51
End: 2021-03-02

## 2021-03-02 VITALS
RESPIRATION RATE: 16 BRPM | DIASTOLIC BLOOD PRESSURE: 74 MMHG | WEIGHT: 258.6 LBS | SYSTOLIC BLOOD PRESSURE: 128 MMHG | HEIGHT: 70 IN | BODY MASS INDEX: 37.02 KG/M2

## 2021-03-02 DIAGNOSIS — Z79.4 TYPE 2 DIABETES MELLITUS WITH HYPERGLYCEMIA, WITH LONG-TERM CURRENT USE OF INSULIN (HCC): Primary | ICD-10-CM

## 2021-03-02 DIAGNOSIS — E11.65 TYPE 2 DIABETES MELLITUS WITH HYPERGLYCEMIA, WITH LONG-TERM CURRENT USE OF INSULIN (HCC): Primary | ICD-10-CM

## 2021-03-02 PROCEDURE — 99214 OFFICE O/P EST MOD 30 MIN: CPT | Performed by: NURSE PRACTITIONER

## 2021-03-02 NOTE — PATIENT INSTRUCTIONS
Increase Tresiba to 130u  Change novolog to 5u before dinner and continue sliding scale (2u for every 20 over 160)  Continue januvia      Hypoglycemia  Hypoglycemia is when the sugar (glucose) level in your blood is too low. Signs of low blood sugar may include:  · Feeling:  ? Hungry.  ? Worried or nervous (anxious).  ? Sweaty and clammy.  ? Confused.  ? Dizzy.  ? Sleepy.  ? Sick to your stomach (nauseous).  · Having:  ? A fast heartbeat.  ? A headache.  ? A change in your vision.  ? Tingling or no feeling (numbness) around your mouth, lips, or tongue.  ? Jerky movements that you cannot control (seizure).  · Having trouble with:  ? Moving (coordination).  ? Sleeping.  ? Passing out (fainting).  ? Getting upset easily (irritability).  Low blood sugar can happen to people who have diabetes and people who do not have diabetes. Low blood sugar can happen quickly, and it can be an emergency.  Treating low blood sugar  Low blood sugar is often treated by eating or drinking something sugary right away, such as:  · Fruit juice, 4-6 oz (120-150 mL).  · Regular soda (not diet soda), 4-6 oz (120-150 mL).  · Low-fat milk, 4 oz (120 mL).  · Several pieces of hard candy.  · Sugar or honey, 1 Tbsp (15 mL).  Treating low blood sugar if you have diabetes  If you can think clearly and swallow safely, follow the 15:15 rule:  · Take 15 grams of a fast-acting carb (carbohydrate). Talk with your doctor about how much you should take.  · Always keep a source of fast-acting carb with you, such as:  ? Sugar tablets (glucose pills). Take 3-4 pills.  ? 6-8 pieces of hard candy.  ? 4-6 oz (120-150 mL) of fruit juice.  ? 4-6 oz (120-150 mL) of regular (not diet) soda.  ? 1 Tbsp (15 mL) honey or sugar.  · Check your blood sugar 15 minutes after you take the carb.  · If your blood sugar is still at or below 70 mg/dL (3.9 mmol/L), take 15 grams of a carb again.  · If your blood sugar does not go above 70 mg/dL (3.9 mmol/L) after 3 tries, get help  right away.  · After your blood sugar goes back to normal, eat a meal or a snack within 1 hour.    Treating very low blood sugar  If your blood sugar is at or below 54 mg/dL (3 mmol/L), you have very low blood sugar (severe hypoglycemia). This may also cause:  · Passing out.  · Jerky movements you cannot control (seizure).  · Losing consciousness (coma).  This is an emergency. Do not wait to see if the symptoms will go away. Get medical help right away. Call your local emergency services (911 in the [place] Do not drive yourself to the hospital.  If you have very low blood sugar and you cannot eat or drink, you may need a glucagon shot (injection). A family member or friend should learn how to check your blood sugar and how to give you a glucagon shot. Ask your doctor if you need to have a glucagon shot kit at home.  Follow these instructions at home:  General instructions  · Take over-the-counter and prescription medicines only as told by your doctor.  · Stay aware of your blood sugar as told by your doctor.  · Limit alcohol intake to no more than 1 drink a day for nonpregnant women and 2 drinks a day for men. One drink equals 12 oz of beer (355 mL), 5 oz of wine (148 mL), or 1½ oz of hard liquor (44 mL).  · Keep all follow-up visits as told by your doctor. This is important.  If you have diabetes:    · Follow your diabetes care plan as told by your doctor. Make sure you:  ? Know the signs of low blood sugar.  ? Take your medicines as told.  ? Follow your exercise and meal plan.  ? Eat on time. Do not skip meals.  ? Check your blood sugar as often as told by your doctor. Always check it before and after exercise.  ? Follow your sick day plan when you cannot eat or drink normally. Make this plan ahead of time with your doctor.  · Share your diabetes care plan with:  ? Your work or school.  ? People you live with.  · Check your pee (urine) for ketones:  ? When you are sick.  ? As told by your doctor.  · Carry a card  or wear jewelry that says you have diabetes.  Contact a doctor if:  · You have trouble keeping your blood sugar in your target range.  · You have low blood sugar often.  Get help right away if:  · You still have symptoms after you eat or drink something sugary.  · Your blood sugar is at or below 54 mg/dL (3 mmol/L).  · You have jerky movements that you cannot control.  · You pass out.  These symptoms may be an emergency. Do not wait to see if the symptoms will go away. Get medical help right away. Call your local emergency services (911 in the [place] Do not drive yourself to the hospital.  Summary  · Hypoglycemia happens when the level of sugar (glucose) in your blood is too low.  · Low blood sugar can happen to people who have diabetes and people who do not have diabetes. Low blood sugar can happen quickly, and it can be an emergency.  · Make sure you know the signs of low blood sugar and know how to treat it.  · Always keep a source of sugar (fast-acting carb) with you to treat low blood sugar.  This information is not intended to replace advice given to you by your health care provider. Make sure you discuss any questions you have with your health care provider.  Document Revised: 04/09/2020 Document Reviewed: 01/20/2017  [employer] Patient Education © 2020 [employer]      Diabetic Retinopathy  Diabetic retinopathy is a disease of the retina. The retina is a light-sensitive membrane at the back of the eye. Retinopathy is a complication of diabetes (diabetes mellitus) and a common cause of bad eyesight (visual impairment). It can eventually cause blindness. Early detection and treatment of diabetic retinopathy is important in keeping your eyes healthy and preventing further damage to them.  What are the causes?  Diabetic retinopathy is caused by blood sugar (glucose) levels that are too high for an extended period of time. High blood glucose over an extended period of time can:  · Damage small blood vessels in  the retina, allowing blood to leak through the vessel walls.  · Cause new, abnormal blood vessels to grow on the retina. This can scar the retina in the advanced stage of diabetic retinopathy.  What increases the risk?  You are more likely to develop this condition if:  · You have had diabetes for a long time.  · You have poorly controlled blood glucose.  · You have high blood pressure.  What are the signs or symptoms?  In the early stages of diabetic retinopathy, there are often no symptoms. As the condition gets worse, symptoms may include:  · Blurred vision. This is usually caused by swelling due to abnormal blood glucose levels. The blurriness may go away when blood glucose levels return to normal.  · Moving specks or dark spots (floaters) in your vision. These can be caused by a small amount of bleeding (hemorrhage) from retinal blood vessels.  · Missing parts of your field of vision, such as vision at the sides of the eyes. This can be caused by larger retinal hemorrhages.  · Difficulty reading.  · Double vision.  · Pain in one or both eyes.  · Feeling pressure in one or both eyes.  · Trouble seeing straight lines. Straight lines may not look straight.  · Redness of the eyes that does not go away.  How is this diagnosed?  This condition may be diagnosed with an eye exam in which your eye care specialist puts drops in your eyes that enlarge (dilate) your pupils. This lets your health care provider examine your retina and check for changes in your retinal blood vessels.  How is this treated?  This condition may be treated by:  · Keeping your blood glucose and blood pressure within a target range.  · Using a type of laser beam to seal your retinal blood vessels. This stops them from bleeding and decreases pressure in your eye.  · Getting shots of medicine in the eye to reduce swelling of the center of the retina (macula). You may be given:  ? Anti-VEGF medicine. This medicine can help slow vision loss, and may  even improve vision.  ? Steroid medicine.  Follow these instructions at home:    · Follow your diabetes management plan as directed by your health care provider. This may include exercising regularly and eating a healthy diet.  · Keep your blood glucose level and your blood pressure in your target range, as directed by your health care provider.  · Check your blood glucose as often as directed.  · Take over the counter and prescription medicines only as told by your health care provider. This includes insulin and oral diabetes medicine.  · Get your eyes checked at least once every year. An eye specialist can usually see diabetic retinopathy developing long before it starts to cause problems. In many cases, it can be treated to prevent complications from occurring.  · Do not use any products that contain nicotine or tobacco, such as cigarettes and e-cigarettes. If you need help quitting, ask your health care provider.  · Keep all follow-up visits as told by your health care provider. This is important.  Contact a health care provider if:  · You notice gradual blurring or other changes in your vision over time.  · You notice that your glasses or contact lenses do not make things look as sharp as they once did.  · You have trouble reading or seeing details at a distance with either eye.  · You notice a change in your vision or notice that parts of your field of vision appear missing or hazy.  · You suddenly see moving specks or dark spots in the field of vision of either eye.  Get help right away if:  · You have sudden pain or pressure in one or both eyes.  · You suddenly lose vision or a curtain or veil seems to come across your eyes.  · You have a sudden burst of floaters in your vision.  Summary  · Diabetic retinopathy is a disease of the retina. The retina is a light-sensitive membrane at the back of the eye. Retinopathy is a complication of diabetes.  · Get your eyes checked at least once every year. An eye  specialist can usually see diabetic retinopathy developing long before it starts to cause problems. In many cases, it can be treated to prevent complications from occurring.  · Keep your blood glucose and your blood pressure in target range. Follow your diabetes management plan as directed by your health care provider.  · Protect your eyes. Wear sunglasses and eye protection when needed.  This information is not intended to replace advice given to you by your health care provider. Make sure you discuss any questions you have with your health care provider.  Document Revised: 01/30/2019 Document Reviewed: 01/22/2018  [employer] Patient Education © 2020 [employer]    Levothyroxine tablets  What is this medicine?  LEVOTHYROXINE (joseph voe thye RILEY een) is a thyroid hormone. This medicine can improve symptoms of thyroid deficiency such as slow speech, lack of energy, weight gain, hair loss, dry skin, and feeling cold. It also helps to treat goiter (an enlarged thyroid gland). It is also used to treat some kinds of thyroid cancer along with surgery and other medicines.  This medicine may be used for other purposes; ask your health care provider or pharmacist if you have questions.  COMMON BRAND NAME(S): Estre, Euthyrox, Levo-T, Levothroid, Levoxyl, Synthroid, Thyro-Tabs, Unithroid  What should I tell my health care provider before I take this medicine?  They need to know if you have any of these conditions:  · [place]'s disease or other adrenal gland problem  · angina  · bone problems  · diabetes  · dieting or on a weight loss program  · fertility problems  · heart disease  · pituitary gland problem  · take medicines that treat or prevent blood clots  · an unusual or allergic reaction to levothyroxine, thyroid hormones, other medicines, foods, dyes, or preservatives  · pregnant or trying to get pregnant  · breast-feeding  How should I use this medicine?  Take this medicine by mouth with plenty of water. It is best to take  on an empty stomach, at least 30 minutes to one hour before breakfast. Avoid taking antacids containing aluminum or magnesium, simethicone, bile acid sequestrants, calcium carbonate, sodium polystyrene sulfonate, ferrous sulfate, sevelamer, lanthanum, or sucralfate within 4 hours of taking this medicine. Follow the directions on the prescription label. Take at the same time each day. Do not take your medicine more often than directed.  Contact your pediatrician regarding the use of this medicine in children. While this drug may be prescribed for children and infants as young as a few days of age for selected conditions, precautions do apply. For infants, you may crush the tablet and place in a small amount of (5 to 10 mL or 1 to 2 teaspoonfuls) of water, breast milk, or non-soy based infant formula. Do not mix with soy-based infant formula. Give as directed.  Overdosage: If you think you have taken too much of this medicine contact a poison control center or emergency room at once.  NOTE: This medicine is only for you. Do not share this medicine with others.  What if I miss a dose?  If you miss a dose, take it as soon as you can. If it is almost time for your next dose, take only that dose. Do not take double or extra doses.  What may interact with this medicine?  · amiodarone  · antacids  · anti-thyroid medicines  · calcium supplements  · carbamazepine  · certain medicines for depression  · certain medicines to treat cancer  · cholestyramine  · clofibrate  · colesevelam  · colestipol  · digoxin  · female hormones, like estrogens and birth control pills, patches, rings, or injections  · iron supplements  · ketamine  · lanthanum  · liquid nutrition products like Ensure  · lithium  · medicines for colds and breathing difficulties  · medicines for diabetes  · medicines or dietary supplements for weight loss  · methadone  · niacin  · orlistat  · oxandrolone  · phenobarbital or other  barbiturates  · phenytoin  · rifampin  · sevelamer  · simethicone  · sodium polystyrene sulfonate  · soy isoflavones  · steroid medicines like prednisone or cortisone  · sucralfate  · testosterone  · theophylline  · warfarin  This list may not describe all possible interactions. Give your health care provider a list of all the medicines, herbs, non-prescription drugs, or dietary supplements you use. Also tell them if you smoke, drink alcohol, or use illegal drugs. Some items may interact with your medicine.  What should I watch for while using this medicine?  Be sure to take this medicine with plenty of fluids. Some tablets may cause choking, gagging, or difficulty swallowing from the tablet getting stuck in your throat. Most of these problems disappear if the medicine is taken with the right amount of water or other fluids.  Do not switch brands of this medicine unless your health care professional agrees with the change. Ask questions if you are uncertain.  You will need regular exams and occasional blood tests to check the response to treatment. If you are receiving this medicine for an underactive thyroid, it may be several weeks before you notice an improvement. Check with your doctor or health care professional if your symptoms do not improve.  It may be necessary for you to take this medicine for the rest of your life. Do not stop using this medicine unless your doctor or health care professional advises you to.  This medicine can affect blood sugar levels. If you have diabetes, check your blood sugar as directed.  You may lose some of your hair when you first start treatment. With time, this usually corrects itself.  If you are going to have surgery, tell your doctor or health care professional that you are taking this medicine.  What side effects may I notice from receiving this medicine?  Side effects that you should report to your doctor or health care professional as soon as possible:  · allergic  reactions like skin rash, itching or hives, swelling of the face, lips, or tongue  · anxious  · breathing problems  · changes in menstrual periods  · chest pain  · diarrhea  · excessive sweating or intolerance to heat  · fast or irregular heartbeat  · leg cramps  · nervousness  · swelling of ankles, feet, or legs  · tremors  · trouble sleeping  · vomiting  Side effects that usually do not require medical attention (report to your doctor or health care professional if they continue or are bothersome):  · changes in appetite  · headache  · irritable  · nausea  · weight loss  This list may not describe all possible side effects. Call your doctor for medical advice about side effects. You may report side effects to FDA at 3-547-MYB-1005.  Where should I keep my medicine?  Keep out of the reach of children.  Store at room temperature between 15 and 30 degrees C (59 and 86 degrees F). Protect from light and moisture. Keep container tightly closed. Throw away any unused medicine after the expiration date.  NOTE: This sheet is a summary. It may not cover all possible information. If you have questions about this medicine, talk to your doctor, pharmacist, or health care provider.  © 2021 [employer] (2020-12-10 13:39:26)      Fat and Cholesterol Restricted Eating Plan  Eating a diet that limits fat and cholesterol may help lower your risk for heart disease and other conditions. Your body needs fat and cholesterol for basic functions, but eating too much of these things can be harmful to your health.  Your health care provider may order lab tests to check your blood fat (lipid) and cholesterol levels. This helps your health care provider understand your risk for certain conditions and whether you need to make diet changes. Work with your health care provider or dietitian to make an eating plan that is right for you.  Your plan includes:  · Limit your fat intake to ______% or less of your total calories a  "day.  · Limit your saturated fat intake to ______% or less of your total calories a day.  · Limit the amount of cholesterol in your diet to less than _________mg a day.  · Eat ___________ g of fiber a day.  What are tips for following this plan?  General guidelines    · If you are overweight, work with your health care provider to lose weight safely. Losing just 5-10% of your body weight can improve your overall health and help prevent diseases such as diabetes and heart disease.  · Avoid:  ? Foods with added sugar.  ? Fried foods.  ? Foods that contain partially hydrogenated oils, including stick margarine, some tub margarines, cookies, crackers, and other baked goods.  · Limit alcohol intake to no more than 1 drink a day for nonpregnant women and 2 drinks a day for men. One drink equals 12 oz of beer, 5 oz of wine, or 1½ oz of hard liquor.  Reading food labels  · Check food labels for:  ? Trans fats, partially hydrogenated oils, or high amounts of saturated fat. Avoid foods that contain saturated fat and trans fat.  ? The amount of cholesterol in each serving. Try to eat no more than 200 mg of cholesterol each day.  ? The amount of fiber in each serving. Try to eat at least 20-30 g of fiber each day.  · Choose foods with healthy fats, such as:  ? Monounsaturated and polyunsaturated fats. These include olive and canola oil, flaxseeds, walnuts, almonds, and seeds.  ? Omega-3 fats. These are found in foods such as salmon, mackerel, sardines, tuna, flaxseed oil, and ground flaxseeds.  · Choose grain products that have whole grains. Look for the word \"whole\" as the first word in the ingredient list.  Cooking  · Cook foods using methods other than frying. Baking, boiling, grilling, and broiling are some healthy options.  · Eat more home-cooked food and less restaurant, buffet, and fast food.  · Avoid cooking using saturated fats.  ? Animal sources of saturated fats include meats, butter, and cream.  ? Plant sources of " saturated fats include palm oil, palm kernel oil, and coconut oil.  Meal planning    · At meals, imagine dividing your plate into fourths:  ? Fill one-half of your plate with vegetables and green salads.  ? Fill one-fourth of your plate with whole grains.  ? Fill one-fourth of your plate with lean protein foods.  · Eat fish that is high in omega-3 fats at least two times a week.  · Eat more foods that contain fiber, such as whole grains, beans, apples, broccoli, carrots, peas, and barley. These foods help promote healthy cholesterol levels in the blood.  Recommended foods  Grains  · Whole grains, such as whole wheat or whole grain breads, crackers, cereals, and pasta. Unsweetened oatmeal, bulgur, barley, quinoa, or brown rice. Corn or whole wheat flour tortillas.  Vegetables  · Fresh or frozen vegetables (raw, steamed, roasted, or grilled). Green salads.  Fruits  · All fresh, canned (in natural juice), or frozen fruits.  Meats and other protein foods  · Ground beef (85% or leaner), grass-fed beef, or beef trimmed of fat. Skinless chicken or turkey. Ground chicken or turkey. Pork trimmed of fat. All fish and seafood. Egg whites. Dried beans, peas, or lentils. Unsalted nuts or seeds. Unsalted canned beans. Natural nut butters without added sugar and oil.  Dairy  · Low-fat or nonfat dairy products, such as skim or 1% milk, 2% or reduced-fat cheeses, low-fat and fat-free ricotta or cottage cheese, or plain low-fat and nonfat yogurt.  Fats and oils  · Tub margarine without trans fats. Light or reduced-fat mayonnaise and salad dressings. Avocado. Olive, canola, sesame, or safflower oils.  The items listed above may not be a complete list of recommended foods or beverages. Contact your dietitian for more options.  Foods to avoid  Grains  · White bread. White pasta. White rice. Cornbread. Bagels, pastries, and croissants. Crackers and snack foods that contain trans fat and hydrogenated oils.  Vegetables  · Vegetables cooked  in cheese, cream, or butter sauce. Fried vegetables.  Fruits  · Canned fruit in heavy syrup. Fruit in cream or butter sauce. Fried fruit.  Meats and other protein foods  · Fatty cuts of meat. Ribs, chicken wings, stein, sausage, bologna, salami, chitterlings, fatback, hot dogs, bratwurst, and packaged lunch meats. Liver and organ meats. Whole eggs and egg yolks. Chicken and turkey with skin. Fried meat.  Dairy  · Whole or 2% milk, cream, half-and-half, and cream cheese. Whole milk cheeses. Whole-fat or sweetened yogurt. Full-fat cheeses. Nondairy creamers and whipped toppings. Processed cheese, cheese spreads, and cheese curds.  Beverages  · Alcohol. Sugar-sweetened drinks such as sodas, lemonade, and fruit drinks.  Fats and oils  · Butter, stick margarine, lard, shortening, ghee, or stein fat. Coconut, palm kernel, and palm oils.  Sweets and desserts  · Corn syrup, sugars, honey, and molasses. Candy. Jam and jelly. Syrup. Sweetened cereals. Cookies, pies, cakes, donuts, muffins, and ice cream.  The items listed above may not be a complete list of foods and beverages to avoid. Contact your dietitian for more information.  Summary  · Your body needs fat and cholesterol for basic functions. However, eating too much of these things can be harmful to your health.  · Work with your health care provider and dietitian to follow a diet low in fat and cholesterol. Doing this may help lower your risk for heart disease and other conditions.  · Choose healthy fats, such as monounsaturated and polyunsaturated fats, and foods high in omega-3 fatty acids.  · Eat fiber-rich foods, such as whole grains, beans, peas, fruits, and vegetables.  · Limit or avoid alcohol, fried foods, and foods high in saturated fats, partially hydrogenated oils, and sugar.  This information is not intended to replace advice given to you by your health care provider. Make sure you discuss any questions you have with your health care provider.  Document  Revised: 11/30/2018 Document Reviewed: 09/04/2018  [employer] Patient Education © 2020 [employer]      Diabetes Mellitus and Foot Care  Foot care is an important part of your health, especially when you have diabetes. Diabetes may cause you to have problems because of poor blood flow (circulation) to your feet and legs, which can cause your skin to:  · Become thinner and drier.  · Break more easily.  · Heal more slowly.  · Peel and crack.  You may also have nerve damage (neuropathy) in your legs and feet, causing decreased feeling in them. This means that you may not notice minor injuries to your feet that could lead to more serious problems. Noticing and addressing any potential problems early is the best way to prevent future foot problems.  How to care for your feet  Foot hygiene  · Wash your feet daily with warm water and mild soap. Do not use hot water. Then, pat your feet and the areas between your toes until they are completely dry. Do not soak your feet as this can dry your skin.  · Trim your toenails straight across. Do not dig under them or around the cuticle. File the edges of your nails with an emery board or nail file.  · Apply a moisturizing lotion or petroleum jelly to the skin on your feet and to dry, brittle toenails. Use lotion that does not contain alcohol and is unscented. Do not apply lotion between your toes.  Shoes and socks  · Wear clean socks or stockings every day. Make sure they are not too tight. Do not wear knee-high stockings since they may decrease blood flow to your legs.  · Wear shoes that fit properly and have enough cushioning. Always look in your shoes before you put them on to be sure there are no objects inside.  · To break in new shoes, wear them for just a few hours a day. This prevents injuries on your feet.  Wounds, scrapes, corns, and calluses  · Check your feet daily for blisters, cuts, bruises, sores, and redness. If you cannot see the bottom of your feet, use a mirror  or ask someone for help.  · Do not cut corns or calluses or try to remove them with medicine.  · If you find a minor scrape, cut, or break in the skin on your feet, keep it and the skin around it clean and dry. You may clean these areas with mild soap and water. Do not clean the area with peroxide, alcohol, or iodine.  · If you have a wound, scrape, corn, or callus on your foot, look at it several times a day to make sure it is healing and not infected. Check for:  ? Redness, swelling, or pain.  ? Fluid or blood.  ? Warmth.  ? Pus or a bad smell.  General instructions  · Do not cross your legs. This may decrease blood flow to your feet.  · Do not use heating pads or hot water bottles on your feet. They may burn your skin. If you have lost feeling in your feet or legs, you may not know this is happening until it is too late.  · Protect your feet from hot and cold by wearing shoes, such as at the beach or on hot pavement.  · Schedule a complete foot exam at least once a year (annually) or more often if you have foot problems. If you have foot problems, report any cuts, sores, or bruises to your health care provider immediately.  Contact a health care provider if:  · You have a medical condition that increases your risk of infection and you have any cuts, sores, or bruises on your feet.  · You have an injury that is not healing.  · You have redness on your legs or feet.  · You feel burning or tingling in your legs or feet.  · You have pain or cramps in your legs and feet.  · Your legs or feet are numb.  · Your feet always feel cold.  · You have pain around a toenail.  Get help right away if:  · You have a wound, scrape, corn, or callus on your foot and:  ? You have pain, swelling, or redness that gets worse.  ? You have fluid or blood coming from the wound, scrape, corn, or callus.  ? Your wound, scrape, corn, or callus feels warm to the touch.  ? You have pus or a bad smell coming from the wound, scrape, corn, or  callus.  ? You have a fever.  ? You have a red line going up your leg.  Summary  · Check your feet every day for cuts, sores, red spots, swelling, and blisters.  · Moisturize feet and legs daily.  · Wear shoes that fit properly and have enough cushioning.  · If you have foot problems, report any cuts, sores, or bruises to your health care provider immediately.  · Schedule a complete foot exam at least once a year (annually) or more often if you have foot problems.  This information is not intended to replace advice given to you by your health care provider. Make sure you discuss any questions you have with your health care provider.  Document Revised: 09/09/2020 Document Reviewed: 01/19/2018  [employer] Patient Education © 2020 [employer]

## 2021-03-02 NOTE — PROGRESS NOTES
"Chief Complaint  Diabetes (checking BG 4 times a day; last eye exam over 4 years ago), Hypothyroidism, Hyperlipidemia, and Hypertension    Subjective          Ricardo Pereira presents to [facility] ENDOCRINOLOGY  History of Present Illness     H/O pancreatitis   No metfomrin r/t kindey dysfunction   Jardiance caused difficulty urinating, he is no longer taking it     a1c improving but still above target     Type 2 dm - Diagnosed about 3-4 years ago.   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 weight fluctuates.   Pt tries to follow DM diet for most part.   On ARB  Lab Results   Component Value Date    HGBA1C 8.50 (H) 02/23/2021       Hyperlipidemia  Lab Results   Component Value Date    CHOL 174 04/11/2016    CHLPL 230 (H) 02/23/2021    TRIG 305 (H) 02/23/2021    HDL 26 (L) 02/23/2021     (H) 02/23/2021   vascepa and rosuvastatin  Tolerating well     Hypothyroid  Levothyroxine 112mcg daily  Not taking on empty stomach   Lab Results   Component Value Date    TSH 1.820 02/23/2021       Vitamin d 50,000u weekly  72.1  Ca 9.7       Objective   Vital Signs:   /74   Resp 16   Ht 177.8 cm (70\")   Wt " 117 kg (258 lb 9.6 oz)   BMI 37.11 kg/m²     Physical Exam  Vitals signs reviewed.   Constitutional:       General: He is not in acute distress.  HENT:      Head: Normocephalic and atraumatic.   Neck:      Musculoskeletal: Normal range of motion and neck supple.   Cardiovascular:      Rate and Rhythm: Normal rate and regular rhythm.   Pulmonary:      Effort: Pulmonary effort is normal. No respiratory distress.   Musculoskeletal: Normal range of motion.         General: No signs of injury.   Skin:     General: Skin is warm and dry.   Neurological:      Mental Status: He is alert and oriented to person, place, and time. Mental status is at baseline.   Psychiatric:         Mood and Affect: Mood normal.         Behavior: Behavior normal.         Thought Content: Thought content normal.         Judgment: Judgment normal.        Result Review :   The following data was reviewed by: MADHU Keane on 03/02/2021:  Common labs    Common Labsle 5/21/20 5/21/20 5/21/20 5/21/20 5/21/20 12/2/20 12/2/20 12/2/20 2/23/21 2/23/21 2/23/21 2/23/21    0814 0814 0814 0814 0814 0837 0837 0837 [id number]   Glucose   169 (A)    171 (A)     132 (A)   BUN   12    11     11   Creatinine   1.09    1.08     0.89   eGFR Non  Am   73    73     91   eGFR  Am   88    88     111   Sodium   138    138     138   Potassium   4.5    5.2     4.4   Chloride   100    101     103   Calcium   9.9    9.6     9.7   Total Protein   7.2    6.6     6.8   Albumin   4.50    4.20     4.30   Total Bilirubin   0.5    0.4     0.5   Alkaline Phosphatase   88    112     96   AST (SGOT)   28    36     36   ALT (SGPT)   39    56 (A)     43 (A)   Total Cholesterol 196       212 (A)  230 (A)     Triglycerides 284 (A)       343 (A)  305 (A)     HDL Cholesterol        27 (A)  26 (A)     LDL Cholesterol         124 (A)  147 (A)     Hemoglobin A1C     9.73 (A) 9.90 (A)     8.50 (A)    Microalbumin, Urine  9.8       22.3      Uric Acid    6.0            (A) Abnormal value       Comments are available for some flowsheets but are not being displayed.                     Assessment and Plan    Diagnoses and all orders for this visit:    1. Type 2 diabetes mellitus with hyperglycemia, with long-term current use of insulin (CMS/[facility]) (Primary)  -     Hemoglobin A1c; Future        Follow Up   Return in about 3 months (around 6/2/2021).   Diabetes with peripheral neuropathy    Yearly eye exam   Increase Tresiba to 130u  Change novolog to 5u before meals and continue sliding scale   Continue Januvia    Continue levothyroxine at current dose  Needs close monitoring to reduce risk of complications from over or under treatment with thyroid hormone replacement  Try to take t4 on empty stomach without other meds     Continue vitamin D    Continue statin and Vascepa    Continue to improve low-cholesterol and diabetic diet  Exercise as tolerated  Hypoglycemia education including signs and symptoms and treatment discussed    Discussed alternative therapies to neuropathy control that could be possibly managed by pain management    Patient was given instructions and counseling regarding his condition or for health maintenance advice. Please see specific information pulled into the AVS if appropriate.     MADHU Keane

## 2021-03-09 ENCOUNTER — OFFICE VISIT (OUTPATIENT)
Dept: FAMILY MEDICINE CLINIC | Facility: CLINIC | Age: 51
End: 2021-03-09

## 2021-03-09 VITALS
HEIGHT: 70 IN | TEMPERATURE: 97.4 F | BODY MASS INDEX: 35.93 KG/M2 | WEIGHT: 251 LBS | DIASTOLIC BLOOD PRESSURE: 80 MMHG | OXYGEN SATURATION: 94 % | HEART RATE: 84 BPM | SYSTOLIC BLOOD PRESSURE: 122 MMHG

## 2021-03-09 DIAGNOSIS — Z00.00 ENCOUNTER FOR MEDICARE ANNUAL WELLNESS EXAM: Primary | ICD-10-CM

## 2021-03-09 PROCEDURE — G0439 PPPS, SUBSEQ VISIT: HCPCS | Performed by: INTERNAL MEDICINE

## 2021-03-09 NOTE — PROGRESS NOTES
Subsequent Medicare Wellness Visit   The ABC's of the Annual Wellness Visit    Chief Complaint   Patient presents with   • Annual Exam   • Immunizations       HPI:  Ricardo Pereira, -1970, is a 50 y.o. male who presents for a Subsequent Medicare Wellness Visit.  Follow-up for hypertension.  Currently, has been feeling well and asymptomatic without any headaches, vision changes, cough, chest pain, shortness of breath, swelling, focal neurologic deficit, memory loss or syncope.  Has been taking the medications regularly and adherent with the regimen of olmesartan 20 mg daily.  Denies medication side effects and no significant interval events.      Follow-up for cholesterol.  Currently, has been feeling well without any myalgias, muscle aches, weakness, numbness, chest pain, short of breath or other issues.  Currently, is adherent with medication regimen of of Crestor 40 mg daily and Vascepa 2 g twice daily and denies medication side effects.  Completed on  last labs..    History of COPD.  Is utilizing albuterol and Brovana along with Pulmicort.  Patient was diagnosed with COVID-19 beginning of Feb.  Still some short of breath but was like this before the infection.    Here for follow-up of diabetes.  Patient states to have been compliant with medications.  Blood sugar monitoring - patient states has been running on average 125.  With a range of 118 - 138.  No episodes of hypoglycemia, nausea, vomiting, new rashes, syncope or other issues.  Denies any difficulties with the current medication regimen of Januvia 100 mg daily, Tresiba, NovoLog FlexPen.  Is being followed by endocrinology.  Last A1c on 2021 of 8.5%    Follow-up for thyroid.  Denies fatigue, weakness, constipation/diarrhea, hair/skin changes, weight gain/loss, depression/anxiety, rashes, palpitations, swelling, chest pain, shortness of breath or other issues.  Has been compliant with taking the medication of levothyroxine 112 mcg daily  with no recent changes.  Denies any difficulty with the current medication.  Is being followed by endocrinology.  Last labs performed on 2/23/2021.    Recent Hospitalizations:  No hospitalization(s) within the last year..    Current Medical Providers:  Patient Care Team:  Simba Moore MD as PCP - General (Internal Medicine)  Avel Gottlieb MD as Consulting Physician (Endocrinology)    Health Habits and Functional and Cognitive Screening and Depression Screening:  Functional & Cognitive Status 3/9/2021   Do you have difficulty preparing food and eating? No   Do you have difficulty bathing yourself, getting dressed or grooming yourself? No   Do you have difficulty using the toilet? No   Do you have difficulty moving around from place to place? No   Do you have trouble with steps or getting out of a bed or a chair? No   Current Diet Well Balanced Diet   Dental Exam Up to date   Eye Exam Up to date   Exercise (times per week) 7 times per week   Current Exercises Include Walking   Current Exercise Activities Include -   Do you need help using the phone?  No   Are you deaf or do you have serious difficulty hearing?  No   Do you need help with transportation? No   Do you need help shopping? No   Do you need help preparing meals?  No   Do you need help with housework?  No   Do you need help with laundry? No   Do you need help taking your medications? No   Do you need help managing money? No   Do you ever drive or ride in a car without wearing a seat belt? No   Have you felt unusual stress, anger or loneliness in the last month? Yes   Who do you live with? Spouse   If you need help, do you have trouble finding someone available to you? No   Have you been bothered in the last four weeks by sexual problems? No   Do you have difficulty concentrating, remembering or making decisions? No       Compared to one year ago, the patient feels his physical health is the same and his mental health is the same.    Depression  Screen:  PHQ-2/PHQ-9 Depression Screening 3/9/2021   Little interest or pleasure in doing things 0   Feeling down, depressed, or hopeless 0   Trouble falling or staying asleep, or sleeping too much 3   Feeling tired or having little energy 0   Poor appetite or overeating 0   Feeling bad about yourself - or that you are a failure or have let yourself or your family down 0   Trouble concentrating on things, such as reading the newspaper or watching television 0   Moving or speaking so slowly that other people could have noticed. Or the opposite - being so fidgety or restless that you have been moving around a lot more than usual 0   Thoughts that you would be better off dead, or of hurting yourself in some way 0   Total Score 3       Falls Risk Assessment:  HUSSAIN Fall Risk Clinician Key Questions   Have you fallen in the past year?: No  Do you feel unsteady with walking?: No  Are you worried about falling?: No      Past Medical/Family/Social History:  The following portions of the patient's history were reviewed and updated as appropriate: allergies, current medications, past family history, past medical history, past social history, past surgical history and problem list.    Allergies   Allergen Reactions   • Penicillins Swelling         Current Outpatient Medications:   •  albuterol (ACCUNEB) 1.25 MG/3ML nebulizer solution, Take 3 mL by nebulization Every 4 (Four) Hours As Needed for Wheezing., Disp: 120 vial, Rfl: 12  •  albuterol sulfate  (90 Base) MCG/ACT inhaler, Inhale 2 puffs Every 4 (Four) Hours As Needed for Wheezing., Disp: 1 inhaler, Rfl: 1  •  arformoterol (BROVANA) 15 MCG/2ML nebulizer solution, Take 2 mL by nebulization 2 (Two) Times a Day., Disp: 120 mL, Rfl: 0  •  aspirin 81 MG EC tablet, Take 1 tablet by mouth Daily., Disp: 90 tablet, Rfl: 1  •  BD PEN NEEDLE AUDREY U/F 32G X 4 MM misc, Use 4 times a day, Disp: 400 each, Rfl: 1  •  gabapentin (Neurontin) 800 MG tablet, Take 1 tablet by mouth 3  (Three) Times a Day., Disp: 90 tablet, Rfl: 5  •  insulin aspart (novoLOG FLEXPEN) 100 UNIT/ML solution pen-injector sc pen, Use as directed with each meal sliding scale of 2 Units for every 20 over 160, Disp: 5 pen, Rfl: 3  •  JANUVIA 100 MG tablet, Take 1 tablet by mouth Daily., Disp: 90 tablet, Rfl: 3  •  levothyroxine (Synthroid) 112 MCG tablet, Take 1 tablet by mouth Daily. On an empty stomach, Disp: 30 tablet, Rfl: 4  •  olmesartan (BENICAR) 20 MG tablet, Take 1 tablet by mouth Daily., Disp: 90 tablet, Rfl: 0  •  pantoprazole (PROTONIX) 40 MG EC tablet, Take 1 tablet by mouth once daily, Disp: 90 tablet, Rfl: 0  •  Respiratory Therapy Supplies (Nebulizer/Tubing/Mouthpiece) kit, 1 kit Take As Directed., Disp: 1 each, Rfl: 2  •  rosuvastatin (CRESTOR) 40 MG tablet, Take 1 tablet by mouth Daily., Disp: 90 tablet, Rfl: 3  •  TRESIBA FLEXTOUCH 200 UNIT/ML solution pen-injector pen injection, Inject 120 Units under the skin into the appropriate area as directed Daily With Breakfast., Disp: 18 pen, Rfl: 3  •  VASCEPA 1 g capsule capsule, Take 2 g by mouth 2 (Two) Times a Day With Meals., Disp: 360 capsule, Rfl: 3  •  vitamin D (ERGOCALCIFEROL) 1.25 MG (72970 UT) capsule capsule, Take 50,000 Units by mouth 1 (One) Time Per Week., Disp: , Rfl:   •  budesonide (PULMICORT) 0.5 MG/2ML nebulizer solution, Take 2 mL by nebulization 2 (Two) Times a Day., Disp: 60 each, Rfl: 0    Aspirin use counseling: Taking ASA appropriately as indicated    Current medication list contains no high risk medications.  No harmful drug interactions have been identified.     Family History   Problem Relation Age of Onset   • Asthma Other    • Bipolar disorder Other    • COPD Other    • Depression Other    • Lupus Other          systemic lupus erythematosus   • Arthritis Other    • No Known Problems Mother    • No Known Problems Father        Social History     Tobacco Use   • Smoking status: Former Smoker     Types: Cigars   • Smokeless  tobacco: Never Used   Substance Use Topics   • Alcohol use: Yes       Past Surgical History:   Procedure Laterality Date   • APPENDECTOMY     • COLONOSCOPY  10/27/2014    tics   • HERNIA REPAIR      umbilical   • INGUINAL HERNIA REPAIR     • NEPHRECTOMY Right    • VENTRAL/INCISIONAL HERNIA REPAIR N/A 5/11/2017    Procedure: VENTRAL/INCISIONAL HERNIA REPAIR LAPAROSCOPIC, RECURRENT INCISION, WITH MESH AND AHEDLYSIS;  Surgeon: Albin Bee MD;  Location: [facility];  Service:        Patient Active Problem List   Diagnosis   • DM2 (diabetes mellitus, type 2) (CMS/HCC)   • Vitamin D deficiency   • Chronic pancreatitis (CMS/HCC)   • Mixed hyperlipidemia   • Abnormal endocrine laboratory test finding   • Secondary hypothyroidism   • Hyperglycemia   • Essential hypertension   • COPD (chronic obstructive pulmonary disease) (CMS/HCC)   • Acute exacerbation of chronic obstructive pulmonary disease (COPD) (CMS/HCC)   • Hypothyroidism   • Morbid obesity due to excess calories (CMS/HCC)   • COPD exacerbation (CMS/HCC)   • Hyperosmolar (nonketotic) coma (CMS/HCC)   • Acute diverticulitis   • REGAN (obstructive sleep apnea)   • History of renal cell carcinoma   • H/O unilateral nephrectomy   • Hospital discharge follow-up   • Hyponatremia   • Diabetic neuropathy (CMS/HCC)   • Onychomycosis   • Severe lumbar pain   • Back pain with radiculopathy   • Chronic right flank pain   • Bronchitis   • Encounter for Medicare annual wellness exam   • Claudication (CMS/HCC)   • Cancer of kidney (CMS/HCC)   • Abscess of back   • COVID-19 virus infection   • Anxiety       Review of Systems   Constitutional: Negative for activity change, appetite change, fatigue, fever, unexpected weight gain and unexpected weight loss.   HENT: Negative for nosebleeds, rhinorrhea, trouble swallowing and voice change.    Eyes: Negative for visual disturbance.   Respiratory: Negative for cough, chest tightness, shortness of breath and wheezing.   "  Cardiovascular: Negative for chest pain, palpitations and leg swelling.   Gastrointestinal: Negative for abdominal pain, blood in stool, constipation, diarrhea, nausea, vomiting, GERD and indigestion.   Genitourinary: Negative for dysuria, frequency and hematuria.   Musculoskeletal: Negative for arthralgias, back pain and myalgias.   Skin: Negative for rash and bruise.   Neurological: Negative for dizziness, tremors, weakness, light-headedness, numbness, headache and memory problem.   Hematological: Negative for adenopathy. Does not bruise/bleed easily.   Psychiatric/Behavioral: Negative for sleep disturbance and depressed mood. The patient is not nervous/anxious.        Objective     Vitals:    03/09/21 0905   BP: 122/80   BP Location: Left arm   Patient Position: Sitting   Cuff Size: Adult   Pulse: 84   Temp: 97.4 °F (36.3 °C)   TempSrc: Temporal   SpO2: 94%   Weight: 114 kg (251 lb)   Height: 177.8 cm (70\")       Patient's Body mass index is 36.01 kg/m². BMI is above normal parameters. Recommendations include: exercise counseling and nutrition counseling.      No exam data present    The patient has no evidence of cognitve impairment.     Physical Exam  Vitals and nursing note reviewed.   Constitutional:       General: He is not in acute distress.     Appearance: He is well-developed. He is not diaphoretic.   HENT:      Head: Normocephalic and atraumatic.      Right Ear: External ear normal.      Left Ear: External ear normal.      Nose: Nose normal.   Eyes:      Conjunctiva/sclera: Conjunctivae normal.      Pupils: Pupils are equal, round, and reactive to light.   Neck:      Thyroid: No thyromegaly.      Trachea: No tracheal deviation.   Cardiovascular:      Rate and Rhythm: Normal rate and regular rhythm.      Heart sounds: Normal heart sounds. No murmur. No friction rub. No gallop.    Pulmonary:      Effort: Pulmonary effort is normal. No respiratory distress.      Breath sounds: Normal breath sounds. "   Abdominal:      General: Bowel sounds are normal.      Palpations: Abdomen is soft. There is no mass.      Tenderness: There is no abdominal tenderness. There is no guarding.   Musculoskeletal:         General: Normal range of motion.      Cervical back: Normal range of motion and neck supple.   Lymphadenopathy:      Cervical: No cervical adenopathy.   Skin:     General: Skin is warm and dry.      Capillary Refill: Capillary refill takes less than 2 seconds.      Findings: No rash.   Neurological:      Mental Status: He is alert and oriented to person, place, and time.      Motor: No abnormal muscle tone.      Deep Tendon Reflexes: Reflexes normal.   Psychiatric:         Behavior: Behavior normal.         Thought Content: Thought content normal.         Judgment: Judgment normal.         Recent Lab Results:  Lab Results   Component Value Date     (H) 02/23/2021     Lab Results   Component Value Date    CHOL 174 04/11/2016    TRIG 305 (H) 02/23/2021    HDL 26 (L) 02/23/2021    VLDL 57 (H) 02/23/2021    LDLHDL  04/11/2016      Comment:      Unable to calculate       Assessment/Plan   Age-appropriate Screening Schedule:  Refer to the list below for future screening recommendations based on patient's age, sex and/or medical conditions.      Health Maintenance   Topic Date Due   • TDAP/TD VACCINES (1 - Tdap) Never done   • COLONOSCOPY  10/27/2019   • DIABETIC FOOT EXAM  07/15/2020   • DIABETIC EYE EXAM  04/20/2021 (Originally 6/17/2016)   • HEMOGLOBIN A1C  08/23/2021   • LIPID PANEL  02/23/2022   • URINE MICROALBUMIN  02/23/2022   • INFLUENZA VACCINE  Completed       Medicare Risks and Personalized Health Plan:  Advance Directive Discussion  Fall Risk  Immunizations Discussed/Encouraged (specific immunizations; adacel Tdap, Hepatitis B Vaccine/Series and COVID-19 when available )  Obesity/Overweight       CMS-Preventive Services Quick Reference  Medicare Preventive Services Addressed:  Annual Wellness Visit  (AWV)    Advance Care Planning:  ACP discussion was held with the patient during this visit. Patient does not have an advance directive, declines further assistance.    Diagnoses and all orders for this visit:    1. Encounter for Medicare annual wellness exam (Primary)      Annual wellness visit reviewed with patient.  All past history, medications, social history, and problem list were reviewed.  Discussed advanced directives and living will.  Patient has living will: Living will: Patient refused.  Discussed fall risk and precautions encourage removing throw rugs and using grab bars within the home and bathroom.  Reviewed labs from 2/23/2021 as in chart to evaluate the blood sugars, kidney, liver, cholesterol for screening.  Discussed flu shot recommended to get the influenza vaccine annually in the fall.  Pneumovax-23 up to date and appropriate. Tdap and COVID-19 vaccination series and hep B series recommended.  Encourage follow-up with the eye doctor on annual basis for glaucoma evaluation.  Discussed weight and encouraged exercise as tolerated while following a healthy diet.  Follow up with current specialists as needed.  Patient has information to schedule the colonoscopy.    An After Visit Summary and PPPS with all of these plans were given to the patient.      Follow Up:  Return in about 1 year (around 3/9/2022) for Medicare Wellness.           · COVID-19 Precautions - Patient was compliant in wearing a mask. When I saw the patient, I used appropriate personal protective equipment (PPE) including mask and eye shield (standard procedure).  Additionally, I used gown and gloves if indicated.  Hand hygiene was completed before and after seeing the patient.  · Dictated utilizing Dragon Dictation

## 2021-03-09 NOTE — PATIENT INSTRUCTIONS
Medicare Wellness  Personal Prevention Plan of Service     Date of Office Visit:  2021  Encounter Provider:  Simba Moore MD  Place of Service:  [facility]  Patient Name: Ricardo Pereira  :  1970    As part of the Medicare Wellness portion of your visit today, we are providing you with this personalized preventive plan of services (PPPS). This plan is based upon recommendations of the [place] Preventive Services Task Force (USPSTF) and the Advisory Committee on Immunization Practices (ACIP).    This lists the preventive care services that should be considered, and provides dates of when you are due. Items listed as completed are up-to-date and do not require any further intervention.    Health Maintenance   Topic Date Due   • Hepatitis B (1 of 3 - Risk 3-dose series) Never done   • TDAP/TD VACCINES (1 - Tdap) Never done   • COLONOSCOPY  10/27/2019   • DIABETIC FOOT EXAM  07/15/2020   • ANNUAL WELLNESS VISIT  2021   • DIABETIC EYE EXAM  2021 (Originally 2016)   • HEMOGLOBIN A1C  2021   • LIPID PANEL  2022   • URINE MICROALBUMIN  2022   • HEPATITIS C SCREENING  Completed   • Pneumococcal Vaccine 0-64  Completed   • INFLUENZA VACCINE  Completed   • MENINGOCOCCAL VACCINE  Aged Out       No orders of the defined types were placed in this encounter.      Return in about 1 year (around 3/9/2022) for Medicare Wellness.

## 2021-04-02 ENCOUNTER — BULK ORDERING (OUTPATIENT)
Dept: CASE MANAGEMENT | Facility: OTHER | Age: 51
End: 2021-04-02

## 2021-04-02 DIAGNOSIS — Z23 IMMUNIZATION DUE: ICD-10-CM

## 2021-04-14 DIAGNOSIS — I10 ESSENTIAL HYPERTENSION: ICD-10-CM

## 2021-04-14 RX ORDER — OLMESARTAN MEDOXOMIL 20 MG/1
TABLET ORAL
Qty: 90 TABLET | Refills: 0 | Status: ON HOLD | OUTPATIENT
Start: 2021-04-14 | End: 2021-06-12 | Stop reason: SDUPTHER

## 2021-05-14 RX ORDER — PANTOPRAZOLE SODIUM 40 MG/1
40 TABLET, DELAYED RELEASE ORAL DAILY
Qty: 90 TABLET | Refills: 3 | Status: SHIPPED | OUTPATIENT
Start: 2021-05-14 | End: 2022-04-26

## 2021-05-20 LAB — HBA1C MFR BLD: 10.1 % (ref 4.8–5.6)

## 2021-06-03 ENCOUNTER — OFFICE VISIT (OUTPATIENT)
Dept: ENDOCRINOLOGY | Age: 51
End: 2021-06-03

## 2021-06-03 VITALS
DIASTOLIC BLOOD PRESSURE: 80 MMHG | OXYGEN SATURATION: 98 % | WEIGHT: 252.2 LBS | HEIGHT: 70 IN | SYSTOLIC BLOOD PRESSURE: 124 MMHG | BODY MASS INDEX: 36.11 KG/M2

## 2021-06-03 DIAGNOSIS — Z79.4 TYPE 2 DIABETES MELLITUS WITH HYPERGLYCEMIA, WITH LONG-TERM CURRENT USE OF INSULIN (HCC): Primary | ICD-10-CM

## 2021-06-03 DIAGNOSIS — E11.65 TYPE 2 DIABETES MELLITUS WITH HYPERGLYCEMIA, WITH LONG-TERM CURRENT USE OF INSULIN (HCC): Primary | ICD-10-CM

## 2021-06-03 DIAGNOSIS — E78.2 MIXED HYPERLIPIDEMIA: ICD-10-CM

## 2021-06-03 DIAGNOSIS — E03.8 HYPOTHYROIDISM DUE TO HASHIMOTO'S THYROIDITIS: ICD-10-CM

## 2021-06-03 DIAGNOSIS — E06.3 HYPOTHYROIDISM DUE TO HASHIMOTO'S THYROIDITIS: ICD-10-CM

## 2021-06-03 PROCEDURE — 99214 OFFICE O/P EST MOD 30 MIN: CPT | Performed by: NURSE PRACTITIONER

## 2021-06-03 RX ORDER — CYCLOBENZAPRINE HCL 10 MG
TABLET ORAL
COMMUNITY
Start: 2021-04-30 | End: 2021-06-22

## 2021-06-03 RX ORDER — DULOXETIN HYDROCHLORIDE 60 MG/1
60 CAPSULE, DELAYED RELEASE ORAL DAILY
COMMUNITY
Start: 2021-04-14 | End: 2021-06-22

## 2021-06-03 RX ORDER — TRAMADOL HYDROCHLORIDE 50 MG/1
50 TABLET ORAL
COMMUNITY
Start: 2021-04-23 | End: 2021-06-22

## 2021-06-03 RX ORDER — PREDNISONE 20 MG/1
TABLET ORAL
COMMUNITY
Start: 2021-04-30 | End: 2021-06-12 | Stop reason: HOSPADM

## 2021-06-03 RX ORDER — INSULIN DEGLUDEC 200 U/ML
140 INJECTION, SOLUTION SUBCUTANEOUS
Qty: 21 ML | Refills: 5 | Status: SHIPPED | OUTPATIENT
Start: 2021-06-03 | End: 2021-10-29 | Stop reason: SDUPTHER

## 2021-06-03 NOTE — PATIENT INSTRUCTIONS
Continue Tresiba 130-140u daily    Novolog 30u before meals plus sliding scale  <150 2u  150-200 4 units  200-250 8 units  >250 12 units     Make sure you are not taking Januvia   Sned me your blood sugars in one month
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
## 2021-06-10 ENCOUNTER — APPOINTMENT (OUTPATIENT)
Dept: CT IMAGING | Facility: HOSPITAL | Age: 51
End: 2021-06-10

## 2021-06-10 ENCOUNTER — APPOINTMENT (OUTPATIENT)
Dept: GENERAL RADIOLOGY | Facility: HOSPITAL | Age: 51
End: 2021-06-10

## 2021-06-10 ENCOUNTER — HOSPITAL ENCOUNTER (OUTPATIENT)
Facility: HOSPITAL | Age: 51
Discharge: HOME | End: 2021-06-12
Attending: EMERGENCY MEDICINE | Admitting: INTERNAL MEDICINE

## 2021-06-10 DIAGNOSIS — E78.2 MIXED HYPERLIPIDEMIA: ICD-10-CM

## 2021-06-10 DIAGNOSIS — I25.119 CORONARY ARTERY DISEASE INVOLVING NATIVE CORONARY ARTERY OF NATIVE HEART WITH ANGINA PECTORIS (HCC): ICD-10-CM

## 2021-06-10 DIAGNOSIS — R07.9 CHEST PAIN WITH HIGH RISK FOR CARDIAC ETIOLOGY: Primary | ICD-10-CM

## 2021-06-10 DIAGNOSIS — J43.9 PULMONARY EMPHYSEMA, UNSPECIFIED EMPHYSEMA TYPE (HCC): ICD-10-CM

## 2021-06-10 DIAGNOSIS — E11.69 TYPE 2 DIABETES MELLITUS WITH OTHER SPECIFIED COMPLICATION, UNSPECIFIED WHETHER LONG TERM INSULIN USE (HCC): ICD-10-CM

## 2021-06-10 DIAGNOSIS — I10 ESSENTIAL HYPERTENSION: ICD-10-CM

## 2021-06-10 LAB
ALBUMIN SERPL-MCNC: 4.2 G/DL (ref 3.5–5.2)
ALBUMIN/GLOB SERPL: 1.7 G/DL
ALP SERPL-CCNC: 89 U/L (ref 39–117)
ALT SERPL W P-5'-P-CCNC: 45 U/L (ref 1–41)
ANION GAP SERPL CALCULATED.3IONS-SCNC: 8 MMOL/L (ref 5–15)
AST SERPL-CCNC: 33 U/L (ref 1–40)
BASOPHILS # BLD AUTO: 0.06 10*3/MM3 (ref 0–0.2)
BASOPHILS NFR BLD AUTO: 0.7 % (ref 0–1.5)
BILIRUB SERPL-MCNC: 0.4 MG/DL (ref 0–1.2)
BUN SERPL-MCNC: 11 MG/DL (ref 6–20)
BUN/CREAT SERPL: 11.2 (ref 7–25)
CALCIUM SPEC-SCNC: 9.5 MG/DL (ref 8.6–10.5)
CHLORIDE SERPL-SCNC: 103 MMOL/L (ref 98–107)
CO2 SERPL-SCNC: 27 MMOL/L (ref 22–29)
CREAT SERPL-MCNC: 0.98 MG/DL (ref 0.76–1.27)
D DIMER PPP FEU-MCNC: 0.36 MCGFEU/ML (ref 0–0.49)
DEPRECATED RDW RBC AUTO: 44.5 FL (ref 37–54)
EOSINOPHIL # BLD AUTO: 0.18 10*3/MM3 (ref 0–0.4)
EOSINOPHIL NFR BLD AUTO: 2.1 % (ref 0.3–6.2)
ERYTHROCYTE [DISTWIDTH] IN BLOOD BY AUTOMATED COUNT: 12.9 % (ref 12.3–15.4)
GFR SERPL CREATININE-BSD FRML MDRD: 82 ML/MIN/1.73
GLOBULIN UR ELPH-MCNC: 2.5 GM/DL
GLUCOSE SERPL-MCNC: 179 MG/DL (ref 65–99)
HCT VFR BLD AUTO: 52.8 % (ref 37.5–51)
HGB BLD-MCNC: 18.4 G/DL (ref 13–17.7)
HOLD SPECIMEN: NORMAL
HOLD SPECIMEN: NORMAL
IMM GRANULOCYTES # BLD AUTO: 0.03 10*3/MM3 (ref 0–0.05)
IMM GRANULOCYTES NFR BLD AUTO: 0.3 % (ref 0–0.5)
LIPASE SERPL-CCNC: 62 U/L (ref 13–60)
LYMPHOCYTES # BLD AUTO: 3.47 10*3/MM3 (ref 0.7–3.1)
LYMPHOCYTES NFR BLD AUTO: 40.1 % (ref 19.6–45.3)
MCH RBC QN AUTO: 32.9 PG (ref 26.6–33)
MCHC RBC AUTO-ENTMCNC: 34.8 G/DL (ref 31.5–35.7)
MCV RBC AUTO: 94.3 FL (ref 79–97)
MONOCYTES # BLD AUTO: 0.53 10*3/MM3 (ref 0.1–0.9)
MONOCYTES NFR BLD AUTO: 6.1 % (ref 5–12)
NEUTROPHILS NFR BLD AUTO: 4.39 10*3/MM3 (ref 1.7–7)
NEUTROPHILS NFR BLD AUTO: 50.7 % (ref 42.7–76)
NRBC BLD AUTO-RTO: 0 /100 WBC (ref 0–0.2)
PLATELET # BLD AUTO: 151 10*3/MM3 (ref 140–450)
PMV BLD AUTO: 11.2 FL (ref 6–12)
POTASSIUM SERPL-SCNC: 4.1 MMOL/L (ref 3.5–5.2)
PROT SERPL-MCNC: 6.7 G/DL (ref 6–8.5)
RBC # BLD AUTO: 5.6 10*6/MM3 (ref 4.14–5.8)
SODIUM SERPL-SCNC: 138 MMOL/L (ref 136–145)
TROPONIN T SERPL-MCNC: <0.01 NG/ML (ref 0–0.03)
TROPONIN T SERPL-MCNC: <0.01 NG/ML (ref 0–0.03)
WBC # BLD AUTO: 8.66 10*3/MM3 (ref 3.4–10.8)
WHOLE BLOOD HOLD SPECIMEN: NORMAL

## 2021-06-10 PROCEDURE — 83690 ASSAY OF LIPASE: CPT | Performed by: EMERGENCY MEDICINE

## 2021-06-10 PROCEDURE — 96374 THER/PROPH/DIAG INJ IV PUSH: CPT

## 2021-06-10 PROCEDURE — 25010000002 HYDROMORPHONE PER 4 MG: Performed by: EMERGENCY MEDICINE

## 2021-06-10 PROCEDURE — 93005 ELECTROCARDIOGRAM TRACING: CPT

## 2021-06-10 PROCEDURE — C9803 HOPD COVID-19 SPEC COLLECT: HCPCS

## 2021-06-10 PROCEDURE — U0005 INFEC AGEN DETEC AMPLI PROBE: HCPCS | Performed by: EMERGENCY MEDICINE

## 2021-06-10 PROCEDURE — 99285 EMERGENCY DEPT VISIT HI MDM: CPT

## 2021-06-10 PROCEDURE — 93010 ELECTROCARDIOGRAM REPORT: CPT | Performed by: INTERNAL MEDICINE

## 2021-06-10 PROCEDURE — 71046 X-RAY EXAM CHEST 2 VIEWS: CPT

## 2021-06-10 PROCEDURE — 85025 COMPLETE CBC W/AUTO DIFF WBC: CPT | Performed by: EMERGENCY MEDICINE

## 2021-06-10 PROCEDURE — G0378 HOSPITAL OBSERVATION PER HR: HCPCS

## 2021-06-10 PROCEDURE — 25010000002 IOPAMIDOL 61 % SOLUTION: Performed by: EMERGENCY MEDICINE

## 2021-06-10 PROCEDURE — 84484 ASSAY OF TROPONIN QUANT: CPT | Performed by: EMERGENCY MEDICINE

## 2021-06-10 PROCEDURE — 74177 CT ABD & PELVIS W/CONTRAST: CPT

## 2021-06-10 PROCEDURE — 93005 ELECTROCARDIOGRAM TRACING: CPT | Performed by: EMERGENCY MEDICINE

## 2021-06-10 PROCEDURE — 80053 COMPREHEN METABOLIC PANEL: CPT | Performed by: EMERGENCY MEDICINE

## 2021-06-10 PROCEDURE — 85379 FIBRIN DEGRADATION QUANT: CPT | Performed by: EMERGENCY MEDICINE

## 2021-06-10 PROCEDURE — U0004 COV-19 TEST NON-CDC HGH THRU: HCPCS | Performed by: EMERGENCY MEDICINE

## 2021-06-10 RX ORDER — NITROGLYCERIN 0.4 MG/1
0.4 TABLET SUBLINGUAL
Status: COMPLETED | OUTPATIENT
Start: 2021-06-10 | End: 2021-06-10

## 2021-06-10 RX ORDER — SODIUM CHLORIDE 0.9 % (FLUSH) 0.9 %
10 SYRINGE (ML) INJECTION AS NEEDED
Status: DISCONTINUED | OUTPATIENT
Start: 2021-06-10 | End: 2021-06-12 | Stop reason: HOSPADM

## 2021-06-10 RX ORDER — ACETAMINOPHEN 325 MG/1
650 TABLET ORAL EVERY 4 HOURS PRN
Status: DISCONTINUED | OUTPATIENT
Start: 2021-06-10 | End: 2021-06-12 | Stop reason: HOSPADM

## 2021-06-10 RX ORDER — ASPIRIN 325 MG
325 TABLET ORAL ONCE
Status: DISCONTINUED | OUTPATIENT
Start: 2021-06-10 | End: 2021-06-10

## 2021-06-10 RX ORDER — ONDANSETRON 4 MG/1
4 TABLET, FILM COATED ORAL EVERY 6 HOURS PRN
Status: DISCONTINUED | OUTPATIENT
Start: 2021-06-10 | End: 2021-06-12 | Stop reason: HOSPADM

## 2021-06-10 RX ORDER — HYDROMORPHONE HYDROCHLORIDE 1 MG/ML
0.5 INJECTION, SOLUTION INTRAMUSCULAR; INTRAVENOUS; SUBCUTANEOUS ONCE
Status: COMPLETED | OUTPATIENT
Start: 2021-06-10 | End: 2021-06-10

## 2021-06-10 RX ORDER — NITROGLYCERIN 0.4 MG/1
0.4 TABLET SUBLINGUAL
Status: DISCONTINUED | OUTPATIENT
Start: 2021-06-10 | End: 2021-06-12 | Stop reason: HOSPADM

## 2021-06-10 RX ORDER — ALUMINA, MAGNESIA, AND SIMETHICONE 2400; 2400; 240 MG/30ML; MG/30ML; MG/30ML
15 SUSPENSION ORAL EVERY 6 HOURS PRN
Status: DISCONTINUED | OUTPATIENT
Start: 2021-06-10 | End: 2021-06-12 | Stop reason: HOSPADM

## 2021-06-10 RX ORDER — ONDANSETRON 2 MG/ML
4 INJECTION INTRAMUSCULAR; INTRAVENOUS EVERY 6 HOURS PRN
Status: DISCONTINUED | OUTPATIENT
Start: 2021-06-10 | End: 2021-06-12 | Stop reason: HOSPADM

## 2021-06-10 RX ADMIN — SODIUM CHLORIDE 1000 ML: 9 INJECTION, SOLUTION INTRAVENOUS at 17:57

## 2021-06-10 RX ADMIN — NITROGLYCERIN 1 INCH: 20 OINTMENT TOPICAL at 18:43

## 2021-06-10 RX ADMIN — IOPAMIDOL 85 ML: 612 INJECTION, SOLUTION INTRAVENOUS at 20:40

## 2021-06-10 RX ADMIN — HYDROMORPHONE HYDROCHLORIDE 0.5 MG: 1 INJECTION, SOLUTION INTRAMUSCULAR; INTRAVENOUS; SUBCUTANEOUS at 18:14

## 2021-06-10 RX ADMIN — NITROGLYCERIN 0.4 MG: 0.4 TABLET, ORALLY DISINTEGRATING SUBLINGUAL at 17:25

## 2021-06-10 RX ADMIN — NITROGLYCERIN 0.4 MG: 0.4 TABLET, ORALLY DISINTEGRATING SUBLINGUAL at 17:58

## 2021-06-10 RX ADMIN — NITROGLYCERIN 0.4 MG: 0.4 TABLET, ORALLY DISINTEGRATING SUBLINGUAL at 22:28

## 2021-06-10 NOTE — ED PROVIDER NOTES
" EMERGENCY DEPARTMENT ENCOUNTER    Room Number:  39/39  Date of encounter:  6/10/2021  PCP: Simba Moore MD  Historian: Patient      HPI:  Chief Complaint: Chest pain  A complete HPI/ROS/PMH/PSH/SH/FH are unobtainable due to: NA    Context: Ricardo Pereira is a 50 y.o. male who presents to the ED c/o sudden onset of \"heavy\" epigastric abdominal/lower chest pain radiating into his upper chest that started approximately 45 minutes ago while he was at rest watching TV.  He complains of constant \"heaviness\" with intermittent \"tightness\".  He has never had these types of pain before.  There are no aggravating or alleviating factors.  There is no associated nausea or vomiting or diaphoresis.  He feels mildly short of breath.  No lower extremity edema.    He has multiple comorbidities: Type 2 diabetes, chronic pancreatitis, hyperlipidemia, COPD, hypertension, tobacco abuse well as a history of renal cancer.    The patient was placed in a mask in triage, hand hygiene was performed before and after my interaction with the patient.  I wore a mask, safety glasses and gloves during my entire interaction with the patient.    PAST MEDICAL HISTORY  Active Ambulatory Problems     Diagnosis Date Noted   • DM2 (diabetes mellitus, type 2) (CMS/[facility]) 08/29/2017   • Vitamin D deficiency 08/29/2017   • Chronic pancreatitis (CMS/[facility]) 08/29/2017   • Mixed hyperlipidemia 08/29/2017   • Abnormal endocrine laboratory test finding 08/29/2017   • Secondary hypothyroidism 08/29/2017   • Hyperglycemia 08/29/2017   • Essential hypertension 08/29/2017   • COPD (chronic obstructive pulmonary disease) (CMS/[facility]) 03/06/2018   • Acute exacerbation of chronic obstructive pulmonary disease (COPD) (CMS/[facility]) 03/06/2018   • Hypothyroidism 03/09/2018   • Morbid obesity due to excess calories (CMS/[facility]) 01/25/2019   • COPD exacerbation (CMS/[facility]) 02/11/2019   • Hyperosmolar (nonketotic) coma (CMS/[facility]) 07/03/2019   • Acute diverticulitis 07/06/2019   • REGAN " (obstructive sleep apnea) 07/06/2019   • History of renal cell carcinoma 07/06/2019   • H/O unilateral nephrectomy 07/06/2019   • Hospital discharge follow-up 07/15/2019   • Hyponatremia 07/15/2019   • Diabetic neuropathy (CMS/HCC) 07/15/2019   • Onychomycosis 07/15/2019   • Severe lumbar pain 11/25/2019   • Back pain with radiculopathy 11/25/2019   • Chronic right flank pain 11/25/2019   • Bronchitis 03/09/2020   • Encounter for Medicare annual wellness exam 03/09/2020   • Claudication (CMS/HCC) 03/09/2020   • Cancer of kidney (CMS/HCC) 07/31/2014   • Abscess of back 06/18/2020   • COVID-19 virus infection 02/08/2021   • Anxiety      Resolved Ambulatory Problems     Diagnosis Date Noted   • Pain of upper abdomen 04/10/2016   • Pancreatitis 04/10/2016   • Incisional hernia 05/11/2017   • MADINA (acute kidney injury) (CMS/[facility]) 03/07/2018     Past Medical History:   Diagnosis Date   • Abdominal hernia    • Abdominal pain, acute    • Abdominal pain, LLQ    • Abnormal brain scan    • Abnormal involuntary movements    • Acute pancreatitis    • Allergic rhinitis    • Arthritis of right glenohumeral joint    • Asthma    • Bilateral carpal tunnel syndrome    • BMI 38.0-38.9,adult    • Burn of left upper extremity, second degree, initial encounter    • Cancer (CMS/[facility])    • Chronic generalized abdominal pain    • Chronic pain    • Common migraine without aura    • Cough syncope    • Depression    • Diabetes mellitus (CMS/HCC)    • Diabetic peripheral neuropathy (CMS/HCC)    • Disease of thyroid gland    • Diverticulitis of colon    • Elevated blood sugar level    • Elevated transaminase level    • Encounter for immunization    • Fibromyalgia    • Gait disturbance    • GERD (gastroesophageal reflux disease)    • Hepatic steatosis    • History of pancreatitis    • History of renal cell cancer    • Hyperlipidemia    • Hypertension    • Hypertriglyceridemia    • IBS (irritable bowel syndrome)    • Incomplete tear of rotator cuff     • Intractable chronic migraine without aura and with status migrainosus    • Late effects of cerebrovascular disease    • Left ankle pain    • Left ankle sprain    • Left foot pain    • Left-sided chest pain    • Leg pain    • Low vitamin D level    • Lumbar strain    • Medicare annual wellness visit, initial    • Migraine, chronic, without aura, intractable    • Nausea and vomiting    • Numbness    • Obstructive sleep apnea    • Photophobia    • Pre-syncope    • Recurrent renal cell carcinoma of kidney (CMS/HCC)    • Renal failure (ARF), acute on chronic (CMS/HCC)    • Right shoulder pain    • Sciatica without lumbago    • Sciatica, left side    • Severe obesity (CMS/HCC)    • Shortness of breath    • Sleep apnea    • Stroke (CMS/[facility])    • SVT (supraventricular tachycardia) (CMS/[facility])    • Syncope and collapse    • Type 2 diabetes mellitus (CMS/[facility])    • Umbilical hernia    • Viral URI with cough          PAST SURGICAL HISTORY  Past Surgical History:   Procedure Laterality Date   • APPENDECTOMY     • COLONOSCOPY  10/27/2014    tics   • HERNIA REPAIR      umbilical   • INGUINAL HERNIA REPAIR     • NEPHRECTOMY Right    • VENTRAL/INCISIONAL HERNIA REPAIR N/A 5/11/2017    Procedure: VENTRAL/INCISIONAL HERNIA REPAIR LAPAROSCOPIC, RECURRENT INCISION, WITH MESH AND AHEDLYSIS;  Surgeon: Albin Bee MD;  Location: [facility];  Service:          FAMILY HISTORY  Family History   Problem Relation Age of Onset   • Asthma Other    • Bipolar disorder Other    • COPD Other    • Depression Other    • Lupus Other          systemic lupus erythematosus   • Arthritis Other    • No Known Problems Mother    • No Known Problems Father          SOCIAL HISTORY  Social History     Socioeconomic History   • Marital status:      Spouse name: Not on file   • Number of children: Not on file   • Years of education: Not on file   • Highest education level: Not on file   Tobacco Use   • Smoking status: Light Tobacco Smoker      Types: Cigars   • Smokeless tobacco: Never Used   Substance and Sexual Activity   • Alcohol use: Yes   • Drug use: No   • Sexual activity: Defer         ALLERGIES  Penicillins        REVIEW OF SYSTEMS  Review of Systems   Constitutional: Negative for chills and fever.   Respiratory: Positive for shortness of breath. Negative for wheezing.    Cardiovascular: Positive for chest pain. Negative for palpitations and leg swelling.   Gastrointestinal: Negative for abdominal pain, blood in stool, diarrhea, nausea and vomiting.        All systems reviewed and negative except for those discussed in HPI.       PHYSICAL EXAM    I have reviewed the triage vital signs and nursing notes.    ED Triage Vitals   Temp Heart Rate Resp BP SpO2   06/10/21 1645 06/10/21 1645 06/10/21 1645 06/10/21 1711 06/10/21 1645   97.8 °F (36.6 °C) 109 22 105/87 97 %      Temp src Heart Rate Source Patient Position BP Location FiO2 (%)   06/10/21 1645 -- -- -- --   Tympanic           Physical Exam   Constitutional: Pt. is oriented to person, place, and time and well-developed, well-nourished, and in no distress.  He appears uncomfortable.   HENT: Normocephalic and atraumatic.  Neck: Normal range of motion. Neck supple. No JVD present. No tracheal deviation present.  Cardiovascular: Normal rate, regular rhythm and normal heart sounds. Exam reveals no gallop and no friction rub.   No murmur heard.  Pulmonary/Chest: Effort normal and breath sounds normal. No stridor. No respiratory distress. No wheezes, no rales.   Abdominal: Soft, obese. Bowel sounds are normal. No distension. There is no tenderness. There is no rebound and no guarding.   Musculoskeletal: Normal range of motion. No edema, tenderness or deformity.   Neurological: Pt. is alert and oriented to person, place, and time.  He has no focal neurologic deficits.  Skin: Skin is warm and dry. No rash noted. Pt. is not diaphoretic. No erythema.   Psychiatric: Mood, affect and judgment normal. He is  pleasant and cooperative.  Nursing note and vitals reviewed.        LAB RESULTS  Recent Results (from the past 24 hour(s))   ECG 12 Lead    Collection Time: 06/10/21  4:53 PM   Result Value Ref Range    QT Interval 364 ms   Comprehensive Metabolic Panel    Collection Time: 06/10/21  5:15 PM    Specimen: Blood   Result Value Ref Range    Glucose 179 (H) 65 - 99 mg/dL    BUN 11 6 - 20 mg/dL    Creatinine 0.98 0.76 - 1.27 mg/dL    Sodium 138 136 - 145 mmol/L    Potassium 4.1 3.5 - 5.2 mmol/L    Chloride 103 98 - 107 mmol/L    CO2 27.0 22.0 - 29.0 mmol/L    Calcium 9.5 8.6 - 10.5 mg/dL    Total Protein 6.7 6.0 - 8.5 g/dL    Albumin 4.20 3.50 - 5.20 g/dL    ALT (SGPT) 45 (H) 1 - 41 U/L    AST (SGOT) 33 1 - 40 U/L    Alkaline Phosphatase 89 39 - 117 U/L    Total Bilirubin 0.4 0.0 - 1.2 mg/dL    eGFR Non African Amer 82 >60 mL/min/1.73    Globulin 2.5 gm/dL    A/G Ratio 1.7 g/dL    BUN/Creatinine Ratio 11.2 7.0 - 25.0    Anion Gap 8.0 5.0 - 15.0 mmol/L   Troponin    Collection Time: 06/10/21  5:15 PM    Specimen: Blood   Result Value Ref Range    Troponin T <0.010 0.000 - 0.030 ng/mL   Green Top (Gel)    Collection Time: 06/10/21  5:15 PM   Result Value Ref Range    Extra Tube Hold for add-ons.    Lavender Top    Collection Time: 06/10/21  5:15 PM   Result Value Ref Range    Extra Tube hold for add-on    Gold Top - SST    Collection Time: 06/10/21  5:15 PM   Result Value Ref Range    Extra Tube Hold for add-ons.    CBC Auto Differential    Collection Time: 06/10/21  5:15 PM    Specimen: Blood   Result Value Ref Range    WBC 8.66 3.40 - 10.80 10*3/mm3    RBC 5.60 4.14 - 5.80 10*6/mm3    Hemoglobin 18.4 (H) 13.0 - 17.7 g/dL    Hematocrit 52.8 (H) 37.5 - 51.0 %    MCV 94.3 79.0 - 97.0 fL    MCH 32.9 26.6 - 33.0 pg    MCHC 34.8 31.5 - 35.7 g/dL    RDW 12.9 12.3 - 15.4 %    RDW-SD 44.5 37.0 - 54.0 fl    MPV 11.2 6.0 - 12.0 fL    Platelets 151 140 - 450 10*3/mm3    Neutrophil % 50.7 42.7 - 76.0 %    Lymphocyte % 40.1 19.6 - 45.3  %    Monocyte % 6.1 5.0 - 12.0 %    Eosinophil % 2.1 0.3 - 6.2 %    Basophil % 0.7 0.0 - 1.5 %    Immature Grans % 0.3 0.0 - 0.5 %    Neutrophils, Absolute 4.39 1.70 - 7.00 10*3/mm3    Lymphocytes, Absolute 3.47 (H) 0.70 - 3.10 10*3/mm3    Monocytes, Absolute 0.53 0.10 - 0.90 10*3/mm3    Eosinophils, Absolute 0.18 0.00 - 0.40 10*3/mm3    Basophils, Absolute 0.06 0.00 - 0.20 10*3/mm3    Immature Grans, Absolute 0.03 0.00 - 0.05 10*3/mm3    nRBC 0.0 0.0 - 0.2 /100 WBC   Lipase    Collection Time: 06/10/21  5:15 PM    Specimen: Blood   Result Value Ref Range    Lipase 62 (H) 13 - 60 U/L   D-dimer, Quantitative    Collection Time: 06/10/21  5:58 PM    Specimen: Blood   Result Value Ref Range    D-Dimer, Quantitative 0.36 0.00 - 0.49 MCGFEU/mL   Troponin    Collection Time: 06/10/21  7:12 PM    Specimen: Blood   Result Value Ref Range    Troponin T <0.010 0.000 - 0.030 ng/mL       Ordered the above labs and independently reviewed the results.        RADIOLOGY  XR Chest 2 View    Result Date: 6/10/2021  EXAMINATION: 2 VIEWS OF THE CHEST  HISTORY: 50-year-old male with a history of chest pain  FINDINGS: PA and lateral chest radiographs were obtained. Comparison is made to a chest radiograph dated 07/03/2019. The lungs are normal in volume and are clear. The cardiomediastinal silhouette and pulmonary vasculature appear normal. No bony abnormality of the chest is appreciated.      Negative chest radiograph.  This report was finalized on 6/10/2021 6:56 PM by Dr. Antonio García M.D.      CT Abdomen Pelvis With Contrast    Result Date: 6/10/2021  Examination: CT of the abdomen and pelvis with contrast  HISTORY: 50-year-old male with a history of epigastric pain  TECHNIQUE: Contiguous axial images were obtained through the abdomen and pelvis following intravenous administration of nonionic contrast. Oral contrast  was not administered. Radiation dose reduction techniques were utilized, including automated exposure control and  exposure modulation based on body size.  COMPARISON: CT of abdomen pelvis without contrast, 07/03/2019  FINDINGS: The visualized portion of the lung bases6 are clear consolidation. The visualized portion of the heart has a normal appearance . The liver demonstrates a mild diffuse hypodense appearance without evidence for focal abnormality. There is stable laxity of the right lateral abdominal wall without a true hernia defect. The pancreas appears normal. The gallbladder has a normal appearance. The patient status post right nephrectomy. The left kidney has a normal appearance. The adrenal glands appear normal. The spleen has a normal appearance. Scattered diverticulosis is seen throughout the sigmoid and descending colon. The osseous structures of the lumbar spine and pelvis appear normal. Mild atheromatous calcification is seen throughout the abdominal aorta.      1.There is no evidence for an acute intra-abdominal or pelvic abnormality. 2. There is stable diffuse hepatic steatosis. 3. There are stable laxity of the right lateral abdominal wall. 4. Status post right nephrectomy.  This report was finalized on 6/10/2021 8:54 PM by Dr. Antonio García M.D.        I ordered the above noted radiological studies. Reviewed by me and discussed with radiologist.  See dictation for official radiology interpretation.      PROCEDURES    Procedures      MEDICATIONS GIVEN IN ER    Medications   sodium chloride 0.9 % flush 10 mL (has no administration in time range)   nitroglycerin (NITROSTAT) SL tablet 0.4 mg (0.4 mg Sublingual Given 6/10/21 1758)   HYDROmorphone (DILAUDID) injection 0.5 mg (0.5 mg Intravenous Given 6/10/21 1814)   sodium chloride 0.9 % bolus 1,000 mL (0 mL Intravenous Stopped 6/10/21 1843)   nitroglycerin (NITROSTAT) ointment 1 inch (1 inch Topical Given 6/10/21 1843)   iopamidol (ISOVUE-300) 61 % injection 100 mL (85 mL Intravenous Given 6/10/21 2040)         PROGRESS, DATA ANALYSIS, CONSULTS, AND MEDICAL  "DECISION MAKING    Any/all labs have been independently reviewed by me.  Any/all radiology studies have been reviewed by me and discussed with radiologist dictating the report.   EKG's independently viewed and interpreted by me.  Discussion below represents my analysis of pertinent findings related to patient's condition, differential diagnosis, treatment plan and final disposition.      ED Course as of Jacek 10 2138   Thu Jacek 10, 2021   1711 EKG performed at 1653 and interpreted by me shows normal sinus rhythm with a rate of 93 bpm.  ME interval's are normal.  There is a nonspecific interventricular conduction delay.  ST-T segments are unremarkable.  There is no significant change when compared to 7/30/2019.    [WC]   1735 CBC unremarkable only for mildly increased hemoglobin hematocrit.    [WC]   1743 Troponin T: <0.010 [WC]   1754 Pain slightly improved after 1 sublingual nitroglycerin.  The patient is a little hypertensive-a liter bolus of saline has been ordered.    [WC]   1759 Mildly elevated.   Lipase(!): 62 [WC]   1827 Normal   D-Dimer, Quant: 0.36 [WC]   1955 Repeat troponin is negative.    [WC]   2015 He states that his pain is \"pretty much gone\".    [WC]   2054 Abdominal CT was independently viewed by me and discussed with Dr. García (radiology)-there are no acute processes notified.  See dictated report for official interpretation.    [WC]   2116 Discussed with Dr. Wilson ([facility] [place]) - he agrees to see the patient in consultation.    [WC]   2137 Case discussed with Dr. Contreras ([facility] - he agrees to admit the patient primarily given his host of comorbidities.    [WC]      ED Course User Index  [WC] Favian Colon MD       AS OF 21:38 EDT VITALS:    BP - 121/70  HR - 68  TEMP - 97.8 °F (36.6 °C) (Tympanic)  02 SATS - 95%        DIAGNOSIS  Final diagnoses:   Chest pain with high risk for cardiac etiology   Pulmonary emphysema, unspecified emphysema type (CMS/HCC)   Type 2 diabetes mellitus with other specified " complication, unspecified whether long term insulin use (CMS/[facility])         DISPOSITION  Observation-telemetry           Favian Colon MD  06/10/21 [id number]

## 2021-06-10 NOTE — ED TRIAGE NOTES
Pt to ED from home with c/o crushing Mid sternal CP that started 20mins PTA.  Pt reports sitting and watching TV when pain started.  Pt reports hx of HTN.    Pt wearing mask, staff wearing appropriate PPE.

## 2021-06-10 NOTE — ED NOTES
Patient presents to ED from home, notes sudden onset of midsternal chest pain that will radiate intermittently up to bilateral jaws.  Patient notes pain comes and goes in waves.  Denies any new shortness of breath.  Notes shortness of breath with exertion, that is normal for him.  Patient denies any cough or fever.  Notes more stress at home that normal.  ABC's intact.  VSS.  Patient is anxious but cooperative with care.  NSR.  Patient wearing mask, nurse wearing mask and protective eyewear during care and assessment.  Hand hygiene performed prior to and post care.      Sergio Mckeon, SHERI  06/10/21 [id number]

## 2021-06-10 NOTE — ED NOTES
Oxygen level dropped to 89% on room air.  Patient placed on 2L via nasal canula.  94% on 2 L.       Sergio Mckeon RN  06/10/21 [id number]

## 2021-06-11 ENCOUNTER — APPOINTMENT (OUTPATIENT)
Dept: NUCLEAR MEDICINE | Facility: HOSPITAL | Age: 51
End: 2021-06-11

## 2021-06-11 ENCOUNTER — APPOINTMENT (OUTPATIENT)
Dept: CARDIOLOGY | Facility: HOSPITAL | Age: 51
End: 2021-06-11

## 2021-06-11 PROBLEM — E66.9 CLASS 2 OBESITY WITH BODY MASS INDEX (BMI) OF 36.0 TO 36.9 IN ADULT: Status: ACTIVE | Noted: 2019-01-25

## 2021-06-11 PROBLEM — E66.812 CLASS 2 OBESITY WITH BODY MASS INDEX (BMI) OF 36.0 TO 36.9 IN ADULT: Status: ACTIVE | Noted: 2019-01-25

## 2021-06-11 LAB
ANION GAP SERPL CALCULATED.3IONS-SCNC: 12.4 MMOL/L (ref 5–15)
BH CV REST NUCLEAR ISOTOPE DOSE: 12 MCI
BH CV STRESS COMMENTS STAGE 1: NORMAL
BH CV STRESS DOSE REGADENOSON STAGE 1: 0.4
BH CV STRESS DURATION MIN STAGE 1: 0
BH CV STRESS DURATION SEC STAGE 1: 10
BH CV STRESS NUCLEAR ISOTOPE DOSE: 35.9 MCI
BH CV STRESS PROTOCOL 1: NORMAL
BH CV STRESS RECOVERY BP: NORMAL MMHG
BH CV STRESS RECOVERY HR: 75 BPM
BH CV STRESS STAGE 1: 1
BUN SERPL-MCNC: 10 MG/DL (ref 6–20)
BUN/CREAT SERPL: 11.2 (ref 7–25)
CALCIUM SPEC-SCNC: 9 MG/DL (ref 8.6–10.5)
CHLORIDE SERPL-SCNC: 104 MMOL/L (ref 98–107)
CO2 SERPL-SCNC: 23.6 MMOL/L (ref 22–29)
CREAT SERPL-MCNC: 0.89 MG/DL (ref 0.76–1.27)
DEPRECATED RDW RBC AUTO: 42.8 FL (ref 37–54)
ERYTHROCYTE [DISTWIDTH] IN BLOOD BY AUTOMATED COUNT: 12.4 % (ref 12.3–15.4)
GFR SERPL CREATININE-BSD FRML MDRD: 91 ML/MIN/1.73
GLUCOSE BLDC GLUCOMTR-MCNC: 117 MG/DL (ref 70–130)
GLUCOSE BLDC GLUCOMTR-MCNC: 122 MG/DL (ref 70–130)
GLUCOSE SERPL-MCNC: 196 MG/DL (ref 65–99)
HCT VFR BLD AUTO: 45.3 % (ref 37.5–51)
HGB BLD-MCNC: 16 G/DL (ref 13–17.7)
LV EF NUC BP: 65 %
MAGNESIUM SERPL-MCNC: 2.1 MG/DL (ref 1.6–2.6)
MAXIMAL PREDICTED HEART RATE: 172 BPM
MCH RBC QN AUTO: 32.7 PG (ref 26.6–33)
MCHC RBC AUTO-ENTMCNC: 35.3 G/DL (ref 31.5–35.7)
MCV RBC AUTO: 92.6 FL (ref 79–97)
PERCENT MAX PREDICTED HR: 69.19 %
PLATELET # BLD AUTO: 133 10*3/MM3 (ref 140–450)
PMV BLD AUTO: 11.7 FL (ref 6–12)
POTASSIUM SERPL-SCNC: 4.4 MMOL/L (ref 3.5–5.2)
QT INTERVAL: 364 MS
QT INTERVAL: 411 MS
RBC # BLD AUTO: 4.89 10*6/MM3 (ref 4.14–5.8)
SARS-COV-2 ORF1AB RESP QL NAA+PROBE: NOT DETECTED
SODIUM SERPL-SCNC: 140 MMOL/L (ref 136–145)
STRESS BASELINE BP: NORMAL MMHG
STRESS BASELINE HR: 66 BPM
STRESS PERCENT HR: 81 %
STRESS POST PEAK BP: NORMAL MMHG
STRESS POST PEAK HR: 119 BPM
STRESS TARGET HR: 146 BPM
TROPONIN T SERPL-MCNC: <0.01 NG/ML (ref 0–0.03)
TSH SERPL DL<=0.05 MIU/L-ACNC: 2.64 UIU/ML (ref 0.27–4.2)
WBC # BLD AUTO: 7.18 10*3/MM3 (ref 3.4–10.8)

## 2021-06-11 PROCEDURE — 25010000002 HEPARIN (PORCINE) PER 1000 UNITS: Performed by: INTERNAL MEDICINE

## 2021-06-11 PROCEDURE — 93017 CV STRESS TEST TRACING ONLY: CPT

## 2021-06-11 PROCEDURE — 0 IOPAMIDOL PER 1 ML: Performed by: INTERNAL MEDICINE

## 2021-06-11 PROCEDURE — 99152 MOD SED SAME PHYS/QHP 5/>YRS: CPT | Performed by: INTERNAL MEDICINE

## 2021-06-11 PROCEDURE — 82962 GLUCOSE BLOOD TEST: CPT

## 2021-06-11 PROCEDURE — 93018 CV STRESS TEST I&R ONLY: CPT | Performed by: INTERNAL MEDICINE

## 2021-06-11 PROCEDURE — C1887 CATHETER, GUIDING: HCPCS | Performed by: INTERNAL MEDICINE

## 2021-06-11 PROCEDURE — 83735 ASSAY OF MAGNESIUM: CPT | Performed by: NURSE PRACTITIONER

## 2021-06-11 PROCEDURE — C1769 GUIDE WIRE: HCPCS | Performed by: INTERNAL MEDICINE

## 2021-06-11 PROCEDURE — G0378 HOSPITAL OBSERVATION PER HR: HCPCS

## 2021-06-11 PROCEDURE — 78452 HT MUSCLE IMAGE SPECT MULT: CPT | Performed by: INTERNAL MEDICINE

## 2021-06-11 PROCEDURE — 25010000002 FENTANYL CITRATE (PF) 50 MCG/ML SOLUTION: Performed by: INTERNAL MEDICINE

## 2021-06-11 PROCEDURE — 0 TECHNETIUM SESTAMIBI: Performed by: HOSPITALIST

## 2021-06-11 PROCEDURE — 93010 ELECTROCARDIOGRAM REPORT: CPT | Performed by: INTERNAL MEDICINE

## 2021-06-11 PROCEDURE — C1894 INTRO/SHEATH, NON-LASER: HCPCS | Performed by: INTERNAL MEDICINE

## 2021-06-11 PROCEDURE — C1874 STENT, COATED/COV W/DEL SYS: HCPCS | Performed by: INTERNAL MEDICINE

## 2021-06-11 PROCEDURE — 78452 HT MUSCLE IMAGE SPECT MULT: CPT

## 2021-06-11 PROCEDURE — 93016 CV STRESS TEST SUPVJ ONLY: CPT | Performed by: INTERNAL MEDICINE

## 2021-06-11 PROCEDURE — A9270 NON-COVERED ITEM OR SERVICE: HCPCS | Performed by: INTERNAL MEDICINE

## 2021-06-11 PROCEDURE — 25010000002 MORPHINE PER 10 MG: Performed by: NURSE PRACTITIONER

## 2021-06-11 PROCEDURE — 63710000001 CLOPIDOGREL 75 MG TABLET: Performed by: INTERNAL MEDICINE

## 2021-06-11 PROCEDURE — 93458 L HRT ARTERY/VENTRICLE ANGIO: CPT | Performed by: INTERNAL MEDICINE

## 2021-06-11 PROCEDURE — 93005 ELECTROCARDIOGRAM TRACING: CPT | Performed by: INTERNAL MEDICINE

## 2021-06-11 PROCEDURE — C1725 CATH, TRANSLUMIN NON-LASER: HCPCS | Performed by: INTERNAL MEDICINE

## 2021-06-11 PROCEDURE — 84443 ASSAY THYROID STIM HORMONE: CPT | Performed by: NURSE PRACTITIONER

## 2021-06-11 PROCEDURE — A9500 TC99M SESTAMIBI: HCPCS | Performed by: HOSPITALIST

## 2021-06-11 PROCEDURE — 84484 ASSAY OF TROPONIN QUANT: CPT | Performed by: NURSE PRACTITIONER

## 2021-06-11 PROCEDURE — 25010000002 REGADENOSON 0.4 MG/5ML SOLUTION: Performed by: HOSPITALIST

## 2021-06-11 PROCEDURE — 25010000002 MIDAZOLAM PER 1 MG: Performed by: INTERNAL MEDICINE

## 2021-06-11 PROCEDURE — 63710000001 ACETAMINOPHEN 325 MG TABLET: Performed by: INTERNAL MEDICINE

## 2021-06-11 PROCEDURE — 99153 MOD SED SAME PHYS/QHP EA: CPT | Performed by: INTERNAL MEDICINE

## 2021-06-11 PROCEDURE — 85027 COMPLETE CBC AUTOMATED: CPT | Performed by: NURSE PRACTITIONER

## 2021-06-11 PROCEDURE — 80048 BASIC METABOLIC PNL TOTAL CA: CPT | Performed by: NURSE PRACTITIONER

## 2021-06-11 PROCEDURE — 99214 OFFICE O/P EST MOD 30 MIN: CPT | Performed by: INTERNAL MEDICINE

## 2021-06-11 PROCEDURE — 85347 COAGULATION TIME ACTIVATED: CPT

## 2021-06-11 PROCEDURE — 92928 PRQ TCAT PLMT NTRAC ST 1 LES: CPT | Performed by: INTERNAL MEDICINE

## 2021-06-11 PROCEDURE — C9600 PERC DRUG-EL COR STENT SING: HCPCS | Performed by: INTERNAL MEDICINE

## 2021-06-11 DEVICE — XIENCE SIERRA™ EVEROLIMUS ELUTING CORONARY STENT SYSTEM 4.00 MM X 23 MM / RAPID-EXCHANGE
Type: IMPLANTABLE DEVICE | Status: FUNCTIONAL
Brand: XIENCE SIERRA™

## 2021-06-11 RX ORDER — PANTOPRAZOLE SODIUM 40 MG/1
40 TABLET, DELAYED RELEASE ORAL DAILY
Status: DISCONTINUED | OUTPATIENT
Start: 2021-06-11 | End: 2021-06-12 | Stop reason: HOSPADM

## 2021-06-11 RX ORDER — ACETAMINOPHEN 325 MG/1
650 TABLET ORAL EVERY 4 HOURS PRN
Status: DISCONTINUED | OUTPATIENT
Start: 2021-06-11 | End: 2021-06-12 | Stop reason: HOSPADM

## 2021-06-11 RX ORDER — HEPARIN SODIUM 1000 [USP'U]/ML
INJECTION, SOLUTION INTRAVENOUS; SUBCUTANEOUS AS NEEDED
Status: DISCONTINUED | OUTPATIENT
Start: 2021-06-11 | End: 2021-06-11 | Stop reason: HOSPADM

## 2021-06-11 RX ORDER — CLOPIDOGREL BISULFATE 75 MG/1
TABLET ORAL AS NEEDED
Status: DISCONTINUED | OUTPATIENT
Start: 2021-06-11 | End: 2021-06-11 | Stop reason: HOSPADM

## 2021-06-11 RX ORDER — LEVOTHYROXINE SODIUM 112 UG/1
112 TABLET ORAL
Status: DISCONTINUED | OUTPATIENT
Start: 2021-06-11 | End: 2021-06-12 | Stop reason: HOSPADM

## 2021-06-11 RX ORDER — LOSARTAN POTASSIUM 50 MG/1
50 TABLET ORAL
Status: DISCONTINUED | OUTPATIENT
Start: 2021-06-11 | End: 2021-06-12 | Stop reason: HOSPADM

## 2021-06-11 RX ORDER — FENTANYL CITRATE 50 UG/ML
INJECTION, SOLUTION INTRAMUSCULAR; INTRAVENOUS AS NEEDED
Status: DISCONTINUED | OUTPATIENT
Start: 2021-06-11 | End: 2021-06-11 | Stop reason: HOSPADM

## 2021-06-11 RX ORDER — SODIUM CHLORIDE 9 MG/ML
1 INJECTION, SOLUTION INTRAVENOUS CONTINUOUS
Status: ACTIVE | OUTPATIENT
Start: 2021-06-11 | End: 2021-06-12

## 2021-06-11 RX ORDER — INSULIN GLARGINE 100 [IU]/ML
70 INJECTION, SOLUTION SUBCUTANEOUS
Status: DISCONTINUED | OUTPATIENT
Start: 2021-06-11 | End: 2021-06-12 | Stop reason: HOSPADM

## 2021-06-11 RX ORDER — MIDAZOLAM HYDROCHLORIDE 1 MG/ML
INJECTION INTRAMUSCULAR; INTRAVENOUS AS NEEDED
Status: DISCONTINUED | OUTPATIENT
Start: 2021-06-11 | End: 2021-06-11 | Stop reason: HOSPADM

## 2021-06-11 RX ORDER — LIDOCAINE HYDROCHLORIDE 20 MG/ML
INJECTION, SOLUTION INFILTRATION; PERINEURAL AS NEEDED
Status: DISCONTINUED | OUTPATIENT
Start: 2021-06-11 | End: 2021-06-11 | Stop reason: HOSPADM

## 2021-06-11 RX ORDER — MORPHINE SULFATE 2 MG/ML
2 INJECTION, SOLUTION INTRAMUSCULAR; INTRAVENOUS ONCE
Status: COMPLETED | OUTPATIENT
Start: 2021-06-11 | End: 2021-06-11

## 2021-06-11 RX ORDER — ICOSAPENT ETHYL 1000 MG/1
2 CAPSULE ORAL 2 TIMES DAILY WITH MEALS
Status: DISCONTINUED | OUTPATIENT
Start: 2021-06-11 | End: 2021-06-12 | Stop reason: HOSPADM

## 2021-06-11 RX ORDER — ASPIRIN 81 MG/1
81 TABLET ORAL DAILY
Status: DISCONTINUED | OUTPATIENT
Start: 2021-06-11 | End: 2021-06-12 | Stop reason: HOSPADM

## 2021-06-11 RX ORDER — SODIUM CHLORIDE 9 MG/ML
INJECTION, SOLUTION INTRAVENOUS CONTINUOUS PRN
Status: COMPLETED | OUTPATIENT
Start: 2021-06-11 | End: 2021-06-11

## 2021-06-11 RX ADMIN — TECHNETIUM TC 99M SESTAMIBI 1 DOSE: 1 INJECTION INTRAVENOUS at 13:25

## 2021-06-11 RX ADMIN — LOSARTAN POTASSIUM 50 MG: 50 TABLET, FILM COATED ORAL at 09:53

## 2021-06-11 RX ADMIN — ACETAMINOPHEN 650 MG: 325 TABLET, FILM COATED ORAL at 18:33

## 2021-06-11 RX ADMIN — MORPHINE SULFATE 2 MG: 2 INJECTION, SOLUTION INTRAMUSCULAR; INTRAVENOUS at 01:27

## 2021-06-11 RX ADMIN — TECHNETIUM TC 99M SESTAMIBI 1 DOSE: 1 INJECTION INTRAVENOUS at 11:15

## 2021-06-11 RX ADMIN — REGADENOSON 0.4 MG: 0.08 INJECTION, SOLUTION INTRAVENOUS at 13:25

## 2021-06-11 RX ADMIN — LEVOTHYROXINE SODIUM 112 MCG: 0.11 TABLET ORAL at 06:16

## 2021-06-11 RX ADMIN — PANTOPRAZOLE SODIUM 40 MG: 40 TABLET, DELAYED RELEASE ORAL at 09:53

## 2021-06-11 RX ADMIN — ASPIRIN 81 MG: 81 TABLET, COATED ORAL at 09:53
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
## 2021-06-12 ENCOUNTER — READMISSION MANAGEMENT (OUTPATIENT)
Dept: CALL CENTER | Facility: HOSPITAL | Age: 51
End: 2021-06-12

## 2021-06-12 VITALS
DIASTOLIC BLOOD PRESSURE: 100 MMHG | HEIGHT: 70 IN | OXYGEN SATURATION: 91 % | WEIGHT: 251 LBS | BODY MASS INDEX: 35.93 KG/M2 | SYSTOLIC BLOOD PRESSURE: 155 MMHG | TEMPERATURE: 98.9 F | HEART RATE: 71 BPM | RESPIRATION RATE: 16 BRPM

## 2021-06-12 PROBLEM — I25.700 CORONARY ARTERY DISEASE INVOLVING CORONARY BYPASS GRAFT OF NATIVE HEART WITH UNSTABLE ANGINA PECTORIS: Status: ACTIVE | Noted: 2021-06-12

## 2021-06-12 LAB
ANION GAP SERPL CALCULATED.3IONS-SCNC: 10.7 MMOL/L (ref 5–15)
BUN SERPL-MCNC: 11 MG/DL (ref 6–20)
BUN/CREAT SERPL: 12.4 (ref 7–25)
CALCIUM SPEC-SCNC: 8.5 MG/DL (ref 8.6–10.5)
CHLORIDE SERPL-SCNC: 102 MMOL/L (ref 98–107)
CHOLEST SERPL-MCNC: 205 MG/DL (ref 0–200)
CO2 SERPL-SCNC: 23.3 MMOL/L (ref 22–29)
CREAT SERPL-MCNC: 0.89 MG/DL (ref 0.76–1.27)
DEPRECATED RDW RBC AUTO: 44.7 FL (ref 37–54)
ERYTHROCYTE [DISTWIDTH] IN BLOOD BY AUTOMATED COUNT: 12.9 % (ref 12.3–15.4)
GFR SERPL CREATININE-BSD FRML MDRD: 91 ML/MIN/1.73
GLUCOSE BLDC GLUCOMTR-MCNC: 160 MG/DL (ref 70–130)
GLUCOSE SERPL-MCNC: 206 MG/DL (ref 65–99)
HBA1C MFR BLD: 9.59 % (ref 4.8–5.6)
HCT VFR BLD AUTO: 46.9 % (ref 37.5–51)
HDLC SERPL-MCNC: 20 MG/DL (ref 40–60)
HGB BLD-MCNC: 16.5 G/DL (ref 13–17.7)
LDLC SERPL CALC-MCNC: 98 MG/DL (ref 0–100)
LDLC/HDLC SERPL: 4.11 {RATIO}
MCH RBC QN AUTO: 33.2 PG (ref 26.6–33)
MCHC RBC AUTO-ENTMCNC: 35.2 G/DL (ref 31.5–35.7)
MCV RBC AUTO: 94.4 FL (ref 79–97)
PLATELET # BLD AUTO: 141 10*3/MM3 (ref 140–450)
PMV BLD AUTO: 11.7 FL (ref 6–12)
POTASSIUM SERPL-SCNC: 3.8 MMOL/L (ref 3.5–5.2)
QT INTERVAL: 418 MS
RBC # BLD AUTO: 4.97 10*6/MM3 (ref 4.14–5.8)
SODIUM SERPL-SCNC: 136 MMOL/L (ref 136–145)
TRIGL SERPL-MCNC: 514 MG/DL (ref 0–150)
VLDLC SERPL-MCNC: 87 MG/DL (ref 5–40)
WBC # BLD AUTO: 6.13 10*3/MM3 (ref 3.4–10.8)

## 2021-06-12 PROCEDURE — 63710000001 LEVOTHYROXINE 112 MCG TABLET: Performed by: INTERNAL MEDICINE

## 2021-06-12 PROCEDURE — 82962 GLUCOSE BLOOD TEST: CPT

## 2021-06-12 PROCEDURE — 80048 BASIC METABOLIC PNL TOTAL CA: CPT | Performed by: INTERNAL MEDICINE

## 2021-06-12 PROCEDURE — 63710000001 LOSARTAN 50 MG TABLET: Performed by: INTERNAL MEDICINE

## 2021-06-12 PROCEDURE — 63710000001 PANTOPRAZOLE 40 MG TABLET DELAYED-RELEASE: Performed by: INTERNAL MEDICINE

## 2021-06-12 PROCEDURE — 63710000001 CLOPIDOGREL 75 MG TABLET: Performed by: INTERNAL MEDICINE

## 2021-06-12 PROCEDURE — 93010 ELECTROCARDIOGRAM REPORT: CPT | Performed by: INTERNAL MEDICINE

## 2021-06-12 PROCEDURE — 93005 ELECTROCARDIOGRAM TRACING: CPT | Performed by: INTERNAL MEDICINE

## 2021-06-12 PROCEDURE — A9270 NON-COVERED ITEM OR SERVICE: HCPCS | Performed by: INTERNAL MEDICINE

## 2021-06-12 PROCEDURE — 63710000001 ASPIRIN 81 MG TABLET DELAYED-RELEASE: Performed by: INTERNAL MEDICINE

## 2021-06-12 PROCEDURE — 80061 LIPID PANEL: CPT | Performed by: INTERNAL MEDICINE

## 2021-06-12 PROCEDURE — 85027 COMPLETE CBC AUTOMATED: CPT | Performed by: INTERNAL MEDICINE

## 2021-06-12 PROCEDURE — 99214 OFFICE O/P EST MOD 30 MIN: CPT | Performed by: INTERNAL MEDICINE

## 2021-06-12 PROCEDURE — 83036 HEMOGLOBIN GLYCOSYLATED A1C: CPT | Performed by: INTERNAL MEDICINE

## 2021-06-12 PROCEDURE — 63710000001 INSULIN GLARGINE PER 5 UNITS: Performed by: INTERNAL MEDICINE

## 2021-06-12 PROCEDURE — G0378 HOSPITAL OBSERVATION PER HR: HCPCS

## 2021-06-12 RX ORDER — OLMESARTAN MEDOXOMIL 40 MG/1
40 TABLET ORAL DAILY
Qty: 30 TABLET | Refills: 6 | Status: SHIPPED | OUTPATIENT
Start: 2021-06-12 | End: 2021-10-20 | Stop reason: SDUPTHER

## 2021-06-12 RX ORDER — CLOPIDOGREL BISULFATE 75 MG/1
75 TABLET ORAL DAILY
Status: DISCONTINUED | OUTPATIENT
Start: 2021-06-12 | End: 2021-06-12 | Stop reason: HOSPADM

## 2021-06-12 RX ORDER — CLOPIDOGREL BISULFATE 75 MG/1
75 TABLET ORAL DAILY
Qty: 30 TABLET | Refills: 11 | Status: SHIPPED | OUTPATIENT
Start: 2021-06-12 | End: 2022-07-12

## 2021-06-12 RX ORDER — ROSUVASTATIN CALCIUM 40 MG/1
40 TABLET, COATED ORAL DAILY
Qty: 30 TABLET | Refills: 6 | Status: SHIPPED | OUTPATIENT
Start: 2021-06-12 | End: 2021-10-29 | Stop reason: SDUPTHER

## 2021-06-12 RX ORDER — ROSUVASTATIN CALCIUM 40 MG/1
40 TABLET, COATED ORAL DAILY
Qty: 90 TABLET | Refills: 0 | Status: SHIPPED | OUTPATIENT
Start: 2021-06-12 | End: 2021-06-22 | Stop reason: SDUPTHER

## 2021-06-12 RX ADMIN — ASPIRIN 81 MG: 81 TABLET, COATED ORAL at 10:07

## 2021-06-12 RX ADMIN — LEVOTHYROXINE SODIUM 112 MCG: 0.11 TABLET ORAL at 06:29

## 2021-06-12 RX ADMIN — INSULIN GLARGINE 70 UNITS: 100 INJECTION, SOLUTION SUBCUTANEOUS at 10:06

## 2021-06-12 RX ADMIN — LOSARTAN POTASSIUM 50 MG: 50 TABLET, FILM COATED ORAL at 10:07

## 2021-06-12 RX ADMIN — CLOPIDOGREL 75 MG: 75 TABLET, FILM COATED ORAL at 10:09

## 2021-06-12 RX ADMIN — PANTOPRAZOLE SODIUM 40 MG: 40 TABLET, DELAYED RELEASE ORAL at 10:07

## 2021-06-12 NOTE — DISCHARGE SUMMARY
[facility]    Date of Discharge:  6/12/2021    PCP: Simba Moore MD    Discharge Diagnosis:   Active Hospital Problems    Diagnosis  POA   • **Coronary artery disease involving coronary bypass graft of native heart with unstable angina pectoris (CMS/HCC) [I25.700]  Unknown   • Anxiety [F41.9]  Yes   • Diabetic neuropathy (CMS/HCC) [E11.40]  Yes   • REGAN (obstructive sleep apnea) [G47.33]  Yes   • History of renal cell carcinoma [Z85.528]  Not Applicable   • H/O unilateral nephrectomy [Z90.5]  Not Applicable   • Class 2 obesity with body mass index (BMI) of 36.0 to 36.9 in adult [E66.9, Z68.36]  Not Applicable   • Hypothyroidism [E03.9]  Yes   • COPD (chronic obstructive pulmonary disease) (CMS/[facility]) [J44.9]  Yes   • Mixed hyperlipidemia [E78.2]  Yes   • Essential hypertension [I10]  Yes   • DM2 (diabetes mellitus, type 2) (CMS/[facility]) [E11.9]  Yes   • Chronic pancreatitis (CMS/[facility]) [K86.1]  Yes      Resolved Hospital Problems   No resolved problems to display.     Procedure(s):  Left Heart Catherization  Coronary angiography  Stent TERRELL coronary     Consults     Date and Time Order Name Status Description    6/10/2021 11:36 PM Inpatient Cardiology Consult Completed     6/10/2021  9:17 PM [facility] (on-call MD unless specified) Details Completed     6/10/2021  8:54 PM [facility] (on-call MD unless specified) Completed         Hospital Course  50-year-old male, with history of hypertension, COPD, diabetes, dyslipidemia, obesity, was seen in the hospital and he presented with main complaints of substernal chest pressure.  Cardiology evaluated the patient.  Patient underwent evaluation, he has diagnosis of coronary artery disease involving coronary bypass graft of native heart with unstable angina pectoralis.  Patient underwent coronary angiography with severe RCA disease.  Patient is status post drug-eluting stent.  Patient has been started on dual antiplatelet therapy.  He  will be taking rosuvastatin.  Patient is otherwise stable, he is okay to be discharged today.  I have seen and examined him at bedside, total time spent on discharge day management is more than 30 minutes.        Condition on Discharge: Improved.     Temp:  [98.1 °F (36.7 °C)-98.9 °F (37.2 °C)] 98.9 °F (37.2 °C)  Heart Rate:  [68-78] 71  Resp:  [10-18] 16  BP: (118-155)/() 155/100  Body mass index is 36.01 kg/m².    Physical Exam   Constitutional:       General: He is not in acute distress.     Appearance: He is well-developed. He is obese. He is not toxic-appearing.   HENT:      Head: Normocephalic and atraumatic.   Eyes:      General: No scleral icterus.        Right eye: No discharge.         Left eye: No discharge.      Conjunctiva/sclera: Conjunctivae normal.   Neck:      Vascular: No JVD.   Cardiovascular:      Rate and Rhythm: Normal rate and regular rhythm.      Heart sounds: Normal heart sounds. No murmur heard.   No friction rub. No gallop.    Pulmonary:      Effort: Pulmonary effort is normal. No respiratory distress.      Breath sounds: Normal breath sounds. No wheezing or rales.   Abdominal:      General: Bowel sounds are normal. There is no distension.      Palpations: Abdomen is soft.      Tenderness: There is no abdominal tenderness. There is no guarding.   Musculoskeletal:         General: No tenderness or deformity. Normal range of motion.      Cervical back: Normal range of motion and neck supple.   Skin:     General: Skin is warm and dry.      Capillary Refill: Capillary refill takes less than 2 seconds.   Neurological:      Mental Status: He is alert and oriented to person, place, and time.   Psychiatric:         Behavior: Behavior normal.     Disposition: Home or Self Care       Discharge Medications      New Medications      Instructions Start Date   clopidogrel 75 MG tablet  Commonly known as: PLAVIX   75 mg, Oral, Daily      rosuvastatin 40 MG tablet  Commonly known as: CRESTOR   40  mg, Oral, Daily      rosuvastatin 40 MG tablet  Commonly known as: CRESTOR   40 mg, Oral, Daily         Changes to Medications      Instructions Start Date   olmesartan 40 MG tablet  Commonly known as: BENICAR  What changed:   · medication strength  · how much to take   40 mg, Oral, Daily         Continue These Medications      Instructions Start Date   albuterol sulfate  (90 Base) MCG/ACT inhaler  Commonly known as: PROVENTIL HFA;VENTOLIN HFA;PROAIR HFA   2 puffs, Inhalation, Every 4 Hours PRN      albuterol 1.25 MG/3ML nebulizer solution  Commonly known as: ACCUNEB   1.25 mg, Nebulization, Every 4 Hours PRN      arformoterol 15 MCG/2ML nebulizer solution  Commonly known as: BROVANA   15 mcg, Nebulization, 2 Times Daily - RT      aspirin 81 MG EC tablet   81 mg, Oral, Daily      BD Pen Needle Maribel U/F 32G X 4 MM misc  Generic drug: Insulin Pen Needle   Use 4 times a day      budesonide 0.5 MG/2ML nebulizer solution  Commonly known as: PULMICORT   0.5 mg, Nebulization, 2 Times Daily - RT      cyclobenzaprine 10 MG tablet  Commonly known as: FLEXERIL   TAKE 1 TABLET BY MOUTH EVERY 8 HOURS AS NEEDED FOR MUSCLE SPASM TIGHTNESS      DULoxetine 60 MG capsule  Commonly known as: CYMBALTA   60 mg, Oral, Daily      insulin aspart 100 UNIT/ML solution pen-injector sc pen  Commonly known as: novoLOG FLEXPEN   Use as directed with each meal sliding scale of 2 Units for every 20 over 160      levothyroxine 112 MCG tablet  Commonly known as: Synthroid   112 mcg, Oral, Daily, On an empty stomach      Nebulizer/Tubing/Mouthpiece kit   1 kit, Does not apply, Take As Directed      pantoprazole 40 MG EC tablet  Commonly known as: PROTONIX   40 mg, Oral, Daily      traMADol 50 MG tablet  Commonly known as: ULTRAM   50 mg, Oral      Tresiba FlexTouch 200 UNIT/ML solution pen-injector pen injection  Generic drug: Insulin Degludec   140 Units, Subcutaneous, Daily With Breakfast      Vascepa 1 g capsule capsule  Generic drug:  icosapent ethyl   2 g, Oral, 2 Times Daily With Meals      vitamin D 1.25 MG (33890 UT) capsule capsule  Commonly known as: ERGOCALCIFEROL   50,000 Units, Oral, Weekly         Stop These Medications    predniSONE 20 MG tablet  Commonly known as: DELTASONE             Additional Instructions for the Follow-ups that You Need to Schedule     Ambulatory Referral to Cardiac Rehab   As directed        Follow-up Information     [facility] .    Specialty: Cardiac Rehabilitation  Contact information:  [address] [address] [phone]           Favian Carrillo MD Follow up in 1 week(s).    Specialty: Cardiology  Why: We will call with appointment  Contact information:  [address]  [facility] [place] [id number] [phone]             Simba Moore MD .    Specialty: Internal Medicine  Contact information:  [address]  [place] [address]  [phone]                     Eduardo Cannon MD  06/12/21  13:09 EDT    Discharge time spent greater than 30 minutes.

## 2021-06-12 NOTE — OUTREACH NOTE
Prep Survey      Responses   [facility] facility patient discharged from?  [facility]   Is LACE score < 7 ?  No   Emergency Room discharge w/ pulse ox?  No   Eligibility  [facility]   Date of Admission  06/10/21   Date of Discharge  06/12/21   Discharge Disposition  Home or Self Care   Discharge diagnosis  status post drug-eluting stent Coronary artery disease involving coronary bypass graft of native heart with unstable angina pectoris    Does the patient have one of the following disease processes/diagnoses(primary or secondary)?  Other   Does the patient have Home health ordered?  No   Is there a DME ordered?  No   Prep survey completed?  Yes          Pauly Alba RN

## 2021-06-12 NOTE — PROGRESS NOTES
"   LOS: 0 days   Patient Care Team:  Simba Moore MD as PCP - General (Internal Medicine)  Avel Gottlieb MD as Consulting Physician (Endocrinology)    Chief Complaint: CP     Interval History: Feels great, would like to go home, no chest pain.       Objective   Vital Signs  Temp:  [98.1 °F (36.7 °C)-98.3 °F (36.8 °C)] 98.2 °F (36.8 °C)  Heart Rate:  [68-78] 69  Resp:  [10-18] 16  BP: (118-140)/(80-92) 137/86    Intake/Output Summary (Last 24 hours) at 6/12/2021 0953  Last data filed at 6/12/2021 0830  Gross per 24 hour   Intake 270 ml   Output 275 ml   Net -5 ml       Last Weight and Admission Weight        06/10/21  1711   Weight: 114 kg (251 lb)     Flowsheet Rows      First Filed Value   Admission Height  177.8 cm (70\") Documented at 06/10/2021 1711   Admission Weight  114 kg (251 lb) Documented at 06/10/2021 1711                  Physical Exam  Vitals reviewed.   Constitutional:       Comments: obese   HENT:      Head: Normocephalic.      Nose: Nose normal.      Mouth/Throat:      Comments: Poor dentition  Eyes:      Conjunctiva/sclera: Conjunctivae normal.   Neck:      Comments: Cannot assess JVD due to body habitus  Cardiovascular:      Rate and Rhythm: Normal rate and regular rhythm.      Pulses: Normal pulses.      Heart sounds: Normal heart sounds.      Comments: Right radial 2+, no hematoma, normal ROM/sensation/strength of hand  Pulmonary:      Effort: Pulmonary effort is normal.      Breath sounds: Wheezing and rhonchi present.   Abdominal:      Palpations: Abdomen is soft.      Tenderness: There is no abdominal tenderness.      Comments: Large right flank hernia   Musculoskeletal:         General: Normal range of motion.      Cervical back: Normal range of motion.   Skin:     General: Skin is warm and dry.      Findings: No erythema.   Neurological:      General: No focal deficit present.      Mental Status: He is alert and oriented to person, place, and time.      Cranial Nerves: No cranial " nerve deficit.   Psychiatric:         Mood and Affect: Mood normal.         Behavior: Behavior normal.         Thought Content: Thought content normal.         Results Review:      Results from last 7 days   Lab Units 06/12/21  0447 06/11/21  0417 06/10/21  1715   SODIUM mmol/L 136 140 138   POTASSIUM mmol/L 3.8 4.4 4.1   CHLORIDE mmol/L 102 104 103   CO2 mmol/L 23.3 23.6 27.0   BUN mg/dL 11 10 11   CREATININE mg/dL 0.89 0.89 0.98   GLUCOSE mg/dL 206* 196* 179*   CALCIUM mg/dL 8.5* 9.0 9.5     Results from last 7 days   Lab Units 06/11/21  0417 06/10/21  1912 06/10/21  1715   TROPONIN T ng/mL <0.010 <0.010 <0.010     Results from last 7 days   Lab Units 06/12/21 0447 06/11/21 0417 06/10/21  1715   WBC 10*3/mm3 6.13 7.18 8.66   HEMOGLOBIN g/dL 16.5 16.0 18.4*   HEMATOCRIT % 46.9 45.3 52.8*   PLATELETS 10*3/mm3 141 133* 151         Results from last 7 days   Lab Units 06/12/21 0447   CHOLESTEROL mg/dL 205*     Results from last 7 days   Lab Units 06/11/21  0417   MAGNESIUM mg/dL 2.1     Results from last 7 days   Lab Units 06/12/21  0447   CHOLESTEROL mg/dL 205*   TRIGLYCERIDES mg/dL 514*   HDL CHOL mg/dL 20*   LDL CHOL mg/dL 98       I reviewed the patient's new clinical results.  I personally viewed and interpreted the patient's EKG/Telemetry data        Medication Review:   aspirin, 81 mg, Oral, Daily  clopidogrel, 75 mg, Oral, Daily  icosapent ethyl, 2 g, Oral, BID With Meals  insulin glargine, 70 Units, Subcutaneous, Daily With Breakfast  levothyroxine, 112 mcg, Oral, Q AM  losartan, 50 mg, Oral, Q24H  pantoprazole, 40 mg, Oral, Daily             Assessment/Plan     1.  Coronary artery disease involving coronary bypass graft of native heart with unstable angina pectoris    *Coronary angiography with severe RCA disease s/p 4x23mm Xience TERRELL; there was moderate nonobstructive disease of the ramus  *LVEF was normal  *DAPT  *Resume rosuvastatin that he was previously taking    2. HTN -- increase home olmesartan to  40mg daily    3. COPD -- wheezy on exam, defer to primary team    4. DM2    5. Obesity    Okay for d/c from cardiac standpoint; we will call him Monday with follow up appointment.     Jaswinder Ling MD  06/12/21  09:53 EDT

## 2021-06-13 LAB — ACT BLD: 312 SECONDS (ref 82–152)

## 2021-06-14 ENCOUNTER — TRANSITIONAL CARE MANAGEMENT TELEPHONE ENCOUNTER (OUTPATIENT)
Dept: CALL CENTER | Facility: HOSPITAL | Age: 51
End: 2021-06-14

## 2021-06-14 NOTE — OUTREACH NOTE
Call Center TCM Note      Responses   [facility] patient discharged from?  [facility]   Does the patient have one of the following disease processes/diagnoses(primary or secondary)?  Other   TCM attempt successful?  Yes   Call start time  1014   Call end time  1024   Discharge diagnosis  CAD, heart cath/stent   Is patient permission given to speak with other caregiver?  Yes   List who call center can speak with  Wife, Macrina Pereira   Person spoke with today (if not patient) and relationship  patient   Meds reviewed with patient/caregiver?  Yes   Is the patient having any side effects they believe may be caused by any medication additions or changes?  No   Does the patient have all medications ordered at discharge?  Yes   Is the patient taking all medications as directed (includes completed medication regime)?  Yes   Comments regarding appointments  Cardiology f/u 6/22/21   Does the patient have a primary care provider?   Yes   Does the patient have an appointment with their PCP within 7 days of discharge?  Greater than 7 days   Comments regarding PCP  PCP Simba Moore MD,  Hospital follow up appt in place for  6/23/21  745am   Nursing Interventions  Verified appointment date/time/provider   Has the patient kept scheduled appointments due by today?  N/A   Comments  Referral made for cardiac rehab.   Has home health visited the patient within 72 hours of discharge?  N/A   Psychosocial issues?  No   Comments  Patient report Heart cath site without any issues. States that it healed quickly.    Did the patient receive a copy of their discharge instructions?  Yes   Nursing interventions  Reviewed instructions with patient   What is the patient's perception of their health status since discharge?  Improving   Is the patient/caregiver able to teach back signs and symptoms related to disease process for when to call PCP?  Yes   Is the patient/caregiver able to teach back signs and symptoms related to disease  process for when to call 911?  Yes   Is the patient/caregiver able to teach back the hierarchy of who to call/visit for symptoms/problems? PCP, Specialist, Home health nurse, Urgent Care, ED, 911  Yes   TCM call completed?  Yes   Wrap up additional comments  Patient without further questions or needs today.           Pauly Escobar RN    6/14/2021, 10:24 EDT

## 2021-06-15 ENCOUNTER — TRANSCRIBE ORDERS (OUTPATIENT)
Dept: CARDIAC REHAB | Facility: HOSPITAL | Age: 51
End: 2021-06-15

## 2021-06-15 DIAGNOSIS — Z95.5 STATUS POST INSERTION OF DRUG-ELUTING STENT INTO RIGHT CORONARY ARTERY FOR CORONARY ARTERY DISEASE: Primary | ICD-10-CM

## 2021-06-16 ENCOUNTER — TELEPHONE (OUTPATIENT)
Dept: ENDOCRINOLOGY | Age: 51
End: 2021-06-16

## 2021-06-22 ENCOUNTER — OFFICE VISIT (OUTPATIENT)
Dept: CARDIOLOGY | Facility: CLINIC | Age: 51
End: 2021-06-22

## 2021-06-22 VITALS
WEIGHT: 250 LBS | DIASTOLIC BLOOD PRESSURE: 62 MMHG | HEIGHT: 70 IN | BODY MASS INDEX: 35.79 KG/M2 | OXYGEN SATURATION: 96 % | HEART RATE: 94 BPM | SYSTOLIC BLOOD PRESSURE: 104 MMHG

## 2021-06-22 DIAGNOSIS — E78.2 MIXED HYPERLIPIDEMIA: ICD-10-CM

## 2021-06-22 DIAGNOSIS — I25.700 CORONARY ARTERY DISEASE INVOLVING CORONARY BYPASS GRAFT OF NATIVE HEART WITH UNSTABLE ANGINA PECTORIS (HCC): Primary | ICD-10-CM

## 2021-06-22 DIAGNOSIS — I10 ESSENTIAL HYPERTENSION: ICD-10-CM

## 2021-06-22 PROCEDURE — 99214 OFFICE O/P EST MOD 30 MIN: CPT | Performed by: NURSE PRACTITIONER

## 2021-06-22 PROCEDURE — 93000 ELECTROCARDIOGRAM COMPLETE: CPT | Performed by: NURSE PRACTITIONER
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
## 2021-06-23 ENCOUNTER — OFFICE VISIT (OUTPATIENT)
Dept: FAMILY MEDICINE CLINIC | Facility: CLINIC | Age: 51
End: 2021-06-23

## 2021-06-23 VITALS
HEART RATE: 84 BPM | WEIGHT: 249 LBS | HEIGHT: 70 IN | DIASTOLIC BLOOD PRESSURE: 80 MMHG | SYSTOLIC BLOOD PRESSURE: 110 MMHG | TEMPERATURE: 97.9 F | BODY MASS INDEX: 35.65 KG/M2 | OXYGEN SATURATION: 95 %

## 2021-06-23 DIAGNOSIS — L08.9 INFECTED SEBACEOUS CYST OF SKIN: ICD-10-CM

## 2021-06-23 DIAGNOSIS — I25.700 CORONARY ARTERY DISEASE INVOLVING CORONARY BYPASS GRAFT OF NATIVE HEART WITH UNSTABLE ANGINA PECTORIS (HCC): ICD-10-CM

## 2021-06-23 DIAGNOSIS — F33.1 MODERATE EPISODE OF RECURRENT MAJOR DEPRESSIVE DISORDER (HCC): ICD-10-CM

## 2021-06-23 DIAGNOSIS — L72.3 INFECTED SEBACEOUS CYST OF SKIN: ICD-10-CM

## 2021-06-23 DIAGNOSIS — E78.2 MIXED HYPERLIPIDEMIA: ICD-10-CM

## 2021-06-23 DIAGNOSIS — Z09 HOSPITAL DISCHARGE FOLLOW-UP: Primary | ICD-10-CM

## 2021-06-23 DIAGNOSIS — I10 ESSENTIAL HYPERTENSION: ICD-10-CM

## 2021-06-23 PROCEDURE — 99495 TRANSJ CARE MGMT MOD F2F 14D: CPT | Performed by: INTERNAL MEDICINE

## 2021-06-23 PROCEDURE — 1111F DSCHRG MED/CURRENT MED MERGE: CPT | Performed by: INTERNAL MEDICINE

## 2021-06-23 RX ORDER — ESCITALOPRAM OXALATE 10 MG/1
10 TABLET ORAL DAILY
Qty: 30 TABLET | Refills: 3 | Status: SHIPPED | OUTPATIENT
Start: 2021-06-23 | End: 2021-11-10

## 2021-06-23 RX ORDER — SULFAMETHOXAZOLE AND TRIMETHOPRIM 800; 160 MG/1; MG/1
1 TABLET ORAL 2 TIMES DAILY
Qty: 20 TABLET | Refills: 0 | Status: SHIPPED | OUTPATIENT
Start: 2021-06-23 | End: 2021-11-10

## 2021-06-23 NOTE — PROGRESS NOTES
Subjective   Ricardo Pereira is a 50 y.o. male.     Chief Complaint   Patient presents with   • Cyst     side of head   • Coronary Artery Disease     hospital follow up        History of Present Illness   Within 48 business hours after discharge our office contacted him via telephone to coordinate his care and needs.  Date of TCM Phone Call 6/12/2021   [facility]   Date of Admission 6/10/2021   Date of Discharge 6/12/2021   Discharge Disposition Home or Self Care     Risk for Readmission (LACE) Score: 8 (6/12/2021  6:01 AM)      Patient was admitted to [facility]  ALL records were obtained and reviewed and /or discussed with admitting physician  Date of admission 6/10/2021  Date of discharge 6/12/2021  Diagnosis coronary artery disease with unstable angina subsequent finding of severe RCA disease and stent placement  Hospital Course   50-year-old male, with history of hypertension, COPD, diabetes, dyslipidemia, obesity, was seen in the hospital and he presented with main complaints of substernal chest pressure.  Cardiology evaluated the patient.  Patient underwent evaluation, he has diagnosis of coronary artery disease involving coronary bypass graft of native heart with unstable angina pectoralis.  Patient underwent coronary angiography with severe RCA disease.  Patient is status post drug-eluting stent.  Patient has been started on dual antiplatelet therapy.  He will be taking rosuvastatin.  Patient is otherwise stable, he is okay to be discharged today.  I have seen and examined him at bedside, total time spent on discharge day management is more than 30 minutes.  Medications upon discharge added Plavix 75 mg daily and rosuvastatin 40 mg daily  Disposition Home  Follow up with cardiology as scheduled was seen yesterday by cardiology and continued the discharge meds and follow up with Dr Carrillo in 1 month.  Cardiac rehab starts next week.  Currently c/o fatigue.  Since discharge home has  "been having some itching in the palms and armpits with no rash or new short of breath.  Ever since coming home has been feeling depressed, crying, thinking \"about the what ifs\", stress about everything, wants to be left alone but still wants people around, Denies SI or HI.  Has a knot on the left side of head since April that is gradually increasing and is tender.  No drainage.  Condition stable    Here for follow-up of diabetes.  Patient states to have been compliant with medications.  No episodes of hypoglycemia, nausea, vomiting, new rashes, syncope or other issues.  Denies any difficulties with the current medication regimen of tresiba and novolog.  Last A1c 9.59 6/12/21.      I reviewed and requested records labs and diagnostics from the hospital with the patient and family.  The patient is to follow-up with specialist as discussed and directed.  If any problems arise or further questions develop patient is to call or to contact us for any needs.    The following portions of the patient's history were reviewed and updated as appropriate: allergies, current medications, past family history, past medical history, past social history, past surgical history and problem list.    Past Medical History:   Diagnosis Date   • Abdominal hernia    • Abdominal pain, acute    • Abdominal pain, LLQ    • Abnormal brain scan    • Abnormal involuntary movements    • Acute pancreatitis    • Allergic rhinitis    • Anxiety    • Arthritis of right glenohumeral joint    • Asthma    • Bilateral carpal tunnel syndrome    • BMI 38.0-38.9,adult    • Burn of left upper extremity, second degree, initial encounter    • Cancer (CMS/[facility])    • Chronic generalized abdominal pain    • Chronic pain     flank pain \" permenantly damaged muscle\"   • Common migraine without aura    • COPD (chronic obstructive pulmonary disease) (CMS/[facility])    • COPD exacerbation (CMS/[facility])    • Coronary artery disease involving coronary bypass graft of native heart with " unstable angina pectoris (CMS/HCC) 6/12/2021   • Cough syncope    • Depression    • Diabetes mellitus (CMS/HCC)     Insulin pump   • Diabetic neuropathy (CMS/HCC)    • Diabetic peripheral neuropathy (CMS/HCC)    • Disease of thyroid gland     hypothyroid   • Diverticulitis of colon    • Elevated blood sugar level    • Elevated cholesterol    • Elevated transaminase level    • Encounter for immunization    • Fibromyalgia    • Gait disturbance    • GERD (gastroesophageal reflux disease)    • Hepatic steatosis    • History of pancreatitis     X 2   • History of renal cell cancer     stage 4 , Rt kidney   • Hospital discharge follow-up    • Hyperlipidemia    • Hypertension    • Hypertriglyceridemia    • IBS (irritable bowel syndrome)    • Incomplete tear of rotator cuff    • Intractable chronic migraine without aura and with status migrainosus    • Late effects of cerebrovascular disease    • Left ankle pain    • Left ankle sprain    • Left foot pain    • Left-sided chest pain    • Leg pain    • Low vitamin D level    • Lumbar strain    • Medicare annual wellness visit, initial    • Migraine, chronic, without aura, intractable    • Nausea and vomiting    • Numbness    • Obstructive sleep apnea    • Photophobia    • Pre-syncope    • Recurrent renal cell carcinoma of kidney (CMS/HCC)    • Renal failure (ARF), acute on chronic (CMS/HCC)    • Right shoulder pain    • Sciatica without lumbago    • Sciatica, left side    • Severe obesity (CMS/HCC)     Severe obesity (BMI 35.0-39.9)   • Shortness of breath    • Sleep apnea    • Stroke (CMS/HCC)     Stroke, lacunar   • SVT (supraventricular tachycardia) (CMS/HCC)    • Syncope and collapse    • Type 2 diabetes mellitus (CMS/HCC)    • Umbilical hernia    • Viral URI with cough        Past Surgical History:   Procedure Laterality Date   • ABDOMINAL SURGERY     • APPENDECTOMY     • CARDIAC CATHETERIZATION Left 6/11/2021    Procedure: Left Heart Catherization;  Surgeon: Cherry  MD Henrietta;  Location:  [facility];  Service: Cardiovascular;  Laterality: Left;   • CARDIAC CATHETERIZATION N/A 6/11/2021    Procedure: Coronary angiography;  Surgeon: Henrietta Carey MD;  Location:  [facility];  Service: Cardiovascular;  Laterality: N/A;   • CARDIAC CATHETERIZATION N/A 6/11/2021    Procedure: Stent TERRELL coronary;  Surgeon: Henrietta Carey MD;  Location:  [facility];  Service: Cardiovascular;  Laterality: N/A;   • COLONOSCOPY  10/27/2014    tics   • HERNIA REPAIR      umbilical   • INGUINAL HERNIA REPAIR     • NEPHRECTOMY Right    • TONSILLECTOMY     • VENTRAL/INCISIONAL HERNIA REPAIR N/A 5/11/2017    Procedure: VENTRAL/INCISIONAL HERNIA REPAIR LAPAROSCOPIC, RECURRENT INCISION, WITH MESH AND AHEDLYSIS;  Surgeon: Albin Bee MD;  Location: [facility] MAIN OR;  Service:        Family History   Problem Relation Age of Onset   • Asthma Other    • Bipolar disorder Other    • COPD Other    • Depression Other    • Lupus Other          systemic lupus erythematosus   • Arthritis Other    • No Known Problems Mother    • No Known Problems Father        Social History     Socioeconomic History   • Marital status:      Spouse name: Not on file   • Number of children: Not on file   • Years of education: Not on file   • Highest education level: Not on file   Tobacco Use   • Smoking status: Light Tobacco Smoker     Types: Cigars   • Smokeless tobacco: Never Used   Vaping Use   • Vaping Use: Never used   Substance and Sexual Activity   • Alcohol use: Never   • Drug use: No   • Sexual activity: Defer       Current Outpatient Medications   Medication Sig Dispense Refill   • albuterol (ACCUNEB) 1.25 MG/3ML nebulizer solution Take 3 mL by nebulization Every 4 (Four) Hours As Needed for Wheezing. 120 vial 12   • albuterol sulfate  (90 Base) MCG/ACT inhaler Inhale 2 puffs Every 4 (Four) Hours As Needed for Wheezing. 1 inhaler 1   • aspirin 81 MG EC tablet  Take 1 tablet by mouth Daily. 90 tablet 1   • BD PEN NEEDLE AUDREY U/F 32G X 4 MM misc Use 4 times a day 400 each 1   • budesonide (PULMICORT) 0.5 MG/2ML nebulizer solution Take 2 mL by nebulization 2 (Two) Times a Day. 60 each 0   • clopidogrel (PLAVIX) 75 MG tablet Take 1 tablet by mouth Daily. 30 tablet 11   • insulin aspart (novoLOG FLEXPEN) 100 UNIT/ML solution pen-injector sc pen Use as directed with each meal sliding scale of 2 Units for every 20 over 160 5 pen 3   • levothyroxine (Synthroid) 112 MCG tablet Take 1 tablet by mouth Daily. On an empty stomach 30 tablet 4   • olmesartan (BENICAR) 40 MG tablet Take 1 tablet by mouth Daily. 30 tablet 6   • pantoprazole (PROTONIX) 40 MG EC tablet Take 1 tablet by mouth Daily. 90 tablet 3   • Respiratory Therapy Supplies (Nebulizer/Tubing/Mouthpiece) kit 1 kit Take As Directed. 1 each 2   • rosuvastatin (CRESTOR) 40 MG tablet Take 1 tablet by mouth Daily. 30 tablet 6   • Tresiba FlexTouch 200 UNIT/ML solution pen-injector pen injection Inject 140 Units under the skin into the appropriate area as directed Daily With Breakfast. 21 mL 5   • VASCEPA 1 g capsule capsule Take 2 g by mouth 2 (Two) Times a Day With Meals. 360 capsule 3   • vitamin D (ERGOCALCIFEROL) 1.25 MG (57849 UT) capsule capsule Take 50,000 Units by mouth 1 (One) Time Per Week.     • escitalopram (Lexapro) 10 MG tablet Take 1 tablet by mouth Daily. 30 tablet 3   • sulfamethoxazole-trimethoprim (BACTRIM DS,SEPTRA DS) 800-160 MG per tablet Take 1 tablet by mouth 2 (Two) Times a Day. 20 tablet 0     No current facility-administered medications for this visit.       Review of Systems   Constitutional: Negative for activity change, appetite change, fatigue, fever, unexpected weight gain and unexpected weight loss.   HENT: Negative for nosebleeds, rhinorrhea, trouble swallowing and voice change.    Eyes: Negative for visual disturbance.   Respiratory: Negative for cough, chest tightness, shortness of breath and  wheezing.    Cardiovascular: Negative for chest pain, palpitations and leg swelling.   Gastrointestinal: Negative for abdominal pain, blood in stool, constipation, diarrhea, nausea, vomiting, GERD and indigestion.   Genitourinary: Negative for dysuria, frequency and hematuria.   Musculoskeletal: Negative for arthralgias, back pain and myalgias.   Skin: Negative for rash and bruise.        Left temple lesion   Neurological: Negative for dizziness, tremors, weakness, light-headedness, numbness, headache and memory problem.   Hematological: Negative for adenopathy. Does not bruise/bleed easily.   Psychiatric/Behavioral: Positive for depressed mood and stress. Negative for self-injury, sleep disturbance and suicidal ideas. The patient is not nervous/anxious.        Objective   Vitals:    06/23/21 0743   BP: 110/80   Pulse: 84   Temp: 97.9 °F (36.6 °C)   SpO2: 95%     Body mass index is 35.73 kg/m².  Physical Exam  Vitals and nursing note reviewed.   Constitutional:       General: He is not in acute distress.     Appearance: He is well-developed. He is obese. He is not diaphoretic.   HENT:      Head: Normocephalic and atraumatic.      Right Ear: External ear normal.      Left Ear: External ear normal.      Nose: Nose normal.   Eyes:      Conjunctiva/sclera: Conjunctivae normal.      Pupils: Pupils are equal, round, and reactive to light.   Neck:      Thyroid: No thyromegaly.      Trachea: No tracheal deviation.   Cardiovascular:      Rate and Rhythm: Normal rate and regular rhythm.      Heart sounds: Normal heart sounds. No murmur heard.   No friction rub. No gallop.    Pulmonary:      Effort: Pulmonary effort is normal. No respiratory distress.      Breath sounds: Normal breath sounds.   Abdominal:      General: Bowel sounds are normal.      Palpations: Abdomen is soft. There is no mass.      Tenderness: There is no abdominal tenderness. There is no guarding.   Musculoskeletal:         General: Normal range of motion.       Cervical back: Normal range of motion and neck supple.   Lymphadenopathy:      Cervical: No cervical adenopathy.   Skin:     General: Skin is warm and dry.      Capillary Refill: Capillary refill takes less than 2 seconds.      Findings: No rash.      Comments: Left temple with 1.5 cm cyst with erythema and tenderness. Without open drainage.   Neurological:      Mental Status: He is alert and oriented to person, place, and time.      Motor: No abnormal muscle tone.      Deep Tendon Reflexes: Reflexes normal.   Psychiatric:         Behavior: Behavior normal.         Thought Content: Thought content normal.         Judgment: Judgment normal.      Comments: Patient crying on discussion.           Assessment/Plan   Diagnoses and all orders for this visit:    1. Hospital discharge follow-up (Primary)    2. Coronary artery disease involving coronary bypass graft of native heart with unstable angina pectoris (CMS/HCC)    3. Mixed hyperlipidemia    4. Essential hypertension    5. Moderate episode of recurrent major depressive disorder (CMS/HCC)  -     escitalopram (Lexapro) 10 MG tablet; Take 1 tablet by mouth Daily.  Dispense: 30 tablet; Refill: 3    6. Infected sebaceous cyst of skin  -     sulfamethoxazole-trimethoprim (BACTRIM DS,SEPTRA DS) 800-160 MG per tablet; Take 1 tablet by mouth 2 (Two) Times a Day.  Dispense: 20 tablet; Refill: 0    Discussed and reviewed hospitalization testing results and stent placement.  We discussed his labs including his A1c being uncontrolled.  We discussed the importance of keeping his cholesterol down as low as possible blood pressure controlled which it is and controlling his diabetes.  We reviewed his medications and will continue the current medications but also discussed that he may wish to follow-up with endocrinology and consider adding medication such as Jardiance that may have a effect on both his blood sugar and his future heart issues.  We discussed his sebaceous cyst  left temple that appears to be slightly infected.  I really do not want him to undergo any kind of surgical procedure at this time so close to his heart procedure.  We will treat with the antibiotic and observe.  If worsens follow-up.  Patient with significant depression that is both reactive and chronic underlying depression.  We will restart medications which she is not on at this time.  Was given Lexapro 10 mg daily and asked that he follow-up in 3 to 4 weeks.  If he has any worsening problems is to follow-up sooner.  Continue with the cardiac rehab as scheduled and follow-up with Dr. Carrillo of cardiology in about 1 month.                · COVID-19 Precautions - Patient was compliant in wearing a mask. When I saw the patient, I used appropriate personal protective equipment (PPE) including mask and eye shield (standard procedure).  Additionally, I used gown and gloves if indicated.  Hand hygiene was completed before and after seeing the patient.  · Dictated utilizing Dragon Dictation

## 2021-06-24 ENCOUNTER — OFFICE VISIT (OUTPATIENT)
Dept: CARDIAC REHAB | Facility: HOSPITAL | Age: 51
End: 2021-06-24

## 2021-06-24 DIAGNOSIS — Z95.5 STATUS POST INSERTION OF DRUG-ELUTING STENT INTO RIGHT CORONARY ARTERY FOR CORONARY ARTERY DISEASE: Primary | ICD-10-CM

## 2021-06-24 PROCEDURE — 93797 PHYS/QHP OP CAR RHAB WO ECG: CPT

## 2021-06-28 ENCOUNTER — TREATMENT (OUTPATIENT)
Dept: CARDIAC REHAB | Facility: HOSPITAL | Age: 51
End: 2021-06-28

## 2021-06-28 DIAGNOSIS — Z95.5 STATUS POST INSERTION OF DRUG-ELUTING STENT INTO RIGHT CORONARY ARTERY FOR CORONARY ARTERY DISEASE: Primary | ICD-10-CM

## 2021-06-28 PROCEDURE — 93798 PHYS/QHP OP CAR RHAB W/ECG: CPT

## 2021-06-30 ENCOUNTER — APPOINTMENT (OUTPATIENT)
Dept: CARDIAC REHAB | Facility: HOSPITAL | Age: 51
End: 2021-06-30

## 2021-07-01 DIAGNOSIS — Z12.11 COLON CANCER SCREENING: Primary | ICD-10-CM

## 2021-07-02 ENCOUNTER — TELEPHONE (OUTPATIENT)
Dept: GASTROENTEROLOGY | Facility: CLINIC | Age: 51
End: 2021-07-02

## 2021-07-02 ENCOUNTER — APPOINTMENT (OUTPATIENT)
Dept: CARDIAC REHAB | Facility: HOSPITAL | Age: 51
End: 2021-07-02

## 2021-07-07 ENCOUNTER — TREATMENT (OUTPATIENT)
Dept: CARDIAC REHAB | Facility: HOSPITAL | Age: 51
End: 2021-07-07

## 2021-07-07 DIAGNOSIS — Z95.5 STATUS POST INSERTION OF DRUG-ELUTING STENT INTO RIGHT CORONARY ARTERY FOR CORONARY ARTERY DISEASE: Primary | ICD-10-CM

## 2021-07-07 PROCEDURE — 93798 PHYS/QHP OP CAR RHAB W/ECG: CPT

## 2021-07-09 ENCOUNTER — TREATMENT (OUTPATIENT)
Dept: CARDIAC REHAB | Facility: HOSPITAL | Age: 51
End: 2021-07-09

## 2021-07-09 DIAGNOSIS — Z95.5 STATUS POST INSERTION OF DRUG-ELUTING STENT INTO RIGHT CORONARY ARTERY FOR CORONARY ARTERY DISEASE: Primary | ICD-10-CM

## 2021-07-09 PROCEDURE — 93798 PHYS/QHP OP CAR RHAB W/ECG: CPT

## 2021-07-12 ENCOUNTER — APPOINTMENT (OUTPATIENT)
Dept: CARDIAC REHAB | Facility: HOSPITAL | Age: 51
End: 2021-07-12

## 2021-07-14 ENCOUNTER — TREATMENT (OUTPATIENT)
Dept: CARDIAC REHAB | Facility: HOSPITAL | Age: 51
End: 2021-07-14

## 2021-07-14 DIAGNOSIS — Z95.5 STATUS POST INSERTION OF DRUG-ELUTING STENT INTO RIGHT CORONARY ARTERY FOR CORONARY ARTERY DISEASE: Primary | ICD-10-CM

## 2021-07-14 PROCEDURE — 93798 PHYS/QHP OP CAR RHAB W/ECG: CPT

## 2021-07-15 DIAGNOSIS — E03.8 SECONDARY HYPOTHYROIDISM: ICD-10-CM

## 2021-07-15 RX ORDER — LEVOTHYROXINE SODIUM 112 UG/1
TABLET ORAL
Qty: 30 TABLET | Refills: 6 | Status: SHIPPED | OUTPATIENT
Start: 2021-07-15 | End: 2021-10-29

## 2021-07-15 NOTE — TELEPHONE ENCOUNTER
Received questionnaire in the mail.  Called and spoke with patient.  He is unaware of when and where his last scope was done.

## 2021-07-16 ENCOUNTER — TELEPHONE (OUTPATIENT)
Dept: GASTROENTEROLOGY | Facility: CLINIC | Age: 51
End: 2021-07-16

## 2021-07-16 ENCOUNTER — TREATMENT (OUTPATIENT)
Dept: CARDIAC REHAB | Facility: HOSPITAL | Age: 51
End: 2021-07-16

## 2021-07-16 DIAGNOSIS — Z95.5 STATUS POST INSERTION OF DRUG-ELUTING STENT INTO RIGHT CORONARY ARTERY FOR CORONARY ARTERY DISEASE: Primary | ICD-10-CM

## 2021-07-16 PROCEDURE — 93798 PHYS/QHP OP CAR RHAB W/ECG: CPT

## 2021-07-16 NOTE — TELEPHONE ENCOUNTER
Patient is not sure when and where his last scope was-- personal hx of polyps-- no family hx of polyps or colon ca-- ASA and Plavix-- Medications:      aspirin 81 MG EC tablet  clopidogrel (PLAVIX) 75 MG tablet  insulin aspart (novoLOG FLEXPEN) 100 UNIT/ML solution pen-injector sc pen  olmesartan (BENICAR) 40 MG tablet  rosuvastatin (CRESTOR) 40 MG tablet  Tresiba FlexTouch 200 UNIT/ML solution pen-injector pen injection  VASCEPA 1 g capsule capsule  vitamin D (ERGOCALCIFEROL) 1.25 MG (85708 UT) capsule capsule  Levothyroxine     OA form scanned into media

## 2021-07-19 ENCOUNTER — APPOINTMENT (OUTPATIENT)
Dept: CARDIAC REHAB | Facility: HOSPITAL | Age: 51
End: 2021-07-19

## 2021-07-21 ENCOUNTER — APPOINTMENT (OUTPATIENT)
Dept: CARDIAC REHAB | Facility: HOSPITAL | Age: 51
End: 2021-07-21

## 2021-07-23 ENCOUNTER — APPOINTMENT (OUTPATIENT)
Dept: CARDIAC REHAB | Facility: HOSPITAL | Age: 51
End: 2021-07-23

## 2021-07-26 ENCOUNTER — APPOINTMENT (OUTPATIENT)
Dept: CARDIAC REHAB | Facility: HOSPITAL | Age: 51
End: 2021-07-26

## 2021-07-28 ENCOUNTER — APPOINTMENT (OUTPATIENT)
Dept: CARDIAC REHAB | Facility: HOSPITAL | Age: 51
End: 2021-07-28

## 2021-07-30 ENCOUNTER — APPOINTMENT (OUTPATIENT)
Dept: CARDIAC REHAB | Facility: HOSPITAL | Age: 51
End: 2021-07-30

## 2021-08-09 RX ORDER — SITAGLIPTIN 100 MG/1
TABLET, FILM COATED ORAL
Qty: 90 TABLET | Refills: 0 | Status: SHIPPED | OUTPATIENT
Start: 2021-08-09 | End: 2021-10-29

## 2021-08-11 ENCOUNTER — OFFICE VISIT (OUTPATIENT)
Dept: CARDIOLOGY | Facility: CLINIC | Age: 51
End: 2021-08-11

## 2021-08-11 ENCOUNTER — APPOINTMENT (OUTPATIENT)
Dept: CARDIAC REHAB | Facility: HOSPITAL | Age: 51
End: 2021-08-11

## 2021-08-11 VITALS
DIASTOLIC BLOOD PRESSURE: 62 MMHG | HEIGHT: 70 IN | SYSTOLIC BLOOD PRESSURE: 116 MMHG | WEIGHT: 258.8 LBS | BODY MASS INDEX: 37.05 KG/M2 | HEART RATE: 96 BPM

## 2021-08-11 DIAGNOSIS — I25.700 CORONARY ARTERY DISEASE INVOLVING CORONARY BYPASS GRAFT OF NATIVE HEART WITH UNSTABLE ANGINA PECTORIS (HCC): Primary | ICD-10-CM

## 2021-08-11 DIAGNOSIS — E78.2 MIXED HYPERLIPIDEMIA: ICD-10-CM

## 2021-08-11 DIAGNOSIS — I10 ESSENTIAL HYPERTENSION: ICD-10-CM

## 2021-08-11 PROCEDURE — 99214 OFFICE O/P EST MOD 30 MIN: CPT | Performed by: NURSE PRACTITIONER

## 2021-08-11 PROCEDURE — 93000 ELECTROCARDIOGRAM COMPLETE: CPT | Performed by: NURSE PRACTITIONER
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
## 2021-08-13 ENCOUNTER — APPOINTMENT (OUTPATIENT)
Dept: CARDIAC REHAB | Facility: HOSPITAL | Age: 51
End: 2021-08-13

## 2021-08-16 ENCOUNTER — APPOINTMENT (OUTPATIENT)
Dept: CARDIAC REHAB | Facility: HOSPITAL | Age: 51
End: 2021-08-16

## 2021-08-18 ENCOUNTER — APPOINTMENT (OUTPATIENT)
Dept: CARDIAC REHAB | Facility: HOSPITAL | Age: 51
End: 2021-08-18

## 2021-08-20 ENCOUNTER — APPOINTMENT (OUTPATIENT)
Dept: CARDIAC REHAB | Facility: HOSPITAL | Age: 51
End: 2021-08-20

## 2021-08-23 ENCOUNTER — APPOINTMENT (OUTPATIENT)
Dept: CARDIAC REHAB | Facility: HOSPITAL | Age: 51
End: 2021-08-23

## 2021-08-25 ENCOUNTER — APPOINTMENT (OUTPATIENT)
Dept: CARDIAC REHAB | Facility: HOSPITAL | Age: 51
End: 2021-08-25

## 2021-08-27 ENCOUNTER — APPOINTMENT (OUTPATIENT)
Dept: CARDIAC REHAB | Facility: HOSPITAL | Age: 51
End: 2021-08-27

## 2021-08-30 ENCOUNTER — APPOINTMENT (OUTPATIENT)
Dept: CARDIAC REHAB | Facility: HOSPITAL | Age: 51
End: 2021-08-30

## 2021-09-01 ENCOUNTER — APPOINTMENT (OUTPATIENT)
Dept: CARDIAC REHAB | Facility: HOSPITAL | Age: 51
End: 2021-09-01

## 2021-09-03 ENCOUNTER — APPOINTMENT (OUTPATIENT)
Dept: CARDIAC REHAB | Facility: HOSPITAL | Age: 51
End: 2021-09-03

## 2021-09-08 ENCOUNTER — APPOINTMENT (OUTPATIENT)
Dept: CARDIAC REHAB | Facility: HOSPITAL | Age: 51
End: 2021-09-08

## 2021-09-10 ENCOUNTER — APPOINTMENT (OUTPATIENT)
Dept: CARDIAC REHAB | Facility: HOSPITAL | Age: 51
End: 2021-09-10

## 2021-09-13 ENCOUNTER — APPOINTMENT (OUTPATIENT)
Dept: CARDIAC REHAB | Facility: HOSPITAL | Age: 51
End: 2021-09-13

## 2021-09-15 ENCOUNTER — APPOINTMENT (OUTPATIENT)
Dept: CARDIAC REHAB | Facility: HOSPITAL | Age: 51
End: 2021-09-15

## 2021-09-17 ENCOUNTER — APPOINTMENT (OUTPATIENT)
Dept: CARDIAC REHAB | Facility: HOSPITAL | Age: 51
End: 2021-09-17

## 2021-09-20 ENCOUNTER — APPOINTMENT (OUTPATIENT)
Dept: CARDIAC REHAB | Facility: HOSPITAL | Age: 51
End: 2021-09-20

## 2021-09-22 ENCOUNTER — APPOINTMENT (OUTPATIENT)
Dept: CARDIAC REHAB | Facility: HOSPITAL | Age: 51
End: 2021-09-22

## 2021-09-24 ENCOUNTER — TELEPHONE (OUTPATIENT)
Dept: CARDIOLOGY | Facility: CLINIC | Age: 51
End: 2021-09-24

## 2021-09-24 DIAGNOSIS — I25.700 CORONARY ARTERY DISEASE INVOLVING CORONARY BYPASS GRAFT OF NATIVE HEART WITH UNSTABLE ANGINA PECTORIS (HCC): Primary | ICD-10-CM

## 2021-10-20 ENCOUNTER — CLINICAL SUPPORT (OUTPATIENT)
Dept: FAMILY MEDICINE CLINIC | Facility: CLINIC | Age: 51
End: 2021-10-20

## 2021-10-20 DIAGNOSIS — I10 ESSENTIAL HYPERTENSION: ICD-10-CM

## 2021-10-20 DIAGNOSIS — Z23 IMMUNIZATION DUE: Primary | ICD-10-CM

## 2021-10-20 PROCEDURE — 90686 IIV4 VACC NO PRSV 0.5 ML IM: CPT | Performed by: INTERNAL MEDICINE

## 2021-10-20 PROCEDURE — G0008 ADMIN INFLUENZA VIRUS VAC: HCPCS | Performed by: INTERNAL MEDICINE

## 2021-10-20 RX ORDER — OLMESARTAN MEDOXOMIL 40 MG/1
40 TABLET ORAL DAILY
Qty: 90 TABLET | Refills: 1 | Status: SHIPPED | OUTPATIENT
Start: 2021-10-20 | End: 2022-04-27

## 2021-10-29 ENCOUNTER — OFFICE VISIT (OUTPATIENT)
Dept: ENDOCRINOLOGY | Age: 51
End: 2021-10-29

## 2021-10-29 VITALS
DIASTOLIC BLOOD PRESSURE: 78 MMHG | WEIGHT: 267.6 LBS | SYSTOLIC BLOOD PRESSURE: 132 MMHG | BODY MASS INDEX: 38.31 KG/M2 | OXYGEN SATURATION: 97 % | HEART RATE: 90 BPM | HEIGHT: 70 IN

## 2021-10-29 DIAGNOSIS — E78.2 HYPERLIPEMIA, MIXED: ICD-10-CM

## 2021-10-29 DIAGNOSIS — E55.9 VITAMIN D DEFICIENCY: ICD-10-CM

## 2021-10-29 DIAGNOSIS — Z79.4 TYPE 2 DIABETES MELLITUS WITH HYPERGLYCEMIA, WITH LONG-TERM CURRENT USE OF INSULIN (HCC): Primary | ICD-10-CM

## 2021-10-29 DIAGNOSIS — N18.30 TYPE 2 DIABETES MELLITUS WITH STAGE 3 CHRONIC KIDNEY DISEASE, WITH LONG-TERM CURRENT USE OF INSULIN, UNSPECIFIED WHETHER STAGE 3A OR 3B CKD (HCC): ICD-10-CM

## 2021-10-29 DIAGNOSIS — E11.22 TYPE 2 DIABETES MELLITUS WITH STAGE 3 CHRONIC KIDNEY DISEASE, WITH LONG-TERM CURRENT USE OF INSULIN, UNSPECIFIED WHETHER STAGE 3A OR 3B CKD (HCC): ICD-10-CM

## 2021-10-29 DIAGNOSIS — Z79.4 TYPE 2 DIABETES MELLITUS WITH STAGE 3 CHRONIC KIDNEY DISEASE, WITH LONG-TERM CURRENT USE OF INSULIN, UNSPECIFIED WHETHER STAGE 3A OR 3B CKD (HCC): ICD-10-CM

## 2021-10-29 DIAGNOSIS — E03.8 SECONDARY HYPOTHYROIDISM: ICD-10-CM

## 2021-10-29 DIAGNOSIS — E11.65 TYPE 2 DIABETES MELLITUS WITH HYPERGLYCEMIA, WITH LONG-TERM CURRENT USE OF INSULIN (HCC): Primary | ICD-10-CM

## 2021-10-29 DIAGNOSIS — E78.2 MIXED HYPERLIPIDEMIA: ICD-10-CM

## 2021-10-29 PROCEDURE — 99214 OFFICE O/P EST MOD 30 MIN: CPT | Performed by: INTERNAL MEDICINE

## 2021-10-29 RX ORDER — ROSUVASTATIN CALCIUM 40 MG/1
40 TABLET, COATED ORAL DAILY
Qty: 30 TABLET | Refills: 6 | Status: SHIPPED | OUTPATIENT
Start: 2021-10-29 | End: 2022-04-28

## 2021-10-29 RX ORDER — PREGABALIN 50 MG/1
50 CAPSULE ORAL 3 TIMES DAILY
Qty: 90 CAPSULE | Refills: 2 | Status: SHIPPED | OUTPATIENT
Start: 2021-10-29 | End: 2021-11-10

## 2021-10-29 RX ORDER — ERGOCALCIFEROL 1.25 MG/1
50000 CAPSULE ORAL
Qty: 5 CAPSULE | Refills: 1 | Status: SHIPPED | OUTPATIENT
Start: 2021-10-29 | End: 2022-01-06

## 2021-10-29 RX ORDER — INSULIN DEGLUDEC 200 U/ML
140 INJECTION, SOLUTION SUBCUTANEOUS
Qty: 21 ML | Refills: 5 | Status: SHIPPED | OUTPATIENT
Start: 2021-10-29 | End: 2022-05-12 | Stop reason: SDUPTHER

## 2021-10-29 RX ORDER — INSULIN ASPART 100 [IU]/ML
32 INJECTION, SOLUTION INTRAVENOUS; SUBCUTANEOUS
Qty: 28.8 ML | Refills: 11 | Status: SHIPPED | OUTPATIENT
Start: 2021-10-29 | End: 2022-05-12

## 2021-10-29 RX ORDER — ICOSAPENT ETHYL 1000 MG/1
2 CAPSULE ORAL 2 TIMES DAILY WITH MEALS
Qty: 360 CAPSULE | Refills: 3 | Status: SHIPPED | OUTPATIENT
Start: 2021-10-29 | End: 2022-10-25

## 2021-10-29 RX ORDER — DAPAGLIFLOZIN 10 MG/1
10 TABLET, FILM COATED ORAL DAILY
Qty: 30 TABLET | Refills: 11 | Status: SHIPPED | OUTPATIENT
Start: 2021-10-29 | End: 2022-10-07

## 2021-10-29 RX ORDER — LEVOTHYROXINE SODIUM 0.1 MG/1
100 TABLET ORAL DAILY
Qty: 30 TABLET | Refills: 11 | Status: SHIPPED | OUTPATIENT
Start: 2021-10-29 | End: 2022-10-07

## 2021-10-29 NOTE — PROGRESS NOTES
"Chief Complaint  Chief Complaint   Patient presents with   • Diabetes   • Hypothyroidism   FOLLOW UP/ TYPE 2 DM HYPOTHYROIDISM        Subjective          History of Present Illness    Ricardo Pereira 51 y.o. presents with Type 2 dm as a F/u    Type 2 dm - Diagnosed about 4 - 5 years ago.   Today in clinic pt reports being on tresiba 130 units Q am, novolog ssi - 2 unit for every 1 over 160 mg/dl, novolog 30 units tid ac. Januvia 100 mg po daily. Does have hx of pancreatitis.   avg bg - 100 - 180 mg/dl   Checks BG - 4 times a day  Sensor - x  Dm retinopathy - no hx ,Last eye exam - uptodate with exam.  Dm nephropathy - x  Dm neuropathy - yes,Dm neuropathy meds - none   CAD - yes, s/p stents  CVA - yes  Episodes of hypoglycemia - x  Pt is physically active. weight has been stable.   On Ace inb.    # reports he is depressed as a result he is not eating rt.     Hyperlipidemia - rosuvastatin 40 mg po daily.     Hypothyroidism - on euthyrox 112 mcg oral daily.     Reviewed primary care physician's/consulting physician documentation and lab results         I have reviewed the patient's allergies, medicines, past medical hx, family hx and social hx in detail.    Objective   Vital Signs:   /78   Pulse 90   Ht 177.8 cm (70\")   Wt 121 kg (267 lb 9.6 oz)   SpO2 97%   BMI 38.40 kg/m²   Physical Exam   General appearance - no distress  Eyes- anicteric sclera  Ear nose and throat-external ears and nose normal.    Respiratory-normal chest on inspection.  No respiratory distress noted.  Skin-no rashes.  Neuro-alert and oriented x3            Result Review :   The following data was reviewed by: Benton Dela Cruz MD on 10/29/2021:  Results Encounter on 09/03/2021   Component Date Value Ref Range Status   • Hemoglobin A1C 10/15/2021 8.10* 4.80 - 5.60 % Final    Comment: Hemoglobin A1C Ranges:  Increased Risk for Diabetes  5.7% to 6.4%  Diabetes                     >= 6.5%  Diabetic Goal                < 7.0%     • Glucose " 10/15/2021 106* 65 - 99 mg/dL Final   • BUN 10/15/2021 12  6 - 20 mg/dL Final   • Creatinine 10/15/2021 1.10  0.76 - 1.27 mg/dL Final   • eGFR Non  Am 10/15/2021 71  >60 mL/min/1.73 Final    Comment: GFR Normal >60  Chronic Kidney Disease <60  Kidney Failure <15     • eGFR  Am 10/15/2021 86  >60 mL/min/1.73 Final   • BUN/Creatinine Ratio 10/15/2021 10.9  7.0 - 25.0 Final   • Sodium 10/15/2021 140  136 - 145 mmol/L Final   • Potassium 10/15/2021 4.6  3.5 - 5.2 mmol/L Final   • Chloride 10/15/2021 103  98 - 107 mmol/L Final   • Total CO2 10/15/2021 26.5  22.0 - 29.0 mmol/L Final   • Calcium 10/15/2021 9.5  8.6 - 10.5 mg/dL Final   • Total Protein 10/15/2021 6.6  6.0 - 8.5 g/dL Final   • Albumin 10/15/2021 4.40  3.50 - 5.20 g/dL Final   • Globulin 10/15/2021 2.2  gm/dL Final   • A/G Ratio 10/15/2021 2.0  g/dL Final   • Total Bilirubin 10/15/2021 0.3  0.0 - 1.2 mg/dL Final   • Alkaline Phosphatase 10/15/2021 87  39 - 117 U/L Final   • AST (SGOT) 10/15/2021 44* 1 - 40 U/L Final   • ALT (SGPT) 10/15/2021 46* 1 - 41 U/L Final   • Total Cholesterol 10/15/2021 130  0 - 200 mg/dL Final    Comment: Cholesterol Reference Ranges  (U.S. Department of Health and Human Services ATP III  Classifications)  Desirable          <200 mg/dL  Borderline High    200-239 mg/dL  High Risk          >240 mg/dL  Triglyceride Reference Ranges  (U.S. Department of Health and Human Services ATP III  Classifications)  Normal           <150 mg/dL  Borderline High  150-199 mg/dL  High             200-499 mg/dL  Very High        >500 mg/dL  HDL Reference Ranges  (U.S. Department of Health and Human Services ATP III  Classifcations)  Low     <40 mg/dl (major risk factor for CHD)  High    >60 mg/dl ('negative' risk factor for CHD)  LDL Reference Ranges  (U.S. Department of Health and Human Services ATP III  Classifcations)  Optimal          <100 mg/dL  Near Optimal     100-129 mg/dL  Borderline High  130-159 mg/dL  High             160-189  mg/dL  Very High        >189 mg/dL     • Triglycerides 10/15/2021 242* 0 - 150 mg/dL Final   • HDL Cholesterol 10/15/2021 24* 40 - 60 mg/dL Final   • VLDL Cholesterol Arnoldo 10/15/2021 40  5 - 40 mg/dL Final   • LDL Chol Calc ([facility]) 10/15/2021 66  0 - 100 mg/dL Final   • Creatinine, Urine 10/15/2021 85.8  Not Estab. mg/dL Final   • Microalbumin, Urine 10/15/2021 8.1  Not Estab. ug/mL Final   • Microalbumin/Creatinine Ratio 10/15/2021 9  0 - 29 mg/g creat Final    Comment:                        Normal:                0 -  29                         Moderately increased: 30 - 300                         Severely increased:       >300     • TSH 10/15/2021 0.883  0.270 - 4.200 uIU/mL Final   • Free T4 10/15/2021 1.72* 0.93 - 1.70 ng/dL Final    Results may be falsely increased if patient taking Biotin.   • Interpretation 10/15/2021 Note   Final    Supplemental report is available.     Data reviewed: Hospital notes       Results Review:    I reviewed the patient's new clinical results.     Assessment and Plan    Problem List Items Addressed This Visit        Other    DM2 (diabetes mellitus, type 2) ([facility]) - Primary    Relevant Medications    Dapagliflozin Propanediol (Farxiga) 10 MG tablet    insulin aspart (NovoLOG FlexPen) 100 UNIT/ML solution pen-injector sc pen    insulin aspart (novoLOG FLEXPEN) 100 UNIT/ML solution pen-injector sc pen    Tresiba FlexTouch 200 UNIT/ML solution pen-injector pen injection    pregabalin (Lyrica) 50 MG capsule    Other Relevant Orders    Ambulatory Referral to Podiatry    Vitamin D deficiency    Mixed hyperlipidemia    Relevant Medications    rosuvastatin (CRESTOR) 40 MG tablet    Vascepa 1 g capsule capsule    Secondary hypothyroidism    Relevant Medications    levothyroxine (Euthyrox) 100 MCG tablet      Other Visit Diagnoses     Hyperlipemia, mixed        Relevant Medications    rosuvastatin (CRESTOR) 40 MG tablet    Vascepa 1 g capsule capsule        Type 2 diabetes  "mellitus-uncontrolled with hyperglycemia  HbA1c is not at goal.  Continue Tresiba 130 units every morning  Increase NovoLog to 32 units 3 times a day with food  Continue the NovoLog sliding scale  Stop Januvia  Added Farxiga 10 mg oral daily.    Hypothyroidism  Levels are worse  Change dose to levothyroxine 100 mcg oral daily.    Diabetic neuropathy-was  Start Lyrica 50 mg twice daily    Sent in a prescription for freestyle tyrese for the patient.    Interpreted the blood work-up/imaging results performed by the primary care/consulting physician -    Refills sent to pharmacy    Follow Up     Patient was given instructions and counseling regarding her condition or for health maintenance advice. Please see specific information pulled into the AVS if appropriate.       Thank you for asking me to see your patient, Ricardo Pereira in consultation.         Benton Dela Cruz MD  10/29/21      [facility] Dragon / transcription disclaimer:     \"Dictated utilizing Dragon dictation\".         "

## 2021-10-29 NOTE — PATIENT INSTRUCTIONS
Type 2 dm -   tresiba 130 units Q am,  novolog ssi - 2 unit for every 1 over 160 mg/dl,   novolog 32 units tid ac.  Stop Januvia 100 mg po daily.     Add Farxiga 10 mg oral daily.     Hypothyroidism  -   Decrease the dose to 100 mcg oral daily of levothyroxine.

## 2021-11-09 ENCOUNTER — APPOINTMENT (OUTPATIENT)
Dept: GENERAL RADIOLOGY | Facility: HOSPITAL | Age: 51
End: 2021-11-09

## 2021-11-09 ENCOUNTER — HOSPITAL ENCOUNTER (EMERGENCY)
Facility: HOSPITAL | Age: 51
Discharge: HOME | End: 2021-11-09
Attending: EMERGENCY MEDICINE | Admitting: EMERGENCY MEDICINE

## 2021-11-09 VITALS
HEIGHT: 70 IN | SYSTOLIC BLOOD PRESSURE: 135 MMHG | OXYGEN SATURATION: 93 % | TEMPERATURE: 98.4 F | RESPIRATION RATE: 18 BRPM | BODY MASS INDEX: 38.4 KG/M2 | HEART RATE: 78 BPM | DIASTOLIC BLOOD PRESSURE: 79 MMHG

## 2021-11-09 DIAGNOSIS — R07.9 CHEST PAIN IN ADULT: Primary | ICD-10-CM

## 2021-11-09 LAB
ALBUMIN SERPL-MCNC: 4.1 G/DL (ref 3.5–5.2)
ALBUMIN/GLOB SERPL: 1.6 G/DL
ALP SERPL-CCNC: 78 U/L (ref 39–117)
ALT SERPL W P-5'-P-CCNC: 54 U/L (ref 1–41)
ANION GAP SERPL CALCULATED.3IONS-SCNC: 9 MMOL/L (ref 5–15)
AST SERPL-CCNC: 47 U/L (ref 1–40)
BASOPHILS # BLD AUTO: 0.04 10*3/MM3 (ref 0–0.2)
BASOPHILS NFR BLD AUTO: 0.5 % (ref 0–1.5)
BILIRUB SERPL-MCNC: 0.3 MG/DL (ref 0–1.2)
BUN SERPL-MCNC: 12 MG/DL (ref 6–20)
BUN/CREAT SERPL: 11.1 (ref 7–25)
CALCIUM SPEC-SCNC: 9.3 MG/DL (ref 8.6–10.5)
CHLORIDE SERPL-SCNC: 106 MMOL/L (ref 98–107)
CO2 SERPL-SCNC: 24 MMOL/L (ref 22–29)
CREAT SERPL-MCNC: 1.08 MG/DL (ref 0.76–1.27)
DEPRECATED RDW RBC AUTO: 43.8 FL (ref 37–54)
EOSINOPHIL # BLD AUTO: 0.15 10*3/MM3 (ref 0–0.4)
EOSINOPHIL NFR BLD AUTO: 1.7 % (ref 0.3–6.2)
ERYTHROCYTE [DISTWIDTH] IN BLOOD BY AUTOMATED COUNT: 13.1 % (ref 12.3–15.4)
GFR SERPL CREATININE-BSD FRML MDRD: 73 ML/MIN/1.73
GLOBULIN UR ELPH-MCNC: 2.6 GM/DL
GLUCOSE SERPL-MCNC: 155 MG/DL (ref 65–99)
HCT VFR BLD AUTO: 47.8 % (ref 37.5–51)
HGB BLD-MCNC: 17 G/DL (ref 13–17.7)
HOLD SPECIMEN: NORMAL
HOLD SPECIMEN: NORMAL
IMM GRANULOCYTES # BLD AUTO: 0.04 10*3/MM3 (ref 0–0.05)
IMM GRANULOCYTES NFR BLD AUTO: 0.5 % (ref 0–0.5)
LYMPHOCYTES # BLD AUTO: 3.34 10*3/MM3 (ref 0.7–3.1)
LYMPHOCYTES NFR BLD AUTO: 38 % (ref 19.6–45.3)
MCH RBC QN AUTO: 32.8 PG (ref 26.6–33)
MCHC RBC AUTO-ENTMCNC: 35.6 G/DL (ref 31.5–35.7)
MCV RBC AUTO: 92.3 FL (ref 79–97)
MONOCYTES # BLD AUTO: 0.43 10*3/MM3 (ref 0.1–0.9)
MONOCYTES NFR BLD AUTO: 4.9 % (ref 5–12)
NEUTROPHILS NFR BLD AUTO: 4.78 10*3/MM3 (ref 1.7–7)
NEUTROPHILS NFR BLD AUTO: 54.4 % (ref 42.7–76)
NRBC BLD AUTO-RTO: 0 /100 WBC (ref 0–0.2)
PLATELET # BLD AUTO: 159 10*3/MM3 (ref 140–450)
PMV BLD AUTO: 10.8 FL (ref 6–12)
POTASSIUM SERPL-SCNC: 4.2 MMOL/L (ref 3.5–5.2)
PROT SERPL-MCNC: 6.7 G/DL (ref 6–8.5)
QT INTERVAL: 372 MS
RBC # BLD AUTO: 5.18 10*6/MM3 (ref 4.14–5.8)
SODIUM SERPL-SCNC: 139 MMOL/L (ref 136–145)
TROPONIN T SERPL-MCNC: <0.01 NG/ML (ref 0–0.03)
TROPONIN T SERPL-MCNC: <0.01 NG/ML (ref 0–0.03)
WBC # BLD AUTO: 8.78 10*3/MM3 (ref 3.4–10.8)
WHOLE BLOOD HOLD SPECIMEN: NORMAL
WHOLE BLOOD HOLD SPECIMEN: NORMAL

## 2021-11-09 PROCEDURE — 80053 COMPREHEN METABOLIC PANEL: CPT

## 2021-11-09 PROCEDURE — 93005 ELECTROCARDIOGRAM TRACING: CPT | Performed by: EMERGENCY MEDICINE

## 2021-11-09 PROCEDURE — 84484 ASSAY OF TROPONIN QUANT: CPT | Performed by: EMERGENCY MEDICINE

## 2021-11-09 PROCEDURE — 93005 ELECTROCARDIOGRAM TRACING: CPT

## 2021-11-09 PROCEDURE — 71046 X-RAY EXAM CHEST 2 VIEWS: CPT

## 2021-11-09 PROCEDURE — 85025 COMPLETE CBC W/AUTO DIFF WBC: CPT

## 2021-11-09 PROCEDURE — 99284 EMERGENCY DEPT VISIT MOD MDM: CPT

## 2021-11-09 PROCEDURE — 84484 ASSAY OF TROPONIN QUANT: CPT

## 2021-11-09 PROCEDURE — 93010 ELECTROCARDIOGRAM REPORT: CPT | Performed by: INTERNAL MEDICINE

## 2021-11-09 RX ORDER — ASPIRIN 325 MG
325 TABLET ORAL ONCE
Status: COMPLETED | OUTPATIENT
Start: 2021-11-09 | End: 2021-11-09

## 2021-11-09 RX ORDER — SODIUM CHLORIDE 0.9 % (FLUSH) 0.9 %
10 SYRINGE (ML) INJECTION AS NEEDED
Status: DISCONTINUED | OUTPATIENT
Start: 2021-11-09 | End: 2021-11-09 | Stop reason: HOSPADM

## 2021-11-09 RX ADMIN — ASPIRIN 325 MG: 325 TABLET ORAL at 19:35

## 2021-11-09 NOTE — ED NOTES
CP since yesterday, worse today. MI with stents place in June. Patient of Dr. Broderick.   Denies N/V or dizziness.      Hanh Arellano, RN  11/09/21 0395

## 2021-11-10 ENCOUNTER — APPOINTMENT (OUTPATIENT)
Dept: GENERAL RADIOLOGY | Facility: HOSPITAL | Age: 51
End: 2021-11-10

## 2021-11-10 ENCOUNTER — TELEPHONE (OUTPATIENT)
Dept: CARDIOLOGY | Facility: CLINIC | Age: 51
End: 2021-11-10

## 2021-11-10 ENCOUNTER — HOSPITAL ENCOUNTER (OUTPATIENT)
Facility: HOSPITAL | Age: 51
Setting detail: OBSERVATION
Discharge: HOME | End: 2021-11-11
Attending: EMERGENCY MEDICINE | Admitting: EMERGENCY MEDICINE

## 2021-11-10 DIAGNOSIS — R07.9 ACUTE CHEST PAIN: Primary | ICD-10-CM

## 2021-11-10 LAB
ALBUMIN SERPL-MCNC: 4.3 G/DL (ref 3.5–5.2)
ALBUMIN/GLOB SERPL: 1.7 G/DL
ALP SERPL-CCNC: 81 U/L (ref 39–117)
ALT SERPL W P-5'-P-CCNC: 54 U/L (ref 1–41)
ANION GAP SERPL CALCULATED.3IONS-SCNC: 7.1 MMOL/L (ref 5–15)
AST SERPL-CCNC: 41 U/L (ref 1–40)
BASOPHILS # BLD AUTO: 0.04 10*3/MM3 (ref 0–0.2)
BASOPHILS NFR BLD AUTO: 0.5 % (ref 0–1.5)
BILIRUB SERPL-MCNC: 0.3 MG/DL (ref 0–1.2)
BUN SERPL-MCNC: 13 MG/DL (ref 6–20)
BUN/CREAT SERPL: 11.8 (ref 7–25)
CALCIUM SPEC-SCNC: 9.4 MG/DL (ref 8.6–10.5)
CHLORIDE SERPL-SCNC: 103 MMOL/L (ref 98–107)
CO2 SERPL-SCNC: 23.9 MMOL/L (ref 22–29)
CREAT SERPL-MCNC: 1.1 MG/DL (ref 0.76–1.27)
D DIMER PPP FEU-MCNC: <0.27 MCGFEU/ML (ref 0–0.49)
DEPRECATED RDW RBC AUTO: 45 FL (ref 37–54)
EOSINOPHIL # BLD AUTO: 0.24 10*3/MM3 (ref 0–0.4)
EOSINOPHIL NFR BLD AUTO: 2.8 % (ref 0.3–6.2)
ERYTHROCYTE [DISTWIDTH] IN BLOOD BY AUTOMATED COUNT: 13.2 % (ref 12.3–15.4)
GFR SERPL CREATININE-BSD FRML MDRD: 71 ML/MIN/1.73
GLOBULIN UR ELPH-MCNC: 2.5 GM/DL
GLUCOSE BLDC GLUCOMTR-MCNC: 100 MG/DL (ref 70–130)
GLUCOSE BLDC GLUCOMTR-MCNC: 179 MG/DL (ref 70–130)
GLUCOSE SERPL-MCNC: 92 MG/DL (ref 65–99)
HBA1C MFR BLD: 8.14 % (ref 4.8–5.6)
HCT VFR BLD AUTO: 48.9 % (ref 37.5–51)
HGB BLD-MCNC: 17.2 G/DL (ref 13–17.7)
HOLD SPECIMEN: NORMAL
HOLD SPECIMEN: NORMAL
IMM GRANULOCYTES # BLD AUTO: 0.03 10*3/MM3 (ref 0–0.05)
IMM GRANULOCYTES NFR BLD AUTO: 0.3 % (ref 0–0.5)
LYMPHOCYTES # BLD AUTO: 3.15 10*3/MM3 (ref 0.7–3.1)
LYMPHOCYTES NFR BLD AUTO: 36.3 % (ref 19.6–45.3)
MCH RBC QN AUTO: 32.8 PG (ref 26.6–33)
MCHC RBC AUTO-ENTMCNC: 35.2 G/DL (ref 31.5–35.7)
MCV RBC AUTO: 93.1 FL (ref 79–97)
MONOCYTES # BLD AUTO: 0.51 10*3/MM3 (ref 0.1–0.9)
MONOCYTES NFR BLD AUTO: 5.9 % (ref 5–12)
NEUTROPHILS NFR BLD AUTO: 4.71 10*3/MM3 (ref 1.7–7)
NEUTROPHILS NFR BLD AUTO: 54.2 % (ref 42.7–76)
NRBC BLD AUTO-RTO: 0 /100 WBC (ref 0–0.2)
PLATELET # BLD AUTO: 170 10*3/MM3 (ref 140–450)
PMV BLD AUTO: 11.4 FL (ref 6–12)
POTASSIUM SERPL-SCNC: 4 MMOL/L (ref 3.5–5.2)
PROT SERPL-MCNC: 6.8 G/DL (ref 6–8.5)
QT INTERVAL: 375 MS
QT INTERVAL: 402 MS
RBC # BLD AUTO: 5.25 10*6/MM3 (ref 4.14–5.8)
SARS-COV-2 RNA RESP QL NAA+PROBE: NOT DETECTED
SODIUM SERPL-SCNC: 134 MMOL/L (ref 136–145)
TROPONIN T SERPL-MCNC: <0.01 NG/ML (ref 0–0.03)
TSH SERPL DL<=0.05 MIU/L-ACNC: 0.91 UIU/ML (ref 0.27–4.2)
WBC # BLD AUTO: 8.68 10*3/MM3 (ref 3.4–10.8)
WHOLE BLOOD HOLD SPECIMEN: NORMAL
WHOLE BLOOD HOLD SPECIMEN: NORMAL

## 2021-11-10 PROCEDURE — G0378 HOSPITAL OBSERVATION PER HR: HCPCS

## 2021-11-10 PROCEDURE — 85025 COMPLETE CBC W/AUTO DIFF WBC: CPT

## 2021-11-10 PROCEDURE — C9803 HOPD COVID-19 SPEC COLLECT: HCPCS

## 2021-11-10 PROCEDURE — 93005 ELECTROCARDIOGRAM TRACING: CPT | Performed by: NURSE PRACTITIONER

## 2021-11-10 PROCEDURE — U0003 INFECTIOUS AGENT DETECTION BY NUCLEIC ACID (DNA OR RNA); SEVERE ACUTE RESPIRATORY SYNDROME CORONAVIRUS 2 (SARS-COV-2) (CORONAVIRUS DISEASE [COVID-19]), AMPLIFIED PROBE TECHNIQUE, MAKING USE OF HIGH THROUGHPUT TECHNOLOGIES AS DESCRIBED BY CMS-2020-01-R: HCPCS | Performed by: NURSE PRACTITIONER

## 2021-11-10 PROCEDURE — 94799 UNLISTED PULMONARY SVC/PX: CPT

## 2021-11-10 PROCEDURE — 36415 COLL VENOUS BLD VENIPUNCTURE: CPT

## 2021-11-10 PROCEDURE — 99214 OFFICE O/P EST MOD 30 MIN: CPT | Performed by: INTERNAL MEDICINE

## 2021-11-10 PROCEDURE — 84484 ASSAY OF TROPONIN QUANT: CPT | Performed by: EMERGENCY MEDICINE

## 2021-11-10 PROCEDURE — 80053 COMPREHEN METABOLIC PANEL: CPT

## 2021-11-10 PROCEDURE — 84484 ASSAY OF TROPONIN QUANT: CPT | Performed by: NURSE PRACTITIONER

## 2021-11-10 PROCEDURE — 71045 X-RAY EXAM CHEST 1 VIEW: CPT

## 2021-11-10 PROCEDURE — 84484 ASSAY OF TROPONIN QUANT: CPT

## 2021-11-10 PROCEDURE — 84443 ASSAY THYROID STIM HORMONE: CPT | Performed by: NURSE PRACTITIONER

## 2021-11-10 PROCEDURE — 93010 ELECTROCARDIOGRAM REPORT: CPT | Performed by: INTERNAL MEDICINE

## 2021-11-10 PROCEDURE — 83036 HEMOGLOBIN GLYCOSYLATED A1C: CPT | Performed by: NURSE PRACTITIONER

## 2021-11-10 PROCEDURE — 82962 GLUCOSE BLOOD TEST: CPT

## 2021-11-10 PROCEDURE — 99284 EMERGENCY DEPT VISIT MOD MDM: CPT

## 2021-11-10 PROCEDURE — 94640 AIRWAY INHALATION TREATMENT: CPT

## 2021-11-10 PROCEDURE — 93005 ELECTROCARDIOGRAM TRACING: CPT | Performed by: EMERGENCY MEDICINE

## 2021-11-10 PROCEDURE — 85379 FIBRIN DEGRADATION QUANT: CPT | Performed by: NURSE PRACTITIONER

## 2021-11-10 PROCEDURE — 93005 ELECTROCARDIOGRAM TRACING: CPT

## 2021-11-10 PROCEDURE — U0005 INFEC AGEN DETEC AMPLI PROBE: HCPCS | Performed by: NURSE PRACTITIONER

## 2021-11-10 RX ORDER — ROSUVASTATIN CALCIUM 20 MG/1
40 TABLET, COATED ORAL DAILY
Status: DISCONTINUED | OUTPATIENT
Start: 2021-11-11 | End: 2021-11-11 | Stop reason: HOSPADM

## 2021-11-10 RX ORDER — DEXTROSE MONOHYDRATE 25 G/50ML
25 INJECTION, SOLUTION INTRAVENOUS
Status: DISCONTINUED | OUTPATIENT
Start: 2021-11-10 | End: 2021-11-11 | Stop reason: HOSPADM

## 2021-11-10 RX ORDER — ICOSAPENT ETHYL 1000 MG/1
2 CAPSULE ORAL 2 TIMES DAILY WITH MEALS
Status: DISCONTINUED | OUTPATIENT
Start: 2021-11-11 | End: 2021-11-11 | Stop reason: HOSPADM

## 2021-11-10 RX ORDER — ASPIRIN 81 MG/1
81 TABLET, CHEWABLE ORAL DAILY
COMMUNITY

## 2021-11-10 RX ORDER — INSULIN LISPRO 100 [IU]/ML
32 INJECTION, SOLUTION INTRAVENOUS; SUBCUTANEOUS
Status: DISCONTINUED | OUTPATIENT
Start: 2021-11-10 | End: 2021-11-11 | Stop reason: HOSPADM

## 2021-11-10 RX ORDER — ASPIRIN 81 MG/1
324 TABLET, CHEWABLE ORAL DAILY
Status: DISCONTINUED | OUTPATIENT
Start: 2021-11-10 | End: 2021-11-11

## 2021-11-10 RX ORDER — NITROGLYCERIN 0.4 MG/1
0.4 TABLET SUBLINGUAL
Status: DISCONTINUED | OUTPATIENT
Start: 2021-11-10 | End: 2021-11-11 | Stop reason: HOSPADM

## 2021-11-10 RX ORDER — ASPIRIN 325 MG
325 TABLET ORAL DAILY
Status: DISCONTINUED | OUTPATIENT
Start: 2021-11-10 | End: 2021-11-10 | Stop reason: SDUPTHER

## 2021-11-10 RX ORDER — SODIUM CHLORIDE 0.9 % (FLUSH) 0.9 %
10 SYRINGE (ML) INJECTION AS NEEDED
Status: DISCONTINUED | OUTPATIENT
Start: 2021-11-10 | End: 2021-11-11 | Stop reason: HOSPADM

## 2021-11-10 RX ORDER — INSULIN GLARGINE 100 [IU]/ML
130 INJECTION, SOLUTION SUBCUTANEOUS
Status: DISCONTINUED | OUTPATIENT
Start: 2021-11-11 | End: 2021-11-11 | Stop reason: HOSPADM

## 2021-11-10 RX ORDER — IPRATROPIUM BROMIDE AND ALBUTEROL SULFATE 2.5; .5 MG/3ML; MG/3ML
3 SOLUTION RESPIRATORY (INHALATION)
Status: DISCONTINUED | OUTPATIENT
Start: 2021-11-10 | End: 2021-11-11 | Stop reason: HOSPADM

## 2021-11-10 RX ORDER — CLOPIDOGREL BISULFATE 75 MG/1
75 TABLET ORAL DAILY
Status: DISCONTINUED | OUTPATIENT
Start: 2021-11-11 | End: 2021-11-11 | Stop reason: HOSPADM

## 2021-11-10 RX ORDER — LEVOTHYROXINE SODIUM 0.05 MG/1
100 TABLET ORAL
Status: DISCONTINUED | OUTPATIENT
Start: 2021-11-11 | End: 2021-11-11 | Stop reason: HOSPADM

## 2021-11-10 RX ORDER — PANTOPRAZOLE SODIUM 40 MG/1
40 TABLET, DELAYED RELEASE ORAL DAILY
Status: DISCONTINUED | OUTPATIENT
Start: 2021-11-11 | End: 2021-11-11 | Stop reason: HOSPADM

## 2021-11-10 RX ORDER — NICOTINE POLACRILEX 4 MG
15 LOZENGE BUCCAL
Status: DISCONTINUED | OUTPATIENT
Start: 2021-11-10 | End: 2021-11-11 | Stop reason: HOSPADM

## 2021-11-10 RX ORDER — SODIUM CHLORIDE 0.9 % (FLUSH) 0.9 %
10 SYRINGE (ML) INJECTION EVERY 12 HOURS SCHEDULED
Status: DISCONTINUED | OUTPATIENT
Start: 2021-11-10 | End: 2021-11-11 | Stop reason: HOSPADM

## 2021-11-10 RX ORDER — LOSARTAN POTASSIUM 100 MG/1
100 TABLET ORAL
Status: DISCONTINUED | OUTPATIENT
Start: 2021-11-11 | End: 2021-11-11 | Stop reason: HOSPADM

## 2021-11-10 RX ORDER — ICOSAPENT ETHYL 1000 MG/1
2 CAPSULE ORAL 2 TIMES DAILY WITH MEALS
Status: DISCONTINUED | OUTPATIENT
Start: 2021-11-10 | End: 2021-11-10

## 2021-11-10 RX ADMIN — IPRATROPIUM BROMIDE AND ALBUTEROL SULFATE 3 ML: 2.5; .5 SOLUTION RESPIRATORY (INHALATION) at 15:29

## 2021-11-10 RX ADMIN — SODIUM CHLORIDE, PRESERVATIVE FREE 10 ML: 5 INJECTION INTRAVENOUS at 21:33

## 2021-11-10 RX ADMIN — NITROGLYCERIN 0.4 MG: 0.4 TABLET SUBLINGUAL at 12:09

## 2021-11-10 RX ADMIN — ASPIRIN 324 MG: 81 TABLET, CHEWABLE ORAL at 14:59

## 2021-11-10 RX ADMIN — IPRATROPIUM BROMIDE AND ALBUTEROL SULFATE 3 ML: 2.5; .5 SOLUTION RESPIRATORY (INHALATION) at 20:38

## 2021-11-10 RX ADMIN — NITROGLYCERIN 0.4 MG: 0.4 TABLET SUBLINGUAL at 11:55

## 2021-11-10 RX ADMIN — SODIUM CHLORIDE, PRESERVATIVE FREE 10 ML: 5 INJECTION INTRAVENOUS at 15:00
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
## 2021-11-10 NOTE — ED PROVIDER NOTES
I have supervised the care provided by the midlevel provider.    We have discussed this patient's history, physical exam, and treatment plan.   I have reviewed the note and have personally examined the patient and agree with the plan of care.  See attached attending note.  My personal findings are below:    Patient complains of substernal chest pain that began yesterday.  He was seen here in the ED.  Troponin was negative x2.  He was offered admission but he declined.  Chest pain returned this morning.  It is described as pressure.  It is worse with movement.  Denies nausea, vomiting, shortness of breath, or sweating.  Patient had a stent placed several months ago by Dr. Carrillo.    On exam: Awake and alert.  Heart is regular rate and rhythm.  Lungs are clear bilaterally.  Abdomen soft and nontender.  No calf tenderness.  Equal radial pulses bilaterally.    Chest pain resolved after 2 doses of sublingual nitro.  Troponin is pending.  Chest x-ray shows some atelectasis in the left base.  Case will be discussed with cardiology.      EKG          EKG time: 10:05 AM  Rhythm/Rate: Sinus rhythm, rate 90  P waves and MN: Normal  QRS, axis: Incomplete RBBB  ST and T waves: Nonspecific ST/T wave changes anteriorly    Interpreted Contemporaneously by me, independently viewed  EKG is not significantly changed compared to prior EKG done on 11/9/2021       Favian Tolliver MD  11/10/21 [id number]
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
## 2021-11-10 NOTE — CASE MANAGEMENT/SOCIAL WORK
Discharge Planning Assessment  [facility]     Patient Name: Ricardo Pereira  MRN: [id number]  Today's Date: 11/10/2021    Admit Date: 11/10/2021     Discharge Needs Assessment     Row Name 11/10/21 1819       Living Environment    Lives With spouse; child(kendra), dependent    Name(s) of Who Lives With Patient Macrina- spouse; Simba- son    Current Living Arrangements home/apartment/condo    Primary Care Provided by self    Provides Primary Care For no one    Family Caregiver if Needed none    Quality of Family Relationships helpful; supportive    Able to Return to Prior Arrangements yes       Resource/Environmental Concerns    Resource/Environmental Concerns none    Transportation Concerns car, none       Transition Planning    Patient/Family Anticipates Transition to home with family    Patient/Family Anticipated Services at Transition none    Transportation Anticipated family or friend will provide       Discharge Needs Assessment    Equipment Currently Used at Home cane, straight    Concerns to be Addressed no discharge needs identified; denies needs/concerns at this time    Anticipated Changes Related to Illness none    Equipment Needed After Discharge none    Provided Post Acute Provider List? N/A    Current Discharge Risk chronically ill               Discharge Plan     Row Name 11/10/21 1810       Plan    Plan Comments Entered room, introduced self and explained role w/PPE in place on self and patient; verified information on facesheet; patient lives in house w/spouse and 2 children; patient is independent w/ADL's, walks w/cane at times; Has RX filled at [facility] in [facility]; No anticipated needs at this time; Family will provide transport upon d/c.    Final Discharge Disposition Code 01 - home or self-care              Continued Care and Services - Admitted Since 11/10/2021    Coordination has not been started for this encounter.          Demographic Summary     Row Name 11/10/21 1818       General  Information    Admission Type observation    Arrived From home    Reason for Consult discharge planning; decision-making    Preferred Language English     Used During This Interaction no       Contact Information    Permission Granted to Share Info With                Functional Status     Row Name 11/10/21 1818       Functional Status    Usual Activity Tolerance moderate    Current Activity Tolerance moderate       Functional Status, IADL    Medications independent    Meal Preparation independent    Housekeeping assistive person; independent    Laundry assistive person; independent    Shopping independent       Mental Status    General Appearance WDL WDL       Mental Status Summary    Recent Changes in Mental Status/Cognitive Functioning no changes       Employment/    Employment Status disabled               Psychosocial    No documentation.                Abuse/Neglect    No documentation.                Legal    No documentation.                Substance Abuse    No documentation.                Patient Forms    No documentation.                   Pauly Esteban RN
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
## 2021-11-10 NOTE — TELEPHONE ENCOUNTER
Pt was seen in the ER last night for chest pain.  Per the ER notes they wanted to keep him under observation and the pt declined.  The ER told him if his symptoms returned he was to come back to the ER.  He is currently having active chest pain.  The pain is nonradiating.    I have advised he go back to the ER  Thanks  Emily Raphael RN  Triage nurse

## 2021-11-10 NOTE — ED NOTES
Pt states he was seen here last night for midsternal chest pain. Today the pain became more constant and radiates to his left chest. Pt states pain is worse with exertion but states it helps when he sits still. Pt had recent cardiac stent in June. Pt is a smoker.    Patient in mask. This RN in appropriate PPE throughout the patient's entire encounter.        Jo Barber, RN  11/10/21 [id number]     Female
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
## 2021-11-10 NOTE — ED TRIAGE NOTES
Seen here last PM with CP and DC. States started having CP again this AM.     Patient was wearing a face mask throughout our encounter.  This RN wore appropriate PPE throughout the encounter.  Hand hygiene was performed before and after patient encounter.
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
## 2021-11-11 ENCOUNTER — READMISSION MANAGEMENT (OUTPATIENT)
Dept: CALL CENTER | Facility: HOSPITAL | Age: 51
End: 2021-11-11

## 2021-11-11 VITALS
DIASTOLIC BLOOD PRESSURE: 85 MMHG | HEART RATE: 72 BPM | TEMPERATURE: 98.1 F | WEIGHT: 267 LBS | BODY MASS INDEX: 38.22 KG/M2 | RESPIRATION RATE: 18 BRPM | HEIGHT: 70 IN | OXYGEN SATURATION: 96 % | SYSTOLIC BLOOD PRESSURE: 135 MMHG

## 2021-11-11 LAB
ANION GAP SERPL CALCULATED.3IONS-SCNC: 11.9 MMOL/L (ref 5–15)
BUN SERPL-MCNC: 14 MG/DL (ref 6–20)
BUN/CREAT SERPL: 15.2 (ref 7–25)
CALCIUM SPEC-SCNC: 9.1 MG/DL (ref 8.6–10.5)
CHLORIDE SERPL-SCNC: 107 MMOL/L (ref 98–107)
CHOLEST SERPL-MCNC: 111 MG/DL (ref 0–200)
CO2 SERPL-SCNC: 22.1 MMOL/L (ref 22–29)
CREAT SERPL-MCNC: 0.92 MG/DL (ref 0.76–1.27)
DEPRECATED RDW RBC AUTO: 44.3 FL (ref 37–54)
ERYTHROCYTE [DISTWIDTH] IN BLOOD BY AUTOMATED COUNT: 13.2 % (ref 12.3–15.4)
GFR SERPL CREATININE-BSD FRML MDRD: 87 ML/MIN/1.73
GLUCOSE BLDC GLUCOMTR-MCNC: 109 MG/DL (ref 70–130)
GLUCOSE SERPL-MCNC: 121 MG/DL (ref 65–99)
HCT VFR BLD AUTO: 47.6 % (ref 37.5–51)
HDLC SERPL-MCNC: 20 MG/DL (ref 40–60)
HGB BLD-MCNC: 16.3 G/DL (ref 13–17.7)
LDLC SERPL CALC-MCNC: 55 MG/DL (ref 0–100)
LDLC/HDLC SERPL: 2.36 {RATIO}
MCH RBC QN AUTO: 31.7 PG (ref 26.6–33)
MCHC RBC AUTO-ENTMCNC: 34.2 G/DL (ref 31.5–35.7)
MCV RBC AUTO: 92.6 FL (ref 79–97)
PLATELET # BLD AUTO: 155 10*3/MM3 (ref 140–450)
PMV BLD AUTO: 11.1 FL (ref 6–12)
POTASSIUM SERPL-SCNC: 4.4 MMOL/L (ref 3.5–5.2)
QT INTERVAL: 387 MS
RBC # BLD AUTO: 5.14 10*6/MM3 (ref 4.14–5.8)
SODIUM SERPL-SCNC: 141 MMOL/L (ref 136–145)
TRIGL SERPL-MCNC: 219 MG/DL (ref 0–150)
VLDLC SERPL-MCNC: 36 MG/DL (ref 5–40)
WBC # BLD AUTO: 8.48 10*3/MM3 (ref 3.4–10.8)

## 2021-11-11 PROCEDURE — 80048 BASIC METABOLIC PNL TOTAL CA: CPT | Performed by: NURSE PRACTITIONER

## 2021-11-11 PROCEDURE — 80061 LIPID PANEL: CPT | Performed by: PHYSICIAN ASSISTANT

## 2021-11-11 PROCEDURE — 93010 ELECTROCARDIOGRAM REPORT: CPT | Performed by: INTERNAL MEDICINE

## 2021-11-11 PROCEDURE — 94799 UNLISTED PULMONARY SVC/PX: CPT

## 2021-11-11 PROCEDURE — 85027 COMPLETE CBC AUTOMATED: CPT | Performed by: NURSE PRACTITIONER

## 2021-11-11 PROCEDURE — G0378 HOSPITAL OBSERVATION PER HR: HCPCS

## 2021-11-11 PROCEDURE — 82962 GLUCOSE BLOOD TEST: CPT

## 2021-11-11 PROCEDURE — 99213 OFFICE O/P EST LOW 20 MIN: CPT | Performed by: INTERNAL MEDICINE

## 2021-11-11 RX ORDER — PREDNISONE 20 MG/1
40 TABLET ORAL DAILY
Qty: 10 TABLET | Refills: 0 | Status: SHIPPED | OUTPATIENT
Start: 2021-11-11 | End: 2021-11-16

## 2021-11-11 RX ORDER — ALBUTEROL SULFATE 2.5 MG/3ML
2.5 SOLUTION RESPIRATORY (INHALATION) EVERY 4 HOURS PRN
Qty: 540 ML | Refills: 12 | Status: SHIPPED | OUTPATIENT
Start: 2021-11-11

## 2021-11-11 RX ADMIN — IPRATROPIUM BROMIDE AND ALBUTEROL SULFATE 3 ML: 2.5; .5 SOLUTION RESPIRATORY (INHALATION) at 08:10

## 2021-11-11 RX ADMIN — LEVOTHYROXINE SODIUM 100 MCG: 0.05 TABLET ORAL at 05:15

## 2021-11-11 NOTE — PLAN OF CARE
Goal Outcome Evaluation:  Plan of Care Reviewed With: patient   Pt rested well through the night, pt had no pain, no new complaints, and is to discharge home this AM. Cardiology did not feel as if this pain was cardiac related and did not feel a stress test was warranted. Vitals have remained stable, RN to continue to monitor.       Progress: improving

## 2021-11-11 NOTE — PLAN OF CARE
Goal Outcome Evaluation:  Plan of Care Reviewed With: patient           Outcome Summary: Patient awake and alert, resting comfortably up in the recliner. PAtient admitted to obs with chest pain.  Cardio came to see the patient and will re-evaluate the patient in the AM. VSS. Will continue to monitor

## 2021-11-11 NOTE — PROGRESS NOTES
"Ricardo WALLACE Pool  1970 51 y.o.  [id number]      Patient Care Team:  Simba Moore MD as PCP - General (Internal Medicine)  Avel Gottlieb MD as Consulting Physician (Endocrinology)    CC: Known coronary disease, atypical chest pain    Interval History: He has done well overnight      Objective   Vital Signs  Temp:  [97.2 °F (36.2 °C)-98.24 °F (36.8 °C)] 98.1 °F (36.7 °C)  Heart Rate:  [] 72  Resp:  [16-18] 18  BP: ()/(65-87) 135/85  No intake or output data in the 24 hours ending 11/11/21 0822  Flowsheet Rows      First Filed Value   Admission Height 177.8 cm (70\") Documented at 11/10/2021 1149   Admission Weight 121 kg (267 lb) Documented at 11/10/2021 1149          Physical Exam:   General Appearance:    Alert,oriented, in no acute distress   Lungs:     Clear to auscultation,BS are equal    Heart:    Normal S1 and S2, RRR without murmur, gallop or rub   HEENT:    Sclerae are clear, no JVD or adenopathy   Abdomen:     Normal bowel sounds, soft nontender, nondistended, no HSM   Extremities:   Moves all extremities well, no edema, no cyanosis, no             Redness, no rash     Medication Review:      aspirin, 324 mg, Oral, Daily  clopidogrel, 75 mg, Oral, Daily  empagliflozin, 25 mg, Oral, Daily  icosapent ethyl, 2 g, Oral, BID With Meals  insulin glargine, 130 Units, Subcutaneous, Daily With Breakfast  insulin lispro, 32 Units, Subcutaneous, TID With Meals  ipratropium-albuterol, 3 mL, Nebulization, 4x Daily - RT  levothyroxine, 100 mcg, Oral, Q AM  losartan, 100 mg, Oral, Q24H  pantoprazole, 40 mg, Oral, Daily  rosuvastatin, 40 mg, Oral, Daily  sodium chloride, 10 mL, Intravenous, Q12H             I reviewed the patient's new clinical results.  I personally viewed and interpreted the patient's EKG/Telemetry data    Assessment/Plan  Active Hospital Problems    Diagnosis  POA   • Chest pain [R07.9]  Yes      Resolved Hospital Problems   No resolved problems to display.       Atypical chest pain " we feel like he is okay and he can go home I would like him to come and see Autumn or Suma in the office in about a week or 10 days for a quick follow-up just to make sure if he has any other symptoms we may have to think about cath in him I think we can go ahead and stop his aspirin and just keep him on long-term clopidogrel    Favian Carrillo MD  11/11/21  08:22 EST

## 2021-11-11 NOTE — OBED NOTES
The TANIA and I have discussed this patients history, physical exam, and treatment plan. I have reviewed the documentation and personally had a face to face interaction with the patient. I affirm the documentation and agree with the treatment and plan.  The following note describes my personal findings    This patient is a 51-year-old male who was admitted through the emergency department secondary to chest pain.  The patient has known coronary disease and had a stent placed just a few months back.  He does take his aspirin and Plavix regularly.  Currently, he has no complaints and he is chest pain-free.    Exam: This patient is resting comfortably and in no distress, without gross neurological deficit.  He is afebrile with stable vital signs and nontoxic in appearance.  Cardiovascular exam shows a regular rate and rhythm, without murmur or rub.  Lungs are clear to auscultation bilaterally and he is in no respiratory distress.    Plan: His work-up in the emergency room thus far was fairly unrevealing.  We will place him on continuous cardiac monitoring in the observation unit as well as obtain serial cardiac enzymes throughout the evening.  We will await input from cardiology.  Disposition pending.      The patient was wearing a facemask upon entrance into the room and remained in such throughout their visit.  I was wearing PPE including a facemask, eye protection, as well as gloves at any point entering the room and throughout the visit

## 2021-11-11 NOTE — PROGRESS NOTES
[facility]     Progress Note    Patient Name: Ricardo Pereira  : 1970  MRN: [id number]  Primary Care Physician:  Simba Moore MD  Date of admission: 11/10/2021    Subjective   Subjective     Chief Complaint: chest pain    HPI:    Patient admitted to observation unit for chest pain. Cardiology saw patient in consultation yesterday, feel this is not cardiac in [facility]. Patient had stent placed in , do not feel repeat stress test will be revealing. Patient has not had any chest pain overnight. He states the pain is no longer reproducible with palpation. D dimer negative. Denies nausea, vomiting, diarrhea. Patient states he stays very worried/stressed about his family and he has had increased stress about his health. He is worried he will die at home alone when his family is gone. Patient states he no longer smokes cigarettes but will have a cigar every once in a while.     Review of Systems  Review of Systems   Psychiatric/Behavioral: The patient is nervous/anxious.    All other systems reviewed and are negative.        Objective   Objective     Vitals:   Temp:  [97.2 °F (36.2 °C)-98.24 °F (36.8 °C)] 97.9 °F (36.6 °C)  Heart Rate:  [] 66  Resp:  [16-18] 18  BP: ()/(65-82) 112/65  Physical Exam   Physical Exam     GENERAL: a/o x 4, NAD  SKIN: Warm pink and dry   HEENT:  PERRLA, EOM intact, conjunctiva normal, sclera clear  NECK: supple, no JVD  LUNGS: Scant expiratory wheezing.  No accessory muscle use and no nasal flaring.  CARDIAC:  Regular rate and rhythm, S1-S2.  No murmurs, rubs or gallops.  No peripheral edema.  Equal pulses bilaterally.  ABDOMEN: Soft, nontender, nondistended.  No guarding or rebound tenderness.  Normal bowel sounds.  MUSCULOSKELETAL: Moves all extremities well.  No deformity.  NEURO: Cranial nerves II through XII grossly intact.  No gross focal deficits.  Alert.  Normal speech and motor.  PSYCH: Normal mood and affect, mild anxiety      Result Review    Result  Review:  I have personally reviewed the results from the time of this admission to 11/11/2021 02:57 EST and agree with these findings:  [x]  Laboratory  []  Microbiology  [x]  Radiology  [x]  EKG/Telemetry   []  Cardiology/Vascular   []  Pathology  [x]  Old records  []  Other:  Most notable findings include: troponin <0.010 x2, D dimer negative    XR Chest 2 View    Result Date: 11/9/2021  No active disease is seen in the chest.  The etiology of the chest pain and midsternal pain is not established on this exam.  This report was finalized on 11/9/2021 7:41 PM by Dr. Michael Maria M.D.      Previous Cardiac Testing:     Cardiac Catheterization 6/11/21   ·Severe mid RCA disease, status post 4x23mm Xience Shu TERRELL  ·Normal LV filling pressures  ·Recommend: ASA, Plavix aggressive risk factor modification     Stress Test 6/11/21   ·Findings consistent with a normal ECG stress test.  ·Left ventricular ejection fraction is normal. (Calculated EF = 65%).  ·Myocardial perfusion imaging indicates a medium-sized infarct located in the inferior wall with mild-to-moderate henna-infarct ischemia. See Bull's eye for details  ·Impressions are consistent with an intermediate risk study.     Echocardiogram 2/14/19  ·Calculated EF = 59.0%.  ·Left ventricular systolic function is normal.  ·Normal echo      Assessment/Plan   Assessment / Plan     Brief Patient Summary:  Ricardo Pereira is a 51 y.o. male who is admitted to the observation unit for chest pain. Work up so far has been unrevealing.     Active Hospital Problems:  Active Hospital Problems    Diagnosis    • Chest pain      Plan:     1. Chest pain  -atypical in nature  -Dr. Carrillo with cardiology saw patient yesterday and this AM, feel stress test would not be beneficial at this point and recommends office f/u in 7-10 days  -other etiologies for chest pain include GERD, costochondritis, anxiety  -continue to monitor     2. Coronary artery disease  -s/p stent mRCA in  June  -continue aspirin, plavix    2. Dyspnea  -D dimer negative  -Duo-neb PRN SOA  -will dc home with albuterol and prednisone, patient comfortable with adjusting his home insulin while on steroids  -monitor     3. Diabetes  -type II with hyperglycemia  -POC glucose  -home insulin regimen, Jardiance     4. Dyslipidemia  -lipid panel pending  -continue statin  -resume Vascepa at discharge    5. Hypothyroidism  -continue levothyroxine  -TSH WNL    6. GERD  -PPI    7. Stress/ anxiety  -patient states he has been seeking a therapist  -outpatient follow up with PCP/ therapist/ psych as warranted. Given information regarding [facility] behavioral health team for follow up.              DVT prophylaxis:  Mechanical DVT prophylaxis orders are present.    CODE STATUS:   Level Of Support Discussed With: Patient  Code Status (Patient has no pulse and is not breathing): CPR (Attempt to Resuscitate)  Medical Interventions (Patient has pulse or is breathing): Full Support    Disposition:  I expect patient to be discharged today.    This note also serves as discharge summary.    Electronically signed by JAVAD Soriano, 11/11/21, 2:57 AM EST.

## 2021-11-11 NOTE — OUTREACH NOTE
Prep Survey      Responses   [facility] facility patient discharged from? [place]   Is LACE score < 7 ? No   Emergency Room discharge w/ pulse ox? No   Eligibility [facility]   Date of Admission 11/10/21   Date of Discharge 11/11/21   Discharge Disposition Home or Self Care   Discharge diagnosis Chest pain   Does the patient have one of the following disease processes/diagnoses(primary or secondary)? Other   Does the patient have Home health ordered? No   Is there a DME ordered? No   Prep survey completed? Yes          Pauly Alba RN

## 2021-11-12 ENCOUNTER — TRANSITIONAL CARE MANAGEMENT TELEPHONE ENCOUNTER (OUTPATIENT)
Dept: CALL CENTER | Facility: HOSPITAL | Age: 51
End: 2021-11-12

## 2021-11-12 NOTE — OUTREACH NOTE
Call Center TCM Note      Responses   [facility] patient discharged from? [place]   Does the patient have one of the following disease processes/diagnoses(primary or secondary)? Other   TCM attempt successful? Yes   Call start time 1443   Call end time 1445   Discharge diagnosis Chest pain   Meds reviewed with patient/caregiver? Yes   Is the patient having any side effects they believe may be caused by any medication additions or changes? No   Does the patient have all medications ordered at discharge? Yes   Is the patient taking all medications as directed (includes completed medication regime)? Yes   Comments regarding appointments Cardiology appt in place on 11/18/2021   Does the patient have a primary care provider?  Yes   Does the patient have an appointment with their PCP within 7 days of discharge? No   Comments regarding PCP Declines PCP appt at present time. Has Cardiology appt in place on 11/18/2021   What is preventing the patient from scheduling follow up appointments within 7 days of discharge? Haven't had time   Nursing Interventions Verified appointment date/time/provider,  Educated patient on importance of making appointment,  Advised patient to make appointment   Has the patient kept scheduled appointments due by today? N/A   Has home health visited the patient within 72 hours of discharge? N/A   Psychosocial issues? No   Did the patient receive a copy of their discharge instructions? Yes   Nursing interventions Reviewed instructions with patient   What is the patient's perception of their health status since discharge? Improving   Is the patient/caregiver able to teach back signs and symptoms related to disease process for when to call PCP? Yes   Is the patient/caregiver able to teach back signs and symptoms related to disease process for when to call 911? Yes   Is the patient/caregiver able to teach back the hierarchy of who to call/visit for symptoms/problems? PCP, Specialist, Home  health nurse, Urgent Care, ED, 911 Yes   If the patient is a current smoker, are they able to teach back resources for cessation? Not a smoker   TCM call completed? Yes   Wrap up additional comments Patient denies any further chest pain.           Davis Cabrera RN    11/12/2021, 14:45 EST

## 2021-11-17 ENCOUNTER — IMMUNIZATION (OUTPATIENT)
Dept: VACCINE CLINIC | Facility: HOSPITAL | Age: 51
End: 2021-11-17

## 2021-11-17 PROCEDURE — 91300 HC SARSCOV02 VAC 30MCG/0.3ML IM: CPT | Performed by: INTERNAL MEDICINE

## 2021-11-17 PROCEDURE — 0004A ADM SARSCOV2 30MCG/0.3ML BOOSTER: CPT | Performed by: INTERNAL MEDICINE

## 2021-11-18 ENCOUNTER — READMISSION MANAGEMENT (OUTPATIENT)
Dept: CALL CENTER | Facility: HOSPITAL | Age: 51
End: 2021-11-18

## 2021-11-18 NOTE — OUTREACH NOTE
Medical Week 2 Survey      Responses   [facility] patient discharged from? [place]   Does the patient have one of the following disease processes/diagnoses(primary or secondary)? Other   Week 2 attempt successful? No   Unsuccessful attempts Attempt 1          Arianne Rai RN

## 2021-11-19 ENCOUNTER — READMISSION MANAGEMENT (OUTPATIENT)
Dept: CALL CENTER | Facility: HOSPITAL | Age: 51
End: 2021-11-19

## 2021-11-19 NOTE — OUTREACH NOTE
Medical Week 2 Survey      Responses   [facility] patient discharged from? [place]   Does the patient have one of the following disease processes/diagnoses(primary or secondary)? Other   Week 2 attempt successful? No   Unsuccessful attempts Attempt 2          Kenyatta Rios RN

## 2021-12-06 ENCOUNTER — READMISSION MANAGEMENT (OUTPATIENT)
Dept: CALL CENTER | Facility: HOSPITAL | Age: 51
End: 2021-12-06

## 2021-12-06 NOTE — OUTREACH NOTE
Medical Week 4 Survey      Responses   [facility] patient discharged from? [place]   Does the patient have one of the following disease processes/diagnoses(primary or secondary)? Other   Week 4 attempt successful? No          Michelle Musa RN

## 2022-01-06 RX ORDER — ERGOCALCIFEROL 1.25 MG/1
CAPSULE ORAL
Qty: 4 CAPSULE | Refills: 5 | Status: SHIPPED | OUTPATIENT
Start: 2022-01-06 | End: 2022-06-09

## 2022-02-11 ENCOUNTER — DOCUMENTATION (OUTPATIENT)
Dept: ENDOCRINOLOGY | Age: 52
End: 2022-02-11

## 2022-02-11 NOTE — PROGRESS NOTES
Benefits Investigation Summary    Prescription: Renewal     Dispensing pharmacy: [facility]    Copay amount: FARXIGA 2/15, TRESIBA 2/17    PLAN: [facility]  BIN: [id number]  PCN: [id number]  RX GROUP: [id number]    Prior Auth and Med Assistance notes:

## 2022-02-15 ENCOUNTER — SPECIALTY PHARMACY (OUTPATIENT)
Dept: ENDOCRINOLOGY | Age: 52
End: 2022-02-15

## 2022-02-15 ENCOUNTER — OFFICE VISIT (OUTPATIENT)
Dept: ENDOCRINOLOGY | Age: 52
End: 2022-02-15

## 2022-02-15 VITALS
WEIGHT: 262.2 LBS | SYSTOLIC BLOOD PRESSURE: 132 MMHG | OXYGEN SATURATION: 97 % | HEART RATE: 83 BPM | HEIGHT: 70 IN | BODY MASS INDEX: 37.54 KG/M2 | DIASTOLIC BLOOD PRESSURE: 71 MMHG

## 2022-02-15 DIAGNOSIS — Z79.4 TYPE 2 DIABETES MELLITUS WITH HYPERGLYCEMIA, WITH LONG-TERM CURRENT USE OF INSULIN: Primary | ICD-10-CM

## 2022-02-15 DIAGNOSIS — E78.2 MIXED HYPERLIPIDEMIA: ICD-10-CM

## 2022-02-15 DIAGNOSIS — E03.8 SECONDARY HYPOTHYROIDISM: ICD-10-CM

## 2022-02-15 DIAGNOSIS — E11.65 TYPE 2 DIABETES MELLITUS WITH HYPERGLYCEMIA, WITH LONG-TERM CURRENT USE OF INSULIN: Primary | ICD-10-CM

## 2022-02-15 PROCEDURE — 99214 OFFICE O/P EST MOD 30 MIN: CPT | Performed by: NURSE PRACTITIONER

## 2022-02-15 RX ORDER — INSULIN ASPART 100 [IU]/ML
45 INJECTION, SOLUTION INTRAVENOUS; SUBCUTANEOUS
Qty: 45 ML | Refills: 3 | Status: SHIPPED | OUTPATIENT
Start: 2022-02-15 | End: 2022-06-14 | Stop reason: SDUPTHER

## 2022-02-15 NOTE — PROGRESS NOTES
"Chief Complaint  Diabetes Mellitus (follow up)    Subjective     {Problem List  Visit Diagnosis   Encounters  Notes  Medications  Labs  Result Review Imaging  Media :23}     Ricardo Pereira presents to [facility] ENDOCRINOLOGY  History of Present Illness   I have reviewed PMH, allergies and medications UTD at this visit     Continue Tresiba 130 units every morning  Increase NovoLog to 32 units 3 times a day with food  Continue the NovoLog sliding scale  Stop Januvia  Added Farxiga 10 mg oral daily.    Type 2 dm    Diagnosed about 4 - 5 years ago.   Today in clinic pt reports being on tresiba 130 units Q am, novolog ssi - 2 unit for every 40 over 160 mg/dl, novolog 30 units tid ac. Januvia 100 mg po daily. Does have hx of pancreatitis.   avg bg - 100 - 180 mg/dl   Checks BG - 4 times a day  Sensor - x  Dm retinopathy - no hx ,Last eye exam - uptodate with exam.  Dm nephropathy - x  Dm neuropathy - yes,Dm neuropathy meds - none   CAD - yes, s/p stents  CVA - yes  Episodes of hypoglycemia - x  Pt is physically active. weight has been stable.   On Ace inb.     # reports he is depressed as a result he is not eating rt.      Hyperlipidemia - rosuvastatin 40 mg po daily.      Hypothyroidism - on euthyrox 112 mcg oral daily.        Objective   Vital Signs:   /71   Pulse 83   Ht 177.8 cm (70\")   Wt 119 kg (262 lb 3.2 oz)   SpO2 97%   BMI 37.62 kg/m²     Physical Exam   Result Review :{Labs  Result Review  Imaging  Med Tab  Media  Procedures :23}   {The following data was reviewed by (Optional):42727}  {Ambulatory Labs (Optional):19448}  {Data reviewed (Optional):37829:::1}          Assessment and Plan {CC Problem List  Visit Diagnosis   ROS  Review (Popup)  Health Maintenance  Quality  BestPractice  Medications  SmartSets  SnapShot Encounters  Media :23}   Diagnoses and all orders for this visit:    1. Type 2 diabetes mellitus with hyperglycemia, with long-term current use " of insulin ([facility]) (Primary)  -     Injection Device for Insulin (InPen 100-Blue-Diamond) device; 1 each 1 (One) Time for 1 dose.  Dispense: 1 each; Refill: 2  -     Insulin Aspart (NovoLOG PenFill) 100 UNIT/ML solution cartridge; Inject 45 Units under the skin into the appropriate area as directed 3 (Three) Times a Day Before Meals. Max dose 45 units 3 times daily AC  Dispense: 45 mL; Refill: 3  -     Insulin Pen Needle 32G X 4 MM misc; 1 each 4 (Four) Times a Day.  Dispense: 100 each; Refill: 5      {Time Spent (Optional):43925}  Follow Up {Instructions Charge Capture  Follow-up Communications :23}  No follow-ups on file.   Patient reports he often forgets if he is taking his insulin dosing and reports he has too much stress going on to give a good history of when and how he takes his insulin  Offered the INPEN to the patient as a way to track his insulin and also give him guidance on if he took a shot or if he missed the shot.  This will allow tracking both by himself and our office as his healthcare providers      Patient was given instructions and counseling regarding his condition or for health maintenance advice. Please see specific information pulled into the AVS if appropriate.
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
## 2022-02-15 NOTE — PROGRESS NOTES
"Chief Complaint  Diabetes Mellitus (follow up)    Subjective          Ricardo Pereira presents to [facility] ENDOCRINOLOGY  History of Present Illness     I have reviewed PMH, allergies and medications UTD at this visit      Continue Tresiba 130 units every morning  Increase NovoLog to 32 units 3 times a day with food  Continue the NovoLog sliding scale  Stop Januvia  Added Farxiga 10 mg oral daily.     Type 2 dm    Diagnosed about 4 - 5 years ago.   Today in clinic pt reports being on tresiba 136 units Q am  novolog ssi - 2 unit for every 40 over 160 mg/dl, novolog 32 units tid ac  Farxgia 10mg daily  Does have hx of pancreatitis, januvia was stopped  Does forget to take shots sometimes and is a poor historian with his insulin dosing \" I have too much going on to remember when and how to take my insulin\"  Checks BG - 4 times a day  A.m.-130s  P.m.-260s  Dm retinopathy - no hx ,Last eye exam -fall 2021  Dm nephropathy - x  Dm neuropathy - yes, also with pedal edema.  Negative NILSON 2020.  Still waiting on podiatry referral.  Sees cardiology. dm neuropathy meds -Lyrica and gabapentin do not help  CAD - yes, s/p stents June 2021  CVA - yes  Episodes of hypoglycemia - no  Pt tries to be physically active. weight is down about 5 pounds  On statin  Lab Results   Component Value Date    HGBA1C 7.7 (H) 02/01/2022        Hypothyroidism    on euthyrox 112 mcg oral daily.   Lab Results   Component Value Date    TSH 0.911 11/10/2021        Objective   Vital Signs:   /71   Pulse 83   Ht 177.8 cm (70\")   Wt 119 kg (262 lb 3.2 oz)   SpO2 97%   BMI 37.62 kg/m²     Physical Exam  Vitals reviewed.   Constitutional:       General: He is not in acute distress.  HENT:      Head: Normocephalic and atraumatic.   Cardiovascular:      Rate and Rhythm: Normal rate and regular rhythm.   Pulmonary:      Effort: Pulmonary effort is normal. No respiratory distress.   Musculoskeletal:         General: No signs of injury. " Normal range of motion.      Cervical back: Normal range of motion and neck supple.   Skin:     General: Skin is warm and dry.   Neurological:      Mental Status: He is alert and oriented to person, place, and time. Mental status is at baseline.   Psychiatric:         Mood and Affect: Mood normal.         Behavior: Behavior normal.         Thought Content: Thought content normal.         Judgment: Judgment normal.        Result Review :   The following data was reviewed by: MADHU Keane on 02/15/2022:  Common labs    Common Labsle 11/10/21 11/10/21 11/10/21 11/11/21 11/11/21 11/11/21 2/1/22    1115 1115 1115 0515 0515 0515    Glucose  92    121 (A)    BUN  13    14    Creatinine  1.10    0.92    eGFR Non African Am  71    87    Sodium  134 (A)    141    Potassium  4.0    4.4    Chloride  103    107    Calcium  9.4    9.1    Albumin  4.30        Total Bilirubin  0.3        Alkaline Phosphatase  81        AST (SGOT)  41 (A)        ALT (SGPT)  54 (A)        WBC 8.68   8.48      Hemoglobin 17.2   16.3      Hematocrit 48.9   47.6      Platelets 170   155      Total Cholesterol     111     Triglycerides     219 (A)     HDL Cholesterol     20 (A)     LDL Cholesterol      55     Hemoglobin A1C   8.14 (A)    7.7 (A)   (A) Abnormal value       Comments are available for some flowsheets but are not being displayed.                     Assessment and Plan    Diagnoses and all orders for this visit:    1. Type 2 diabetes mellitus with hyperglycemia, with long-term current use of insulin (HCC) (Primary)  -     Injection Device for Insulin (InPen 100-Blue-Diamond) device; 1 each 1 (One) Time for 1 dose.  Dispense: 1 each; Refill: 2  -     Insulin Aspart (NovoLOG PenFill) 100 UNIT/ML solution cartridge; Inject 45 Units under the skin into the appropriate area as directed 3 (Three) Times a Day Before Meals. Max dose 45 units 3 times daily AC  Dispense: 45 mL; Refill: 3  -     Insulin Pen Needle 32G X 4 MM misc; 1 each 4 (Four)  Times a Day.  Dispense: 100 each; Refill: 5  -     Hemoglobin A1c; Future  -     Basic Metabolic Panel; Future  -     TSH; Future  -     T4, Free; Future  -     Microalbumin / Creatinine Urine Ratio - Urine, Clean Catch; Future  -     Lipid Panel; Future    2. Mixed hyperlipidemia  -     Hemoglobin A1c; Future  -     Basic Metabolic Panel; Future  -     TSH; Future  -     T4, Free; Future  -     Microalbumin / Creatinine Urine Ratio - Urine, Clean Catch; Future  -     Lipid Panel; Future    3. Secondary hypothyroidism  -     Hemoglobin A1c; Future  -     Basic Metabolic Panel; Future  -     TSH; Future  -     T4, Free; Future  -     Microalbumin / Creatinine Urine Ratio - Urine, Clean Catch; Future  -     Lipid Panel; Future        Follow Up   No follow-ups on file.     Patient reports he often forgets if he is taking his insulin dosing and reports he has too much stress going on to give a good history of when and how he takes his insulin  Offered the INPEN to the patient as a way to track his insulin and also give him guidance on if he took a shot or if he missed the shot.  This will allow tracking both by himself and our office as his healthcare providers    Instead of increasing his insulin dose at this time I would like to evaluate his missed doses and the effects on his blood sugar  A1c is improving  Goal will be closer to 7% or less  Continue current Tresiba dose at 136u  Continue statin, LDL stable  Continue current T4 dose, TSH stable    Patient was given instructions and counseling regarding his condition or for health maintenance advice. Please see specific information pulled into the AVS if appropriate.     MADHU Keane

## 2022-03-02 ENCOUNTER — OFFICE VISIT (OUTPATIENT)
Dept: CARDIOLOGY | Facility: CLINIC | Age: 52
End: 2022-03-02

## 2022-03-02 VITALS
SYSTOLIC BLOOD PRESSURE: 110 MMHG | HEIGHT: 70 IN | HEART RATE: 89 BPM | BODY MASS INDEX: 37.34 KG/M2 | WEIGHT: 260.8 LBS | DIASTOLIC BLOOD PRESSURE: 70 MMHG

## 2022-03-02 DIAGNOSIS — Z79.4 TYPE 2 DIABETES MELLITUS WITH STAGE 3 CHRONIC KIDNEY DISEASE, WITH LONG-TERM CURRENT USE OF INSULIN, UNSPECIFIED WHETHER STAGE 3A OR 3B CKD: ICD-10-CM

## 2022-03-02 DIAGNOSIS — I10 ESSENTIAL HYPERTENSION: Primary | ICD-10-CM

## 2022-03-02 DIAGNOSIS — N18.30 TYPE 2 DIABETES MELLITUS WITH STAGE 3 CHRONIC KIDNEY DISEASE, WITH LONG-TERM CURRENT USE OF INSULIN, UNSPECIFIED WHETHER STAGE 3A OR 3B CKD: ICD-10-CM

## 2022-03-02 DIAGNOSIS — E66.01 CLASS 2 SEVERE OBESITY DUE TO EXCESS CALORIES WITH SERIOUS COMORBIDITY AND BODY MASS INDEX (BMI) OF 36.0 TO 36.9 IN ADULT: ICD-10-CM

## 2022-03-02 DIAGNOSIS — E11.22 TYPE 2 DIABETES MELLITUS WITH STAGE 3 CHRONIC KIDNEY DISEASE, WITH LONG-TERM CURRENT USE OF INSULIN, UNSPECIFIED WHETHER STAGE 3A OR 3B CKD: ICD-10-CM

## 2022-03-02 DIAGNOSIS — E78.2 MIXED HYPERLIPIDEMIA: ICD-10-CM

## 2022-03-02 DIAGNOSIS — I25.700 CORONARY ARTERY DISEASE INVOLVING CORONARY BYPASS GRAFT OF NATIVE HEART WITH UNSTABLE ANGINA PECTORIS: ICD-10-CM

## 2022-03-02 PROCEDURE — 99214 OFFICE O/P EST MOD 30 MIN: CPT | Performed by: INTERNAL MEDICINE

## 2022-03-02 PROCEDURE — 93000 ELECTROCARDIOGRAM COMPLETE: CPT | Performed by: INTERNAL MEDICINE

## 2022-03-02 NOTE — PROGRESS NOTES
"Date of Office Visit: 22  Encounter Provider: Favian Carrillo MD  Place of Service: [facility] CARDIOLOGY  Patient Name: Ricardo Pereira  :1970  [id number]    Chief Complaint   Patient presents with   • Coronary Artery Disease   :     HPI: Ricardo Pereira is a 51 y.o. male  a history of renal cell carcinoma s/p right nephrectomy, tobacco abuse, COPD, REGAN- unable to tolerate cpap, hypothyroidism, diabetes mellitus, HTN, obesity, prior stroke, and cough induced syncope.     He was last seen in hospital consultation in 2019 for shortness of breath, SVT with rates in the 150s- in the setting of COPD exacerbation. An echocardiogram was done on 19 where it was normal with EF of 59%.  In 2021 he came in with chest pain ruled out for an MI but was suspicious we see him in the Cath Lab he had a 80% lesion in his mid RCA stented with a 4.0 x 23 mm Xience drug-eluting stent.  He was then in 2021 with atypical chest pain and ruled out and we actually just elected to watch him now he is here for follow-up.      His hearts been doing pretty well he is not any chest pain he has chronic shortness of breath no PND orthopnea no edema he says he has lost about 50 pounds so that is really great he has 1 kidney was removed about 10 years ago.  He is still smoking cigars    Past Medical History:   Diagnosis Date   • Abdominal hernia    • Abdominal pain, acute    • Abdominal pain, LLQ    • Abnormal brain scan    • Abnormal involuntary movements    • Acute pancreatitis    • Allergic rhinitis    • Anxiety    • Arthritis of right glenohumeral joint    • Asthma    • Bilateral carpal tunnel syndrome    • BMI 38.0-38.9,adult    • Burn of left upper extremity, second degree, initial encounter    • Cancer (HCC)    • Chronic generalized abdominal pain    • Chronic pain     flank pain \" permenantly damaged muscle\"   • Common migraine without aura    • COPD (chronic obstructive " pulmonary disease) ([facility])    • COPD exacerbation (HCC)    • Coronary artery disease involving coronary bypass graft of native heart with unstable angina pectoris ([facility]) 6/12/2021   • Cough syncope    • Depression    • Diabetes mellitus (HCC)     Insulin pump   • Diabetic neuropathy (HCC)    • Diabetic peripheral neuropathy (HCC)    • Disease of thyroid gland     hypothyroid   • Diverticulitis of colon    • Elevated blood sugar level    • Elevated cholesterol    • Elevated transaminase level    • Encounter for immunization    • Fibromyalgia    • Gait disturbance    • GERD (gastroesophageal reflux disease)    • Heart attack ([facility]) 06/11/2021    two block in right artery, two stints pur in.    • Hepatic steatosis    • History of pancreatitis     X 2   • History of renal cell cancer     stage 4 , Rt kidney   • Hospital discharge follow-up    • Hyperlipidemia    • Hypertension    • Hypertriglyceridemia    • IBS (irritable bowel syndrome)    • Incomplete tear of rotator cuff    • Intractable chronic migraine without aura and with status migrainosus    • Late effects of cerebrovascular disease    • Left ankle pain    • Left ankle sprain    • Left foot pain    • Left-sided chest pain    • Leg pain    • Low vitamin D level    • Lumbar strain    • Medicare annual wellness visit, initial    • Migraine, chronic, without aura, intractable    • Nausea and vomiting    • Numbness    • Obstructive sleep apnea    • Photophobia    • Pre-syncope    • Recurrent renal cell carcinoma of kidney (HCC)    • Renal failure (ARF), acute on chronic (HCC)    • Right shoulder pain    • Sciatica without lumbago    • Sciatica, left side    • Severe obesity ([facility])     Severe obesity (BMI 35.0-39.9)   • Shortness of breath    • Sleep apnea    • Stroke ([facility])     Stroke, lacunar   • SVT (supraventricular tachycardia) ([facility])    • Syncope and collapse    • Type 2 diabetes mellitus ([facility])    • Umbilical hernia    • Viral URI with cough        Past Surgical  History:   Procedure Laterality Date   • ABDOMINAL SURGERY     • APPENDECTOMY     • CARDIAC CATHETERIZATION Left 6/11/2021    Procedure: Left Heart Catherization;  Surgeon: Henrietta Carey MD;  Location:  [facility];  Service: Cardiovascular;  Laterality: Left;   • CARDIAC CATHETERIZATION N/A 6/11/2021    Procedure: Coronary angiography;  Surgeon: Henrietta Carey MD;  Location:  [facility];  Service: Cardiovascular;  Laterality: N/A;   • CARDIAC CATHETERIZATION N/A 6/11/2021    Procedure: Stent TERRELL coronary;  Surgeon: Henrietta Carey MD;  Location:  [facility];  Service: Cardiovascular;  Laterality: N/A;   • COLONOSCOPY  10/27/2014    tics   • HERNIA REPAIR      umbilical   • INGUINAL HERNIA REPAIR     • NEPHRECTOMY Right    • TONSILLECTOMY     • VENTRAL/INCISIONAL HERNIA REPAIR N/A 5/11/2017    Procedure: VENTRAL/INCISIONAL HERNIA REPAIR LAPAROSCOPIC, RECURRENT INCISION, WITH MESH AND AHEDLYSIS;  Surgeon: Albin Bee MD;  Location: [facility] MAIN OR;  Service:    • WISDOM TOOTH EXTRACTION         Social History     Socioeconomic History   • Marital status:    Tobacco Use   • Smoking status: Current Some Day Smoker     Types: Cigars   • Smokeless tobacco: Never Used   • Tobacco comment: occasional cigar use   Vaping Use   • Vaping Use: Never used   Substance and Sexual Activity   • Alcohol use: Never   • Drug use: No   • Sexual activity: Defer       Family History   Problem Relation Age of Onset   • Asthma Other    • Bipolar disorder Other    • COPD Other    • Depression Other    • Lupus Other          systemic lupus erythematosus   • Arthritis Other    • No Known Problems Mother    • No Known Problems Father        Review of Systems   Constitutional: Negative for decreased appetite, fever, malaise/fatigue and weight loss.   HENT: Negative for nosebleeds.    Eyes: Negative for double vision.   Cardiovascular: Negative for chest pain, claudication, cyanosis,  dyspnea on exertion, irregular heartbeat, leg swelling, near-syncope, orthopnea, palpitations, paroxysmal nocturnal dyspnea and syncope.   Respiratory: Negative for cough, hemoptysis and shortness of breath.    Hematologic/Lymphatic: Negative for bleeding problem.   Skin: Negative for rash.   Musculoskeletal: Negative for falls and myalgias.   Gastrointestinal: Negative for hematochezia, jaundice, melena, nausea and vomiting.   Genitourinary: Negative for hematuria.   Neurological: Negative for dizziness and seizures.   Psychiatric/Behavioral: Negative for altered mental status and memory loss.       Allergies   Allergen Reactions   • Penicillins Swelling         Current Outpatient Medications:   •  albuterol (PROVENTIL) (2.5 MG/3ML) 0.083% nebulizer solution, Take 2.5 mg by nebulization Every 4 (Four) Hours As Needed for Wheezing., Disp: 540 mL, Rfl: 12  •  aspirin 81 MG chewable tablet, Chew 81 mg Daily., Disp: , Rfl:   •  clopidogrel (PLAVIX) 75 MG tablet, Take 1 tablet by mouth Daily., Disp: 30 tablet, Rfl: 11  •  Dapagliflozin Propanediol (Farxiga) 10 MG tablet, Take 10 mg by mouth Daily., Disp: 30 tablet, Rfl: 11  •  insulin aspart (NovoLOG FlexPen) 100 UNIT/ML solution pen-injector sc pen, Inject 32 Units under the skin into the appropriate area as directed 3 (Three) Times a Day With Meals., Disp: 28.8 mL, Rfl: 11  •  Insulin Aspart (NovoLOG PenFill) 100 UNIT/ML solution cartridge, Inject 45 Units under the skin into the appropriate area as directed 3 (Three) Times a Day Before Meals. Max dose 45 units 3 times daily AC, Disp: 45 mL, Rfl: 3  •  Insulin Pen Needle 32G X 4 MM misc, 1 each 4 (Four) Times a Day., Disp: 100 each, Rfl: 5  •  levothyroxine (Euthyrox) 100 MCG tablet, Take 1 tablet by mouth Daily., Disp: 30 tablet, Rfl: 11  •  olmesartan (BENICAR) 40 MG tablet, Take 1 tablet by mouth Daily., Disp: 90 tablet, Rfl: 1  •  pantoprazole (PROTONIX) 40 MG EC tablet, Take 1 tablet by mouth Daily., Disp: 90  "tablet, Rfl: 3  •  rosuvastatin (CRESTOR) 40 MG tablet, Take 1 tablet by mouth Daily., Disp: 30 tablet, Rfl: 6  •  Tresiba FlexTouch 200 UNIT/ML solution pen-injector pen injection, Inject 140 Units under the skin into the appropriate area as directed Daily With Breakfast. (Patient taking differently: Inject 132 Units under the skin into the appropriate area as directed Daily With Breakfast.), Disp: 21 mL, Rfl: 5  •  Vascepa 1 g capsule capsule, Take 2 g by mouth 2 (Two) Times a Day With Meals., Disp: 360 capsule, Rfl: 3  •  vitamin D (ERGOCALCIFEROL) 1.25 MG (54538 UT) capsule capsule, Take 1 capsule by mouth once a week, Disp: 4 capsule, Rfl: 5      Objective:     Vitals:    03/02/22 1052   BP: 110/70   Pulse: 89   Weight: 118 kg (260 lb 12.8 oz)   Height: 177.8 cm (70\")     Body mass index is 37.42 kg/m².    Constitutional:       Appearance: Well-developed.   Eyes:      General: No scleral icterus.  HENT:      Head: Normocephalic.   Neck:      Thyroid: No thyromegaly.      Vascular: No JVD.      Lymphadenopathy: No cervical adenopathy.   Pulmonary:      Effort: Pulmonary effort is normal.      Breath sounds: Normal breath sounds. No wheezing. No rales.   Cardiovascular:      Normal rate. Regular rhythm.      No gallop.   Edema:     Peripheral edema absent.   Abdominal:      Palpations: Abdomen is soft.      Tenderness: There is no abdominal tenderness.   Musculoskeletal: Normal range of motion. Skin:     General: Skin is warm and dry.      Findings: No rash.   Neurological:      Mental Status: Alert and oriented to person, place, and time.           ECG 12 Lead    Date/Time: 3/2/2022 11:18 AM  Performed by: Favian Carrillo MD  Authorized by: Favian Carrillo MD   Comparison: compared with previous ECG   Similar to previous ECG  Rhythm: sinus rhythm    Clinical impression: normal ECG             Assessment:       Diagnosis Plan   1. Coronary artery disease involving coronary bypass graft of native heart with " unstable angina pectoris (HCC)     2. Essential hypertension     3. Mixed hyperlipidemia     4. Class 2 severe obesity due to excess calories with serious comorbidity and body mass index (BMI) of 36.0 to 36.9 in adult (HCC)            Plan:       I think from a cardiac standpoint his heart is doing well not having a lot of symptoms with that really talked about continuing to get the weight loss I think that his best option to feel better even though he is lost 50 pounds he still is caring another you know 60-80 extra I am going to stop his aspirin and just keep him on clopidogrel long-term we will have him come back and see Autumn Moise in a year and see me in 2    Return for 1 year with Suma Vee or Autumn Lobato, 2 years with me.     As always, it has been a pleasure to participate in your patient's care.      Sincerely,       Favian Carrillo MD

## 2022-03-15 ENCOUNTER — OFFICE VISIT (OUTPATIENT)
Dept: FAMILY MEDICINE CLINIC | Facility: CLINIC | Age: 52
End: 2022-03-15

## 2022-03-15 VITALS
OXYGEN SATURATION: 97 % | HEART RATE: 68 BPM | BODY MASS INDEX: 37.22 KG/M2 | TEMPERATURE: 98.3 F | DIASTOLIC BLOOD PRESSURE: 78 MMHG | SYSTOLIC BLOOD PRESSURE: 122 MMHG | HEIGHT: 70 IN | WEIGHT: 260 LBS

## 2022-03-15 DIAGNOSIS — Z12.5 SPECIAL SCREENING, PROSTATE CANCER: ICD-10-CM

## 2022-03-15 DIAGNOSIS — Z00.00 ENCOUNTER FOR MEDICARE ANNUAL WELLNESS EXAM: Primary | ICD-10-CM

## 2022-03-15 DIAGNOSIS — I10 ESSENTIAL HYPERTENSION: ICD-10-CM

## 2022-03-15 DIAGNOSIS — E55.9 VITAMIN D DEFICIENCY: ICD-10-CM

## 2022-03-15 PROCEDURE — G0439 PPPS, SUBSEQ VISIT: HCPCS | Performed by: INTERNAL MEDICINE

## 2022-03-15 PROCEDURE — 1170F FXNL STATUS ASSESSED: CPT | Performed by: INTERNAL MEDICINE

## 2022-03-15 PROCEDURE — 1159F MED LIST DOCD IN RCRD: CPT | Performed by: INTERNAL MEDICINE

## 2022-03-15 NOTE — PROGRESS NOTES
Subsequent Medicare Wellness Visit   The ABC's of the Annual Wellness Visit    Chief Complaint   Patient presents with   • Annual Exam       HPI:  Ricardo Pereira, -1970, is a 51 y.o. male who presents for a Subsequent Medicare Wellness Visit.    Follow-up for hypertension.  Currently, has been feeling well and asymptomatic without any headaches, vision changes, cough, chest pain, shortness of breath, swelling, focal neurologic deficit, memory loss or syncope.  Has been taking the medications regularly and adherent with the regimen olmesartan 40 mg daily.  Denies medication side effects and no significant interval events.      Here for follow-up of diabetes.  Followed by Dr. Gottlieb endocrinology patient states to have been compliant with medications.  Blood sugar monitoring - patient states has been running on average 143.  No episodes of hypoglycemia, nausea, vomiting, new rashes, syncope or other issues.  Denies any difficulties with the current medication regimen of Farxiga 10 mg daily, Tresiba 200 units/mL 136U, and NovoLog 32U TID.  Last A1c on 2022 of 7.7%.    History of coronary artery disease with stent placed in the RCA 6/10/2021.  He continues with his Plavix 75 mg daily and aspirin 81 mg daily.    History of hyper cholesterol.  Currently, has been feeling well without any myalgias, muscle aches, weakness, numbness, chest pain, short of breath or other issues.  Currently, is adherent with medication regimen of rosuvastatin 40 mg daily and Vascepa 2 g 2 times daily that is and denies medication side effects.  Last lipid panel performed on 11/10/2021.    History of hypo-thyroid.  Followed by endocrinology Dr. Gottlieb.  Denies fatigue, weakness, constipation/diarrhea, hair/skin changes, weight gain/loss, depression/anxiety, rashes, palpitations, swelling, chest pain, shortness of breath or other issues.  Has been compliant with taking the medication of levothyroxine 100 mcg daily with no recent  changes.  Denies any difficulty with the current medication.  Last TSH on 11/10/2021 of 0.911.    History of GERD tolerating with the pantoprazole 40 mg daily.    History of vitamin D deficiency on 50,000 units weekly.    Recent Hospitalizations:  Recently treated at the following:  [facility].  6/10/2021 with coronary disease receiving a stent.    Current Medical Providers:  Patient Care Team:  Simba Moore MD as PCP - General (Internal Medicine)  Avel Gottlieb MD as Consulting Physician (Endocrinology)  Favian Carrillo MD as Consulting Physician (Cardiology)    Health Habits and Functional and Cognitive Screening and Depression Screening:  Functional & Cognitive Status 3/15/2022   Do you have difficulty preparing food and eating? No   Do you have difficulty bathing yourself, getting dressed or grooming yourself? No   Do you have difficulty using the toilet? No   Do you have difficulty moving around from place to place? No   Do you have trouble with steps or getting out of a bed or a chair? No   Current Diet Well Balanced Diet   Dental Exam Up to date   Eye Exam Up to date   Exercise (times per week) 2 times per week   Current Exercises Include Walking   Current Exercise Activities Include -   Do you need help using the phone?  No   Are you deaf or do you have serious difficulty hearing?  No   Do you need help with transportation? No   Do you need help shopping? No   Do you need help preparing meals?  No   Do you need help with housework?  No   Do you need help with laundry? No   Do you need help taking your medications? No   Do you need help managing money? No   Do you ever drive or ride in a car without wearing a seat belt? No   Have you felt unusual stress, anger or loneliness in the last month? No   Who do you live with? Spouse   If you need help, do you have trouble finding someone available to you? No   Have you been bothered in the last four weeks by sexual problems? No   Do you have  difficulty concentrating, remembering or making decisions? No       Compared to one year ago, the patient feels his physical health is the same and his mental health is the same.    Depression Screen:  PHQ-2/PHQ-9 Depression Screening 3/15/2022   Retired Total Score -   Little Interest or Pleasure in Doing Things 1-->several days   Feeling Down, Depressed or Hopeless 0-->not at all   PHQ-9: Brief Depression Severity Measure Score 1       Falls Risk Assessment:  HUSSAIN Fall Risk Clinician Key Questions   Have you fallen in the past year?: No  Do you feel unsteady with walking?: No  Are you worried about falling?: No      Past Medical/Family/Social History:  The following portions of the patient's history were reviewed and updated as appropriate: allergies, current medications, past family history, past medical history, past social history, past surgical history and problem list.    Allergies   Allergen Reactions   • Penicillins Swelling         Current Outpatient Medications:   •  albuterol (PROVENTIL) (2.5 MG/3ML) 0.083% nebulizer solution, Take 2.5 mg by nebulization Every 4 (Four) Hours As Needed for Wheezing., Disp: 540 mL, Rfl: 12  •  aspirin 81 MG chewable tablet, Chew 81 mg Daily., Disp: , Rfl:   •  clopidogrel (PLAVIX) 75 MG tablet, Take 1 tablet by mouth Daily., Disp: 30 tablet, Rfl: 11  •  Dapagliflozin Propanediol (Farxiga) 10 MG tablet, Take 10 mg by mouth Daily., Disp: 30 tablet, Rfl: 11  •  insulin aspart (NovoLOG FlexPen) 100 UNIT/ML solution pen-injector sc pen, Inject 32 Units under the skin into the appropriate area as directed 3 (Three) Times a Day With Meals., Disp: 28.8 mL, Rfl: 11  •  Insulin Aspart (NovoLOG PenFill) 100 UNIT/ML solution cartridge, Inject 45 Units under the skin into the appropriate area as directed 3 (Three) Times a Day Before Meals. Max dose 45 units 3 times daily AC, Disp: 45 mL, Rfl: 3  •  Insulin Pen Needle 32G X 4 MM misc, 1 each 4 (Four) Times a Day., Disp: 100 each, Rfl:  5  •  levothyroxine (Euthyrox) 100 MCG tablet, Take 1 tablet by mouth Daily., Disp: 30 tablet, Rfl: 11  •  olmesartan (BENICAR) 40 MG tablet, Take 1 tablet by mouth Daily., Disp: 90 tablet, Rfl: 1  •  pantoprazole (PROTONIX) 40 MG EC tablet, Take 1 tablet by mouth Daily., Disp: 90 tablet, Rfl: 3  •  rosuvastatin (CRESTOR) 40 MG tablet, Take 1 tablet by mouth Daily., Disp: 30 tablet, Rfl: 6  •  Tresiba FlexTouch 200 UNIT/ML solution pen-injector pen injection, Inject 140 Units under the skin into the appropriate area as directed Daily With Breakfast. (Patient taking differently: Inject 132 Units under the skin into the appropriate area as directed Daily With Breakfast.), Disp: 21 mL, Rfl: 5  •  Vascepa 1 g capsule capsule, Take 2 g by mouth 2 (Two) Times a Day With Meals., Disp: 360 capsule, Rfl: 3  •  vitamin D (ERGOCALCIFEROL) 1.25 MG (27599 UT) capsule capsule, Take 1 capsule by mouth once a week, Disp: 4 capsule, Rfl: 5    Aspirin use counseling: Taking ASA appropriately as indicated    Current medication list contains no high risk medications.  No harmful drug interactions have been identified.     Family History   Problem Relation Age of Onset   • Asthma Other    • Bipolar disorder Other    • COPD Other    • Depression Other    • Lupus Other          systemic lupus erythematosus   • Arthritis Other    • No Known Problems Mother    • No Known Problems Father        Social History     Tobacco Use   • Smoking status: Current Some Day Smoker     Types: Cigars   • Smokeless tobacco: Never Used   • Tobacco comment: occasional cigar use   Substance Use Topics   • Alcohol use: Never       Past Surgical History:   Procedure Laterality Date   • ABDOMINAL SURGERY     • APPENDECTOMY     • CARDIAC CATHETERIZATION Left 6/11/2021    Procedure: Left Heart Catherization;  Surgeon: Henrietta Carey MD;  Location: [facility] INVASIVE LOCATION;  Service: Cardiovascular;  Laterality: Left;   • CARDIAC CATHETERIZATION N/A 6/11/2021     Procedure: Coronary angiography;  Surgeon: Henrietta Carey MD;  Location:  [facility] INV[facility] LOCATION;  Service: Cardiovascular;  Laterality: N/A;   • CARDIAC CATHETERIZATION N/A 6/11/2021    Procedure: Stent TERRELL coronary;  Surgeon: Henrietta Carey MD;  Location:  [facility] INVASIVE LOCATION;  Service: Cardiovascular;  Laterality: N/A;   • COLONOSCOPY  10/27/2014    tics   • HERNIA REPAIR      umbilical   • INGUINAL HERNIA REPAIR     • NEPHRECTOMY Right    • TONSILLECTOMY     • VENTRAL/INCISIONAL HERNIA REPAIR N/A 5/11/2017    Procedure: VENTRAL/INCISIONAL HERNIA REPAIR LAPAROSCOPIC, RECURRENT INCISION, WITH MESH AND AHEDLYSIS;  Surgeon: Albin Bee MD;  Location: [facility] MAIN OR;  Service:    • WISDOM TOOTH EXTRACTION         Patient Active Problem List   Diagnosis   • DM2 (diabetes mellitus, type 2) ([facility])   • Vitamin D deficiency   • Chronic pancreatitis ([facility])   • Mixed hyperlipidemia   • Abnormal endocrine laboratory test finding   • Secondary hypothyroidism   • Hyperglycemia   • Essential hypertension   • COPD (chronic obstructive pulmonary disease) ([facility])   • Acute exacerbation of chronic obstructive pulmonary disease (COPD) ([facility])   • Hypothyroidism   • Class 2 obesity with body mass index (BMI) of 36.0 to 36.9 in adult   • COPD exacerbation ([facility])   • Hyperosmolar (nonketotic) coma ([facility])   • Acute diverticulitis   • REGAN (obstructive sleep apnea)   • History of renal cell carcinoma   • H/O unilateral nephrectomy   • Hospital discharge follow-up   • Hyponatremia   • Diabetic neuropathy ([facility])   • Onychomycosis   • Severe lumbar pain   • Back pain with radiculopathy   • Chronic right flank pain   • Bronchitis   • Encounter for Medicare annual wellness exam   • Claudication ([facility])   • Cancer of kidney ([facility])   • Abscess of back   • COVID-19 virus infection   • Anxiety   • Coronary artery disease involving coronary bypass graft of native heart with unstable angina pectoris ([facility])   • Moderate episode of recurrent  "major depressive disorder (HCC)   • Infected sebaceous cyst of skin   • Chest pain       Review of Systems   Constitutional: Negative for activity change, appetite change, fatigue, fever, unexpected weight gain and unexpected weight loss.   HENT: Negative for nosebleeds, rhinorrhea, trouble swallowing and voice change.    Eyes: Negative for visual disturbance.   Respiratory: Negative for cough, chest tightness, shortness of breath and wheezing.    Cardiovascular: Negative for chest pain, palpitations and leg swelling.        Chronic feet swelling.   Gastrointestinal: Negative for abdominal pain, blood in stool, constipation, diarrhea, nausea, vomiting, GERD and indigestion.   Genitourinary: Negative for dysuria, frequency and hematuria.   Musculoskeletal: Negative for arthralgias, back pain and myalgias.   Skin: Negative for rash and wound.   Neurological: Negative for dizziness, tremors, weakness, light-headedness, numbness, headache and memory problem.        Chronic foot numbness   Hematological: Negative for adenopathy. Does not bruise/bleed easily.   Psychiatric/Behavioral: Negative for sleep disturbance and depressed mood. The patient is not nervous/anxious.        Objective     Vitals:    03/15/22 0855   BP: 122/78   BP Location: Left arm   Patient Position: Sitting   Cuff Size: Large Adult   Pulse: 68   Temp: 98.3 °F (36.8 °C)   TempSrc: Temporal   SpO2: 97%   Weight: 118 kg (260 lb)   Height: 177.8 cm (70\")       Patient's Body mass index is 37.31 kg/m². indicating that he is obese (BMI >30). Obesity-related health conditions include the following: obstructive sleep apnea, hypertension, coronary heart disease, diabetes mellitus and dyslipidemias. Obesity is unchanged. BMI is is above average; BMI management plan is completed. We discussed portion control and increasing exercise..      No exam data present    The patient has no evidence of cognitve impairment.     Physical Exam  Vitals and nursing note " reviewed.   Constitutional:       General: He is not in acute distress.     Appearance: He is well-developed. He is obese. He is not diaphoretic.   HENT:      Head: Normocephalic and atraumatic.      Right Ear: External ear normal.      Left Ear: External ear normal.      Nose: Nose normal.   Eyes:      Conjunctiva/sclera: Conjunctivae normal.      Pupils: Pupils are equal, round, and reactive to light.   Neck:      Thyroid: No thyromegaly.      Trachea: No tracheal deviation.   Cardiovascular:      Rate and Rhythm: Normal rate and regular rhythm.      Heart sounds: Normal heart sounds. No murmur heard.    No friction rub. No gallop.      Comments: Left greater than right pedal edema.  Pulmonary:      Effort: Pulmonary effort is normal. No respiratory distress.      Breath sounds: Normal breath sounds.   Abdominal:      General: Bowel sounds are normal.      Palpations: Abdomen is soft. There is no mass.      Tenderness: There is no abdominal tenderness. There is no guarding.   Musculoskeletal:         General: Normal range of motion.      Cervical back: Normal range of motion and neck supple.      Comments: Tenderness in the feet and toes.   Lymphadenopathy:      Cervical: No cervical adenopathy.   Skin:     General: Skin is warm and dry.      Capillary Refill: Capillary refill takes less than 2 seconds.      Findings: No rash.   Neurological:      Mental Status: He is alert and oriented to person, place, and time.      Motor: No abnormal muscle tone.      Deep Tendon Reflexes: Reflexes normal.   Psychiatric:         Behavior: Behavior normal.         Thought Content: Thought content normal.         Judgment: Judgment normal.         Recent Lab Results:     Lab Results   Component Value Date    CHOL 111 11/11/2021    TRIG 219 (H) 11/11/2021    HDL 20 (L) 11/11/2021    VLDL 36 11/11/2021    LDLHDL 2.36 11/11/2021       Assessment/Plan   Age-appropriate Screening Schedule:  Refer to the list below for future  screening recommendations based on patient's age, sex and/or medical conditions.      Health Maintenance   Topic Date Due   • TDAP/TD VACCINES (1 - Tdap) Never done   • DIABETIC EYE EXAM  Never done   • HEMOGLOBIN A1C  08/01/2022   • URINE MICROALBUMIN  10/15/2022   • LIPID PANEL  11/11/2022   • DIABETIC FOOT EXAM  03/15/2023   • INFLUENZA VACCINE  Completed       Medicare Risks and Personalized Health Plan:  Advance Directive Discussion  Immunizations Discussed/Encouraged (specific immunizations; Tdap )  Obesity/Overweight       CMS-Preventive Services Quick Reference  Medicare Preventive Services Addressed:  Annual Wellness Visit (AWV)  Colorectal Cancer Screening, Cologuard Test   Glaucoma screening (for individuals with diabetes mellitus, family history of glaucoma, -Americans (> or =) age 50, -Americans (> or =) age 65)    Advance Care Planning:  ACP discussion was held with the patient during this visit. Patient does not have an advance directive, declines further assistance.    Diagnoses and all orders for this visit:    1. Encounter for Medicare annual wellness exam (Primary)    2. Essential hypertension    3. Vitamin D deficiency  -     Vitamin D 25 Hydroxy; Future    4. Special screening, prostate cancer      Annual wellness visit reviewed with patient.  All past history, medications, social history, and problem list were reviewed.  Discussed advanced directives and living will.  Patient has living will: Living will: Patient refused.  Discussed fall risk and precautions encourage removing throw rugs and using grab bars within the home and bathroom.  Will check the labs as ordered above to evaluate the blood sugars, kidney, liver, cholesterol for screening.  Discussed flu shot recommended to get the influenza vaccine annually in the fall.  The patient has a COVID  Topic on their chart, and they are fully vaccinated.  Pneumovax-23 up to date and appropriate.  Tdap vaccination recommended.   Encourage follow-up with the eye doctor on annual basis for glaucoma evaluation.  Discussed weight and encouraged exercise as tolerated while following a healthy diet.  Colon cancer screening discussed and current status:  cologuard When he turns 52 and medicare covers.  Discussed prostate screening for which he will need screening but medicare will only allow after age 50 just like the colon cancer screening.  Follow up with current specialists as needed.    An After Visit Summary and PPPS with all of these plans were given to the patient.       Follow Up:  Return in about 1 year (around 3/15/2023) for Medicare Wellness.           · COVID-19 Precautions - Patient was compliant in wearing a mask. When I saw the patient, I used appropriate personal protective equipment (PPE) including mask and eye shield (standard procedure).  Additionally, I used gown and gloves if indicated.  Hand hygiene was completed before and after seeing the patient.  · Dictated utilizing Dragon Dictation

## 2022-04-26 DIAGNOSIS — I10 ESSENTIAL HYPERTENSION: ICD-10-CM

## 2022-04-26 RX ORDER — PANTOPRAZOLE SODIUM 40 MG/1
TABLET, DELAYED RELEASE ORAL
Qty: 90 TABLET | Refills: 0 | Status: SHIPPED | OUTPATIENT
Start: 2022-04-26 | End: 2022-07-22

## 2022-04-27 DIAGNOSIS — Z79.4 TYPE 2 DIABETES MELLITUS WITH HYPERGLYCEMIA, WITH LONG-TERM CURRENT USE OF INSULIN: ICD-10-CM

## 2022-04-27 DIAGNOSIS — E11.65 TYPE 2 DIABETES MELLITUS WITH HYPERGLYCEMIA, WITH LONG-TERM CURRENT USE OF INSULIN: ICD-10-CM

## 2022-04-27 RX ORDER — OLMESARTAN MEDOXOMIL 40 MG/1
TABLET ORAL
Qty: 90 TABLET | Refills: 0 | Status: SHIPPED | OUTPATIENT
Start: 2022-04-27 | End: 2022-07-21

## 2022-04-28 ENCOUNTER — LAB (OUTPATIENT)
Dept: ENDOCRINOLOGY | Age: 52
End: 2022-04-28

## 2022-04-28 DIAGNOSIS — E78.2 MIXED HYPERLIPIDEMIA: ICD-10-CM

## 2022-04-28 DIAGNOSIS — E11.65 TYPE 2 DIABETES MELLITUS WITH HYPERGLYCEMIA, WITH LONG-TERM CURRENT USE OF INSULIN: ICD-10-CM

## 2022-04-28 DIAGNOSIS — Z79.4 TYPE 2 DIABETES MELLITUS WITH HYPERGLYCEMIA, WITH LONG-TERM CURRENT USE OF INSULIN: ICD-10-CM

## 2022-04-28 DIAGNOSIS — E03.8 SECONDARY HYPOTHYROIDISM: ICD-10-CM

## 2022-04-28 RX ORDER — ROSUVASTATIN CALCIUM 40 MG/1
TABLET, COATED ORAL
Qty: 90 TABLET | Refills: 0 | Status: SHIPPED | OUTPATIENT
Start: 2022-04-28 | End: 2022-09-23 | Stop reason: SDUPTHER

## 2022-04-29 LAB
ALBUMIN/CREAT UR: 8 MG/G CREAT (ref 0–29)
BUN SERPL-MCNC: 12 MG/DL (ref 6–24)
BUN/CREAT SERPL: 11 (ref 9–20)
CALCIUM SERPL-MCNC: 9.4 MG/DL (ref 8.7–10.2)
CHLORIDE SERPL-SCNC: 109 MMOL/L (ref 96–106)
CHOLEST SERPL-MCNC: 122 MG/DL (ref 100–199)
CO2 SERPL-SCNC: 20 MMOL/L (ref 20–29)
CREAT SERPL-MCNC: 1.14 MG/DL (ref 0.76–1.27)
CREAT UR-MCNC: 119.7 MG/DL
EGFRCR SERPLBLD CKD-EPI 2021: 79 ML/MIN/1.73
GLUCOSE SERPL-MCNC: 115 MG/DL (ref 65–99)
HBA1C MFR BLD: 9 % (ref 4.8–5.6)
HDLC SERPL-MCNC: 23 MG/DL
IMP & REVIEW OF LAB RESULTS: NORMAL
LDLC SERPL CALC-MCNC: 73 MG/DL (ref 0–99)
MICROALBUMIN UR-MCNC: 9 UG/ML
POTASSIUM SERPL-SCNC: 4.5 MMOL/L (ref 3.5–5.2)
SODIUM SERPL-SCNC: 145 MMOL/L (ref 134–144)
T4 FREE SERPL-MCNC: 1.65 NG/DL (ref 0.82–1.77)
TRIGL SERPL-MCNC: 146 MG/DL (ref 0–149)
TSH SERPL DL<=0.005 MIU/L-ACNC: 1.01 UIU/ML (ref 0.45–4.5)
VLDLC SERPL CALC-MCNC: 26 MG/DL (ref 5–40)

## 2022-05-09 ENCOUNTER — TELEPHONE (OUTPATIENT)
Dept: ENDOCRINOLOGY | Age: 52
End: 2022-05-09

## 2022-05-09 ENCOUNTER — SPECIALTY PHARMACY (OUTPATIENT)
Dept: ENDOCRINOLOGY | Age: 52
End: 2022-05-09

## 2022-05-09 NOTE — TELEPHONE ENCOUNTER
Talked to patient today and he said sometimes he will have to manually enter his insulin because it doesn't want to link with his phone, but a few hours later it will show up and act as if he took double the dose - he does remove the manual entry at that time.

## 2022-05-09 NOTE — PROGRESS NOTES
Specialty Pharmacy Refill Coordination Note     Ricardo is a 51 y.o. male contacted today regarding refills of  1 specialty medication(s).    Reviewed and verified with patient:         Specialty medication(s) and dose(s) confirmed: yes    Refill Questions    Flowsheet Row Most Recent Value   Changes to allergies? No   Changes to medications? No   New conditions since last clinic visit No   Unplanned office visit, urgent care, ED, or hospital admission in the last 4 weeks  No   How does patient/caregiver feel medication is working? Very good   Financial problems or insurance changes  No   If yes, describe changes in insurance or financial issues. n/a   How many doses of your specialty medications were missed in the last 4 weeks? 0   Why were doses missed? n/a   Does this patient require a clinical escalation to a pharmacist? No          Delivery Questions    Flowsheet Row Most Recent Value   Delivery method [employer]   Delivery address correct? Yes   Preferred delivery time? Anytime   Number of medications in delivery 1   Medication being filled and delivered novolog penfill cart   Doses left of specialty medications 1 week   Is there any medication that is due not being filled? No   Supplies needed? No supplies needed   Cooler needed? Yes   Do any medications need mixed or dated? No   Additional comments 0   Questions or concerns for the pharmacist? No   Explain any questions or concerns for the pharmacist n/a   Are any medications first time fills? No   Shipment status Cooler packed            Medication Adherence    Any gaps in refill history greater than 2 weeks in the last 3 months: no  Demonstrates understanding of importance of adherence: yes  Informant: patient  Reliability of informant: reliable  Provider-estimated medication adherence level: %  Reasons for non-adherence: no problems identified  Adherence tools used: alarm  Support network for adherence: family member, healthcare provider           Follow-up: 2 month(s)     BAUDILIO DOMINGUEZ  Specialty Pharmacy Technician

## 2022-05-12 ENCOUNTER — OFFICE VISIT (OUTPATIENT)
Dept: ENDOCRINOLOGY | Age: 52
End: 2022-05-12

## 2022-05-12 VITALS
WEIGHT: 262 LBS | BODY MASS INDEX: 37.51 KG/M2 | DIASTOLIC BLOOD PRESSURE: 69 MMHG | HEART RATE: 102 BPM | HEIGHT: 70 IN | SYSTOLIC BLOOD PRESSURE: 119 MMHG | OXYGEN SATURATION: 98 %

## 2022-05-12 DIAGNOSIS — E78.5 HYPERLIPIDEMIA ASSOCIATED WITH TYPE 2 DIABETES MELLITUS: ICD-10-CM

## 2022-05-12 DIAGNOSIS — E11.69 HYPERLIPIDEMIA ASSOCIATED WITH TYPE 2 DIABETES MELLITUS: ICD-10-CM

## 2022-05-12 DIAGNOSIS — Z79.4 TYPE 2 DIABETES MELLITUS WITH HYPERGLYCEMIA, WITH LONG-TERM CURRENT USE OF INSULIN: Primary | ICD-10-CM

## 2022-05-12 DIAGNOSIS — E11.65 TYPE 2 DIABETES MELLITUS WITH HYPERGLYCEMIA, WITH LONG-TERM CURRENT USE OF INSULIN: Primary | ICD-10-CM

## 2022-05-12 DIAGNOSIS — E03.8 SECONDARY HYPOTHYROIDISM: ICD-10-CM

## 2022-05-12 PROCEDURE — 99214 OFFICE O/P EST MOD 30 MIN: CPT | Performed by: INTERNAL MEDICINE

## 2022-05-12 RX ORDER — INSULIN DEGLUDEC 200 U/ML
140 INJECTION, SOLUTION SUBCUTANEOUS
Qty: 21 ML | Refills: 5 | Status: SHIPPED | OUTPATIENT
Start: 2022-05-12 | End: 2022-10-14

## 2022-05-12 NOTE — PROGRESS NOTES
"Chief Complaint  Type 2 dm     Subjective          History of Present Illness    Ricardo Pereira 51 y.o. presents with Type 2 dm as a F/u patient.    Type 2 dm - Diagnosed about 10 years ago.   Today in clinic pt reports being on Tresiba 130 units Q am, Novolog 32 units tid ac, farxiga 10 mg po daily.   Does have hx of pancreatitis and so januvia was stopped.   FBG - 100 - 120  Pre meals - 160 -170  Checks BG - 4 - 5 times  -did not bring his meter for us to review  Sensor - x  Dm retinopathy -x ,Last eye exam - fall 2021  Dm nephropathy - x, creatinine levels have been stable, he has only 1 kidney, nephrectomy secondary to renal cancer 10 years ago  Dm neuropathy - yes ,Dm neuropathy meds -   CAD - yes  CVA - yes  Episodes of hypoglycemia - x  Pt is physically active. weight has been stable.   Pt tries to follow DM diet for most part.   On statin.     # pt reports that his bg levels are better than the a1c and cannot really tell and agree for the high A1c.       Reviewed primary care physician's/consulting physician documentation and lab results         I have reviewed the patient's allergies, medicines, past medical hx, family hx and social hx in detail.    Objective   Vital Signs:   /69   Pulse 102   Ht 177.8 cm (70\")   Wt 119 kg (262 lb)   SpO2 98%   BMI 37.59 kg/m²   Physical Exam   General appearance - no distress  Eyes- anicteric sclera  Ear nose and throat-external ears and nose normal.    Respiratory-normal chest on inspection.  No respiratory distress noted.  Skin-no rashes.  Neuro-alert and oriented x3      Result Review :   The following data was reviewed by: Benton Dela Cruz MD on 05/12/2022:  Lab on 04/28/2022   Component Date Value Ref Range Status   • Total Cholesterol 04/28/2022 122  100 - 199 mg/dL Final   • Triglycerides 04/28/2022 146  0 - 149 mg/dL Final   • HDL Cholesterol 04/28/2022 23 (A) >39 mg/dL Final   • VLDL Cholesterol Arnoldo 04/28/2022 26  5 - 40 mg/dL Final   • LDL Chol Calc " ([facility]) 04/28/2022 73  0 - 99 mg/dL Final   • Creatinine, Urine 04/28/2022 119.7  Not Estab. mg/dL Final   • Microalbumin, Urine 04/28/2022 9.0  Not Estab. ug/mL Final   • Microalbumin/Creatinine Ratio 04/28/2022 8  0 - 29 mg/g creat Final    Comment:                        Normal:                0 -  29                         Moderately increased: 30 - 300                         Severely increased:       >300     • Free T4 04/28/2022 1.65  0.82 - 1.77 ng/dL Final   • TSH 04/28/2022 1.010  0.450 - 4.500 uIU/mL Final   • Glucose 04/28/2022 115 (A) 65 - 99 mg/dL Final   • BUN 04/28/2022 12  6 - 24 mg/dL Final   • Creatinine 04/28/2022 1.14  0.76 - 1.27 mg/dL Final   • EGFR Result 04/28/2022 79  >59 mL/min/1.73 Final   • BUN/Creatinine Ratio 04/28/2022 11  9 - 20 Final   • Sodium 04/28/2022 145 (A) 134 - 144 mmol/L Final   • Potassium 04/28/2022 4.5  3.5 - 5.2 mmol/L Final   • Chloride 04/28/2022 109 (A) 96 - 106 mmol/L Final   • Total CO2 04/28/2022 20  20 - 29 mmol/L Final   • Calcium 04/28/2022 9.4  8.7 - 10.2 mg/dL Final   • Hemoglobin A1C 04/28/2022 9.0 (A) 4.8 - 5.6 % Final    Comment:          Prediabetes: 5.7 - 6.4           Diabetes: >6.4           Glycemic control for adults with diabetes: <7.0     • Interpretation 04/28/2022 Note   Final    Supplemental report is available.     Data reviewed: PCP notes       Results Review:    I reviewed the patient's new clinical results.     Assessment and Plan    Problem List Items Addressed This Visit        Other    DM2 (diabetes mellitus, type 2) ([facility]) - Primary    Relevant Medications    Tresiba FlexTouch 200 UNIT/ML solution pen-injector pen injection    Other Relevant Orders    TSH    T4, Free    T3, Free    Basic Metabolic Panel    Hemoglobin A1c    Lipid Panel    Vitamin B12 & Folate    Secondary hypothyroidism    Relevant Orders    TSH    T4, Free    T3, Free    Basic Metabolic Panel    Hemoglobin A1c    Lipid Panel    Vitamin B12 & Folate      Other Visit  "Diagnoses     Hyperlipidemia associated with type 2 diabetes mellitus (HCC)        Relevant Medications    Tresiba FlexTouch 200 UNIT/ML solution pen-injector pen injection    Other Relevant Orders    TSH    T4, Free    T3, Free    Basic Metabolic Panel    Hemoglobin A1c    Lipid Panel    Vitamin B12 & Folate        Diabetes mellitus-uncontrolled with hyperglycemia  HbA1c significantly worse  Increase Tresiba to 140 units daily  Change NovoLog to 35 units before each meal  Continue Farxiga.    Discussed with the patient about placing CGM on him to further assess the blood glucose trends as there is a discrepancy between the glycemic levels that the patient is reporting in the A1c but the patient refused to do so.    Hyperlipidemia  Continue Crestor 40 mg oral daily.  Interpreted the blood work-up/imaging results performed by the primary care/consulting physician -    Refills sent to pharmacy    Follow Up     Patient was given instructions and counseling regarding her condition or for health maintenance advice. Please see specific information pulled into the AVS if appropriate.       Thank you for asking me to see your patient, Ricardo Pereira in consultation.         Benton Dela Cruz MD  05/12/22      EMR Dragon / transcription disclaimer:     \"Dictated utilizing Dragon dictation\".         "

## 2022-05-23 NOTE — TELEPHONE ENCOUNTER
"Call from pt.  He reports has been experiencing \"hurting around my belly button\" for past 3 1/2 weeks.  Has had CT, US, HIDA scan.  Pt states wants to proceed with any further testing and eval with Dr Jones.  Per o/v note of 11/14/16 - pt was to have returned around 3/16/17.  Appt scheduled with Dr Jones for 4/13/17 @ 0900.    Attempt call to Dr Arpit Armstrong's office @ [phone] - answering service states having trouble with phone lines.  May also try [phone] -repeatedly busy.  Will try back to request imaging.    Message to MA's re: add on.  " H&P scanned in. Please see scan document. Pt seen and examined;  Proceed with Procedure.

## 2022-06-09 RX ORDER — ERGOCALCIFEROL 1.25 MG/1
CAPSULE ORAL
Qty: 4 CAPSULE | Refills: 0 | Status: SHIPPED | OUTPATIENT
Start: 2022-06-09 | End: 2022-07-21

## 2022-06-14 DIAGNOSIS — E11.65 TYPE 2 DIABETES MELLITUS WITH HYPERGLYCEMIA, WITH LONG-TERM CURRENT USE OF INSULIN: ICD-10-CM

## 2022-06-14 DIAGNOSIS — Z79.4 TYPE 2 DIABETES MELLITUS WITH HYPERGLYCEMIA, WITH LONG-TERM CURRENT USE OF INSULIN: ICD-10-CM

## 2022-06-14 RX ORDER — INSULIN ASPART 100 [IU]/ML
45 INJECTION, SOLUTION INTRAVENOUS; SUBCUTANEOUS
Qty: 45 ML | Refills: 3 | Status: SHIPPED | OUTPATIENT
Start: 2022-06-14 | End: 2022-10-19 | Stop reason: SDUPTHER

## 2022-06-14 RX ORDER — INSULIN ASPART 100 [IU]/ML
45 INJECTION, SOLUTION INTRAVENOUS; SUBCUTANEOUS
Qty: 45 ML | Refills: 3 | Status: CANCELLED | OUTPATIENT
Start: 2022-06-14

## 2022-06-20 ENCOUNTER — SPECIALTY PHARMACY (OUTPATIENT)
Dept: ENDOCRINOLOGY | Age: 52
End: 2022-06-20

## 2022-06-20 NOTE — PROGRESS NOTES
Specialty Pharmacy Refill Coordination Note     Ricardo is a 51 y.o. male contacted today regarding refills of  1 specialty medication(s).    Reviewed and verified with patient:         Specialty medication(s) and dose(s) confirmed: yes    Refill Questions    Flowsheet Row Most Recent Value   Changes to allergies? No   Changes to medications? No   New conditions since last clinic visit No   Unplanned office visit, urgent care, ED, or hospital admission in the last 4 weeks  No   How does patient/caregiver feel medication is working? Very good   Financial problems or insurance changes  No   If yes, describe changes in insurance or financial issues. n/a   Since the previous refill, were any specialty medication doses or scheduled injections missed or delayed?  No   If yes, please provide the amount n/a   Why were doses missed? n/a   Does this patient require a clinical escalation to a pharmacist? No          Delivery Questions    Flowsheet Row Most Recent Value   Delivery method [employer]   Delivery address correct? Yes   Preferred delivery time? Anytime   Number of medications in delivery 1   Medication being filled and delivered novolog   Doses left of specialty medications 1 week   Is there any medication that is due not being filled? No   Supplies needed? No supplies needed   Cooler needed? Yes   Do any medications need mixed or dated? No   Additional comments 0   Questions or concerns for the pharmacist? No   Explain any questions or concerns for the pharmacist n/a   Are any medications first time fills? No   Shipment status Cooler packed            Medication Adherence    Adherence tools used: alarm  Support network for adherence: family member, healthcare provider          Follow-up: 3 month(s)     BAUDILIO DOMINGUEZ  Specialty Pharmacy Technician

## 2022-07-12 RX ORDER — CLOPIDOGREL BISULFATE 75 MG/1
TABLET ORAL
Qty: 90 TABLET | Refills: 1 | Status: SHIPPED | OUTPATIENT
Start: 2022-07-12 | End: 2022-12-30

## 2022-07-21 DIAGNOSIS — I10 ESSENTIAL HYPERTENSION: ICD-10-CM

## 2022-07-21 RX ORDER — OLMESARTAN MEDOXOMIL 40 MG/1
TABLET ORAL
Qty: 90 TABLET | Refills: 0 | Status: SHIPPED | OUTPATIENT
Start: 2022-07-21 | End: 2022-10-19

## 2022-07-21 RX ORDER — ERGOCALCIFEROL 1.25 MG/1
CAPSULE ORAL
Qty: 4 CAPSULE | Refills: 0 | Status: SHIPPED | OUTPATIENT
Start: 2022-07-21 | End: 2022-08-22

## 2022-07-22 RX ORDER — PANTOPRAZOLE SODIUM 40 MG/1
TABLET, DELAYED RELEASE ORAL
Qty: 90 TABLET | Refills: 0 | Status: SHIPPED | OUTPATIENT
Start: 2022-07-22 | End: 2022-10-19

## 2022-08-08 ENCOUNTER — SPECIALTY PHARMACY (OUTPATIENT)
Dept: ENDOCRINOLOGY | Age: 52
End: 2022-08-08

## 2022-08-22 RX ORDER — ERGOCALCIFEROL 1.25 MG/1
CAPSULE ORAL
Qty: 4 CAPSULE | Refills: 0 | Status: SHIPPED | OUTPATIENT
Start: 2022-08-22 | End: 2022-09-20

## 2022-08-25 ENCOUNTER — SPECIALTY PHARMACY (OUTPATIENT)
Dept: ENDOCRINOLOGY | Age: 52
End: 2022-08-25

## 2022-08-25 ENCOUNTER — OFFICE VISIT (OUTPATIENT)
Dept: ENDOCRINOLOGY | Age: 52
End: 2022-08-25

## 2022-08-25 VITALS
OXYGEN SATURATION: 96 % | WEIGHT: 263.8 LBS | HEART RATE: 94 BPM | BODY MASS INDEX: 37.77 KG/M2 | SYSTOLIC BLOOD PRESSURE: 118 MMHG | TEMPERATURE: 96.2 F | HEIGHT: 70 IN | DIASTOLIC BLOOD PRESSURE: 72 MMHG

## 2022-08-25 DIAGNOSIS — E11.65 TYPE 2 DIABETES MELLITUS WITH HYPERGLYCEMIA, WITH LONG-TERM CURRENT USE OF INSULIN: Primary | ICD-10-CM

## 2022-08-25 DIAGNOSIS — Z79.4 TYPE 2 DIABETES MELLITUS WITH HYPERGLYCEMIA, WITH LONG-TERM CURRENT USE OF INSULIN: Primary | ICD-10-CM

## 2022-08-25 DIAGNOSIS — E03.8 SECONDARY HYPOTHYROIDISM: ICD-10-CM

## 2022-08-25 DIAGNOSIS — E11.69 HYPERLIPIDEMIA ASSOCIATED WITH TYPE 2 DIABETES MELLITUS: ICD-10-CM

## 2022-08-25 DIAGNOSIS — E78.5 HYPERLIPIDEMIA ASSOCIATED WITH TYPE 2 DIABETES MELLITUS: ICD-10-CM

## 2022-08-25 PROCEDURE — 99214 OFFICE O/P EST MOD 30 MIN: CPT | Performed by: NURSE PRACTITIONER
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
## 2022-08-25 NOTE — PROGRESS NOTES
"Chief Complaint  Diabetes (Type 2)    Subjective        Ricardo Pereira presents to [facility] ENDOCRINOLOGY  History of Present Illness     Lab Results   Component Value Date    HGBA1C 7.5 (H) 08/11/2022     Has been taking care of his premature grandson who is now 6 months old for the last week    Type 2 dm   Diagnosed about 10 years ago.   Today in clinic pt reports being on Tresiba 140 units Q am, Novolog about 36 units AC, farxiga 10 mg po daily.   Does have hx of pancreatitis and so januvia was stopped   Checks BG - 4 - 5 times  AM <120  Noon <140  PM <160  Dm retinopathy -x, Last eye exam - fall 2021  Dm nephropathy - x, creatinine levels have been stable, he has only 1 kidney, nephrectomy secondary to renal cancer 10 years ago  Dm neuropathy - yes   CAD - yes  CVA - yes  Episodes of hypoglycemia - denies  On arb and statin with vascepa    Hypothyroid  Levothyroxine 100mcg  Lab Results   Component Value Date    TSH 0.951 08/11/2022        Objective   Vital Signs:  /72   Pulse 94   Temp 96.2 °F (35.7 °C)   Ht 177.8 cm (70\")   Wt 120 kg (263 lb 12.8 oz)   SpO2 96%   BMI 37.85 kg/m²   Estimated body mass index is 37.85 kg/m² as calculated from the following:    Height as of this encounter: 177.8 cm (70\").    Weight as of this encounter: 120 kg (263 lb 12.8 oz).          Physical Exam  Vitals reviewed.   Constitutional:       General: He is not in acute distress.  HENT:      Head: Normocephalic and atraumatic.   Cardiovascular:      Rate and Rhythm: Normal rate and regular rhythm.   Pulmonary:      Effort: Pulmonary effort is normal. No respiratory distress.   Musculoskeletal:         General: No signs of injury. Normal range of motion.      Cervical back: Normal range of motion and neck supple.   Skin:     General: Skin is warm and dry.   Neurological:      Mental Status: He is alert and oriented to person, place, and time. Mental status is at baseline.   Psychiatric:         Mood and " Affect: Mood normal.         Behavior: Behavior normal.         Thought Content: Thought content normal.         Judgment: Judgment normal.        Result Review :  The following data was reviewed by: MADHU Keane on 08/25/2022:  Common labs    Common Labsle 2/1/22 4/28/22 4/28/22 4/28/22 4/28/22 8/11/22 8/11/22 8/11/22     0820 0820 0820 0820 0815 0815 0815   Glucose    115 (A)  80     BUN    12  13     Creatinine    1.14  1.01     Sodium    145 (A)  143     Potassium    4.5  4.4     Chloride    109 (A)  105     Calcium    9.4  9.7     Total Cholesterol  122      118   Triglycerides  146      209 (A)   HDL Cholesterol  23 (A)      24 (A)   LDL Cholesterol   73      59   Hemoglobin A1C 7.7 (A)    9.0 (A)  7.5 (A)    Microalbumin, Urine   9.0        (A) Abnormal value       Comments are available for some flowsheets but are not being displayed.                     Assessment and Plan   Diagnoses and all orders for this visit:    1. Type 2 diabetes mellitus with hyperglycemia, with long-term current use of insulin (HCC) (Primary)  -     TSH; Future  -     T4, Free; Future  -     Hemoglobin A1c; Future  -     Comprehensive Metabolic Panel; Future  -     Lipid Panel; Future  -     Microalbumin / Creatinine Urine Ratio - Urine, Clean Catch; Future    2. Hyperlipidemia associated with type 2 diabetes mellitus (HCC)  -     TSH; Future  -     T4, Free; Future  -     Hemoglobin A1c; Future  -     Comprehensive Metabolic Panel; Future  -     Lipid Panel; Future  -     Microalbumin / Creatinine Urine Ratio - Urine, Clean Catch; Future    3. Secondary hypothyroidism  -     TSH; Future  -     T4, Free; Future  -     Hemoglobin A1c; Future  -     Comprehensive Metabolic Panel; Future  -     Lipid Panel; Future  -     Microalbumin / Creatinine Urine Ratio - Urine, Clean Catch; Future             Follow Up   No follow-ups on file.     a1c improved near target range, continue toujeo, novolog and farxiga  Cholesterol levels  overall favorable, continue statin and vascepa  TSH stable, continue current t4 dose    Patient was given instructions and counseling regarding his condition or for health maintenance advice. Please see specific information pulled into the AVS if appropriate.     Sylvia Mireles APRN

## 2022-09-15 DIAGNOSIS — Z79.4 TYPE 2 DIABETES MELLITUS WITH HYPERGLYCEMIA, WITH LONG-TERM CURRENT USE OF INSULIN: ICD-10-CM

## 2022-09-15 DIAGNOSIS — E11.65 TYPE 2 DIABETES MELLITUS WITH HYPERGLYCEMIA, WITH LONG-TERM CURRENT USE OF INSULIN: ICD-10-CM

## 2022-09-16 RX ORDER — PEN NEEDLE, DIABETIC 32GX 5/32"
NEEDLE, DISPOSABLE MISCELLANEOUS
Qty: 400 EACH | Refills: 3 | Status: SHIPPED | OUTPATIENT
Start: 2022-09-16

## 2022-09-20 RX ORDER — ERGOCALCIFEROL 1.25 MG/1
50000 CAPSULE ORAL WEEKLY
Qty: 4 CAPSULE | Refills: 0 | Status: SHIPPED | OUTPATIENT
Start: 2022-09-20 | End: 2022-10-16

## 2022-09-23 ENCOUNTER — TELEPHONE (OUTPATIENT)
Dept: ENDOCRINOLOGY | Age: 52
End: 2022-09-23

## 2022-09-23 RX ORDER — ROSUVASTATIN CALCIUM 40 MG/1
40 TABLET, COATED ORAL DAILY
Qty: 90 TABLET | Refills: 0 | Status: SHIPPED | OUTPATIENT
Start: 2022-09-23 | End: 2022-12-21

## 2022-10-07 RX ORDER — LEVOTHYROXINE SODIUM 0.1 MG/1
TABLET ORAL
Qty: 90 TABLET | Refills: 1 | Status: SHIPPED | OUTPATIENT
Start: 2022-10-07

## 2022-10-07 RX ORDER — DAPAGLIFLOZIN 10 MG/1
TABLET, FILM COATED ORAL
Qty: 90 TABLET | Refills: 1 | Status: SHIPPED | OUTPATIENT
Start: 2022-10-07

## 2022-10-13 DIAGNOSIS — Z79.4 TYPE 2 DIABETES MELLITUS WITH HYPERGLYCEMIA, WITH LONG-TERM CURRENT USE OF INSULIN: ICD-10-CM

## 2022-10-13 DIAGNOSIS — E11.65 TYPE 2 DIABETES MELLITUS WITH HYPERGLYCEMIA, WITH LONG-TERM CURRENT USE OF INSULIN: ICD-10-CM

## 2022-10-14 RX ORDER — INSULIN DEGLUDEC 200 U/ML
140 INJECTION, SOLUTION SUBCUTANEOUS
Qty: 63 ML | Refills: 1 | Status: SHIPPED | OUTPATIENT
Start: 2022-10-14 | End: 2023-01-12

## 2022-10-16 RX ORDER — ERGOCALCIFEROL 1.25 MG/1
CAPSULE ORAL
Qty: 4 CAPSULE | Refills: 0 | Status: SHIPPED | OUTPATIENT
Start: 2022-10-16 | End: 2022-11-14

## 2022-10-19 ENCOUNTER — SPECIALTY PHARMACY (OUTPATIENT)
Dept: ENDOCRINOLOGY | Age: 52
End: 2022-10-19

## 2022-10-19 DIAGNOSIS — E11.65 TYPE 2 DIABETES MELLITUS WITH HYPERGLYCEMIA, WITH LONG-TERM CURRENT USE OF INSULIN: ICD-10-CM

## 2022-10-19 DIAGNOSIS — I10 ESSENTIAL HYPERTENSION: ICD-10-CM

## 2022-10-19 DIAGNOSIS — Z79.4 TYPE 2 DIABETES MELLITUS WITH HYPERGLYCEMIA, WITH LONG-TERM CURRENT USE OF INSULIN: ICD-10-CM

## 2022-10-19 RX ORDER — OLMESARTAN MEDOXOMIL 40 MG/1
TABLET ORAL
Qty: 90 TABLET | Refills: 3 | Status: SHIPPED | OUTPATIENT
Start: 2022-10-19

## 2022-10-19 RX ORDER — PANTOPRAZOLE SODIUM 40 MG/1
TABLET, DELAYED RELEASE ORAL
Qty: 90 TABLET | Refills: 0 | Status: SHIPPED | OUTPATIENT
Start: 2022-10-19 | End: 2023-02-28

## 2022-10-19 RX ORDER — INSULIN ASPART 100 [IU]/ML
45 INJECTION, SOLUTION INTRAVENOUS; SUBCUTANEOUS
Qty: 135 ML | Refills: 0 | Status: SHIPPED | OUTPATIENT
Start: 2022-10-19 | End: 2023-01-16 | Stop reason: SDUPTHER

## 2022-10-19 NOTE — PROGRESS NOTES
Specialty Pharmacy Refill Coordination Note     Ricardo is a 52 y.o. male contacted today regarding refills of his specialty medication(s).    Specialty medication(s) and dose(s) confirmed: Yes  Changes to medications: no  Changes to insurance: no  Reviewed and verified with patient:         Refill Questions    Flowsheet Row Most Recent Value   Changes to allergies? No   Changes to medications? No   New conditions since last clinic visit No   Unplanned office visit, urgent care, ED, or hospital admission in the last 4 weeks  No   How does patient/caregiver feel medication is working? Very good   Financial problems or insurance changes  No   Since the previous refill, were any specialty medication doses or scheduled injections missed or delayed?  No   Does this patient require a clinical escalation to a pharmacist? No          Delivery Questions    Flowsheet Row Most Recent Value   Delivery method Other (Comment)  [Beeline]   Delivery address correct? Yes   Delivery phone number [phone]   Preferred delivery time? Anytime   Number of medications in delivery 1   Medication being filled and delivered novolog cartridge   Is there any medication that is due not being filled? No   Supplies needed? No supplies needed   Do any medications need mixed or dated? No   Copay form of payment Credit card on file   Questions or concerns for the pharmacist? No   Are any medications first time fills? No   Shipment status Cooler packed          Medication Adherence    Adherence tools used: alarm  Support network for adherence: family member, healthcare provider          Follow-up: 90 day(s)     Rhoda Arora, [place]  10/19/2022   13:51 EDT

## 2022-10-19 NOTE — TELEPHONE ENCOUNTER
Rx Refill Note  Requested Prescriptions     Pending Prescriptions Disp Refills   • pantoprazole (PROTONIX) 40 MG EC tablet [Pharmacy Med Name: Pantoprazole Sodium 40 MG Oral Tablet Delayed Release] 90 tablet 0     Sig: Take 1 tablet by mouth once daily      Last office visit with prescribing clinician: 3/15/2022      Next office visit with prescribing clinician: 3/15/2023

## 2022-10-22 DIAGNOSIS — E78.2 MIXED HYPERLIPIDEMIA: ICD-10-CM

## 2022-10-25 RX ORDER — ICOSAPENT ETHYL 1000 MG/1
CAPSULE ORAL
Qty: 360 CAPSULE | Refills: 0 | Status: SHIPPED | OUTPATIENT
Start: 2022-10-25 | End: 2023-02-28

## 2022-10-31 ENCOUNTER — CLINICAL SUPPORT (OUTPATIENT)
Dept: FAMILY MEDICINE CLINIC | Facility: CLINIC | Age: 52
End: 2022-10-31

## 2022-10-31 DIAGNOSIS — Z23 NEED FOR PNEUMOCOCCAL VACCINATION: Primary | ICD-10-CM

## 2022-10-31 DIAGNOSIS — Z23 IMMUNIZATION DUE: Primary | ICD-10-CM

## 2022-10-31 DIAGNOSIS — Z23 IMMUNIZATION DUE: ICD-10-CM

## 2022-10-31 PROCEDURE — 90677 PCV20 VACCINE IM: CPT | Performed by: NURSE PRACTITIONER

## 2022-10-31 PROCEDURE — G0009 ADMIN PNEUMOCOCCAL VACCINE: HCPCS | Performed by: NURSE PRACTITIONER

## 2022-11-14 RX ORDER — ERGOCALCIFEROL 1.25 MG/1
CAPSULE ORAL
Qty: 4 CAPSULE | Refills: 0 | Status: SHIPPED | OUTPATIENT
Start: 2022-11-14 | End: 2022-12-14

## 2022-11-22 DIAGNOSIS — B37.9 YEAST INFECTION: Primary | ICD-10-CM

## 2022-11-22 RX ORDER — CLOTRIMAZOLE 1 %
1 CREAM (GRAM) TOPICAL 2 TIMES DAILY
Qty: 28 G | Refills: 1 | Status: SHIPPED | OUTPATIENT
Start: 2022-11-22

## 2022-12-05 DIAGNOSIS — E11.69 HYPERLIPIDEMIA ASSOCIATED WITH TYPE 2 DIABETES MELLITUS: ICD-10-CM

## 2022-12-05 DIAGNOSIS — E78.5 HYPERLIPIDEMIA ASSOCIATED WITH TYPE 2 DIABETES MELLITUS: ICD-10-CM

## 2022-12-05 DIAGNOSIS — Z79.4 TYPE 2 DIABETES MELLITUS WITH HYPERGLYCEMIA, WITH LONG-TERM CURRENT USE OF INSULIN: ICD-10-CM

## 2022-12-05 DIAGNOSIS — E11.65 TYPE 2 DIABETES MELLITUS WITH HYPERGLYCEMIA, WITH LONG-TERM CURRENT USE OF INSULIN: ICD-10-CM

## 2022-12-05 DIAGNOSIS — E03.8 SECONDARY HYPOTHYROIDISM: ICD-10-CM

## 2022-12-06 LAB
ALBUMIN SERPL-MCNC: 4.3 G/DL (ref 3.5–5.2)
ALBUMIN/CREAT UR: <5 MG/G CREAT (ref 0–29)
ALBUMIN/GLOB SERPL: 1.8 G/DL
ALP SERPL-CCNC: 93 U/L (ref 39–117)
ALT SERPL-CCNC: 49 U/L (ref 1–41)
AST SERPL-CCNC: 35 U/L (ref 1–40)
BILIRUB SERPL-MCNC: 0.5 MG/DL (ref 0–1.2)
BUN SERPL-MCNC: 12 MG/DL (ref 6–20)
BUN/CREAT SERPL: 10.3 (ref 7–25)
CALCIUM SERPL-MCNC: 9.5 MG/DL (ref 8.6–10.5)
CHLORIDE SERPL-SCNC: 103 MMOL/L (ref 98–107)
CHOLEST SERPL-MCNC: 146 MG/DL (ref 0–200)
CO2 SERPL-SCNC: 26.8 MMOL/L (ref 22–29)
CREAT SERPL-MCNC: 1.16 MG/DL (ref 0.76–1.27)
CREAT UR-MCNC: 63.8 MG/DL
EGFRCR SERPLBLD CKD-EPI 2021: 76.7 ML/MIN/1.73
GLOBULIN SER CALC-MCNC: 2.4 GM/DL
GLUCOSE SERPL-MCNC: 152 MG/DL (ref 65–99)
HBA1C MFR BLD: 7.4 % (ref 4.8–5.6)
HDLC SERPL-MCNC: 26 MG/DL (ref 40–60)
IMP & REVIEW OF LAB RESULTS: NORMAL
LDLC SERPL CALC-MCNC: 81 MG/DL (ref 0–100)
MICROALBUMIN UR-MCNC: <3 UG/ML
POTASSIUM SERPL-SCNC: 4.3 MMOL/L (ref 3.5–5.2)
PROT SERPL-MCNC: 6.7 G/DL (ref 6–8.5)
SODIUM SERPL-SCNC: 142 MMOL/L (ref 136–145)
T4 FREE SERPL-MCNC: 1.61 NG/DL (ref 0.93–1.7)
TRIGL SERPL-MCNC: 234 MG/DL (ref 0–150)
TSH SERPL DL<=0.005 MIU/L-ACNC: 1.56 UIU/ML (ref 0.27–4.2)
VLDLC SERPL CALC-MCNC: 39 MG/DL (ref 5–40)

## 2022-12-14 RX ORDER — ERGOCALCIFEROL 1.25 MG/1
CAPSULE ORAL
Qty: 4 CAPSULE | Refills: 0 | Status: SHIPPED | OUTPATIENT
Start: 2022-12-14 | End: 2023-01-19

## 2022-12-20 NOTE — PROGRESS NOTES
"Chief Complaint  Diabetes (Type2: patient doesn't have his meter with him today but states that he checks his bs 3xs daily no abnormalities to report with a highest reading of 144 and the lowest 89. The patient has no hx of retinopathy, with a moderate case of neuropathy in his feet and hands )    Subjective        Ricardo Pereira presents to [facility]  History of Present Illness     PRIMARY CAREGIVER FOR HIS 11 MONTH OLD GRANDSON     Lab Results   Component Value Date    HGBA1C 7.40 (H) 12/05/2022       Type 2 dm   Diagnosed about 10 years ago.   Today in clinic pt reports being on Tresiba 140u QAM, Novolog about 36u with meals, farxiga 10 mg po daily.   Does have hx of pancreatitis and so januvia was stopped   Checks BG - 4 - 5 times  Dm retinopathy -x, Last eye exam - fall 2021  Dm nephropathy - nephrectomy secondary to renal cancer 10+ years ago  Dm neuropathy - yes, reports podiatry is too expensive as they need payment up front    Used gabapentin in the past, also use to see pain management   CAD - yes, follows with cardiology   CVA - yes  Episodes of hypoglycemia - non reported   On arb and statin with vascepa    Lab Results   Component Value Date    CHOL 111 11/11/2021    CHLPL 146 12/05/2022    TRIG 234 (H) 12/05/2022    HDL 26 (L) 12/05/2022    LDL 81 12/05/2022          Hypothyroid  Levothyroxine 100mcg  Lab Results   Component Value Date    TSH 1.560 12/05/2022       Objective   Vital Signs:  /70   Pulse 89   Temp 96.9 °F (36.1 °C) (Temporal)   Ht 177.8 cm (70\")   Wt 121 kg (267 lb 9.6 oz)   SpO2 95%   BMI 38.40 kg/m²   Estimated body mass index is 38.4 kg/m² as calculated from the following:    Height as of this encounter: 177.8 cm (70\").    Weight as of this encounter: 121 kg (267 lb 9.6 oz).          Physical Exam  Vitals reviewed.   Constitutional:       General: He is not in acute distress.  HENT:      Head: Normocephalic and atraumatic.   Eyes:      " General:         Right eye: No discharge.         Left eye: No discharge.   Pulmonary:      Effort: Pulmonary effort is normal. No respiratory distress.   Musculoskeletal:         General: No signs of injury.      Cervical back: Normal range of motion and neck supple.      Right lower leg: Edema present.      Left lower leg: Edema present.      Right foot: Decreased range of motion.      Left foot: Decreased range of motion.      Comments: Foot edema only  Dark toes  Dry skin  Cool to touch    Feet:      Right foot:      Skin integrity: Dry skin present.      Left foot:      Skin integrity: Dry skin present.   Skin:     General: Skin is warm and dry.   Neurological:      Mental Status: He is alert and oriented to person, place, and time. Mental status is at baseline.   Psychiatric:         Mood and Affect: Mood normal.         Behavior: Behavior normal.         Thought Content: Thought content normal.         Judgment: Judgment normal.        Result Review :  The following data was reviewed by: MADHU Keane on 12/21/2022:  Common labs    Common Labs 4/28/22 4/28/22 4/28/22 4/28/22 8/11/22 8/11/22 8/11/22 12/5/22 12/5/22 12/5/22 12/5/22    0820 0820 0820 0820 0815 0815 0815 0808 0808 [id number] 0808   Glucose   115 (A)  80     152 (A)    BUN   12  13     12    Creatinine   1.14  1.01     1.16    Sodium   145 (A)  143     142    Potassium   4.5  4.4     4.3    Chloride   109 (A)  105     103    Calcium   9.4  9.7     9.5    Total Protein          6.7    Albumin          4.30    Total Bilirubin          0.5    Alkaline Phosphatase          93    AST (SGOT)          35    ALT (SGPT)          49 (A)    Total Cholesterol 122      118  146     Triglycerides 146      209 (A)  234 (A)     HDL Cholesterol 23 (A)      24 (A)  26 (A)     LDL Cholesterol  73      59  81     Hemoglobin A1C    9.0 (A)  7.5 (A)     7.40 (A)   Microalbumin, Urine  9.0      <3.0      (A) Abnormal value       Comments are available for some  flowsheets but are not being displayed.                     Assessment and Plan   Diagnoses and all orders for this visit:    1. Type 2 diabetes mellitus with hyperglycemia, with long-term current use of insulin (HCC) (Primary)  -     Ambulatory Referral to Podiatry  -     Ambulatory Referral to Vascular Surgery    2. Hyperlipidemia associated with type 2 diabetes mellitus (HCC)    3. Secondary hypothyroidism             Follow Up   No follow-ups on file.     Given his increased life stress right now, a1c decently controlled   Will try to find a different podiatry eval  Vascular eval  Does not want pain medication at this time   Continue statin and vascepa along with oral farxiga  contineu current t4 dose, labs stable     Patient was given instructions and counseling regarding his condition or for health maintenance advice. Please see specific information pulled into the AVS if appropriate.     MADHU Keane

## 2022-12-21 ENCOUNTER — OFFICE VISIT (OUTPATIENT)
Dept: ENDOCRINOLOGY | Age: 52
End: 2022-12-21

## 2022-12-21 VITALS
OXYGEN SATURATION: 95 % | SYSTOLIC BLOOD PRESSURE: 110 MMHG | TEMPERATURE: 96.9 F | WEIGHT: 267.6 LBS | HEIGHT: 70 IN | BODY MASS INDEX: 38.31 KG/M2 | HEART RATE: 89 BPM | DIASTOLIC BLOOD PRESSURE: 70 MMHG

## 2022-12-21 DIAGNOSIS — E11.65 TYPE 2 DIABETES MELLITUS WITH HYPERGLYCEMIA, WITH LONG-TERM CURRENT USE OF INSULIN: Primary | ICD-10-CM

## 2022-12-21 DIAGNOSIS — E03.8 SECONDARY HYPOTHYROIDISM: ICD-10-CM

## 2022-12-21 DIAGNOSIS — E11.69 HYPERLIPIDEMIA ASSOCIATED WITH TYPE 2 DIABETES MELLITUS: ICD-10-CM

## 2022-12-21 DIAGNOSIS — Z79.4 TYPE 2 DIABETES MELLITUS WITH HYPERGLYCEMIA, WITH LONG-TERM CURRENT USE OF INSULIN: Primary | ICD-10-CM

## 2022-12-21 DIAGNOSIS — E78.5 HYPERLIPIDEMIA ASSOCIATED WITH TYPE 2 DIABETES MELLITUS: ICD-10-CM

## 2022-12-21 PROCEDURE — 99214 OFFICE O/P EST MOD 30 MIN: CPT | Performed by: NURSE PRACTITIONER

## 2022-12-21 RX ORDER — ROSUVASTATIN CALCIUM 40 MG/1
TABLET, COATED ORAL
Qty: 90 TABLET | Refills: 0 | Status: SHIPPED | OUTPATIENT
Start: 2022-12-21 | End: 2023-03-24

## 2022-12-30 RX ORDER — CLOPIDOGREL BISULFATE 75 MG/1
TABLET ORAL
Qty: 90 TABLET | Refills: 0 | Status: SHIPPED | OUTPATIENT
Start: 2022-12-30

## 2023-01-16 ENCOUNTER — SPECIALTY PHARMACY (OUTPATIENT)
Dept: ENDOCRINOLOGY | Age: 53
End: 2023-01-16
Payer: MEDICARE

## 2023-01-16 DIAGNOSIS — E11.65 TYPE 2 DIABETES MELLITUS WITH HYPERGLYCEMIA, WITH LONG-TERM CURRENT USE OF INSULIN: ICD-10-CM

## 2023-01-16 DIAGNOSIS — Z79.4 TYPE 2 DIABETES MELLITUS WITH HYPERGLYCEMIA, WITH LONG-TERM CURRENT USE OF INSULIN: ICD-10-CM

## 2023-01-16 RX ORDER — INSULIN ASPART 100 [IU]/ML
45 INJECTION, SOLUTION INTRAVENOUS; SUBCUTANEOUS
Qty: 120 ML | Refills: 0 | Status: SHIPPED | OUTPATIENT
Start: 2023-01-16

## 2023-01-16 NOTE — PROGRESS NOTES
Specialty Pharmacy Refill Coordination Note     Ricardo is a 52 y.o. male contacted today regarding refills of  1 specialty medication(s).    Reviewed and verified with patient:       Specialty medication(s) and dose(s) confirmed: yes    Refill Questions    Flowsheet Row Most Recent Value   Changes to allergies? No   Changes to medications? No   New conditions since last clinic visit No   Unplanned office visit, urgent care, ED, or hospital admission in the last 4 weeks  No   How does patient/caregiver feel medication is working? Very good   Financial problems or insurance changes  No   If yes, describe changes in insurance or financial issues. n/a   Since the previous refill, were any specialty medication doses or scheduled injections missed or delayed?  No   If yes, please provide the amount n/a   Why were doses missed? n/a   Does this patient require a clinical escalation to a pharmacist? No          Delivery Questions    Flowsheet Row Most Recent Value   Delivery method Other (Comment)  [beeline 1/18]   Delivery address correct? Yes   Preferred delivery time? Anytime   Number of medications in delivery 1   Medication being filled and delivered novolog   Doses left of specialty medications 1 week   Is there any medication that is due not being filled? No   Supplies needed? No supplies needed   Cooler needed? Yes   Do any medications need mixed or dated? No   Copay form of payment Credit card on file   Additional comments 10.35   Questions or concerns for the pharmacist? No   Explain any questions or concerns for the pharmacist n/a   Are any medications first time fills? No   Shipment status Cooler packed            Medication Adherence    Adherence tools used: alarm  Support network for adherence: family member, healthcare provider          Follow-up: 85 day(s)     Vera Gardner  Specialty Pharmacy Technician

## 2023-01-19 RX ORDER — ERGOCALCIFEROL 1.25 MG/1
CAPSULE ORAL
Qty: 4 CAPSULE | Refills: 0 | Status: SHIPPED | OUTPATIENT
Start: 2023-01-19 | End: 2023-03-15

## 2023-01-19 NOTE — TELEPHONE ENCOUNTER
Requested Prescriptions     Name from pharmacy: Vitamin D (Ergocalciferol) 1.25 MG (37798 UT) Oral Capsule         Will file in chart as: vitamin D (ERGOCALCIFEROL) 1.25 MG (34179 UT) capsule capsule    Sig: Take 1 capsule by mouth once a week    Disp:  4 capsule    Refills:  0    Start: 1/18/2023    Class: Normal    Non-formulary    Last ordered: 1 month ago by MADHU Keane Last refill: 12/14/2022    Rx #: [id number]       To be filled at: [address] ([facility]), [place] - [address] [facility] [phone] [facility] [phone]

## 2023-02-28 DIAGNOSIS — E78.2 MIXED HYPERLIPIDEMIA: ICD-10-CM

## 2023-02-28 RX ORDER — ICOSAPENT ETHYL 1000 MG/1
CAPSULE ORAL
Qty: 360 CAPSULE | Refills: 0 | Status: SHIPPED | OUTPATIENT
Start: 2023-02-28

## 2023-02-28 RX ORDER — PANTOPRAZOLE SODIUM 40 MG/1
TABLET, DELAYED RELEASE ORAL
Qty: 90 TABLET | Refills: 0 | Status: SHIPPED | OUTPATIENT
Start: 2023-02-28 | End: 2023-03-15 | Stop reason: SDUPTHER

## 2023-02-28 RX ORDER — ERGOCALCIFEROL 1.25 MG/1
CAPSULE ORAL
Qty: 4 CAPSULE | Refills: 0 | OUTPATIENT
Start: 2023-02-28

## 2023-03-15 ENCOUNTER — OFFICE VISIT (OUTPATIENT)
Dept: FAMILY MEDICINE CLINIC | Facility: CLINIC | Age: 53
End: 2023-03-15
Payer: MEDICARE

## 2023-03-15 ENCOUNTER — SPECIALTY PHARMACY (OUTPATIENT)
Dept: ENDOCRINOLOGY | Age: 53
End: 2023-03-15
Payer: MEDICARE

## 2023-03-15 VITALS
BODY MASS INDEX: 37.42 KG/M2 | HEART RATE: 91 BPM | WEIGHT: 261.4 LBS | SYSTOLIC BLOOD PRESSURE: 134 MMHG | OXYGEN SATURATION: 93 % | HEIGHT: 70 IN | DIASTOLIC BLOOD PRESSURE: 76 MMHG

## 2023-03-15 DIAGNOSIS — K21.9 GASTROESOPHAGEAL REFLUX DISEASE WITHOUT ESOPHAGITIS: ICD-10-CM

## 2023-03-15 DIAGNOSIS — Z00.00 ENCOUNTER FOR MEDICARE ANNUAL WELLNESS EXAM: Primary | ICD-10-CM

## 2023-03-15 DIAGNOSIS — Z12.5 SPECIAL SCREENING, PROSTATE CANCER: ICD-10-CM

## 2023-03-15 DIAGNOSIS — Z12.11 SCREENING FOR COLON CANCER: ICD-10-CM

## 2023-03-15 PROCEDURE — 1170F FXNL STATUS ASSESSED: CPT | Performed by: INTERNAL MEDICINE

## 2023-03-15 PROCEDURE — 1159F MED LIST DOCD IN RCRD: CPT | Performed by: INTERNAL MEDICINE

## 2023-03-15 PROCEDURE — G0439 PPPS, SUBSEQ VISIT: HCPCS | Performed by: INTERNAL MEDICINE

## 2023-03-15 RX ORDER — INSULIN DEGLUDEC 200 U/ML
INJECTION, SOLUTION SUBCUTANEOUS
COMMUNITY
Start: 2023-01-13 | End: 2023-04-03

## 2023-03-15 RX ORDER — PANTOPRAZOLE SODIUM 40 MG/1
40 TABLET, DELAYED RELEASE ORAL DAILY
Qty: 90 TABLET | Refills: 3 | Status: SHIPPED | OUTPATIENT
Start: 2023-03-15

## 2023-03-15 NOTE — PATIENT INSTRUCTIONS
Medicare Wellness  Personal Prevention Plan of Service     Date of Office Visit:    Encounter Provider:  Simba Moore MD  Place of Service:  [facility] PR[facility]ARE  Patient Name: Ricardo Pereira  :  1970    As part of the Medicare Wellness portion of your visit today, we are providing you with this personalized preventive plan of services (PPPS). This plan is based upon recommendations of the [place] Preventive Services Task Force (USPSTF) and the Advisory Committee on Immunization Practices (ACIP).    This lists the preventive care services that should be considered, and provides dates of when you are due. Items listed as completed are up-to-date and do not require any further intervention.    Health Maintenance   Topic Date Due    Hepatitis B (1 of 3 - 3-dose series) Never done    DIABETIC EYE EXAM  Never done    COLORECTAL CANCER SCREENING  10/27/2019    COVID-19 Vaccine (4 - Booster for Pfizer series) 2022    ZOSTER VACCINE (1 of 2) Never done    HEMOGLOBIN A1C  2023    LIPID PANEL  2023    URINE MICROALBUMIN  2023    ANNUAL WELLNESS VISIT  03/15/2024    DIABETIC FOOT EXAM  03/15/2024    TDAP/TD VACCINES (2 - Td or Tdap) 10/31/2032    HEPATITIS C SCREENING  Completed    Pneumococcal Vaccine 0-64  Completed    INFLUENZA VACCINE  Completed       Orders Placed This Encounter   Procedures    PSA Screen     Standing Status:   Future     Standing Expiration Date:   3/15/2024     Order Specific Question:   Release to patient     Answer:   Routine Release    Cologuard - Stool, Per Rectum     Standing Status:   Future     Standing Expiration Date:   3/15/2024       Return in about 1 year (around 3/15/2024) for Medicare Wellness.

## 2023-03-15 NOTE — PROGRESS NOTES
Subsequent Medicare Wellness Visit   The ABC's of the Annual Wellness Visit    Chief Complaint   Patient presents with   • Annual Exam       HPI:  Ricardo Pereira, -1970, is a 52 y.o. male who presents for a Subsequent Medicare Wellness Visit.    Follow-up for hypertension.  Currently, has been feeling well and asymptomatic without any headaches, vision changes, cough, chest pain, shortness of breath, swelling, focal neurologic deficit, memory loss or syncope.  Has been taking the medications regularly and adherent with the regimen olmesartan 40 mg daily.  Denies medication side effects and no significant interval events.       Here for follow-up of diabetes.  Followed by Dr. Gottlieb endocrinology patient states to have been compliant with medications.  Blood sugar monitoring - patient states has been running on average 143.  No episodes of hypoglycemia, nausea, vomiting, new rashes, syncope or other issues.  Denies any difficulties with the current medication regimen of Farxiga 10 mg daily, Tresiba 200 units/mL 136U, and NovoLog 32U TID.  Last A1c on 2022 of 7.4 %.     History of coronary artery disease with stent placed in the RCA 6/10/2021.  He continues with his Plavix 75 mg daily and aspirin 81 mg daily.  Still seeing cardiology Dr Carrillo.     History of hyper cholesterol.  Currently, has been feeling well without any myalgias, muscle aches, weakness, numbness, chest pain, short of breath or other issues.  Currently, is adherent with medication regimen of rosuvastatin 40 mg daily and Vascepa 2 g 2 times daily that is and denies medication side effects.  Last lipid panel performed on 2022.     History of hypo-thyroid.  Followed by endocrinology Dr. Gottlieb.  Denies fatigue, weakness, constipation/diarrhea, hair/skin changes, weight gain/loss, depression/anxiety, rashes, palpitations, swelling, chest pain, shortness of breath or other issues.  Has been compliant with taking the medication of  levothyroxine 100 mcg daily with no recent changes.  Denies any difficulty with the current medication.  Last TSH on 12/5/2022 of 1.56.     History of GERD tolerating with the pantoprazole 40 mg daily.     History of vitamin D deficiency on no medication currently.    History of diabetic peripheral neuropathy and has tried lyrica, gabapentin with no relief.  Then tried cymbalta with severe N, Diarrhea and syncopal episode.  Now seeing pain management.    Recent Hospitalizations:  No hospitalization(s) within the last year..    Current Medical Providers:  Patient Care Team:  Simba Moore MD as PCP - General (Internal Medicine)  Avel Gottlieb MD as Consulting Physician (Endocrinology)  Favian Carrillo MD as Consulting Physician (Cardiology)    Health Habits and Functional and Cognitive Screening and Depression Screening:  Functional & Cognitive Status 3/15/2023   Do you have difficulty preparing food and eating? No   Do you have difficulty bathing yourself, getting dressed or grooming yourself? No   Do you have difficulty using the toilet? No   Do you have difficulty moving around from place to place? No   Do you have trouble with steps or getting out of a bed or a chair? No   Current Diet Low Carb Diet   Dental Exam Up to date   Eye Exam Up to date   Exercise (times per week) 0 times per week   Current Exercises Include No Regular Exercise   Current Exercise Activities Include -   Do you need help using the phone?  No   Are you deaf or do you have serious difficulty hearing?  No   Do you need help with transportation? No   Do you need help shopping? No   Do you need help preparing meals?  No   Do you need help with housework?  No   Do you need help with laundry? No   Do you need help taking your medications? No   Do you need help managing money? No   Do you ever drive or ride in a car without wearing a seat belt? No   Have you felt unusual stress, anger or loneliness in the last month? No   Who do you live  with? Spouse   If you need help, do you have trouble finding someone available to you? No   Have you been bothered in the last four weeks by sexual problems? No   Do you have difficulty concentrating, remembering or making decisions? No     Compared to one year ago, the patient feels his physical health is the same and his mental health is the same.    Depression Screen:  PHQ-2/PHQ-9 Depression Screening 3/15/2023   Retired Total Score -   Little Interest or Pleasure in Doing Things 0-->not at all   Feeling Down, Depressed or Hopeless 0-->not at all   PHQ-9: Brief Depression Severity Measure Score 0     Falls Risk Assessment:  HUSSAIN Fall Risk Clinician Key Questions   Have you fallen in the past year?: No  Do you feel unsteady with walking?: No  Are you worried about falling?: No    Past Medical/Family/Social History:  The following portions of the patient's history were reviewed and updated as appropriate: allergies, current medications, past family history, past medical history, past social history, past surgical history and problem list.    Allergies   Allergen Reactions   • Duloxetine Nausea And Vomiting   • Penicillins Swelling       Current Outpatient Medications:   •  pantoprazole (PROTONIX) 40 MG EC tablet, Take 1 tablet by mouth Daily., Disp: 90 tablet, Rfl: 3  •  albuterol (PROVENTIL) (2.5 MG/3ML) 0.083% nebulizer solution, Take 2.5 mg by nebulization Every 4 (Four) Hours As Needed for Wheezing., Disp: 540 mL, Rfl: 12  •  aspirin 81 MG chewable tablet, Chew 81 mg Daily., Disp: , Rfl:   •  BD Pen Needle Maribel 2nd Gen 32G X 4 MM misc, USE 1 PEN NEEDLE  4 TIMES DAILY, Disp: 400 each, Rfl: 3  •  clopidogrel (PLAVIX) 75 MG tablet, Take 1 tablet by mouth once daily, Disp: 90 tablet, Rfl: 0  •  clotrimazole (LOTRIMIN) 1 % cream, Apply 1 application topically to the appropriate area as directed 2 (Two) Times a Day., Disp: 28 g, Rfl: 1  •  Farxiga 10 MG tablet, Take 1 tablet by mouth once daily, Disp: 90 tablet,  Rfl: 1  •  Insulin Aspart (NovoLOG PenFill) 100 UNIT/ML solution cartridge, Inject 45 Units under the skin into the appropriate area as directed 3 (Three) Times a Day Before Meals., Disp: 120 mL, Rfl: 0  •  levothyroxine (SYNTHROID, LEVOTHROID) 100 MCG tablet, Take 1 tablet by mouth once daily, Disp: 90 tablet, Rfl: 1  •  olmesartan (BENICAR) 40 MG tablet, Take 1 tablet by mouth once daily, Disp: 90 tablet, Rfl: 3  •  rosuvastatin (CRESTOR) 40 MG tablet, Take 1 tablet by mouth once daily, Disp: 90 tablet, Rfl: 0  •  Tresiba FlexTouch 200 UNIT/ML solution pen-injector pen injection, INJECT 140 UNITS UNDER THE SKIN INTO THE APPROPRIATE AREA AS DIRECTED DAILY WITH BREAKFAST, Disp: , Rfl:   •  Vascepa 1 g capsule capsule, Take 2 capsules by mouth twice daily, Disp: 360 capsule, Rfl: 0    Aspirin use counseling: Taking ASA appropriately as indicated    Current medication list contains no high risk medications.  No harmful drug interactions have been identified.     Family History   Problem Relation Age of Onset   • Asthma Other    • Bipolar disorder Other    • COPD Other    • Depression Other    • Lupus Other          systemic lupus erythematosus   • Arthritis Other    • No Known Problems Mother    • No Known Problems Father      Social History     Tobacco Use   • Smoking status: Some Days     Types: Cigars     Passive exposure: Never   • Smokeless tobacco: Never   • Tobacco comments:     occasional cigar use   Substance Use Topics   • Alcohol use: Never     Past Surgical History:   Procedure Laterality Date   • ABDOMINAL SURGERY     • APPENDECTOMY     • CARDIAC CATHETERIZATION Left 6/11/2021    Procedure: Left Heart Catherization;  Surgeon: Henrietta Carey MD;  Location: [facility] CATH INVASIVE LOCATION;  Service: Cardiovascular;  Laterality: Left;   • CARDIAC CATHETERIZATION N/A 6/11/2021    Procedure: Coronary angiography;  Surgeon: Henrietta Carey MD;  Location:  [facility] CATH INVASIVE LOCATION;  Service: Cardiovascular;   Laterality: N/A;   • CARDIAC CATHETERIZATION N/A 6/11/2021    Procedure: Stent TERRELL coronary;  Surgeon: Henrietta Carey MD;  Location:  [facility] CATH INVASIVE LOCATION;  Service: Cardiovascular;  Laterality: N/A;   • COLONOSCOPY  10/27/2014    tics   • HERNIA REPAIR      umbilical   • INGUINAL HERNIA REPAIR     • NEPHRECTOMY Right    • TONSILLECTOMY     • VENTRAL/INCISIONAL HERNIA REPAIR N/A 5/11/2017    Procedure: VENTRAL/INCISIONAL HERNIA REPAIR LAPAROSCOPIC, RECURRENT INCISION, WITH MESH AND AHEDLYSIS;  Surgeon: Albin Bee MD;  Location: [facility] MAIN OR;  Service:    • WISDOM TOOTH EXTRACTION       Patient Active Problem List   Diagnosis   • DM2 (diabetes mellitus, type 2) ([facility])   • Vitamin D deficiency   • Chronic pancreatitis ([facility])   • Mixed hyperlipidemia   • Abnormal endocrine laboratory test finding   • Secondary hypothyroidism   • Hyperglycemia   • Essential hypertension   • COPD (chronic obstructive pulmonary disease) ([facility])   • Acute exacerbation of chronic obstructive pulmonary disease (COPD) ([facility])   • Hypothyroidism   • Class 2 obesity with body mass index (BMI) of 36.0 to 36.9 in adult   • COPD exacerbation ([facility])   • Hyperosmolar (nonketotic) coma ([facility])   • Acute diverticulitis   • REGAN (obstructive sleep apnea)   • History of renal cell carcinoma   • H/O unilateral nephrectomy   • Hospital discharge follow-up   • Hyponatremia   • Diabetic neuropathy ([facility])   • Onychomycosis   • Severe lumbar pain   • Back pain with radiculopathy   • Chronic right flank pain   • Bronchitis   • Encounter for Medicare annual wellness exam   • Claudication ([facility])   • Cancer of kidney ([facility])   • Abscess of back   • COVID-19 virus infection   • Anxiety   • Coronary artery disease involving coronary bypass graft of native heart with unstable angina pectoris ([facility])   • Moderate episode of recurrent major depressive disorder ([facility])   • Infected sebaceous cyst of skin   • Chest pain     Review of Systems   Constitutional: Negative for  "activity change, appetite change, fatigue, fever, unexpected weight gain and unexpected weight loss.   HENT: Negative for nosebleeds, rhinorrhea, trouble swallowing and voice change.    Eyes: Negative for visual disturbance.   Respiratory: Negative for cough, chest tightness, shortness of breath and wheezing.    Cardiovascular: Negative for chest pain, palpitations and leg swelling.   Gastrointestinal: Negative for abdominal pain, blood in stool, constipation, diarrhea, nausea, vomiting, GERD and indigestion.   Genitourinary: Negative for dysuria, frequency and hematuria.   Musculoskeletal: Negative for arthralgias, back pain and myalgias.   Skin: Negative for rash and wound.   Neurological: Negative for dizziness, tremors, weakness, light-headedness, numbness, headache and memory problem.        Bilateral foot pain and numbness.   Hematological: Negative for adenopathy. Does not bruise/bleed easily.   Psychiatric/Behavioral: Negative for sleep disturbance and depressed mood. The patient is not nervous/anxious.        Objective     Vitals:    03/15/23 0901   BP: 134/76   BP Location: Right arm   Patient Position: Sitting   Cuff Size: Large Adult   Pulse: 91   SpO2: 93%   Weight: 119 kg (261 lb 6.4 oz)   Height: 177.8 cm (70\")     Class 2 Severe Obesity (BMI >=35 and <=39.9). Obesity-related health conditions include the following: obstructive sleep apnea, hypertension, coronary heart disease, diabetes mellitus and dyslipidemias. Obesity is improving with lifestyle modifications. BMI is is above average; BMI management plan is completed. We discussed portion control and increasing exercise.    The patient has no evidence of cognitve impairment.     Physical Exam  Vitals and nursing note reviewed.   Constitutional:       General: He is not in acute distress.     Appearance: He is well-developed. He is obese. He is not diaphoretic.   HENT:      Head: Normocephalic and atraumatic.      Right Ear: External ear normal.    "   Left Ear: External ear normal.      Nose: Nose normal.   Eyes:      Conjunctiva/sclera: Conjunctivae normal.      Pupils: Pupils are equal, round, and reactive to light.   Neck:      Thyroid: No thyromegaly.      Trachea: No tracheal deviation.   Cardiovascular:      Rate and Rhythm: Normal rate and regular rhythm.      Heart sounds: Normal heart sounds. No murmur heard.    No friction rub. No gallop.   Pulmonary:      Effort: Pulmonary effort is normal. No respiratory distress.      Breath sounds: Normal breath sounds.   Abdominal:      General: Bowel sounds are normal.      Palpations: Abdomen is soft. There is no mass.      Tenderness: There is no abdominal tenderness. There is no guarding.   Musculoskeletal:         General: Normal range of motion.      Cervical back: Normal range of motion and neck supple.      Comments: Severe foot sensitivity and pain with numbness but no lesions or calluses.   Lymphadenopathy:      Cervical: No cervical adenopathy.   Skin:     General: Skin is warm and dry.      Capillary Refill: Capillary refill takes less than 2 seconds.      Findings: No rash.   Neurological:      Mental Status: He is alert and oriented to person, place, and time.      Motor: No abnormal muscle tone.      Deep Tendon Reflexes: Reflexes normal.   Psychiatric:         Behavior: Behavior normal.         Thought Content: Thought content normal.         Judgment: Judgment normal.     Recent Lab Results:     Lab Results   Component Value Date    CHOL 111 11/11/2021    TRIG 234 (H) 12/05/2022    HDL 26 (L) 12/05/2022    VLDL 39 12/05/2022    LDLHDL 2.36 11/11/2021     Assessment & Plan   Age-appropriate Screening Schedule:  Refer to the list below for future screening recommendations based on patient's age, sex and/or medical conditions.      Health Maintenance   Topic Date Due   • Hepatitis B (1 of 3 - 3-dose series) Never done   • DIABETIC EYE EXAM  Never done   • COLORECTAL CANCER SCREENING  10/27/2019   •  COVID-19 Vaccine (4 - Booster for Pfizer series) 01/12/2022   • ZOSTER VACCINE (1 of 2) Never done   • HEMOGLOBIN A1C  06/05/2023   • LIPID PANEL  12/05/2023   • URINE MICROALBUMIN  12/05/2023   • ANNUAL WELLNESS VISIT  03/15/2024   • DIABETIC FOOT EXAM  03/15/2024   • TDAP/TD VACCINES (2 - Td or Tdap) 10/31/2032   • HEPATITIS C SCREENING  Completed   • Pneumococcal Vaccine 0-64  Completed   • INFLUENZA VACCINE  Completed     Medicare Risks and Personalized Health Plan:  Advance Directive Discussion  Colon Cancer Screening  Fall Risk  Glaucoma Risk  Immunizations Discussed/Encouraged (specific immunizations; Shingrix )    CMS-Preventive Services Quick Reference  Medicare Preventive Services Addressed:  Annual Wellness Visit (AWV)  Glaucoma screening (for individuals with diabetes mellitus, family history of glaucoma, -Americans (> or =) age 50, -Americans (> or =) age 65)    Advance Care Planning:  ACP discussion was held with the patient during this visit. Patient does not have an advance directive, information provided.    Diagnoses and all orders for this visit:    1. Encounter for Medicare annual wellness exam (Primary)    2. Screening for colon cancer  -     Cologuard - Stool, Per Rectum; Future    3. Special screening, prostate cancer  -     PSA Screen; Future    4. Gastroesophageal reflux disease without esophagitis  -     pantoprazole (PROTONIX) 40 MG EC tablet; Take 1 tablet by mouth Daily.  Dispense: 90 tablet; Refill: 3    Annual wellness visit reviewed with patient.  All past history, medications, social history, and problem list were reviewed.  Discussed advanced directives and living will.  Patient has living will: Living will: given information packet.  Discussed fall risk and precautions encourage removing throw rugs and using grab bars within the home and bathroom.  Will check the labs as ordered above to evaluate the blood sugars, kidney, liver, cholesterol for screening.  Discussed  flu shot recommended to get the influenza vaccine annually in the fall.  The patient has a COVID HM Topic on their chart, and they are fully vaccinated.  Prevnar-20 and Pneumovax-23 up to date and appropriate.  Covid-19 booster and shingrix vaccination recommended.  Encourage follow-up with the eye doctor on annual basis for glaucoma evaluation.  Discussed weight and encouraged exercise as tolerated while following a healthy diet.  Colon cancer screening discussed and current status:  cologuard ordered.  Discussed prostate screening for which he will need screening.  Follow up with current specialists/endocrinology as needed.  An After Visit Summary and PPPS with all of these plans were given to the patient.    We reocommend diabetic shoes with diabetes, neuropathy, and vascular disease.  Follow Up:  Return in about 1 year (around 3/15/2024) for Medicare Wellness.           · COVID-19 Precautions - Patient was compliant in wearing a mask. When I saw the patient, I used appropriate personal protective equipment (PPE) including mask and eye shield (standard procedure).  Additionally, I used gown and gloves if indicated.  Hand hygiene was completed before and after seeing the patient.  · Dictated utilizing Dragon Dictation
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
## 2023-03-22 ENCOUNTER — TELEPHONE (OUTPATIENT)
Dept: CARDIOLOGY | Facility: CLINIC | Age: 53
End: 2023-03-22

## 2023-03-22 ENCOUNTER — OFFICE VISIT (OUTPATIENT)
Dept: CARDIOLOGY | Facility: CLINIC | Age: 53
End: 2023-03-22
Payer: MEDICARE

## 2023-03-22 ENCOUNTER — OFFICE VISIT (OUTPATIENT)
Dept: ENDOCRINOLOGY | Age: 53
End: 2023-03-22
Payer: MEDICARE

## 2023-03-22 VITALS
WEIGHT: 261.2 LBS | SYSTOLIC BLOOD PRESSURE: 122 MMHG | HEIGHT: 70 IN | TEMPERATURE: 97 F | HEART RATE: 91 BPM | DIASTOLIC BLOOD PRESSURE: 60 MMHG | BODY MASS INDEX: 37.39 KG/M2 | OXYGEN SATURATION: 95 %

## 2023-03-22 VITALS
HEIGHT: 70 IN | DIASTOLIC BLOOD PRESSURE: 80 MMHG | OXYGEN SATURATION: 95 % | SYSTOLIC BLOOD PRESSURE: 130 MMHG | HEART RATE: 88 BPM | BODY MASS INDEX: 30.95 KG/M2 | WEIGHT: 216.2 LBS

## 2023-03-22 DIAGNOSIS — E11.65 TYPE 2 DIABETES MELLITUS WITH HYPERGLYCEMIA, WITH LONG-TERM CURRENT USE OF INSULIN: Primary | ICD-10-CM

## 2023-03-22 DIAGNOSIS — I25.700 CORONARY ARTERY DISEASE INVOLVING CORONARY BYPASS GRAFT OF NATIVE HEART WITH UNSTABLE ANGINA PECTORIS: ICD-10-CM

## 2023-03-22 DIAGNOSIS — Z12.5 SPECIAL SCREENING, PROSTATE CANCER: ICD-10-CM

## 2023-03-22 DIAGNOSIS — E03.8 HYPOTHYROIDISM DUE TO HASHIMOTO'S THYROIDITIS: ICD-10-CM

## 2023-03-22 DIAGNOSIS — Z79.4 TYPE 2 DIABETES MELLITUS WITH HYPERGLYCEMIA, WITH LONG-TERM CURRENT USE OF INSULIN: Primary | ICD-10-CM

## 2023-03-22 DIAGNOSIS — E06.3 HYPOTHYROIDISM DUE TO HASHIMOTO'S THYROIDITIS: ICD-10-CM

## 2023-03-22 DIAGNOSIS — E11.69 HYPERLIPIDEMIA ASSOCIATED WITH TYPE 2 DIABETES MELLITUS: ICD-10-CM

## 2023-03-22 DIAGNOSIS — Z86.73 H/O: CVA (CEREBROVASCULAR ACCIDENT): ICD-10-CM

## 2023-03-22 DIAGNOSIS — I25.10 CORONARY ARTERY DISEASE INVOLVING NATIVE CORONARY ARTERY OF NATIVE HEART WITHOUT ANGINA PECTORIS: ICD-10-CM

## 2023-03-22 DIAGNOSIS — E78.5 HYPERLIPIDEMIA ASSOCIATED WITH TYPE 2 DIABETES MELLITUS: ICD-10-CM

## 2023-03-22 DIAGNOSIS — I10 ESSENTIAL HYPERTENSION: Primary | ICD-10-CM

## 2023-03-22 DIAGNOSIS — E78.2 MIXED HYPERLIPIDEMIA: ICD-10-CM

## 2023-03-22 PROCEDURE — 99214 OFFICE O/P EST MOD 30 MIN: CPT | Performed by: NURSE PRACTITIONER

## 2023-03-22 PROCEDURE — 3075F SYST BP GE 130 - 139MM HG: CPT | Performed by: NURSE PRACTITIONER

## 2023-03-22 PROCEDURE — 3079F DIAST BP 80-89 MM HG: CPT | Performed by: NURSE PRACTITIONER

## 2023-03-22 PROCEDURE — 1159F MED LIST DOCD IN RCRD: CPT | Performed by: NURSE PRACTITIONER

## 2023-03-22 PROCEDURE — 1160F RVW MEDS BY RX/DR IN RCRD: CPT | Performed by: NURSE PRACTITIONER

## 2023-03-22 PROCEDURE — 93000 ELECTROCARDIOGRAM COMPLETE: CPT | Performed by: NURSE PRACTITIONER

## 2023-03-22 PROCEDURE — 3078F DIAST BP <80 MM HG: CPT | Performed by: NURSE PRACTITIONER

## 2023-03-22 PROCEDURE — 3074F SYST BP LT 130 MM HG: CPT | Performed by: NURSE PRACTITIONER

## 2023-03-22 RX ORDER — EZETIMIBE 10 MG/1
10 TABLET ORAL DAILY
Qty: 90 TABLET | Refills: 3 | Status: SHIPPED | OUTPATIENT
Start: 2023-03-22 | End: 2023-03-22

## 2023-03-22 NOTE — PROGRESS NOTES
"Chief Complaint  Diabetes (Type2: patient doesn't have meter with him today but states that he checks 3xs daily with a  low end read of 89, and a high end read of 150, he has no hx of retinopathy with a severe case of neuropathy in his feet and hands )    Subjective        Ricardo Pereira presents to [facility] ENDOCRINOLOGY  History of Present Illness     Going to have spinal stimulator placed in hopes of pain relief  Cardiology goal LDL 60-70  PCP requesting PSA screen    Type 2 dm   Diagnosed about 10 years ago.   Today in clinic pt reports being on Tresiba 140u QAM, Novolog about 36u with meals, farxiga 10 mg po daily.   Does have hx of pancreatitis and so januvia was stopped   nephrectomy secondary to renal cancer 10+ years ago  CAD - yes, follows with cardiology   CVA - yes  Episodes of hypoglycemia - no  On arb and statin with vascepa    Objective   Vital Signs:  /60   Pulse 91   Temp 97 °F (36.1 °C) (Temporal)   Ht 177.8 cm (70\")   Wt 118 kg (261 lb 3.2 oz)   SpO2 95%   BMI 37.48 kg/m²   Estimated body mass index is 37.48 kg/m² as calculated from the following:    Height as of this encounter: 177.8 cm (70\").    Weight as of this encounter: 118 kg (261 lb 3.2 oz).             Physical Exam  Vitals reviewed.   Constitutional:       General: He is not in acute distress.  HENT:      Head: Normocephalic and atraumatic.   Cardiovascular:      Rate and Rhythm: Normal rate.   Pulmonary:      Effort: Pulmonary effort is normal. No respiratory distress.   Musculoskeletal:         General: No signs of injury. Normal range of motion.      Cervical back: Normal range of motion and neck supple.   Skin:     General: Skin is warm and dry.   Neurological:      Mental Status: He is alert and oriented to person, place, and time. Mental status is at baseline.   Psychiatric:         Mood and Affect: Mood normal.         Behavior: Behavior normal.         Thought Content: Thought content normal.        "  Judgment: Judgment normal.        Result Review :  The following data was reviewed by: MADHU Keane on 03/22/2023:  Common labs    Common Labs 4/28/22 4/28/22 4/28/22 4/28/22 8/11/22 8/11/22 8/11/22 12/5/22 12/5/22 12/5/22 12/5/22    0820 0820 0820 0820 0815 0815 0815 0808 [id number] [id number]   Glucose   115 (A)  80     152 (A)    BUN   12  13     12    Creatinine   1.14  1.01     1.16    Sodium   145 (A)  143     142    Potassium   4.5  4.4     4.3    Chloride   109 (A)  105     103    Calcium   9.4  9.7     9.5    Total Protein          6.7    Albumin          4.30    Total Bilirubin          0.5    Alkaline Phosphatase          93    AST (SGOT)          35    ALT (SGPT)          49 (A)    Total Cholesterol 122      118  146     Triglycerides 146      209 (A)  234 (A)     HDL Cholesterol 23 (A)      24 (A)  26 (A)     LDL Cholesterol  73      59  81     Hemoglobin A1C    9.0 (A)  7.5 (A)     7.40 (A)   Microalbumin, Urine  9.0      <3.0      (A) Abnormal value       Comments are available for some flowsheets but are not being displayed.                        Assessment and Plan   Diagnoses and all orders for this visit:    1. Type 2 diabetes mellitus with hyperglycemia, with long-term current use of insulin (HCC) (Primary)  -     Hemoglobin A1c  -     Basic Metabolic Panel  -     Lipid Panel  -     PSA Screen    2. Special screening, prostate cancer  -     Hemoglobin A1c  -     Basic Metabolic Panel  -     Lipid Panel  -     PSA Screen    3. Hyperlipidemia associated with type 2 diabetes mellitus (HCC)    4. Hypothyroidism due to Hashimoto's thyroiditis    5. Coronary artery disease involving native coronary artery of native heart without angina pectoris    6. H/O: CVA (cerebrovascular accident)             Follow Up   No follow-ups on file.     Ensure A1c favorable for surgery  Labs per cardiology and PCP request  Continue statin and arb   No additional changes made today  Thyroid labs stable    Patient was  given instructions and counseling regarding his condition or for health maintenance advice. Please see specific information pulled into the AVS if appropriate.     MADHU Keane

## 2023-03-22 NOTE — TELEPHONE ENCOUNTER
Thanks   [FreeTextEntry1] : SIMI EMERSON is a 30 year old woman who presents with chronic arthralgias x 7 years, marked worsening following 1st pregnancy, though reports milder sx even in high school which resulted in her having to give up track. Immediately post-partum + neurological issues -- forgetful, difficulty formulating speech at that time as well -- neuro w/u including MRI negative and resolved without recurrence. No current neuro sx. Then developed severe diffuse pain -- skin hypersensitivity, joint arthralgias, myalgias and muscle spasm. Pain started in neck and shoulder areas, but then spread everywhere. Hips, entire back, knees are the worst areas, spares smaller joints. Worse at the end of the day or if she tries to exercise, severe pain at night time. Advil does not help. Poor sleep 2/2 interruptions to care for children, fatigue during the day but not severe enough to limit ADLs, no mood sx. \par \par Inflammatory arthritis ROS negative for symmetrical peripheral joint synovitis, prolonged AM stiffness, enthesitis, dactylitis, psoriasis/ rashes, eye inflammation, inflammatory low back pain, IBD. \par \par + dry mouth x 5 years, some tenderness over submandibular areas \par + some receding of hair at temples, no other focal hair loss \par + migraines with aura -- diminished in the past few months \par + CPK markedly elevated in Feb 2020 when went to ED for acute worsening hip and low back pain without preceding trauma/infection/overuse. CPK 3000, remainder of inflammatory w/u negative, negative muscle bx and self-resolved and has not recurred. \par \par SLE ROS negative for ocular sicca, salivary gland swelling, oral ulcers, malar rash, photosensitivity, SOB, chest pain, serositis, abd pain, dysuria, hematuria, rash, joint AM stiffness/synovitis, hematologic abnormalities, Raynauds. APLS ROS -  2 uncomplicated pregnancies, no miscarriages, no thrombotic events.  \par \par FH - mother with "borderline lupus" \par \par Saw genetics in 2019 -- evaluated for CAPS, Fabry disease -- negative \par Labs -  <--3247, normal muscle bx \par Negative - YAMILE, Sjogrens, c3/4, RF/CCP, ESR/CRP, smooth muscle, scl-70, CMV, EBV, Lyme, erlichia, Babesia, TPO

## 2023-03-22 NOTE — PROGRESS NOTES
[facility] Follow Up Office Note     Encounter Date:23  Patient:Ricardo Pereira  :1970  MRN:[id number]      Chief Complaint:   Chief Complaint   Patient presents with   • Shortness of Breath   • Edema   • Follow-up         History of Presenting Illness:        Ricardo Pereira is a 52 y.o. male who is here for follow-up.  He is a patient of Dr Carrillo.    Patient has past medical history significant for coronary artery disease with prior PCI, essential hypertension, mixed hyperlipidemia, SVT, diabetes, obesity, prior stroke, renal cell carcinoma status post right nephrectomy, tobacco abuse, COPD, REGAN unable to tolerate CPAP, hypothyroidism.    In  patient was seen while admitted for SVT with heart rates in the 150s in setting of COPD exacerbation.  Echocardiogram at this time showed normal LVEF.    In  patient presented with chest pain.  Due to presentation he ultimately had left heart catheterization revealing 80% mid RCA lesion status post TERRELL.    Patient was last seen in the office in 2022 by Dr. Carrillo.  From a cardiac perspective he was felt to be doing well.  He lost about 50 pounds.  His aspirin was stopped at this time and he was continued on clopidogrel.    Patient reports that his shortness of breath is stable.  He has emphysema.  He is not having chest pain.  Unfortunately, he has a lot of issues with chronic pain that keeps him from being very active.  He has pain related to prior nephrectomy in his abdomen as well as chronic back and leg pain.  He is pursuing a spinal stimulator as he has tried multiple medications for neuropathy which have not helped.  He denies palpitations, dizziness, lower extremity edema or orthopnea.  He does sleep in a recliner however this is related to pain.  He still smokes cigars about 2 times a week.  He does not use alcohol.    Review of Systems:  Review of Systems   Cardiovascular: Negative for chest pain, dyspnea on exertion, leg swelling,  orthopnea and palpitations.   Respiratory: Negative for shortness of breath.        Current Outpatient Medications on File Prior to Visit   Medication Sig Dispense Refill   • albuterol (PROVENTIL) (2.5 MG/3ML) 0.083% nebulizer solution Take 2.5 mg by nebulization Every 4 (Four) Hours As Needed for Wheezing. 540 mL 12   • aspirin 81 MG chewable tablet Chew 1 tablet Daily.     • BD Pen Needle Maribel 2nd Gen 32G X 4 MM misc USE 1 PEN NEEDLE  4 TIMES DAILY 400 each 3   • clopidogrel (PLAVIX) 75 MG tablet Take 1 tablet by mouth once daily 90 tablet 0   • clotrimazole (LOTRIMIN) 1 % cream Apply 1 application topically to the appropriate area as directed 2 (Two) Times a Day. 28 g 1   • Farxiga 10 MG tablet Take 1 tablet by mouth once daily 90 tablet 1   • Insulin Aspart (NovoLOG PenFill) 100 UNIT/ML solution cartridge Inject 45 Units under the skin into the appropriate area as directed 3 (Three) Times a Day Before Meals. 120 mL 0   • levothyroxine (SYNTHROID, LEVOTHROID) 100 MCG tablet Take 1 tablet by mouth once daily 90 tablet 1   • olmesartan (BENICAR) 40 MG tablet Take 1 tablet by mouth once daily 90 tablet 3   • pantoprazole (PROTONIX) 40 MG EC tablet Take 1 tablet by mouth Daily. 90 tablet 3   • rosuvastatin (CRESTOR) 40 MG tablet Take 1 tablet by mouth once daily 90 tablet 0   • Tresiba FlexTouch 200 UNIT/ML solution pen-injector pen injection INJECT 140 UNITS UNDER THE SKIN INTO THE APPROPRIATE AREA AS DIRECTED DAILY WITH BREAKFAST     • Vascepa 1 g capsule capsule Take 2 capsules by mouth twice daily 360 capsule 0     No current facility-administered medications on file prior to visit.       Allergies   Allergen Reactions   • Duloxetine Nausea And Vomiting   • Penicillins Swelling       Past Medical History:   Diagnosis Date   • Abdominal hernia    • Abdominal pain, acute    • Abdominal pain, LLQ    • Abnormal brain scan    • Abnormal involuntary movements    • Acute pancreatitis    • Allergic rhinitis    • Anxiety   "  • Arthritis of right glenohumeral joint    • Asthma    • Bilateral carpal tunnel syndrome    • BMI 38.0-38.9,adult    • Burn of left upper extremity, second degree, initial encounter    • Cancer ([facility])    • Chronic generalized abdominal pain    • Chronic pain     flank pain \" permenantly damaged muscle\"   • Common migraine without aura    • COPD (chronic obstructive pulmonary disease) ([facility])    • COPD exacerbation ([facility])    • Coronary artery disease involving coronary bypass graft of native heart with unstable angina pectoris ([facility]) 6/12/2021   • Cough syncope    • Depression    • Diabetes mellitus ([facility])     Insulin pump   • Diabetic neuropathy (HCC)    • Diabetic peripheral neuropathy ([facility])    • Disease of thyroid gland     hypothyroid   • Diverticulitis of colon    • Elevated blood sugar level    • Elevated cholesterol    • Elevated transaminase level    • Encounter for immunization    • Fibromyalgia    • Gait disturbance    • GERD (gastroesophageal reflux disease)    • Heart attack ([facility]) 06/11/2021    two block in right artery, two stints pur in.    • Hepatic steatosis    • History of pancreatitis     X 2   • History of renal cell cancer     stage 4 , Rt kidney   • Hospital discharge follow-up    • Hyperlipidemia    • Hypertension    • Hypertriglyceridemia    • IBS (irritable bowel syndrome)    • Incomplete tear of rotator cuff    • Intractable chronic migraine without aura and with status migrainosus    • Late effects of cerebrovascular disease    • Left ankle pain    • Left ankle sprain    • Left foot pain    • Left-sided chest pain    • Leg pain    • Low vitamin D level    • Lumbar strain    • Medicare annual wellness visit, initial    • Migraine, chronic, without aura, intractable    • Nausea and vomiting    • Numbness    • Obstructive sleep apnea    • Photophobia    • Pre-syncope    • Recurrent renal cell carcinoma of kidney (HCC)    • Renal failure (ARF), acute on chronic (HCC)    • Right shoulder pain    • " Sciatica without lumbago    • Sciatica, left side    • Severe obesity (HCC)     Severe obesity (BMI 35.0-39.9)   • Shortness of breath    • Sleep apnea    • Stroke (HCC)     Stroke, lacunar   • SVT (supraventricular tachycardia) ([facility])    • Syncope and collapse    • Type 2 diabetes mellitus (HCC)    • Umbilical hernia    • Viral URI with cough        Past Surgical History:   Procedure Laterality Date   • ABDOMINAL SURGERY     • APPENDECTOMY     • CARDIAC CATHETERIZATION Left 6/11/2021    Procedure: Left Heart Catherization;  Surgeon: Henrietta Carey MD;  Location:  [facility];  Service: Cardiovascular;  Laterality: Left;   • CARDIAC CATHETERIZATION N/A 6/11/2021    Procedure: Coronary angiography;  Surgeon: Henrietta Carey MD;  Location:  [facility];  Service: Cardiovascular;  Laterality: N/A;   • CARDIAC CATHETERIZATION N/A 6/11/2021    Procedure: Stent TERRELL coronary;  Surgeon: Henrietta Carey MD;  Location:  [facility];  Service: Cardiovascular;  Laterality: N/A;   • COLONOSCOPY  10/27/2014    tics   • HERNIA REPAIR      umbilical   • INGUINAL HERNIA REPAIR     • NEPHRECTOMY Right    • TONSILLECTOMY     • VENTRAL/INCISIONAL HERNIA REPAIR N/A 5/11/2017    Procedure: VENTRAL/INCISIONAL HERNIA REPAIR LAPAROSCOPIC, RECURRENT INCISION, WITH MESH AND AHEDLYSIS;  Surgeon: Albin Bee MD;  Location: [facility] MAIN OR;  Service:    • WISDOM TOOTH EXTRACTION         Social History     Socioeconomic History   • Marital status:    Tobacco Use   • Smoking status: Some Days     Types: Cigars     Passive exposure: Never   • Smokeless tobacco: Never   • Tobacco comments:     occasional cigar use   Vaping Use   • Vaping Use: Never used   Substance and Sexual Activity   • Alcohol use: Never   • Drug use: No   • Sexual activity: Defer       Family History   Problem Relation Age of Onset   • Asthma Other    • Bipolar disorder Other    • COPD Other    • Depression Other    •  "Lupus Other          systemic lupus erythematosus   • Arthritis Other    • No Known Problems Mother    • No Known Problems Father        The following portions of the patient's history were reviewed and updated as appropriate: allergies, current medications, past family history, past medical history, past social history, past surgical history and problem list.       Objective:       Vitals:    03/22/23 0927   BP: 130/80   BP Location: Right arm   Patient Position: Sitting   Cuff Size: Adult   Pulse: 88   SpO2: 95%   Weight: 98.1 kg (216 lb 3.2 oz)   Height: 177.8 cm (70\")         Physical Exam:  Constitutional:  Chronically ill-appearing, pleasant  HENT: Oropharynx clear and membrane moist  Eyes: Normal conjunctiva, no sclera icterus  Neck: Supple, no carotid bruit bilaterally  Cardiovascular: Regular rate and rhythm, No Murmur, No bilateral lower extremity edema  Pulmonary: Normal respiratory effort, normal lung sounds, + wheezing  Neurological: Alert and orient x 3  Skin: Warm, dry, no ecchymosis, no rash  Psych: Appropriate mood and affect. Normal judgment and insight         Lab Results   Component Value Date     12/05/2022     08/11/2022    K 4.3 12/05/2022    K 4.4 08/11/2022     12/05/2022     08/11/2022    CO2 26.8 12/05/2022    CO2 23 08/11/2022    BUN 12 12/05/2022    BUN 13 08/11/2022    CREATININE 1.16 12/05/2022    CREATININE 1.01 08/11/2022    EGFRIFNONA 87 11/11/2021    EGFRIFNONA 71 11/10/2021    EGFRIFAFRI 86 10/15/2021    EGFRIFAFRI 111 02/23/2021    GLUCOSE 152 (H) 12/05/2022    GLUCOSE 80 08/11/2022    CALCIUM 9.5 12/05/2022    CALCIUM 9.7 08/11/2022    PROTENTOTREF 6.7 12/05/2022    PROTENTOTREF 6.6 10/15/2021    ALBUMIN 4.30 12/05/2022    ALBUMIN 4.30 11/10/2021    BILITOT 0.5 12/05/2022    BILITOT 0.3 11/10/2021    AST 35 12/05/2022    AST 41 (H) 11/10/2021    ALT 49 (H) 12/05/2022    ALT 54 (H) 11/10/2021     Lab Results   Component Value Date    WBC 8.48 11/11/2021    " WBC 8.68 11/10/2021    HGB 16.3 11/11/2021    HGB 17.2 11/10/2021    HCT 47.6 11/11/2021    HCT 48.9 11/10/2021    MCV 92.6 11/11/2021    MCV 93.1 11/10/2021     11/11/2021     11/10/2021     Lab Results   Component Value Date    CHOL 111 11/11/2021    CHOL 205 (H) 06/12/2021    TRIG 234 (H) 12/05/2022    TRIG 209 (H) 08/11/2022    HDL 26 (L) 12/05/2022    HDL 24 (L) 08/11/2022    LDL 81 12/05/2022    LDL 59 08/11/2022     Lab Results   Component Value Date    PROBNP 35.6 02/11/2019    PROBNP 16.0 03/05/2018     Lab Results   Component Value Date    TROPONINT <0.010 11/10/2021     Lab Results   Component Value Date    TSH 1.560 12/05/2022    TSH 0.951 08/11/2022           ECG 12 Lead    Date/Time: 3/22/2023 9:38 AM  Performed by: Cassidy Oconnell APRN  Authorized by: Cassidy Oconnell APRN   Comparison: compared with previous ECG from 3/2/2022  Similar to previous ECG  Rhythm: sinus rhythm  Rate: normal  BPM: 85  Conduction: incomplete right bundle branch block               Assessment:          Diagnosis Plan   1. Essential hypertension  Lipid Panel      2. Coronary artery disease involving coronary bypass graft of native heart with unstable angina pectoris (HCC)  ECG 12 Lead      3. Mixed hyperlipidemia               Plan:       Coronary artery disease without angina  · Continue Plavix, statin.  He is not on beta-blocker with advanced COPD    Essential hypertension  · BP controlled on current therapy  · Most recent labs with normal creatinine    Mixed hyperlipidemia  · Most recent lipid panel with elevated LDL.  I would recommend repeating this as it was previously well controlled  · If remains elevated will add Zetia  · Continue rosuvastatin and Vascepa    Obesity    Tobacco abuse     Cardiac clearance  · Patient is seeking cardiac clearance for spinal stimulator procedure.  I think he is of acceptable risk for this procedure, he has nonmodifiable risk factors.  I would recommend holding  clopidogrel for 5 days.      Patient is seen today for follow-up.  He has a lot of chronical medical issues, however from a cardiac perspective I think he is stable.  We are going to continue to closely watch his cholesterol.  We discussed lifestyle modifications, however he is limited related to chronic pain issues.  He is planning to pursue spinal stimulator procedure.  Follow-up in 1 year or earlier with problems    Orders Placed This Encounter   Procedures   • Lipid Panel     Standing Status:   Future     Standing Expiration Date:   3/22/2024     Order Specific Question:   Release to patient     Answer:   Routine Release   • ECG 12 Lead     This order was created via procedure documentation     Order Specific Question:   Release to patient     Answer:   Routine Release            MADHU Martinez  [facility]  03/22/23  10:00 EDT

## 2023-03-23 LAB
BUN SERPL-MCNC: 10 MG/DL (ref 6–24)
BUN/CREAT SERPL: 8 (ref 9–20)
CALCIUM SERPL-MCNC: 9.4 MG/DL (ref 8.7–10.2)
CHLORIDE SERPL-SCNC: 101 MMOL/L (ref 96–106)
CHOLEST SERPL-MCNC: 226 MG/DL (ref 100–199)
CO2 SERPL-SCNC: 21 MMOL/L (ref 20–29)
CREAT SERPL-MCNC: 1.22 MG/DL (ref 0.76–1.27)
EGFRCR SERPLBLD CKD-EPI 2021: 72 ML/MIN/1.73
GLUCOSE SERPL-MCNC: 235 MG/DL (ref 70–99)
HBA1C MFR BLD: 10.4 % (ref 4.8–5.6)
HDLC SERPL-MCNC: 22 MG/DL
IMP & REVIEW OF LAB RESULTS: NORMAL
LDLC SERPL CALC-MCNC: 136 MG/DL (ref 0–99)
POTASSIUM SERPL-SCNC: 4.6 MMOL/L (ref 3.5–5.2)
PSA SERPL-MCNC: 1.6 NG/ML (ref 0–4)
SODIUM SERPL-SCNC: 140 MMOL/L (ref 134–144)
TRIGL SERPL-MCNC: 369 MG/DL (ref 0–149)
VLDLC SERPL CALC-MCNC: 68 MG/DL (ref 5–40)

## 2023-03-24 RX ORDER — ROSUVASTATIN CALCIUM 40 MG/1
TABLET, COATED ORAL
Qty: 90 TABLET | Refills: 1 | Status: SHIPPED | OUTPATIENT
Start: 2023-03-24

## 2023-03-27 ENCOUNTER — TELEPHONE (OUTPATIENT)
Dept: ENDOCRINOLOGY | Age: 53
End: 2023-03-27
Payer: MEDICARE

## 2023-03-27 ENCOUNTER — SPECIALTY PHARMACY (OUTPATIENT)
Dept: ENDOCRINOLOGY | Age: 53
End: 2023-03-27
Payer: MEDICARE

## 2023-03-27 RX ORDER — INSULIN ASPART 100 [IU]/ML
45 INJECTION, SOLUTION INTRAVENOUS; SUBCUTANEOUS
Qty: 15 ML | Refills: 0 | Status: SHIPPED | OUTPATIENT
Start: 2023-03-27

## 2023-03-27 NOTE — TELEPHONE ENCOUNTER
Specialty Pharmacy Patient Management Program  Prescription Refill Request     Patient currently fills medications at  Pharmacy. Needing refill(s) on the following:      Requested Prescriptions     Pending Prescriptions Disp Refills   • insulin aspart (NovoLOG FlexPen) 100 UNIT/ML solution pen-injector sc pen 15 mL 0     Sig: Inject 45 Units under the skin into the appropriate area as directed 3 (Three) Times a Day Before Meals.     I apologize, I pended injection prior when it should have been the Flexpen.     Pended for MADHU Keane to review, and approve if appropriate.       Virginia Ramirez, PharmD, MM   Clinical Speciality Pharmacist, Endocrinology   3/27/2023  15:02 EDT

## 2023-03-27 NOTE — TELEPHONE ENCOUNTER
Pt called stating his in pen for his insulin broke, it hit the floor and when he goes to take his novolog it pours out none goes to his skin. He is asking for a refill for his in pen? He is stating something is wrong with the pen itself.     He said it gets shipped to him  Please call him back at [phone]

## 2023-03-27 NOTE — PROGRESS NOTES
Specialty Pharmacy Patient Management Program  Endocrinology Refill Outreach      Ricardo is a 52 y.o. male contacted today regarding refills of his medication(s).    Specialty medication(s) and dose(s) confirmed: N/A  Other medication(s) being refilled: Inpen     Refill Questions    Flowsheet Row Most Recent Value   Changes to allergies? No   Changes to medications? No   New conditions since last clinic visit No   Unplanned office visit, urgent care, ED, or hospital admission in the last 4 weeks  No   How does patient/caregiver feel medication is working? Very good   Financial problems or insurance changes  No   Since the previous refill, were any specialty medication doses or scheduled injections missed or delayed?  No   Does this patient require a clinical escalation to a pharmacist? No          Delivery Questions    Flowsheet Row Most Recent Value   Delivery method  at Pharmacy   Delivery phone number [phone]   Number of medications in delivery 1   Medication being filled and delivered Inpen   Doses left of specialty medications N/A   Is there any medication that is due not being filled? No   Supplies needed? No supplies needed   Do any medications need mixed or dated? No   Copay form of payment Pay at pickup   Additional comments $0   Questions or concerns for the pharmacist? No   Are any medications first time fills? No          Medication Adherence    Adherence tools used: alarm  Support network for adherence: family member, healthcare provider         Patient called to inform me that his Inpen was broken, after being dropped, and has been unable to inject insulin. He states that the pen is leaking insulin and no insulin is being injected. His blood sugar increased to 254 mg/dL. Inpen is ready to be picked up from pharmacy and patient was notified.     Follow-Up: 1 year     Virginia Ramirez, [place], MM   Clinical Speciality Pharmacist, Endocrinology   3/27/2023  09:30 EDT

## 2023-03-27 NOTE — TELEPHONE ENCOUNTER
Specialty Pharmacy Patient Management Program  Prescription Refill Request     Patient currently fills medications at  Pharmacy. Needing refill(s) on the following:      Requested Prescriptions     Pending Prescriptions Disp Refills   • insulin aspart (novoLOG) 100 UNIT/ML injection 15 mL 0     Sig: Inject 45 Units under the skin into the appropriate area as directed 3 (Three) Times a Day Before Meals.       Patient called this morning regarding his Inpen which he broke over the weekend. We got this filled and patient picked up from the pharmacy today. He then called back stating he could not get the pen to work. Newton from [employer] has reached out to the patient and they are working on getting IT involved. Pending Novolog Flexpen in the mean time so patient is not without insulin, he stated last he checked his blood glucose was over 250 mg/dL.     Pended for MADHU Keane to review, and approve if appropriate.     NV: 6/28/23Aline  LV: 3/22/23Aline, PharmD, MM   Clinical Speciality Pharmacist, Endocrinology   3/27/2023  14:11 EDT

## 2023-04-03 RX ORDER — INSULIN DEGLUDEC 200 U/ML
140 INJECTION, SOLUTION SUBCUTANEOUS EVERY MORNING
Qty: 63 ML | Refills: 1 | Status: SHIPPED | OUTPATIENT
Start: 2023-04-03 | End: 2023-07-02

## 2023-04-08 DIAGNOSIS — E11.65 TYPE 2 DIABETES MELLITUS WITH HYPERGLYCEMIA, WITH LONG-TERM CURRENT USE OF INSULIN: ICD-10-CM

## 2023-04-08 DIAGNOSIS — Z79.4 TYPE 2 DIABETES MELLITUS WITH HYPERGLYCEMIA, WITH LONG-TERM CURRENT USE OF INSULIN: ICD-10-CM

## 2023-04-08 RX ORDER — INSULIN ASPART 100 [IU]/ML
45 INJECTION, SOLUTION INTRAVENOUS; SUBCUTANEOUS
Qty: 120 ML | Refills: 0 | Status: CANCELLED | OUTPATIENT
Start: 2023-04-08

## 2023-04-10 ENCOUNTER — SPECIALTY PHARMACY (OUTPATIENT)
Dept: ENDOCRINOLOGY | Age: 53
End: 2023-04-10
Payer: MEDICARE

## 2023-04-10 RX ORDER — INSULIN ASPART 100 [IU]/ML
45 INJECTION, SOLUTION INTRAVENOUS; SUBCUTANEOUS
Qty: 15 ML | Refills: 0 | Status: SHIPPED | OUTPATIENT
Start: 2023-04-10

## 2023-04-10 NOTE — PROGRESS NOTES
Specialty Pharmacy Refill Coordination Note     Ricardo is a 52 y.o. male contacted today regarding refills of  1 specialty medication(s).    Reviewed and verified with patient:       Specialty medication(s) and dose(s) confirmed: yes    Refill Questions    Flowsheet Row Most Recent Value   Changes to allergies? No   Changes to medications? No  [On trial Spinal cord stimulator had to stop Plavix]   New conditions since last clinic visit No   Unplanned office visit, urgent care, ED, or hospital admission in the last 4 weeks  No   How does patient/caregiver feel medication is working? Very good   Financial problems or insurance changes  No   If yes, describe changes in insurance or financial issues. N/A   Since the previous refill, were any specialty medication doses or scheduled injections missed or delayed?  No   If yes, please provide the amount $0   Why were doses missed? N/A   Does this patient require a clinical escalation to a pharmacist? No          Delivery Questions    Flowsheet Row Most Recent Value   Delivery method Other (Comment)  [BeeLine]   Delivery address correct? Yes   Delivery phone number [phone]   Preferred delivery time? Anytime   Number of medications in delivery 1   Medication being filled and delivered Novolog FlexPen   Doses left of specialty medications 1 week   Is there any medication that is due not being filled? No   Supplies needed? No supplies needed   Cooler needed? Yes   Do any medications need mixed or dated? No   Copay form of payment Credit card on file   Additional comments $0   Questions or concerns for the pharmacist? No   Explain any questions or concerns for the pharmacist N/A   Are any medications first time fills? No   Tracking number for delivery N/A   Shipment status Cooler packed            Medication Adherence    Adherence tools used: alarm  Support network for adherence: family member, healthcare provider          Follow-up: 4 day(s)     Nayeli Barillas, Pharmacy  Technician  Specialty Pharmacy Technician

## 2023-04-11 RX ORDER — INSULIN ASPART 100 [IU]/ML
45 INJECTION, SOLUTION INTRAVENOUS; SUBCUTANEOUS
Qty: 45 ML | Refills: 1 | Status: SHIPPED | OUTPATIENT
Start: 2023-04-11

## 2023-04-11 NOTE — TELEPHONE ENCOUNTER
Specialty Pharmacy Patient Management Program  Prescription Refill Request     Patient currently fills medications at  Pharmacy. Needing refill(s) on the following:      Requested Prescriptions     Pending Prescriptions Disp Refills   • insulin aspart (NovoLOG FlexPen) 100 UNIT/ML solution pen-injector sc pen 45 mL 1     Sig: Inject 45 Units under the skin into the appropriate area as directed 3 (Three) Times a Day Before Meals.     Patient wants to stop Inpen and switch to Flexpen after issues with Inpen.I am so sorry, I had this going to [facility].     LV: 3/22, Ailne  NV: 6/28, Aline    Pended for MADHU Keane to review, and approve if appropriate.     Virginia Ramirez, PharmD, MM   Clinical Speciality Pharmacist, Endocrinology   4/11/2023  08:01 EDT

## 2023-04-12 ENCOUNTER — SPECIALTY PHARMACY (OUTPATIENT)
Dept: ENDOCRINOLOGY | Age: 53
End: 2023-04-12
Payer: MEDICARE

## 2023-04-12 NOTE — PROGRESS NOTES
Spoke with patient and apologized for the delayed shipment. I advised the patient his medication will be delivered tomorrow.

## 2023-05-08 ENCOUNTER — SPECIALTY PHARMACY (OUTPATIENT)
Dept: ENDOCRINOLOGY | Age: 53
End: 2023-05-08
Payer: MEDICARE

## 2023-05-08 NOTE — PROGRESS NOTES
Specialty Pharmacy Refill Coordination Note     Ricardo is a 52 y.o. male contacted today regarding refills of  1   specialty medication(s).    Reviewed and verified with patient:       Specialty medication(s) and dose(s) confirmed: yes    Refill Questions    Flowsheet Row Most Recent Value   Changes to allergies? No   Changes to medications? No   New conditions since last clinic visit No   Unplanned office visit, urgent care, ED, or hospital admission in the last 4 weeks  No   How does patient/caregiver feel medication is working? Very good   Financial problems or insurance changes  No   If yes, describe changes in insurance or financial issues. NA   Since the previous refill, were any specialty medication doses or scheduled injections missed or delayed?  No   If yes, please provide the amount NA   Why were doses missed? NA   Does this patient require a clinical escalation to a pharmacist? No          Delivery Questions    Flowsheet Row Most Recent Value   Delivery method Other (Comment)  [Beeline]   Delivery address correct? No   Delivery phone number [phone]   Preferred delivery time? Anytime   Number of medications in delivery 1   Medication being filled and delivered Novolog   Doses left of specialty medications 1 week   Is there any medication that is due not being filled? No   Supplies needed? No supplies needed   Cooler needed? Yes   Do any medications need mixed or dated? No   Additional comments $0   Questions or concerns for the pharmacist? No   Explain any questions or concerns for the pharmacist NA   Are any medications first time fills? No   Shipment status Cooler packed            Medication Adherence    Adherence tools used: alarm  Support network for adherence: family member, healthcare provider          Follow-up: 29 day(s)     Sylvia Chaves  Specialty Pharmacy Technician

## 2023-05-17 ENCOUNTER — LAB (OUTPATIENT)
Dept: LAB | Facility: HOSPITAL | Age: 53
End: 2023-05-17
Payer: MEDICARE

## 2023-05-17 ENCOUNTER — TRANSCRIBE ORDERS (OUTPATIENT)
Dept: LAB | Facility: HOSPITAL | Age: 53
End: 2023-05-17
Payer: MEDICARE

## 2023-05-17 DIAGNOSIS — M79.2 PAROXYSMAL NERVE PAIN: Primary | ICD-10-CM

## 2023-05-17 DIAGNOSIS — M79.2 PAROXYSMAL NERVE PAIN: ICD-10-CM

## 2023-05-17 DIAGNOSIS — R79.9 ABNORMAL FINDING OF BLOOD CHEMISTRY, UNSPECIFIED: ICD-10-CM

## 2023-05-17 LAB — HBA1C MFR BLD: 8 % (ref 4.8–5.6)

## 2023-05-17 PROCEDURE — 83036 HEMOGLOBIN GLYCOSYLATED A1C: CPT

## 2023-05-17 PROCEDURE — 36415 COLL VENOUS BLD VENIPUNCTURE: CPT

## 2023-05-31 DIAGNOSIS — Z79.4 TYPE 2 DIABETES MELLITUS WITH HYPERGLYCEMIA, WITH LONG-TERM CURRENT USE OF INSULIN: ICD-10-CM

## 2023-05-31 DIAGNOSIS — E11.65 TYPE 2 DIABETES MELLITUS WITH HYPERGLYCEMIA, WITH LONG-TERM CURRENT USE OF INSULIN: ICD-10-CM

## 2023-05-31 RX ORDER — PEN NEEDLE, DIABETIC 32GX 5/32"
NEEDLE, DISPOSABLE MISCELLANEOUS
Qty: 400 EACH | Refills: 3 | Status: CANCELLED | OUTPATIENT
Start: 2023-05-31

## 2023-06-01 DIAGNOSIS — Z79.4 TYPE 2 DIABETES MELLITUS WITH HYPERGLYCEMIA, WITH LONG-TERM CURRENT USE OF INSULIN: ICD-10-CM

## 2023-06-01 DIAGNOSIS — E11.65 TYPE 2 DIABETES MELLITUS WITH HYPERGLYCEMIA, WITH LONG-TERM CURRENT USE OF INSULIN: ICD-10-CM

## 2023-06-01 RX ORDER — CLOPIDOGREL BISULFATE 75 MG/1
TABLET ORAL
Qty: 90 TABLET | Refills: 3 | Status: SHIPPED | OUTPATIENT
Start: 2023-06-01

## 2023-06-01 RX ORDER — PEN NEEDLE, DIABETIC 32GX 5/32"
1 NEEDLE, DISPOSABLE MISCELLANEOUS 4 TIMES DAILY
Qty: 400 EACH | Refills: 0 | Status: SHIPPED | OUTPATIENT
Start: 2023-06-01

## 2023-06-01 NOTE — TELEPHONE ENCOUNTER
Rx Refill Note  Requested Prescriptions     Pending Prescriptions Disp Refills   • Insulin Pen Needle (BD Pen Needle Maribel 2nd Gen) 32G X 4 MM misc 400 each 0     Si each Daily. USE TO INJECT INSULIN UNDER THE SKIN 4 TIMES DAILY  Indications: Type 2 Diabetes      Last office visit with prescribing clinician: 3/22/2023   Last telemedicine visit with prescribing clinician: Visit date not found   Next office visit with prescribing clinician: 2023                         Would you like a call back once the refill request has been completed: [] Yes [] No    If the office needs to give you a call back, can they leave a voicemail: [] Yes [] No    Henna Ludwig MA  23, 13:55 EDT\

## 2023-06-05 DIAGNOSIS — E78.2 MIXED HYPERLIPIDEMIA: ICD-10-CM

## 2023-06-05 RX ORDER — ICOSAPENT ETHYL 1000 MG/1
2 CAPSULE ORAL EVERY 12 HOURS SCHEDULED
Qty: 360 CAPSULE | Refills: 0 | Status: SHIPPED | OUTPATIENT
Start: 2023-06-05

## 2023-06-06 RX ORDER — INSULIN ASPART 100 [IU]/ML
45 INJECTION, SOLUTION INTRAVENOUS; SUBCUTANEOUS
Qty: 45 ML | Refills: 1 | Status: CANCELLED | OUTPATIENT
Start: 2023-06-06

## 2023-06-08 ENCOUNTER — SPECIALTY PHARMACY (OUTPATIENT)
Dept: ENDOCRINOLOGY | Age: 53
End: 2023-06-08
Payer: MEDICARE

## 2023-06-08 RX ORDER — INSULIN ASPART 100 [IU]/ML
45 INJECTION, SOLUTION INTRAVENOUS; SUBCUTANEOUS
Qty: 45 ML | Refills: 0 | Status: SHIPPED | OUTPATIENT
Start: 2023-06-08

## 2023-06-08 NOTE — TELEPHONE ENCOUNTER
Rx Refill Note  Requested Prescriptions     Pending Prescriptions Disp Refills    insulin aspart (NovoLOG FlexPen) 100 UNIT/ML solution pen-injector sc pen 45 mL 1     Sig: Inject 45 Units under the skin into the appropriate area as directed 3 (Three) Times a Day With Meals.      Last office visit with prescribing clinician: 3/22/2023   Last telemedicine visit with prescribing clinician: Visit date not found   Next office visit with prescribing clinician: 6/28/2023                         Would you like a call back once the refill request has been completed: [] Yes [] No    If the office needs to give you a call back, can they leave a voicemail: [] Yes [] No    Henna Ludwig MA  06/08/23, 08:10 EDT

## 2023-06-08 NOTE — PROGRESS NOTES
Specialty Pharmacy Refill Coordination Note     Ricardo is a 52 y.o. male contacted today regarding refills of  1 specialty medication(s).    Reviewed and verified with patient:       Specialty medication(s) and dose(s) confirmed: yes    Refill Questions      Flowsheet Row Most Recent Value   Changes to allergies? No   Changes to medications? No   New conditions since last clinic visit No   Unplanned office visit, urgent care, ED, or hospital admission in the last 4 weeks  No   How does patient/caregiver feel medication is working? Very good   Financial problems or insurance changes  No   If yes, describe changes in insurance or financial issues. NA   Since the previous refill, were any specialty medication doses or scheduled injections missed or delayed?  No   If yes, please provide the amount NA   Why were doses missed? NA   Does this patient require a clinical escalation to a pharmacist? No            Delivery Questions      Flowsheet Row Most Recent Value   Delivery method Other (Comment)  [Beeline]   Delivery address correct? Yes   Delivery phone number [phone]   Preferred delivery time? Anytime   Number of medications in delivery 1   Medication being filled and delivered Novolog   Doses left of specialty medications 1   Is there any medication that is due not being filled? No   Supplies needed? No supplies needed   Cooler needed? Yes   Do any medications need mixed or dated? No   Copay form of payment Credit card on file   Additional comments $0   Questions or concerns for the pharmacist? No   Explain any questions or concerns for the pharmacist NA   Are any medications first time fills? No   Shipment status Cooler packed              Medication Adherence    Adherence tools used: alarm  Support network for adherence: family member, healthcare provider          Follow-up: 29 day(s)     Sylvia Chaves  Specialty Pharmacy Technician

## 2023-06-14 NOTE — TELEPHONE ENCOUNTER
Specialty Pharmacy Patient Management Program  Prescription Refill Request     Patient currently fills medications at  Pharmacy. Needing refill(s) on the following:      Requested Prescriptions     Pending Prescriptions Disp Refills    dapagliflozin Propanediol (Farxiga) 10 MG tablet 90 tablet 1     Sig: Take 10 mg by mouth Daily.    levothyroxine (Synthroid) 100 MCG tablet 90 tablet 1     Sig: Take 1 tablet by mouth Daily.       Last Visit: 3/22/23, Aline  Next Visit: 6/28/23, Aline    Pended for [facility] to review, and approve if appropriate.     Virginia Ramirez, PharmD, MM   Clinical Speciality Pharmacist, Endocrinology   6/14/2023  11:44 EDT

## 2023-06-15 ENCOUNTER — SPECIALTY PHARMACY (OUTPATIENT)
Dept: ENDOCRINOLOGY | Age: 53
End: 2023-06-15
Payer: MEDICARE

## 2023-06-15 ENCOUNTER — TELEPHONE (OUTPATIENT)
Dept: CARDIOLOGY | Facility: CLINIC | Age: 53
End: 2023-06-15
Payer: MEDICARE

## 2023-06-15 DIAGNOSIS — E78.2 MIXED HYPERLIPIDEMIA: Primary | ICD-10-CM

## 2023-06-15 RX ORDER — DAPAGLIFLOZIN 10 MG/1
1 TABLET, FILM COATED ORAL DAILY
Qty: 90 TABLET | Refills: 1 | Status: SHIPPED | OUTPATIENT
Start: 2023-06-15

## 2023-06-15 RX ORDER — LEVOTHYROXINE SODIUM 0.1 MG/1
100 TABLET ORAL DAILY
Qty: 90 TABLET | Refills: 1 | Status: SHIPPED | OUTPATIENT
Start: 2023-06-15

## 2023-06-15 NOTE — TELEPHONE ENCOUNTER
Please let him know he needs a repeat lipid panel to monitor his cholesterol.  This is ordered and can be done at any [facility] outpatient lab.  Thank you

## 2023-06-15 NOTE — PROGRESS NOTES
Specialty Pharmacy Refill Coordination Note     Ricardo is a 52 y.o. male contacted today regarding refills of  2 specialty medication(s).    Reviewed and verified with patient:       Specialty medication(s) and dose(s) confirmed: yes        Delivery Questions      Flowsheet Row Most Recent Value   Delivery method Other (Comment)  [Beeline]   Delivery address correct? Yes   Delivery phone number [phone]   Preferred delivery time? Anytime   Number of medications in delivery 2   Medication being filled and delivered Levothyroxine and Farxiga   Doses left of specialty medications out   Is there any medication that is due not being filled? No   Medication that does not need to be filled at this time NA   Why are other medications not being filled? NA   Supplies needed? No supplies needed   Cooler needed? No   Do any medications need mixed or dated? No   Copay form of payment Credit card on file   Additional comments $0   Questions or concerns for the pharmacist? No   Explain any questions or concerns for the pharmacist NA   Are any medications first time fills? No              Medication Adherence    Adherence tools used: alarm  Support network for adherence: family member, healthcare provider          Follow-up: 85 day(s)     Sylvia Chaves  Specialty Pharmacy Technician

## 2023-07-25 RX ORDER — INSULIN ASPART 100 [IU]/ML
45 INJECTION, SOLUTION INTRAVENOUS; SUBCUTANEOUS
Qty: 45 ML | Refills: 1 | Status: SHIPPED | OUTPATIENT
Start: 2023-07-25

## 2023-07-25 NOTE — TELEPHONE ENCOUNTER
Specialty Pharmacy Patient Management Program  Prescription Refill Request     Patient currently fills medications at  Pharmacy. Needing refill(s) on the following:      Requested Prescriptions     Pending Prescriptions Disp Refills    insulin aspart (NovoLOG FlexPen) 100 UNIT/ML solution pen-injector sc pen 45 mL 1     Sig: Inject 45 Units under the skin into the appropriate area as directed 3 (Three) Times a Day With Meals.       Last Visit: 3/22/23, Aline  Next Visit: 8/24/23, Aline    Pended for [facility] to review, and approve if appropriate.     Virginia Ramirez, PharmD, MM   Clinical Speciality Pharmacist, Endocrinology   7/25/2023  09:50 EDT

## 2023-08-01 ENCOUNTER — SPECIALTY PHARMACY (OUTPATIENT)
Dept: ENDOCRINOLOGY | Age: 53
End: 2023-08-01
Payer: MEDICARE

## 2023-08-24 ENCOUNTER — OFFICE VISIT (OUTPATIENT)
Dept: ENDOCRINOLOGY | Age: 53
End: 2023-08-24
Payer: MEDICARE

## 2023-08-24 VITALS
OXYGEN SATURATION: 98 % | HEIGHT: 70 IN | DIASTOLIC BLOOD PRESSURE: 70 MMHG | TEMPERATURE: 96.2 F | BODY MASS INDEX: 36.71 KG/M2 | WEIGHT: 256.4 LBS | HEART RATE: 95 BPM | SYSTOLIC BLOOD PRESSURE: 130 MMHG

## 2023-08-24 DIAGNOSIS — I25.10 CORONARY ARTERY DISEASE INVOLVING NATIVE CORONARY ARTERY OF NATIVE HEART WITHOUT ANGINA PECTORIS: ICD-10-CM

## 2023-08-24 DIAGNOSIS — E11.65 TYPE 2 DIABETES MELLITUS WITH HYPERGLYCEMIA, WITH LONG-TERM CURRENT USE OF INSULIN: Primary | ICD-10-CM

## 2023-08-24 DIAGNOSIS — E78.2 MIXED HYPERLIPIDEMIA: ICD-10-CM

## 2023-08-24 DIAGNOSIS — Z86.73 H/O: CVA (CEREBROVASCULAR ACCIDENT): ICD-10-CM

## 2023-08-24 DIAGNOSIS — E03.8 HYPOTHYROIDISM DUE TO HASHIMOTO'S THYROIDITIS: ICD-10-CM

## 2023-08-24 DIAGNOSIS — E66.01 CLASS 2 SEVERE OBESITY DUE TO EXCESS CALORIES WITH SERIOUS COMORBIDITY AND BODY MASS INDEX (BMI) OF 36.0 TO 36.9 IN ADULT: ICD-10-CM

## 2023-08-24 DIAGNOSIS — Z79.4 TYPE 2 DIABETES MELLITUS WITH HYPERGLYCEMIA, WITH LONG-TERM CURRENT USE OF INSULIN: Primary | ICD-10-CM

## 2023-08-24 DIAGNOSIS — E06.3 HYPOTHYROIDISM DUE TO HASHIMOTO'S THYROIDITIS: ICD-10-CM

## 2023-08-24 DIAGNOSIS — Z79.4 TYPE 2 DIABETES MELLITUS WITH STAGE 3 CHRONIC KIDNEY DISEASE, WITH LONG-TERM CURRENT USE OF INSULIN, UNSPECIFIED WHETHER STAGE 3A OR 3B CKD: ICD-10-CM

## 2023-08-24 DIAGNOSIS — E11.22 TYPE 2 DIABETES MELLITUS WITH STAGE 3 CHRONIC KIDNEY DISEASE, WITH LONG-TERM CURRENT USE OF INSULIN, UNSPECIFIED WHETHER STAGE 3A OR 3B CKD: ICD-10-CM

## 2023-08-24 DIAGNOSIS — N18.30 TYPE 2 DIABETES MELLITUS WITH STAGE 3 CHRONIC KIDNEY DISEASE, WITH LONG-TERM CURRENT USE OF INSULIN, UNSPECIFIED WHETHER STAGE 3A OR 3B CKD: ICD-10-CM

## 2023-08-24 PROCEDURE — 99214 OFFICE O/P EST MOD 30 MIN: CPT | Performed by: NURSE PRACTITIONER

## 2023-08-24 PROCEDURE — 3078F DIAST BP <80 MM HG: CPT | Performed by: NURSE PRACTITIONER

## 2023-08-24 PROCEDURE — 3075F SYST BP GE 130 - 139MM HG: CPT | Performed by: NURSE PRACTITIONER

## 2023-08-24 PROCEDURE — 3052F HG A1C>EQUAL 8.0%<EQUAL 9.0%: CPT | Performed by: NURSE PRACTITIONER

## 2023-08-24 RX ORDER — SULFAMETHOXAZOLE AND TRIMETHOPRIM 800; 160 MG/1; MG/1
TABLET ORAL
COMMUNITY
Start: 2023-08-16

## 2023-08-24 RX ORDER — DOXYCYCLINE HYCLATE 100 MG/1
CAPSULE ORAL
COMMUNITY
Start: 2023-08-11

## 2023-08-24 NOTE — PROGRESS NOTES
"Chief Complaint  Diabetes (Spinal surgery in  , battery in meter  have not been able to change. )    Subjective        Ricardo Pereira presents to [facility] ENDOCRINOLOGY  History of Present Illness    Patient is in a lot of pain following his spinal surgery, and having complications with the healing- following with provider weekly       Has a hard time affording what he needs to manage his blood sugars     A1c 7.1%    Type 2 dm   Diagnosed about 10 years ago.   Today in clinic pt reports being on Tresiba 140u QAM, Novolog about 45u with meals, farxiga 10 mg po daily.   Does have hx of pancreatitis and so januvia was stopped   nephrectomy secondary to renal cancer 10+ years ago  CAD - yes, follows with cardiology   CVA - yes  Episodes of hypoglycemia - no  On arb and statin with vascepa    Hypoglycemia   Levothyroxine 100mcg daily  TSH 1 2023    Objective   Vital Signs:  /70   Pulse 95   Temp 96.2 øF (35.7 øC) (Temporal)   Ht 177.8 cm (70\")   Wt 116 kg (256 lb 6.4 oz)   SpO2 98%   BMI 36.79 kg/mý   Estimated body mass index is 36.79 kg/mý as calculated from the following:    Height as of this encounter: 177.8 cm (70\").    Weight as of this encounter: 116 kg (256 lb 6.4 oz).         Physical Exam  Vitals reviewed.   Constitutional:       General: He is not in acute distress.  HENT:      Head: Normocephalic and atraumatic.   Cardiovascular:      Rate and Rhythm: Normal rate.   Pulmonary:      Effort: Pulmonary effort is normal. No respiratory distress.   Musculoskeletal:         General: No signs of injury. Normal range of motion.      Cervical back: Normal range of motion and neck supple.   Skin:     General: Skin is warm and dry.   Neurological:      Mental Status: He is alert and oriented to person, place, and time. Mental status is at baseline.   Psychiatric:         Mood and Affect: Mood normal.         Behavior: Behavior normal.         Thought Content: Thought content " normal.         Judgment: Judgment normal.        Result Review :  The following data was reviewed by: MADHU Keane on 08/24/2023:  Common labs          12/5/2022    08:08 3/22/2023    13:21 5/17/2023    09:28   Common Labs   Glucose 152  235     BUN 12  10     Creatinine 1.16  1.22     Sodium 142  140     Potassium 4.3  4.6     Chloride 103  101     Calcium 9.5  9.4     Total Protein 6.7      Albumin 4.30      Total Bilirubin 0.5      Alkaline Phosphatase 93      AST (SGOT) 35      ALT (SGPT) 49      Total Cholesterol 146  226     Triglycerides 234  369     HDL Cholesterol 26  22     LDL Cholesterol  81  136     Hemoglobin A1C 7.40  10.4  8.00    Microalbumin, Urine <3.0      PSA  1.6                    Assessment and Plan   Diagnoses and all orders for this visit:    1. Type 2 diabetes mellitus with hyperglycemia, with long-term current use of insulin (Primary)    2. Mixed hyperlipidemia    3. Hypothyroidism due to Hashimoto's thyroiditis    4. Coronary artery disease involving native coronary artery of native heart without angina pectoris    5. H/O: CVA (cerebrovascular accident)    6. Type 2 diabetes mellitus with stage 3 chronic kidney disease, with long-term current use of insulin, unspecified whether stage 3a or 3b CKD    7. Class 2 severe obesity due to excess calories with serious comorbidity and body mass index (BMI) of 36.0 to 36.9 in adult             Follow Up   Return in about 6 months (around 2/24/2024).    A1c at goal  Continue insulin regimen as above with farxiga  Continue statin and vascepa, no recent lipid panel for review    Thyroid labs stable, continue current t4 dose at 100mcg     Patient was given instructions and counseling regarding his condition or for health maintenance advice. Please see specific information pulled into the AVS if appropriate.     MADHU Keane

## 2023-08-29 DIAGNOSIS — E78.2 MIXED HYPERLIPIDEMIA: ICD-10-CM

## 2023-08-29 RX ORDER — ICOSAPENT ETHYL 1000 MG/1
2 CAPSULE ORAL EVERY 12 HOURS SCHEDULED
Qty: 360 CAPSULE | Refills: 0 | Status: SHIPPED | OUTPATIENT
Start: 2023-08-29

## 2023-09-05 RX ORDER — ROSUVASTATIN CALCIUM 40 MG/1
TABLET, COATED ORAL
Qty: 90 TABLET | Refills: 0 | Status: SHIPPED | OUTPATIENT
Start: 2023-09-05

## 2023-09-08 ENCOUNTER — SPECIALTY PHARMACY (OUTPATIENT)
Dept: ENDOCRINOLOGY | Age: 53
End: 2023-09-08
Payer: MEDICARE

## 2023-09-08 NOTE — PROGRESS NOTES
Specialty Pharmacy Refill Coordination Note     Ricardo is a 53 y.o. male contacted today regarding refills of  3 specialty medication(s).    Reviewed and verified with patient: N  Specialty medication(s) and dose(s) confirmed: yes    Refill Questions      Flowsheet Row Most Recent Value   Changes to allergies? No   Changes to medications? No   New conditions since last clinic visit No   Unplanned office visit, urgent care, ED, or hospital admission in the last 4 weeks  No   How does patient/caregiver feel medication is working? Good   Financial problems or insurance changes  No   Since the previous refill, were any specialty medication doses or scheduled injections missed or delayed?  No   Does this patient require a clinical escalation to a pharmacist? No            Delivery Questions      Flowsheet Row Most Recent Value   Delivery method  at Pharmacy   Delivery address correct? No   Preferred delivery time? Anytime   Number of medications in delivery 3   Medication being filled and delivered farxiga, levothyroxine, novolog   Is there any medication that is due not being filled? No   Cooler needed? No   Do any medications need mixed or dated? No   Copay form of payment Pay at pickup   Shipment status Delivery complete              Medication Adherence    Adherence tools used: alarm  Support network for adherence: family member, healthcare provider          Follow-up: 85 day(s)     Rachell Hernandez, Pharmacy Technician  Specialty Pharmacy Technician

## 2023-09-12 ENCOUNTER — SPECIALTY PHARMACY (OUTPATIENT)
Dept: ENDOCRINOLOGY | Age: 53
End: 2023-09-12
Payer: MEDICARE
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
## 2023-09-15 RX ORDER — INSULIN DEGLUDEC 200 U/ML
INJECTION, SOLUTION SUBCUTANEOUS
Qty: 63 ML | Refills: 0 | Status: SHIPPED | OUTPATIENT
Start: 2023-09-15

## 2023-10-04 RX ORDER — INSULIN ASPART 100 [IU]/ML
45 INJECTION, SOLUTION INTRAVENOUS; SUBCUTANEOUS
Qty: 120 ML | Refills: 1 | Status: SHIPPED | OUTPATIENT
Start: 2023-10-04

## 2023-10-06 ENCOUNTER — SPECIALTY PHARMACY (OUTPATIENT)
Dept: ENDOCRINOLOGY | Age: 53
End: 2023-10-06
Payer: MEDICARE

## 2023-10-06 DIAGNOSIS — E11.65 TYPE 2 DIABETES MELLITUS WITH HYPERGLYCEMIA, WITH LONG-TERM CURRENT USE OF INSULIN: ICD-10-CM

## 2023-10-06 DIAGNOSIS — Z79.4 TYPE 2 DIABETES MELLITUS WITH HYPERGLYCEMIA, WITH LONG-TERM CURRENT USE OF INSULIN: ICD-10-CM

## 2023-10-06 RX ORDER — PEN NEEDLE, DIABETIC 32GX 5/32"
1 NEEDLE, DISPOSABLE MISCELLANEOUS 4 TIMES DAILY
Qty: 400 EACH | Refills: 1 | Status: SHIPPED | OUTPATIENT
Start: 2023-10-06

## 2023-10-06 NOTE — TELEPHONE ENCOUNTER
Specialty Pharmacy Patient Management Program  Prescription Refill Request     Patient currently fills medications at  Pharmacy. Needing refill(s) on the following:      Requested Prescriptions     Pending Prescriptions Disp Refills    Insulin Aspart, w/Niacinamide, (Fiasp FlexTouch) 100 UNIT/ML solution pen-injector 120 mL 1     Sig: Inject 45 Units under the skin into the appropriate area as directed 3 (Three) Times a Day With Meals.       Pended for MADHU Keane to review, and approve if appropriate.     Nadeen Patel, PharmD, BCPS, BCCCP  10/6/2023 14:43 EDT

## 2023-10-06 NOTE — PROGRESS NOTES
Specialty Pharmacy Patient Management Program  Endocrinology Refill Outreach      Ricardo is a 53 y.o. male contacted today regarding refills of his medication(s).    Specialty medication(s) and dose(s) confirmed: Yes    Refill Questions      Flowsheet Row Most Recent Value   Changes to allergies? No   Changes to medications? Yes  [Will dispense Fiasp this time due to Novolog backorder]   New conditions since last clinic visit No   Unplanned office visit, urgent care, ED, or hospital admission in the last 4 weeks  No   How does patient/caregiver feel medication is working? Very good   Financial problems or insurance changes  No   Since the previous refill, were any specialty medication doses or scheduled injections missed or delayed?  No   Does this patient require a clinical escalation to a pharmacist? No          Delivery Questions      Flowsheet Row Most Recent Value   Delivery method Other (Comment)  [BeeLine]   Delivery address correct? Yes   Delivery phone number [phone]   Preferred delivery time? Anytime   Number of medications in delivery 2   Medication(s) being filled and delivered Insulin Aspart (W/Niacinamide), Insulin Pen Needle   Is there any medication that is due not being filled? No   Supplies needed? No supplies needed   Cooler needed? Yes   Do any medications need mixed or dated? No   Additional comments $0   Questions or concerns for the pharmacist? No   Are any medications first time fills? No   Shipment status Delivery complete            Medication Adherence          Adherence tools used: alarm      Support network for adherence: family member, healthcare provider               Follow-Up: in 3 months    Nadeen Patel PharmD, BCPS, BCCCP  10/9/2023 16:01 EDT

## 2023-10-09 RX ORDER — INSULIN ASPART INJECTION 100 [IU]/ML
45 INJECTION, SOLUTION SUBCUTANEOUS
Qty: 120 ML | Refills: 1 | Status: SHIPPED | OUTPATIENT
Start: 2023-10-09

## 2023-11-13 ENCOUNTER — OFFICE VISIT (OUTPATIENT)
Dept: FAMILY MEDICINE CLINIC | Facility: CLINIC | Age: 53
End: 2023-11-13
Payer: MEDICARE

## 2023-11-13 ENCOUNTER — TELEPHONE (OUTPATIENT)
Dept: FAMILY MEDICINE CLINIC | Facility: CLINIC | Age: 53
End: 2023-11-13

## 2023-11-13 VITALS
HEIGHT: 70 IN | TEMPERATURE: 97.7 F | BODY MASS INDEX: 36.51 KG/M2 | SYSTOLIC BLOOD PRESSURE: 150 MMHG | HEART RATE: 79 BPM | OXYGEN SATURATION: 93 % | WEIGHT: 255 LBS | DIASTOLIC BLOOD PRESSURE: 76 MMHG

## 2023-11-13 DIAGNOSIS — J40 BRONCHITIS: ICD-10-CM

## 2023-11-13 DIAGNOSIS — R05.9 COUGH, UNSPECIFIED TYPE: Primary | ICD-10-CM

## 2023-11-13 LAB
EXPIRATION DATE: NORMAL
FLUAV AG UPPER RESP QL IA.RAPID: NOT DETECTED
FLUBV AG UPPER RESP QL IA.RAPID: NOT DETECTED
INTERNAL CONTROL: NORMAL
Lab: NORMAL
SARS-COV-2 AG UPPER RESP QL IA.RAPID: NOT DETECTED

## 2023-11-13 PROCEDURE — 1159F MED LIST DOCD IN RCRD: CPT | Performed by: NURSE PRACTITIONER

## 2023-11-13 PROCEDURE — 3077F SYST BP >= 140 MM HG: CPT | Performed by: NURSE PRACTITIONER

## 2023-11-13 PROCEDURE — 99213 OFFICE O/P EST LOW 20 MIN: CPT | Performed by: NURSE PRACTITIONER

## 2023-11-13 PROCEDURE — 3052F HG A1C>EQUAL 8.0%<EQUAL 9.0%: CPT | Performed by: NURSE PRACTITIONER

## 2023-11-13 PROCEDURE — 87428 SARSCOV & INF VIR A&B AG IA: CPT | Performed by: NURSE PRACTITIONER

## 2023-11-13 PROCEDURE — 1160F RVW MEDS BY RX/DR IN RCRD: CPT | Performed by: NURSE PRACTITIONER

## 2023-11-13 PROCEDURE — 3078F DIAST BP <80 MM HG: CPT | Performed by: NURSE PRACTITIONER

## 2023-11-13 RX ORDER — ALBUTEROL SULFATE 90 UG/1
2 AEROSOL, METERED RESPIRATORY (INHALATION) EVERY 4 HOURS PRN
Qty: 8 G | Refills: 2 | Status: SHIPPED | OUTPATIENT
Start: 2023-11-13

## 2023-11-13 RX ORDER — ALBUTEROL SULFATE 2.5 MG/3ML
2.5 SOLUTION RESPIRATORY (INHALATION) EVERY 4 HOURS PRN
Qty: 540 ML | Refills: 12 | Status: SHIPPED | OUTPATIENT
Start: 2023-11-13

## 2023-11-13 RX ORDER — PREDNISONE 5 MG/1
1 TABLET ORAL DAILY
Qty: 21 TABLET | Refills: 0 | Status: SHIPPED | OUTPATIENT
Start: 2023-11-13

## 2023-11-13 RX ORDER — DEXTROMETHORPHAN HYDROBROMIDE AND PROMETHAZINE HYDROCHLORIDE 15; 6.25 MG/5ML; MG/5ML
5 SYRUP ORAL 4 TIMES DAILY PRN
Qty: 118 ML | Refills: 0 | Status: SHIPPED | OUTPATIENT
Start: 2023-11-13

## 2023-11-13 RX ORDER — AZITHROMYCIN 250 MG/1
TABLET, FILM COATED ORAL
Qty: 6 TABLET | Refills: 0 | Status: SHIPPED | OUTPATIENT
Start: 2023-11-13

## 2023-11-13 NOTE — TELEPHONE ENCOUNTER
Pharmacy Name:  [facility]     Reference Number (if applicable): N/A    Pharmacy representative name: PARIS     Pharmacy representative phone number: [phone]     What medication are you calling in regards to: PREDNISONE STEROID CY     What question does the pharmacy have: THEY DO NOT HAVE THIS IN STOCK, BUT CAN DO LOOSE TABLETS AND LIST DIRECTIONS ON THE MEDICATION, TO TAKE AS IF IT IS A CY.    Who is the provider that prescribed the medication: MAURY HERNANDEZ     Additional notes:     PLEASE ADVISE

## 2023-11-13 NOTE — PROGRESS NOTES
"Chief Complaint  Cough    Subjective        Ricardo A Pool presents to [facility]  History of Present Illness  Patient states he started to feel bad on Saturday with coughing and phlegm. Denies fever, but does have shortness of breath. Has a history of COPD. Using otc meds including dayquil.   Objective   Vital Signs:  /76 (BP Location: Left arm, Patient Position: Sitting, Cuff Size: Large Adult)   Pulse 79   Temp 97.7 °F (36.5 °C) (Temporal)   Ht 177.8 cm (70\")   Wt 116 kg (255 lb)   SpO2 93%   BMI 36.59 kg/m²   Estimated body mass index is 36.59 kg/m² as calculated from the following:    Height as of this encounter: 177.8 cm (70\").    Weight as of this encounter: 116 kg (255 lb).               Physical Exam  Constitutional:       Appearance: Normal appearance.   HENT:      Head: Normocephalic.   Eyes:      Extraocular Movements: Extraocular movements intact.      Pupils: Pupils are equal, round, and reactive to light.   Cardiovascular:      Rate and Rhythm: Normal rate and regular rhythm.   Pulmonary:      Breath sounds: Wheezing and rhonchi present.   Musculoskeletal:         General: Normal range of motion.      Cervical back: Normal range of motion.   Skin:     General: Skin is warm and dry.   Neurological:      General: No focal deficit present.      Mental Status: He is alert and oriented to person, place, and time.   Psychiatric:         Mood and Affect: Mood normal.        Result Review :          SARS Antigen  Not Detected, Presumptive Negative Not Detected   Influenza A Antigen KAYE  Not Detected Not Detected   Influenza B Antigen KAYE  Not Detected Not Detected            Assessment and Plan   Diagnoses and all orders for this visit:    1. Cough, unspecified type (Primary)  -     POCT SARS-CoV-2 Antigen KAYE + Flu  -     azithromycin (Zithromax Z-Ciaran) 250 MG tablet; Take 2 tablets by mouth on day 1, then 1 tablet daily on days 2-5  Dispense: 6 tablet; Refill: " 0    2. Bronchitis  -     predniSONE 5 MG (21) tablet therapy pack dose pack; Take 1 tablet by mouth Daily. Take as directed on package instructions.  Dispense: 21 tablet; Refill: 0  -     promethazine-dextromethorphan (PROMETHAZINE-DM) 6.25-15 MG/5ML syrup; Take 5 mL by mouth 4 (Four) Times a Day As Needed for Cough.  Dispense: 118 mL; Refill: 0  -     albuterol sulfate  (90 Base) MCG/ACT inhaler; Inhale 2 puffs Every 4 (Four) Hours As Needed for Wheezing.  Dispense: 8 g; Refill: 2  -     albuterol (PROVENTIL) (2.5 MG/3ML) 0.083% nebulizer solution; Take 2.5 mg by nebulization Every 4 (Four) Hours As Needed for Wheezing.  Dispense: 540 mL; Refill: 12             Follow Up   Return if symptoms worsen or fail to improve.  Patient was given instructions and counseling regarding his condition or for health maintenance advice. Please see specific information pulled into the AVS if appropriate.

## 2023-11-25 DIAGNOSIS — E78.2 MIXED HYPERLIPIDEMIA: ICD-10-CM

## 2023-11-27 RX ORDER — ICOSAPENT ETHYL 1000 MG/1
2 CAPSULE ORAL EVERY 12 HOURS SCHEDULED
Qty: 360 CAPSULE | Refills: 0 | Status: SHIPPED | OUTPATIENT
Start: 2023-11-27

## 2023-11-27 NOTE — TELEPHONE ENCOUNTER
Rx Refill Note  Requested Prescriptions     Pending Prescriptions Disp Refills    Vascepa 1 g capsule capsule [Pharmacy Med Name: Vascepa 1 GM Oral Capsule] 360 capsule 0     Sig: Take 2 capsules by mouth twice daily      Last office visit with prescribing clinician: 8/24/2023   Last telemedicine visit with prescribing clinician: Visit date not found   Next office visit with prescribing clinician: 2/26/2024                         Would you like a call back once the refill request has been completed: [] Yes [] No    If the office needs to give you a call back, can they leave a voicemail: [] Yes [] No    Hanh Sharp  11/27/23, 09:40 EST

## 2023-12-04 DIAGNOSIS — E11.65 TYPE 2 DIABETES MELLITUS WITH HYPERGLYCEMIA, WITH LONG-TERM CURRENT USE OF INSULIN: Primary | ICD-10-CM

## 2023-12-04 DIAGNOSIS — E03.8 HYPOTHYROIDISM DUE TO HASHIMOTO'S THYROIDITIS: ICD-10-CM

## 2023-12-04 DIAGNOSIS — E06.3 HYPOTHYROIDISM DUE TO HASHIMOTO'S THYROIDITIS: ICD-10-CM

## 2023-12-04 DIAGNOSIS — Z79.4 TYPE 2 DIABETES MELLITUS WITH HYPERGLYCEMIA, WITH LONG-TERM CURRENT USE OF INSULIN: Primary | ICD-10-CM

## 2023-12-04 RX ORDER — LEVOTHYROXINE SODIUM 0.1 MG/1
100 TABLET ORAL DAILY
Qty: 90 TABLET | Refills: 0 | Status: SHIPPED | OUTPATIENT
Start: 2023-12-04

## 2023-12-04 RX ORDER — DAPAGLIFLOZIN 10 MG/1
1 TABLET, FILM COATED ORAL DAILY
Qty: 90 TABLET | Refills: 0 | Status: SHIPPED | OUTPATIENT
Start: 2023-12-04

## 2023-12-05 RX ORDER — ROSUVASTATIN CALCIUM 40 MG/1
TABLET, COATED ORAL
Qty: 90 TABLET | Refills: 0 | Status: SHIPPED | OUTPATIENT
Start: 2023-12-05

## 2023-12-05 NOTE — TELEPHONE ENCOUNTER
Rx Refill Note  Requested Prescriptions     Pending Prescriptions Disp Refills    rosuvastatin (CRESTOR) 40 MG tablet [Pharmacy Med Name: Rosuvastatin Calcium 40 MG Oral Tablet] 90 tablet 0     Sig: Take 1 tablet by mouth once daily      Last office visit with prescribing clinician: 8/24/2023   Last telemedicine visit with prescribing clinician: Visit date not found   Next office visit with prescribing clinician: 2/26/2024                         Would you like a call back once the refill request has been completed: [] Yes [] No    If the office needs to give you a call back, can they leave a voicemail: [] Yes [] No    Hanh Sharp  12/05/23, 08:13 EST

## 2023-12-06 ENCOUNTER — SPECIALTY PHARMACY (OUTPATIENT)
Dept: ENDOCRINOLOGY | Age: 53
End: 2023-12-06
Payer: MEDICARE

## 2023-12-06 NOTE — PROGRESS NOTES
Specialty Pharmacy Refill Coordination Note     Ricardo is a 53 y.o. male contacted today regarding refills of  2 specialty medication(s).    Reviewed and verified with patient:       Specialty medication(s) and dose(s) confirmed: yes    Refill Questions      Flowsheet Row Most Recent Value   Changes to allergies? No   Changes to medications? No   New conditions since last clinic visit No   Unplanned office visit, urgent care, ED, or hospital admission in the last 4 weeks  No   How does patient/caregiver feel medication is working? Good   Financial problems or insurance changes  No   Since the previous refill, were any specialty medication doses or scheduled injections missed or delayed?  No   Does this patient require a clinical escalation to a pharmacist? No            Delivery Questions      Flowsheet Row Most Recent Value   Delivery method Other (Comment)  [beeline]   Delivery address correct? Yes   Delivery phone number [phone]   Preferred delivery time? Anytime   Number of medications in delivery 2   Medication(s) being filled and delivered Dapagliflozin Propanediol, Levothyroxine Sodium (Thyroid Hormones)   Doses left of specialty medications 1 week   Is there any medication that is due not being filled? No   Supplies needed? No supplies needed   Cooler needed? No   Do any medications need mixed or dated? No   Copay form of payment Credit card on file   Additional comments $0   Questions or concerns for the pharmacist? No   Are any medications first time fills? No   Shipment status Delivery complete              Medication Adherence          Adherence tools used: alarm      Support network for adherence: family member, healthcare provider                Follow-up: 85 day(s)     Rachell Hernandez, Pharmacy Technician  Specialty Pharmacy Technician

## 2023-12-14 DIAGNOSIS — I10 ESSENTIAL HYPERTENSION: ICD-10-CM

## 2023-12-14 RX ORDER — OLMESARTAN MEDOXOMIL 40 MG/1
TABLET ORAL
Qty: 90 TABLET | Refills: 0 | Status: SHIPPED | OUTPATIENT
Start: 2023-12-14

## 2023-12-15 RX ORDER — INSULIN DEGLUDEC 200 U/ML
INJECTION, SOLUTION SUBCUTANEOUS
Qty: 63 ML | Refills: 0 | Status: SHIPPED | OUTPATIENT
Start: 2023-12-15

## 2023-12-15 NOTE — TELEPHONE ENCOUNTER
Rx Refill Note  Requested Prescriptions     Pending Prescriptions Disp Refills    Tresiba FlexTouch 200 UNIT/ML solution pen-injector pen injection [Pharmacy Med Name: Tresiba FlexTouch 200 UNIT/ML Subcutaneous Solution Pen-injector] 63 mL 0     Sig: INJECT 140 UNITS SUBCUTANEOUSLY ONCE DAILY IN THE MORNING AS DIRECTED      Last office visit with prescribing clinician: 8/24/2023   Last telemedicine visit with prescribing clinician: Visit date not found   Next office visit with prescribing clinician: 2/26/2024                         Would you like a call back once the refill request has been completed: [] Yes [] No    If the office needs to give you a call back, can they leave a voicemail: [] Yes [] No    Hanh Sharp  12/15/23, 09:14 EST

## 2024-01-05 ENCOUNTER — SPECIALTY PHARMACY (OUTPATIENT)
Dept: ENDOCRINOLOGY | Age: 54
End: 2024-01-05
Payer: MEDICARE

## 2024-01-05 NOTE — PROGRESS NOTES
Specialty Pharmacy Refill Coordination Note     Ricardo is a 53 y.o. male contacted today regarding refills of  1 specialty medication(s).    Reviewed and verified with patient:       Specialty medication(s) and dose(s) confirmed: yes    Refill Questions      Flowsheet Row Most Recent Value   Changes to allergies? No   Changes to medications? No   New conditions or infections since last clinic visit No   Unplanned office visit, urgent care, ED, or hospital admission in the last 4 weeks  No   How does patient/caregiver feel medication is working? Good   Financial problems or insurance changes  No   Since the previous refill, were any specialty medication doses or scheduled injections missed or delayed?  No   Does this patient require a clinical escalation to a pharmacist? No            Delivery Questions      Flowsheet Row Most Recent Value   Delivery method Beeline   Delivery address verified with patient/caregiver? Yes   Delivery address Home   Number of medications in delivery 1   Medication(s) being filled and delivered Insulin Aspart (W/Niacinamide)   Doses left of specialty medications 1 week   Copay verified? Yes   Copay amount 11.20   Copay form of payment Credit/debit on file              Medication Adherence          Adherence tools used: alarm      Support network for adherence: family member, healthcare provider                Follow-up: 85 day(s)     Rachell Hernandez, Pharmacy Technician  Specialty Pharmacy Technician

## 2024-02-21 DIAGNOSIS — E78.2 MIXED HYPERLIPIDEMIA: ICD-10-CM

## 2024-02-21 RX ORDER — ICOSAPENT ETHYL 1000 MG/1
2 CAPSULE ORAL EVERY 12 HOURS SCHEDULED
Qty: 360 CAPSULE | Refills: 0 | Status: SHIPPED | OUTPATIENT
Start: 2024-02-21

## 2024-02-21 NOTE — TELEPHONE ENCOUNTER
Rx Refill Note  Requested Prescriptions     Pending Prescriptions Disp Refills    Vascepa 1 g capsule capsule [Pharmacy Med Name: Vascepa 1 GM Oral Capsule] 360 capsule 0     Sig: Take 2 capsules by mouth twice daily      Last office visit with prescribing clinician: 8/24/2023   Last telemedicine visit with prescribing clinician: Visit date not found   Next office visit with prescribing clinician: 3/27/2024                         Would you like a call back once the refill request has been completed: [] Yes [] No    If the office needs to give you a call back, can they leave a voicemail: [] Yes [] No    Hanh Sharp  02/21/24, 09:35 EST

## 2024-02-29 DIAGNOSIS — Z79.4 TYPE 2 DIABETES MELLITUS WITH HYPERGLYCEMIA, WITH LONG-TERM CURRENT USE OF INSULIN: ICD-10-CM

## 2024-02-29 DIAGNOSIS — E06.3 HYPOTHYROIDISM DUE TO HASHIMOTO'S THYROIDITIS: ICD-10-CM

## 2024-02-29 DIAGNOSIS — E11.65 TYPE 2 DIABETES MELLITUS WITH HYPERGLYCEMIA, WITH LONG-TERM CURRENT USE OF INSULIN: ICD-10-CM

## 2024-02-29 DIAGNOSIS — E03.8 HYPOTHYROIDISM DUE TO HASHIMOTO'S THYROIDITIS: ICD-10-CM

## 2024-02-29 RX ORDER — LEVOTHYROXINE SODIUM 0.1 MG/1
100 TABLET ORAL DAILY
Qty: 30 TABLET | Refills: 0 | Status: SHIPPED | OUTPATIENT
Start: 2024-02-29

## 2024-02-29 NOTE — TELEPHONE ENCOUNTER
Rx Refill Note  Requested Prescriptions     Pending Prescriptions Disp Refills    dapagliflozin Propanediol (Farxiga) 10 MG tablet 90 tablet 0     Sig: Take 10 mg by mouth Daily. Indications: Type 2 Diabetes    levothyroxine (Synthroid) 100 MCG tablet 90 tablet 0     Sig: Take 1 tablet by mouth Daily. Indications: Underactive Thyroid      Last office visit with prescribing clinician: 8/24/2023   Last telemedicine visit with prescribing clinician: Visit date not found   Next office visit with prescribing clinician: 3/27/2024                         Would you like a call back once the refill request has been completed: [] Yes [] No    If the office needs to give you a call back, can they leave a voicemail: [] Yes [] No    Jduie Sharp MA  02/29/24, 09:22 EST

## 2024-03-04 ENCOUNTER — SPECIALTY PHARMACY (OUTPATIENT)
Dept: ENDOCRINOLOGY | Age: 54
End: 2024-03-04
Payer: MEDICARE

## 2024-03-04 RX ORDER — ROSUVASTATIN CALCIUM 40 MG/1
TABLET, COATED ORAL
Qty: 30 TABLET | Refills: 0 | Status: SHIPPED | OUTPATIENT
Start: 2024-03-04

## 2024-03-04 NOTE — TELEPHONE ENCOUNTER
Rx Refill Note  Requested Prescriptions     Pending Prescriptions Disp Refills    rosuvastatin (CRESTOR) 40 MG tablet [Pharmacy Med Name: Rosuvastatin Calcium 40 MG Oral Tablet] 90 tablet 0     Sig: Take 1 tablet by mouth once daily      Last office visit with prescribing clinician: 8/24/2023   Last telemedicine visit with prescribing clinician: Visit date not found   Next office visit with prescribing clinician: 3/27/2024                         Would you like a call back once the refill request has been completed: [] Yes [] No    If the office needs to give you a call back, can they leave a voicemail: [] Yes [] No    Judie Sharp MA  03/04/24, 09:23 EST
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
## 2024-03-13 ENCOUNTER — TELEPHONE (OUTPATIENT)
Dept: ENDOCRINOLOGY | Age: 54
End: 2024-03-13
Payer: MEDICARE

## 2024-03-13 DIAGNOSIS — E06.3 HYPOTHYROIDISM DUE TO HASHIMOTO'S THYROIDITIS: ICD-10-CM

## 2024-03-13 DIAGNOSIS — E03.8 HYPOTHYROIDISM DUE TO HASHIMOTO'S THYROIDITIS: ICD-10-CM

## 2024-03-13 DIAGNOSIS — E11.65 TYPE 2 DIABETES MELLITUS WITH HYPERGLYCEMIA, WITH LONG-TERM CURRENT USE OF INSULIN: Primary | ICD-10-CM

## 2024-03-13 DIAGNOSIS — Z79.4 TYPE 2 DIABETES MELLITUS WITH HYPERGLYCEMIA, WITH LONG-TERM CURRENT USE OF INSULIN: Primary | ICD-10-CM

## 2024-03-20 ENCOUNTER — OFFICE VISIT (OUTPATIENT)
Dept: FAMILY MEDICINE CLINIC | Facility: CLINIC | Age: 54
End: 2024-03-20
Payer: MEDICARE

## 2024-03-20 VITALS
DIASTOLIC BLOOD PRESSURE: 68 MMHG | SYSTOLIC BLOOD PRESSURE: 110 MMHG | HEIGHT: 70 IN | TEMPERATURE: 97.1 F | HEART RATE: 92 BPM | OXYGEN SATURATION: 94 % | BODY MASS INDEX: 36.85 KG/M2 | WEIGHT: 257.4 LBS

## 2024-03-20 DIAGNOSIS — Z23 IMMUNIZATION DUE: ICD-10-CM

## 2024-03-20 DIAGNOSIS — Z79.4 TYPE 2 DIABETES MELLITUS WITH HYPERGLYCEMIA, WITH LONG-TERM CURRENT USE OF INSULIN: ICD-10-CM

## 2024-03-20 DIAGNOSIS — E11.65 TYPE 2 DIABETES MELLITUS WITH HYPERGLYCEMIA, WITH LONG-TERM CURRENT USE OF INSULIN: ICD-10-CM

## 2024-03-20 DIAGNOSIS — Z00.00 MEDICARE ANNUAL WELLNESS VISIT, SUBSEQUENT: Primary | ICD-10-CM

## 2024-03-20 DIAGNOSIS — E03.8 HYPOTHYROIDISM DUE TO HASHIMOTO'S THYROIDITIS: ICD-10-CM

## 2024-03-20 DIAGNOSIS — E06.3 HYPOTHYROIDISM DUE TO HASHIMOTO'S THYROIDITIS: ICD-10-CM

## 2024-03-20 PROBLEM — E66.9 OBESITY (BMI 35.0-39.9 WITHOUT COMORBIDITY): Status: ACTIVE | Noted: 2024-03-20

## 2024-03-20 PROBLEM — I63.9 CEREBRAL INFARCTION: Status: ACTIVE | Noted: 2024-03-20

## 2024-03-20 RX ORDER — EZETIMIBE 10 MG/1
1 TABLET ORAL DAILY
COMMUNITY
Start: 2023-12-05

## 2024-03-20 NOTE — PROGRESS NOTES
The ABCs of the Annual Wellness Visit  Subsequent Medicare Wellness Visit    Subjective    Ricardo Pereira is a 53 y.o. male who presents for a Subsequent Medicare Wellness Visit.    Follow-up for hypertension.  Currently, has been feeling well and asymptomatic without any headaches, vision changes, cough, chest pain, shortness of breath, swelling, focal neurologic deficit, memory loss or syncope.  Has been taking the medications regularly and adherent with the regimen olmesartan 40 mg daily.  Denies medication side effects and no significant interval events.       Here for follow-up of diabetes.  Followed by Dr. Gottlieb endocrinology patient states to have been compliant with medications.  Blood sugar monitoring - patient states has been running on average 140.  No episodes of hypoglycemia, nausea, vomiting, new rashes, syncope or other issues.  Denies any difficulties with the current medication regimen of Farxiga 10 mg daily, Tresiba 200 units/mL 140U, and Fiasp 45U TID.  Last A1c on 6/29/2023 of 7.1% and 5/17/2023 of 8.0%     History of coronary artery disease with stent placed in the RCA 6/10/2021.  He continues with his Plavix 75 mg daily and aspirin 81 mg daily.  Still seeing cardiology Dr Carrillo.     History of hyper cholesterol.  Currently, has been feeling well without any myalgias, muscle aches, weakness, numbness, chest pain, short of breath or other issues.  Currently, is adherent with medication regimen of Zetia 10 mg daily, rosuvastatin 40 mg daily and Vascepa 2 g 2 times daily that is and denies medication side effects.  Last lipid panel performed on 12/5/2022.     History of hypo-thyroid.  Followed by endocrinology Dr. Gottlieb.  Denies fatigue, weakness, constipation/diarrhea, hair/skin changes, weight gain/loss, depression/anxiety, rashes, palpitations, swelling, chest pain, shortness of breath or other issues.  Has been compliant with taking the medication of levothyroxine 100 mcg daily with no recent  changes.  Denies any difficulty with the current medication.  Last TSH on 6/9/2023.     History of GERD tolerating with the pantoprazole 40 mg daily.     History of vitamin D deficiency on no medication currently.     History of diabetic peripheral neuropathy and has tried lyrica, gabapentin with no relief.  Then tried cymbalta with severe N, Diarrhea and syncopal episode.  Had spinal stimulator placed 6/2023 Dr Parker and the leg pain improved but now with pain in the back.     The following portions of the patient's history were reviewed and updated as appropriate: allergies, current medications, past family history, past medical history, past social history, past surgical history, and problem list.    Compared to one year ago, the patient feels his physical health is the same.    Compared to one year ago, the patient feels his mental health is the same.    Recent Hospitalizations:  He was not admitted to the hospital during the last year.     Current Medical Providers:  Patient Care Team:  Simba Moore MD as PCP - General (Internal Medicine)  Avel Gottlieb MD as Consulting Physician (Endocrinology)  Favian Carrillo MD as Consulting Physician (Cardiology)    Outpatient Medications Prior to Visit   Medication Sig Dispense Refill    albuterol (PROVENTIL) (2.5 MG/3ML) 0.083% nebulizer solution Take 2.5 mg by nebulization Every 4 (Four) Hours As Needed for Wheezing. 540 mL 12    albuterol sulfate  (90 Base) MCG/ACT inhaler Inhale 2 puffs Every 4 (Four) Hours As Needed for Wheezing. 8 g 2    aspirin 81 MG chewable tablet Chew 1 tablet Daily.      clopidogrel (PLAVIX) 75 MG tablet Take 1 tablet by mouth once daily 90 tablet 3    dapagliflozin Propanediol (Farxiga) 10 MG tablet Take 10 mg by mouth Daily. Indications: Type 2 Diabetes 30 tablet 0    ezetimibe (ZETIA) 10 MG tablet Take 1 tablet by mouth Daily.      Insulin Aspart FlexPen 100 UNIT/ML solution pen-injector Inject 45 Units under the skin  into the appropriate area as directed 3 (Three) Times a Day With Meals. 120 mL 1    Insulin Aspart, w/Niacinamide, (Fiasp FlexTouch) 100 UNIT/ML solution pen-injector Inject 45 Units under the skin into the appropriate area as directed 3 (Three) Times a Day With Meals. 120 mL 1    Insulin Degludec (TRESIBA FLEXTOUCH SC) Inject 140 Units under the skin into the appropriate area as directed Every Morning.      Insulin Pen Needle (BD Pen Needle Maribel 2nd Gen) 32G X 4 MM misc Use 1 each 4 (Four) Times a Day for insulin injections 400 each 1    levothyroxine (Synthroid) 100 MCG tablet Take 1 tablet by mouth Daily. Indications: Underactive Thyroid 30 tablet 0    olmesartan (BENICAR) 40 MG tablet Take 1 tablet by mouth once daily 90 tablet 0    pantoprazole (PROTONIX) 40 MG EC tablet Take 1 tablet by mouth Daily. 90 tablet 3    rosuvastatin (CRESTOR) 40 MG tablet Take 1 tablet by mouth once daily 30 tablet 0    Tresiba FlexTouch 200 UNIT/ML solution pen-injector pen injection INJECT 140 UNITS SUBCUTANEOUSLY ONCE DAILY IN THE MORNING AS DIRECTED 63 mL 0    Vascepa 1 g capsule capsule Take 2 capsules by mouth twice daily 360 capsule 0    clotrimazole (LOTRIMIN) 1 % cream Apply 1 application topically to the appropriate area as directed 2 (Two) Times a Day. 28 g 1     No facility-administered medications prior to visit.     No opioid medication identified on active medication list. I have reviewed chart for other potential  high risk medication/s and harmful drug interactions in the elderly.      Aspirin is on active medication list. Aspirin use is indicated based on review of current medical condition/s. Pros and cons of this therapy have been discussed today. Benefits of this medication outweigh potential harm.  Patient has been encouraged to continue taking this medication.  .    Patient Active Problem List   Diagnosis    DM2 (diabetes mellitus, type 2)    Vitamin D deficiency    Chronic pancreatitis    Mixed hyperlipidemia  "   Abnormal endocrine laboratory test finding    Secondary hypothyroidism    Hyperglycemia    Essential hypertension    COPD (chronic obstructive pulmonary disease)    Acute exacerbation of chronic obstructive pulmonary disease (COPD)    Hypothyroidism    Class 2 obesity with body mass index (BMI) of 36.0 to 36.9 in adult    COPD exacerbation    Hyperosmolar (nonketotic) coma    Acute diverticulitis    REGAN (obstructive sleep apnea)    History of renal cell carcinoma    H/O unilateral nephrectomy    Hospital discharge follow-up    Hyponatremia    Diabetic neuropathy    Onychomycosis    Severe lumbar pain    Back pain with radiculopathy    Chronic right flank pain    Bronchitis    Medicare annual wellness visit, subsequent    Claudication    Cancer of kidney    Abscess of back    COVID-19 virus infection    Anxiety    Coronary artery disease involving coronary bypass graft of native heart with unstable angina pectoris    Moderate episode of recurrent major depressive disorder    Infected sebaceous cyst of skin    Chest pain    Cerebral infarction    Obesity (BMI 35.0-39.9 without comorbidity)     Advance Care Planning   Advance Care Planning     Advance Directive is not on file.  ACP discussion was held with the patient during this visit. Patient does not have an advance directive, declines further assistance.     Objective    Vitals:    03/20/24 1515   BP: 110/68   BP Location: Left arm   Patient Position: Sitting   Cuff Size: Large Adult   Pulse: 92   Temp: 97.1 °F (36.2 °C)   TempSrc: Temporal   SpO2: 94%   Weight: 117 kg (257 lb 6.4 oz)   Height: 177.8 cm (70\")     Estimated body mass index is 36.93 kg/m² as calculated from the following:    Height as of this encounter: 177.8 cm (70\").    Weight as of this encounter: 117 kg (257 lb 6.4 oz).    Does the patient have evidence of cognitive impairment? No, Mini-cog 5 out of 5 score        HEALTH RISK ASSESSMENT    Smoking Status:  Social History     Tobacco Use "   Smoking Status Some Days    Types: Cigars    Passive exposure: Current   Smokeless Tobacco Never   Tobacco Comments    occasional cigar use     Alcohol Consumption:  Social History     Substance and Sexual Activity   Alcohol Use Never     Fall Risk Screen:    STEADI Fall Risk Assessment was completed, and patient is at LOW risk for falls.Assessment completed on:3/20/2024    Depression Screening:      3/20/2024     3:17 PM   PHQ-2/PHQ-9 Depression Screening   Little Interest or Pleasure in Doing Things 0-->not at all   Feeling Down, Depressed or Hopeless 0-->not at all   PHQ-9: Brief Depression Severity Measure Score 0       Health Habits and Functional and Cognitive Screening:      3/20/2024     3:17 PM   Functional & Cognitive Status   Do you have difficulty preparing food and eating? No   Do you have difficulty bathing yourself, getting dressed or grooming yourself? No   Do you have difficulty using the toilet? No   Do you have difficulty moving around from place to place? No   Do you have trouble with steps or getting out of a bed or a chair? No   Dental Exam Not up to date   Eye Exam Up to date   Exercise (times per week) 0 times per week   Current Exercises Include No Regular Exercise   Do you need help using the phone?  No   Are you deaf or do you have serious difficulty hearing?  No   Do you need help to go to places out of walking distance? No   Do you need help shopping? No   Do you need help preparing meals?  No   Do you need help with housework?  No   Do you need help with laundry? No   Do you need help taking your medications? No   Do you need help managing money? No   Do you ever drive or ride in a car without wearing a seat belt? No   Have you felt unusual stress, anger or loneliness in the last month? No   Who do you live with? Spouse   If you need help, do you have trouble finding someone available to you? No   Have you been bothered in the last four weeks by sexual problems? No   Do you have  difficulty concentrating, remembering or making decisions? No       Age-appropriate Screening Schedule:  Refer to the list below for future screening recommendations based on patient's age, sex and/or medical conditions. Orders for these recommended tests are listed in the plan section. The patient has been provided with a written plan.    Health Maintenance   Topic Date Due    DIABETIC EYE EXAM  Never done    Hepatitis B (1 of 3 - 19+ 3-dose series) Never done    ZOSTER VACCINE (1 of 2) Never done    COLORECTAL CANCER SCREENING  03/19/2023    URINE MICROALBUMIN  12/05/2023    HEMOGLOBIN A1C  12/09/2023    LIPID PANEL  03/22/2024    INFLUENZA VACCINE  03/31/2024 (Originally 8/1/2023)    ANNUAL WELLNESS VISIT  03/20/2025    DIABETIC FOOT EXAM  03/20/2025    BMI FOLLOWUP  03/20/2025    TDAP/TD VACCINES (3 - Td or Tdap) 10/31/2032    HEPATITIS C SCREENING  Completed    COVID-19 Vaccine  Completed    Pneumococcal Vaccine 0-64  Completed        CMS Preventative Services Quick Reference  Risk Factors Identified During Encounter  Immunizations Discussed/Encouraged: Shingrix and COVID19  The above risks/problems have been discussed with the patient.  Pertinent information has been shared with the patient in the After Visit Summary.  An After Visit Summary and PPPS were made available to the patient.  Diagnoses and all orders for this visit:    1. Medicare annual wellness visit, subsequent (Primary)    2. Immunization due  -     COVID-19 F23 (Pfizer) 12yrs+ (COMIRNATY)      Annual wellness visit reviewed with patient.  All past history, medications, social history, and problem list were reviewed.  Discussed advanced directives and living will.  Patient has living will: Living will: no and information packet given to patient to complete at home and bring copy to office.  Discussed fall risk and precautions encourage removing throw rugs and using grab bars within the home and bathroom.  Will check the labs as ordered above to  evaluate the blood sugars, kidney, liver, cholesterol for screening.  Discussed flu shot recommended to get the high-dose influenza vaccine annually in the fall.  The patient has a COVID HM Topic on their chart, and they are fully vaccinated.  Prevnar-13 and pneumovax-23 up to date and appropriate.  Covid booster and Shingrix vaccination series recommended.  Encourage follow-up with the eye doctor on annual basis for glaucoma evaluation.  Discussed weight and encouraged exercise as tolerated while following a healthy diet.  Colon cancer screening discussed and current status:  aubrie done 3/18/23 negative with repeat every 3 years.  Discussed prostate screening for which he is currently seeing urology.  Follow up with current specialists as needed.    Follow Up:   Next Medicare Wellness visit to be scheduled in 1 year.

## 2024-03-21 LAB
ALBUMIN SERPL-MCNC: 4.3 G/DL (ref 3.5–5.2)
ALBUMIN/CREAT UR: 6 MG/G CREAT (ref 0–29)
ALBUMIN/GLOB SERPL: 2.2 G/DL
ALP SERPL-CCNC: 94 U/L (ref 39–117)
ALT SERPL-CCNC: 46 U/L (ref 1–41)
AST SERPL-CCNC: 40 U/L (ref 1–40)
BILIRUB SERPL-MCNC: 0.5 MG/DL (ref 0–1.2)
BUN SERPL-MCNC: 12 MG/DL (ref 6–20)
BUN/CREAT SERPL: 10.3 (ref 7–25)
CALCIUM SERPL-MCNC: 9.5 MG/DL (ref 8.6–10.5)
CHLORIDE SERPL-SCNC: 102 MMOL/L (ref 98–107)
CHOLEST SERPL-MCNC: 117 MG/DL (ref 0–200)
CO2 SERPL-SCNC: 24.7 MMOL/L (ref 22–29)
CREAT SERPL-MCNC: 1.16 MG/DL (ref 0.76–1.27)
CREAT UR-MCNC: 95.2 MG/DL
EGFRCR SERPLBLD CKD-EPI 2021: 76.3 ML/MIN/1.73
GLOBULIN SER CALC-MCNC: 2 GM/DL
GLUCOSE SERPL-MCNC: 102 MG/DL (ref 65–99)
HBA1C MFR BLD: 7.9 % (ref 4.8–5.6)
HDLC SERPL-MCNC: 24 MG/DL (ref 40–60)
IMP & REVIEW OF LAB RESULTS: NORMAL
LDLC SERPL CALC-MCNC: 48 MG/DL (ref 0–100)
MICROALBUMIN UR-MCNC: 6.1 UG/ML
POTASSIUM SERPL-SCNC: 4 MMOL/L (ref 3.5–5.2)
PROT SERPL-MCNC: 6.3 G/DL (ref 6–8.5)
SODIUM SERPL-SCNC: 139 MMOL/L (ref 136–145)
TRIGL SERPL-MCNC: 287 MG/DL (ref 0–150)
TSH SERPL DL<=0.005 MIU/L-ACNC: 1.72 UIU/ML (ref 0.27–4.2)
VLDLC SERPL CALC-MCNC: 45 MG/DL (ref 5–40)

## 2024-03-27 ENCOUNTER — OFFICE VISIT (OUTPATIENT)
Dept: ENDOCRINOLOGY | Age: 54
End: 2024-03-27
Payer: MEDICARE

## 2024-03-27 VITALS
HEART RATE: 89 BPM | OXYGEN SATURATION: 95 % | DIASTOLIC BLOOD PRESSURE: 72 MMHG | HEIGHT: 70 IN | WEIGHT: 260.2 LBS | TEMPERATURE: 97.3 F | BODY MASS INDEX: 37.25 KG/M2 | SYSTOLIC BLOOD PRESSURE: 124 MMHG

## 2024-03-27 DIAGNOSIS — I25.10 CORONARY ARTERY DISEASE INVOLVING NATIVE CORONARY ARTERY OF NATIVE HEART WITHOUT ANGINA PECTORIS: ICD-10-CM

## 2024-03-27 DIAGNOSIS — Z79.4 TYPE 2 DIABETES MELLITUS WITH HYPERGLYCEMIA, WITH LONG-TERM CURRENT USE OF INSULIN: ICD-10-CM

## 2024-03-27 DIAGNOSIS — Z79.4 TYPE 2 DIABETES MELLITUS WITH HYPERGLYCEMIA, WITH LONG-TERM CURRENT USE OF INSULIN: Primary | ICD-10-CM

## 2024-03-27 DIAGNOSIS — E11.65 TYPE 2 DIABETES MELLITUS WITH HYPERGLYCEMIA, WITH LONG-TERM CURRENT USE OF INSULIN: ICD-10-CM

## 2024-03-27 DIAGNOSIS — E11.65 TYPE 2 DIABETES MELLITUS WITH HYPERGLYCEMIA, WITH LONG-TERM CURRENT USE OF INSULIN: Primary | ICD-10-CM

## 2024-03-27 DIAGNOSIS — Z86.73 H/O: CVA (CEREBROVASCULAR ACCIDENT): ICD-10-CM

## 2024-03-27 DIAGNOSIS — E66.01 CLASS 2 SEVERE OBESITY DUE TO EXCESS CALORIES WITH SERIOUS COMORBIDITY AND BODY MASS INDEX (BMI) OF 37.0 TO 37.9 IN ADULT: ICD-10-CM

## 2024-03-27 DIAGNOSIS — E78.2 MIXED HYPERLIPIDEMIA: ICD-10-CM

## 2024-03-27 DIAGNOSIS — E06.3 HYPOTHYROIDISM DUE TO HASHIMOTO'S THYROIDITIS: ICD-10-CM

## 2024-03-27 DIAGNOSIS — E03.8 HYPOTHYROIDISM DUE TO HASHIMOTO'S THYROIDITIS: ICD-10-CM

## 2024-03-27 PROCEDURE — 99214 OFFICE O/P EST MOD 30 MIN: CPT | Performed by: NURSE PRACTITIONER

## 2024-03-27 PROCEDURE — 3078F DIAST BP <80 MM HG: CPT | Performed by: NURSE PRACTITIONER

## 2024-03-27 PROCEDURE — 3051F HG A1C>EQUAL 7.0%<8.0%: CPT | Performed by: NURSE PRACTITIONER

## 2024-03-27 PROCEDURE — 3074F SYST BP LT 130 MM HG: CPT | Performed by: NURSE PRACTITIONER

## 2024-03-27 RX ORDER — INSULIN ASPART 100 [IU]/ML
50 INJECTION, SOLUTION INTRAVENOUS; SUBCUTANEOUS
Qty: 150 ML | Refills: 0 | Status: SHIPPED | OUTPATIENT
Start: 2024-03-27 | End: 2024-03-27 | Stop reason: CLARIF

## 2024-03-27 RX ORDER — LEVOTHYROXINE SODIUM 0.1 MG/1
100 TABLET ORAL DAILY
Qty: 90 TABLET | Refills: 1 | Status: SHIPPED | OUTPATIENT
Start: 2024-03-27

## 2024-03-27 RX ORDER — DAPAGLIFLOZIN 10 MG/1
1 TABLET, FILM COATED ORAL DAILY
Qty: 90 TABLET | Refills: 1 | Status: SHIPPED | OUTPATIENT
Start: 2024-03-27

## 2024-03-27 RX ORDER — INSULIN DEGLUDEC 200 U/ML
150 INJECTION, SOLUTION SUBCUTANEOUS EVERY MORNING
Qty: 67.5 ML | Refills: 0 | Status: SHIPPED | OUTPATIENT
Start: 2024-03-27

## 2024-03-27 RX ORDER — INSULIN ASPART INJECTION 100 [IU]/ML
50 INJECTION, SOLUTION SUBCUTANEOUS
Qty: 150 ML | Refills: 0 | Status: SHIPPED | OUTPATIENT
Start: 2024-03-27 | End: 2024-03-28 | Stop reason: ALTCHOICE

## 2024-03-27 NOTE — TELEPHONE ENCOUNTER
Rx Refill Note  Requested Prescriptions     Pending Prescriptions Disp Refills    dapagliflozin Propanediol (Farxiga) 10 MG tablet 30 tablet 5     Sig: Take 10 mg by mouth Daily. Indications: Type 2 Diabetes    levothyroxine (Synthroid) 100 MCG tablet 30 tablet 5     Sig: Take 1 tablet by mouth Daily. Indications: Underactive Thyroid      Last office visit with prescribing clinician: 3/27/2024   Last telemedicine visit with prescribing clinician: Visit date not found   Next office visit with prescribing clinician: 9/25/2024                         Would you like a call back once the refill request has been completed: [] Yes [] No    If the office needs to give you a call back, can they leave a voicemail: [] Yes [] No    Hanh Sharp  03/27/24, 10:08 EDT

## 2024-03-27 NOTE — TELEPHONE ENCOUNTER
Specialty Pharmacy Patient Management Program  Prescription Refill Request     Patient currently fills medications at  Pharmacy. Needing refill(s) on the following:      Requested Prescriptions     Pending Prescriptions Disp Refills    Insulin Aspart, w/Niacinamide, (Fiasp FlexTouch) 100 UNIT/ML solution pen-injector 150 mL 0     Sig: Inject 50 Units under the skin into the appropriate area as directed 3 (Three) Times a Day With Meals for 90 days.     Last visit: 3/27/24  Next visit: 9/25/24    Sending in prescription for fiasp to replace novolog based on insurance preferences. Sending per CCA.    Signed CCA scanned into media tab.    Rhoda Arora, PharmD  Clinical Specialty Pharmacist, Endocrinology  3/27/2024  10:14 EDT

## 2024-03-27 NOTE — PROGRESS NOTES
"Chief Complaint  Diabetes    Subjective        Ricardo A Pool presents to [facility] ENDOCRINOLOGY  History of Present Illness    Type 2 dm, > 10 years    Dm regimen: Tresiba 140u QAM, Novolog about 45u with meals, farxiga 10 mg po daily  Checking BS: 3x/day  nephrectomy secondary to renal cancer 10+ years ago  (+)CAD; follows with cardiology   (+)CVA  Upcoming diabetic eye exam  Episodes of hypoglycemia - denies   Renal: arb   HLP, mixed: statin, zetia and vascepa     Hypoglycemia   Levothyroxine 100mcg daily, takes with protonix in the morning     Objective   Vital Signs:  /72   Pulse 89   Temp 97.3 °F (36.3 °C) (Temporal)   Ht 177.8 cm (70\")   Wt 118 kg (260 lb 3.2 oz)   SpO2 95%   BMI 37.33 kg/m²   Estimated body mass index is 37.33 kg/m² as calculated from the following:    Height as of this encounter: 177.8 cm (70\").    Weight as of this encounter: 118 kg (260 lb 3.2 oz).         Physical Exam  Vitals reviewed.   Constitutional:       General: He is not in acute distress.  HENT:      Head: Normocephalic and atraumatic.   Cardiovascular:      Rate and Rhythm: Normal rate.   Pulmonary:      Effort: Pulmonary effort is normal. No respiratory distress.   Musculoskeletal:         General: No signs of injury. Normal range of motion.      Cervical back: Normal range of motion and neck supple.   Skin:     General: Skin is warm and dry.   Neurological:      Mental Status: He is alert and oriented to person, place, and time. Mental status is at baseline.   Psychiatric:         Mood and Affect: Mood normal.         Behavior: Behavior normal.         Thought Content: Thought content normal.         Judgment: Judgment normal.        Result Review :    The following data was reviewed by: MADHU Keane on 03/27/2024:  Common labs          5/17/2023    09:28 3/20/2024    07:50   Common Labs   Glucose  102    BUN  12    Creatinine  1.16    Sodium  139    Potassium  4.0    Chloride  102  "   Calcium  9.5    Total Protein  6.3    Albumin  4.3    Total Bilirubin  0.5    Alkaline Phosphatase  94    AST (SGOT)  40    ALT (SGPT)  46    Total Cholesterol  117    Triglycerides  287    HDL Cholesterol  24    LDL Cholesterol   48    Hemoglobin A1C 8.00  7.90    Microalbumin, Urine  6.1                   Assessment and Plan     Diagnoses and all orders for this visit:    1. Type 2 diabetes mellitus with hyperglycemia, with long-term current use of insulin (Primary)    2. Hypothyroidism due to Hashimoto's thyroiditis    3. Mixed hyperlipidemia    4. H/O: CVA (cerebrovascular accident)    5. Coronary artery disease involving native coronary artery of native heart without angina pectoris    6. Class 2 severe obesity due to excess calories with serious comorbidity and body mass index (BMI) of 37.0 to 37.9 in adult    Other orders  -     Insulin Degludec (Tresiba FlexTouch) 200 UNIT/ML solution pen-injector pen injection; Inject 150 Units under the skin into the appropriate area as directed Every Morning.  Dispense: 67.5 mL; Refill: 0  -     Insulin Aspart FlexPen 100 UNIT/ML solution pen-injector; Inject 50 Units under the skin into the appropriate area as directed 3 (Three) Times a Day With Meals for 90 days.  Dispense: 150 mL; Refill: 0             Follow Up     Return in about 6 months (around 9/27/2024).    A1c not at goal  Change to: Tresiba 150u QAM, Novolog about 50u with meals  Continue farxiga 10 mg po daily  Cholesterol labs improving, continue regimen as above with low cholesterol diet   Thyroid labs stable, continue current t4 dose at 100mcg     Patient was given instructions and counseling regarding his condition or for health maintenance advice. Please see specific information pulled into the AVS if appropriate.     Sylvia Mireles, APRN

## 2024-03-28 ENCOUNTER — SPECIALTY PHARMACY (OUTPATIENT)
Dept: ENDOCRINOLOGY | Age: 54
End: 2024-03-28
Payer: MEDICARE

## 2024-03-28 RX ORDER — INSULIN LISPRO 100 U/ML
50 INJECTION, SOLUTION SUBCUTANEOUS 3 TIMES DAILY
Qty: 150 ML | Refills: 0 | Status: SHIPPED | OUTPATIENT
Start: 2024-03-28

## 2024-03-28 NOTE — PROGRESS NOTES
Specialty Pharmacy Refill Coordination Note     Ricardo is a 53 y.o. male contacted today regarding refills of  3 specialty medication(s).    Reviewed and verified with patient:       Specialty medication(s) and dose(s) confirmed: yes    Refill Questions      Flowsheet Row Most Recent Value   Changes to allergies? No   Changes to medications? No   New conditions or infections since last clinic visit No   Unplanned office visit, urgent care, ED, or hospital admission in the last 4 weeks  No   How does patient/caregiver feel medication is working? Good   Financial problems or insurance changes  No   Since the previous refill, were any specialty medication doses or scheduled injections missed or delayed?  No   Does this patient require a clinical escalation to a pharmacist? No            Delivery Questions      Flowsheet Row Most Recent Value   Delivery method Beeline   Delivery address verified with patient/caregiver? Yes   Delivery address Home   Number of medications in delivery 3   Medication(s) being filled and delivered Levothyroxine Sodium (Thyroid Hormones), Insulin Lispro, Dapagliflozin Propanediol   Doses left of specialty medications 1 week   Copay verified? Yes   Copay amount 26.90   Copay form of payment Credit/debit on file              Medication Adherence    Adherence tools used: alarm  Support network for adherence: family member, healthcare provider          Follow-up: 85 day(s)     Rachell Hernandez, Pharmacy Technician  Specialty Pharmacy Technician

## 2024-03-28 NOTE — TELEPHONE ENCOUNTER
Specialty Pharmacy Patient Management Program  Prescription Refill Request     Patient currently fills medications at  Pharmacy. Needing refill(s) on the following:      Requested Prescriptions     Pending Prescriptions Disp Refills    Insulin Lispro (ADMELOG SOLOSTAR) 100 UNIT/ML injection pen 150 mL 0     Sig: Inject 50 Units under the skin into the appropriate area as directed 3 (Three) Times a Day With Meals       Previously switched to fiasp per insurance formulary. Fiasp is currently on back order. The only other short-acting insulin that is covered without a prior authorization is admelog. Sending in a new prescription for admelog at this time per CCA.    Rhoda Arora, PharmD  Clinical Specialty Pharmacist, Endocrinology  3/28/2024  11:44 EDT

## 2024-03-29 RX ORDER — ROSUVASTATIN CALCIUM 40 MG/1
TABLET, COATED ORAL
Qty: 90 TABLET | Refills: 1 | Status: SHIPPED | OUTPATIENT
Start: 2024-03-29

## 2024-03-29 NOTE — TELEPHONE ENCOUNTER
Rx Refill Note  Requested Prescriptions     Pending Prescriptions Disp Refills    rosuvastatin (CRESTOR) 40 MG tablet [Pharmacy Med Name: Rosuvastatin Calcium 40 MG Oral Tablet] 30 tablet 0     Sig: Take 1 tablet by mouth once daily      Last office visit with prescribing clinician: 3/27/2024   Last telemedicine visit with prescribing clinician: Visit date not found   Next office visit with prescribing clinician: 9/25/2024                         Would you like a call back once the refill request has been completed: [] Yes [] No    If the office needs to give you a call back, can they leave a voicemail: [] Yes [] No    Hanh Sharp  03/29/24, 07:51 EDT

## 2024-04-04 RX ORDER — EZETIMIBE 10 MG/1
10 TABLET ORAL DAILY
Qty: 90 TABLET | Refills: 0 | Status: SHIPPED | OUTPATIENT
Start: 2024-04-04

## 2024-04-30 ENCOUNTER — SPECIALTY PHARMACY (OUTPATIENT)
Dept: ENDOCRINOLOGY | Age: 54
End: 2024-04-30
Payer: MEDICARE

## 2024-04-30 NOTE — PROGRESS NOTES
Specialty Pharmacy Patient Management Program  Endocrinology Refill Outreach      Ricardo is a 53 y.o. male contacted today regarding refills of his medication(s).    Specialty medication(s) and dose(s) confirmed: yes    Refill Questions      Flowsheet Row Most Recent Value   Changes to allergies? No   Changes to medications? No   New conditions or infections since last clinic visit No   Unplanned office visit, urgent care, ED, or hospital admission in the last 4 weeks  No   How does patient/caregiver feel medication is working? Good   Financial problems or insurance changes  No   Since the previous refill, were any specialty medication doses or scheduled injections missed or delayed?  No   Does this patient require a clinical escalation to a pharmacist? No          Delivery Questions      Flowsheet Row Most Recent Value   Delivery method Beeline   Delivery address verified with patient/caregiver? Yes   Delivery address Home   Number of medications in delivery 1   Medication(s) being filled and delivered Insulin Degludec   Copay verified? Yes   Copay amount 11.20   Copay form of payment Credit/debit on file            Follow-Up: 90 days    Rhoda Arora, PharmD  Clinical Specialty Pharmacist, Endocrinology  4/30/2024  08:38 EDT

## 2024-05-13 DIAGNOSIS — K21.9 GASTROESOPHAGEAL REFLUX DISEASE WITHOUT ESOPHAGITIS: ICD-10-CM

## 2024-05-13 RX ORDER — CLOPIDOGREL BISULFATE 75 MG/1
TABLET ORAL
Qty: 90 TABLET | Refills: 0 | Status: SHIPPED | OUTPATIENT
Start: 2024-05-13

## 2024-05-13 RX ORDER — PANTOPRAZOLE SODIUM 40 MG/1
40 TABLET, DELAYED RELEASE ORAL DAILY
Qty: 90 TABLET | Refills: 1 | Status: SHIPPED | OUTPATIENT
Start: 2024-05-13

## 2024-05-16 DIAGNOSIS — E78.2 MIXED HYPERLIPIDEMIA: ICD-10-CM

## 2024-05-16 RX ORDER — ICOSAPENT ETHYL 1000 MG/1
2 CAPSULE ORAL EVERY 12 HOURS SCHEDULED
Qty: 360 CAPSULE | Refills: 0 | Status: SHIPPED | OUTPATIENT
Start: 2024-05-16

## 2024-05-16 NOTE — TELEPHONE ENCOUNTER
Rx Refill Note  Requested Prescriptions     Pending Prescriptions Disp Refills    Vascepa 1 g capsule capsule [Pharmacy Med Name: Vascepa 1 GM Oral Capsule] 360 capsule 0     Sig: Take 2 capsules by mouth twice daily      Last office visit with prescribing clinician: 3/27/2024   Last telemedicine visit with prescribing clinician: Visit date not found   Next office visit with prescribing clinician: 9/25/2024                         Would you like a call back once the refill request has been completed: [] Yes [] No    If the office needs to give you a call back, can they leave a voicemail: [] Yes [] No    Judie Sharp MA  05/16/24, 09:21 EDT

## 2024-05-20 DIAGNOSIS — I10 ESSENTIAL HYPERTENSION: ICD-10-CM

## 2024-05-20 RX ORDER — OLMESARTAN MEDOXOMIL 40 MG/1
TABLET ORAL
Qty: 90 TABLET | Refills: 0 | Status: SHIPPED | OUTPATIENT
Start: 2024-05-20

## 2024-06-27 ENCOUNTER — SPECIALTY PHARMACY (OUTPATIENT)
Dept: ENDOCRINOLOGY | Age: 54
End: 2024-06-27
Payer: MEDICARE

## 2024-06-27 RX ORDER — INSULIN LISPRO 100 U/ML
50 INJECTION, SOLUTION SUBCUTANEOUS 3 TIMES DAILY
Qty: 135 ML | Refills: 0 | Status: SHIPPED | OUTPATIENT
Start: 2024-06-27

## 2024-06-27 RX ORDER — EZETIMIBE 10 MG/1
10 TABLET ORAL DAILY
Qty: 90 TABLET | Refills: 0 | Status: SHIPPED | OUTPATIENT
Start: 2024-06-27

## 2024-06-27 NOTE — PROGRESS NOTES
Specialty Pharmacy Patient Management Program  Endocrinology Refill Outreach      Ricardo is a 53 y.o. male contacted today regarding refills of his medication(s).    Specialty medication(s) and dose(s) confirmed: Insulin lispro, Farxiga and levothyroxine    Refill Questions      Flowsheet Row Most Recent Value   Changes to allergies? No   Changes to medications? No   New conditions or infections since last clinic visit No   Unplanned office visit, urgent care, ED, or hospital admission in the last 4 weeks  No   How does patient/caregiver feel medication is working? Good   Financial problems or insurance changes  No   Since the previous refill, were any specialty medication doses or scheduled injections missed or delayed?  No   Does this patient require a clinical escalation to a pharmacist? No          Delivery Questions      Flowsheet Row Most Recent Value   Delivery method Beeline   Delivery address verified with patient/caregiver? Yes   Delivery address Home   Number of medications in delivery 3   Medication(s) being filled and delivered Insulin Lispro, Dapagliflozin Propanediol, Levothyroxine Sodium (Thyroid Hormones)   Doses left of specialty medications About a week   Copay verified? Yes   Copay amount $0   Copay form of payment No copayment ($0)            Follow-Up: About 3 months for future fills    Virginia Ramirez, PharmD, MM   Clinical Speciality Pharmacist, Endocrinology   6/27/2024  15:07 EDT

## 2024-06-27 NOTE — TELEPHONE ENCOUNTER
Specialty Pharmacy Patient Management Program  Prescription Refill Request     Patient currently fills medications at  Pharmacy. Needing refill(s) on the following:      Requested Prescriptions     Pending Prescriptions Disp Refills    Insulin Lispro (ADMELOG SOLOSTAR) 100 UNIT/ML injection pen 135 mL 0     Sig: Inject 50 Units under the skin into the appropriate area as directed 3 (Three) Times a Day With Meals     Last Visit: 3/27, Aline  Next Visit: 9/25, Aline    Pended for MADHU Keane to review, and approve if appropriate.     Virginia Ramirez, PharmD, MM   Clinical Speciality Pharmacist, Endocrinology   6/27/2024  09:17 EDT

## 2024-07-23 RX ORDER — INSULIN DEGLUDEC 200 U/ML
150 INJECTION, SOLUTION SUBCUTANEOUS EVERY MORNING
Qty: 63 ML | Refills: 0 | Status: SHIPPED | OUTPATIENT
Start: 2024-07-23

## 2024-07-23 NOTE — TELEPHONE ENCOUNTER
Rx Refill Note  Requested Prescriptions     Pending Prescriptions Disp Refills    Insulin Degludec (Tresiba FlexTouch) 200 UNIT/ML solution pen-injector pen injection 63 mL 0     Sig: Inject 150 Units under the skin into the appropriate area as directed Every Morning.      Last office visit with prescribing clinician: 3/27/2024   Last telemedicine visit with prescribing clinician: Visit date not found   Next office visit with prescribing clinician: 9/25/2024                         Would you like a call back once the refill request has been completed: [] Yes [] No    If the office needs to give you a call back, can they leave a voicemail: [] Yes [] No    Hanh Sharp  07/23/24, 11:16 EDT

## 2024-07-25 ENCOUNTER — SPECIALTY PHARMACY (OUTPATIENT)
Dept: ENDOCRINOLOGY | Age: 54
End: 2024-07-25
Payer: MEDICARE

## 2024-07-25 NOTE — PROGRESS NOTES
Specialty Pharmacy Refill Coordination Note     Ricardo is a 53 y.o. male contacted today regarding refills of  1 specialty medication(s).    Reviewed and verified with patient:       Specialty medication(s) and dose(s) confirmed: yes    Refill Questions      Flowsheet Row Most Recent Value   Changes to allergies? No   Changes to medications? No   New conditions or infections since last clinic visit No   Unplanned office visit, urgent care, ED, or hospital admission in the last 4 weeks  No   How does patient/caregiver feel medication is working? Good   Financial problems or insurance changes  No   Since the previous refill, were any specialty medication doses or scheduled injections missed or delayed?  No   Does this patient require a clinical escalation to a pharmacist? No            Delivery Questions      Flowsheet Row Most Recent Value   Delivery method Beeline   Delivery address verified with patient/caregiver? Yes   Delivery address Home   Number of medications in delivery 1   Medication(s) being filled and delivered Insulin Degludec   Doses left of specialty medications 1 week   Copay verified? Yes   Copay amount 0   Copay form of payment No copayment ($0)   Ship Date 07/25   Delivery Date 07/26   Signature Required No              Medication Adherence    Adherence tools used: alarm  Support network for adherence: family member, healthcare provider          Follow-up: 80 day(s)     Rachell Hernandez, Pharmacy Technician  Specialty Pharmacy Technician

## 2024-08-08 DIAGNOSIS — E78.2 MIXED HYPERLIPIDEMIA: ICD-10-CM

## 2024-08-08 RX ORDER — ICOSAPENT ETHYL 1000 MG/1
2 CAPSULE ORAL EVERY 12 HOURS SCHEDULED
Qty: 360 CAPSULE | Refills: 0 | Status: SHIPPED | OUTPATIENT
Start: 2024-08-08

## 2024-08-08 NOTE — TELEPHONE ENCOUNTER
Rx Refill Note  Requested Prescriptions     Pending Prescriptions Disp Refills    Vascepa 1 g capsule capsule [Pharmacy Med Name: Vascepa 1 GM Oral Capsule] 360 capsule 0     Sig: Take 2 capsules by mouth twice daily      Last office visit with prescribing clinician: 3/27/2024   Last telemedicine visit with prescribing clinician: Visit date not found   Next office visit with prescribing clinician: 9/25/2024                         Would you like a call back once the refill request has been completed: [] Yes [] No    If the office needs to give you a call back, can they leave a voicemail: [] Yes [] No    Judie Sharp MA  08/08/24, 09:25 EDT

## 2024-08-09 RX ORDER — CLOPIDOGREL BISULFATE 75 MG/1
TABLET ORAL
Qty: 90 TABLET | Refills: 0 | Status: SHIPPED | OUTPATIENT
Start: 2024-08-09

## 2024-08-12 DIAGNOSIS — I10 ESSENTIAL HYPERTENSION: ICD-10-CM

## 2024-08-12 RX ORDER — OLMESARTAN MEDOXOMIL 40 MG/1
TABLET ORAL
Qty: 90 TABLET | Refills: 0 | Status: SHIPPED | OUTPATIENT
Start: 2024-08-12

## 2024-08-20 ENCOUNTER — PATIENT OUTREACH (OUTPATIENT)
Age: 54
End: 2024-08-20
Payer: MEDICARE

## 2024-08-20 NOTE — OUTREACH NOTE
MSW sent Social Determinants of Health (SDOH) survey to patient via [employer]. MSW received response to SDOH survey from patient stating that no further assistance is needed with community resources. MSW to sign off.    Adalgisa MCCLELLAN -   Ambulatory Case Management    8/20/2024, 12:16 EDT

## 2024-08-23 ENCOUNTER — SPECIALTY PHARMACY (OUTPATIENT)
Dept: ENDOCRINOLOGY | Age: 54
End: 2024-08-23
Payer: MEDICARE

## 2024-08-23 DIAGNOSIS — B37.9 YEAST INFECTION: ICD-10-CM

## 2024-08-23 RX ORDER — CLOTRIMAZOLE 1 %
1 CREAM (GRAM) TOPICAL 2 TIMES DAILY
Qty: 28 G | Refills: 0 | Status: SHIPPED | OUTPATIENT
Start: 2024-08-23
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
## 2024-09-06 DIAGNOSIS — B37.9 YEAST INFECTION: ICD-10-CM

## 2024-09-06 RX ORDER — CLOTRIMAZOLE 1 %
1 CREAM (GRAM) TOPICAL 2 TIMES DAILY
Qty: 28 G | Refills: 0 | Status: SHIPPED | OUTPATIENT
Start: 2024-09-06

## 2024-09-13 DIAGNOSIS — Z79.4 TYPE 2 DIABETES MELLITUS WITH HYPERGLYCEMIA, WITH LONG-TERM CURRENT USE OF INSULIN: Primary | ICD-10-CM

## 2024-09-13 DIAGNOSIS — E11.65 TYPE 2 DIABETES MELLITUS WITH HYPERGLYCEMIA, WITH LONG-TERM CURRENT USE OF INSULIN: Primary | ICD-10-CM

## 2024-09-25 ENCOUNTER — OFFICE VISIT (OUTPATIENT)
Dept: ENDOCRINOLOGY | Age: 54
End: 2024-09-25
Payer: MEDICARE

## 2024-09-25 VITALS
HEIGHT: 70 IN | WEIGHT: 262.2 LBS | DIASTOLIC BLOOD PRESSURE: 78 MMHG | SYSTOLIC BLOOD PRESSURE: 134 MMHG | BODY MASS INDEX: 37.54 KG/M2 | TEMPERATURE: 98.1 F

## 2024-09-25 DIAGNOSIS — I25.10 CORONARY ARTERY DISEASE INVOLVING NATIVE CORONARY ARTERY OF NATIVE HEART WITHOUT ANGINA PECTORIS: ICD-10-CM

## 2024-09-25 DIAGNOSIS — E11.65 TYPE 2 DIABETES MELLITUS WITH HYPERGLYCEMIA, WITH LONG-TERM CURRENT USE OF INSULIN: Primary | ICD-10-CM

## 2024-09-25 DIAGNOSIS — Z79.4 TYPE 2 DIABETES MELLITUS WITH HYPERGLYCEMIA, WITH LONG-TERM CURRENT USE OF INSULIN: Primary | ICD-10-CM

## 2024-09-25 DIAGNOSIS — Z86.73 H/O: CVA (CEREBROVASCULAR ACCIDENT): ICD-10-CM

## 2024-09-25 DIAGNOSIS — E78.2 MIXED HYPERLIPIDEMIA: ICD-10-CM

## 2024-09-25 DIAGNOSIS — E66.01 CLASS 2 SEVERE OBESITY DUE TO EXCESS CALORIES WITH SERIOUS COMORBIDITY AND BODY MASS INDEX (BMI) OF 37.0 TO 37.9 IN ADULT: ICD-10-CM

## 2024-09-25 DIAGNOSIS — E03.8 HYPOTHYROIDISM DUE TO HASHIMOTO'S THYROIDITIS: ICD-10-CM

## 2024-09-25 DIAGNOSIS — E06.3 HYPOTHYROIDISM DUE TO HASHIMOTO'S THYROIDITIS: ICD-10-CM

## 2024-09-25 PROCEDURE — 3052F HG A1C>EQUAL 8.0%<EQUAL 9.0%: CPT | Performed by: NURSE PRACTITIONER

## 2024-09-25 PROCEDURE — 99214 OFFICE O/P EST MOD 30 MIN: CPT | Performed by: NURSE PRACTITIONER

## 2024-09-25 PROCEDURE — 3075F SYST BP GE 130 - 139MM HG: CPT | Performed by: NURSE PRACTITIONER

## 2024-09-25 PROCEDURE — 3078F DIAST BP <80 MM HG: CPT | Performed by: NURSE PRACTITIONER

## 2024-09-25 RX ORDER — EZETIMIBE 10 MG/1
10 TABLET ORAL DAILY
Qty: 90 TABLET | Refills: 0 | Status: SHIPPED | OUTPATIENT
Start: 2024-09-25

## 2024-09-25 RX ORDER — ROSUVASTATIN CALCIUM 40 MG/1
TABLET, COATED ORAL
Qty: 90 TABLET | Refills: 1 | Status: SHIPPED | OUTPATIENT
Start: 2024-09-25

## 2024-09-25 RX ORDER — INSULIN DEGLUDEC 200 U/ML
150 INJECTION, SOLUTION SUBCUTANEOUS EVERY MORNING
Qty: 72 ML | Refills: 1 | Status: SHIPPED | OUTPATIENT
Start: 2024-09-25

## 2024-09-25 RX ORDER — LEVOTHYROXINE SODIUM 100 UG/1
100 TABLET ORAL DAILY
Qty: 90 TABLET | Refills: 1 | Status: SHIPPED | OUTPATIENT
Start: 2024-09-25

## 2024-09-25 RX ORDER — ICOSAPENT ETHYL 1000 MG/1
2 CAPSULE ORAL EVERY 12 HOURS SCHEDULED
Qty: 360 CAPSULE | Refills: 1 | Status: SHIPPED | OUTPATIENT
Start: 2024-09-25

## 2024-09-25 RX ORDER — PEN NEEDLE, DIABETIC 32GX 5/32"
1 NEEDLE, DISPOSABLE MISCELLANEOUS 4 TIMES DAILY
Qty: 400 EACH | Refills: 1 | Status: SHIPPED | OUTPATIENT
Start: 2024-09-25

## 2024-09-25 RX ORDER — DAPAGLIFLOZIN 10 MG/1
1 TABLET, FILM COATED ORAL DAILY
Qty: 90 TABLET | Refills: 1 | Status: SHIPPED | OUTPATIENT
Start: 2024-09-25

## 2024-09-25 RX ORDER — INSULIN LISPRO 100 U/ML
50 INJECTION, SOLUTION SUBCUTANEOUS 3 TIMES DAILY
Qty: 135 ML | Refills: 1 | Status: SHIPPED | OUTPATIENT
Start: 2024-09-25

## 2024-09-30 ENCOUNTER — SPECIALTY PHARMACY (OUTPATIENT)
Dept: ENDOCRINOLOGY | Age: 54
End: 2024-09-30
Payer: MEDICARE

## 2024-09-30 NOTE — PROGRESS NOTES
Specialty Pharmacy Refill Coordination Note     Ricardo is a 54 y.o. male contacted today regarding refills of  5 specialty medication(s).    Reviewed and verified with patient:       Specialty medication(s) and dose(s) confirmed: yes    Refill Questions      Flowsheet Row Most Recent Value   Changes to allergies? No   Changes to medications? No   New conditions or infections since last clinic visit No   Unplanned office visit, urgent care, ED, or hospital admission in the last 4 weeks  No   How does patient/caregiver feel medication is working? Good   Financial problems or insurance changes  No   Since the previous refill, were any specialty medication doses or scheduled injections missed or delayed?  No   Does this patient require a clinical escalation to a pharmacist? No            Delivery Questions      Flowsheet Row Most Recent Value   Delivery method [employer]   Delivery address verified with patient/caregiver? Yes   Delivery address Home   Number of medications in delivery 5   Medication(s) being filled and delivered Insulin Lispro, Levothyroxine Sodium (Thyroid Hormones), Dapagliflozin Propanediol, Insulin Degludec, Insulin Pen Needle   Doses left of specialty medications 1 week   Copay verified? Yes   Copay amount 0   Copay form of payment No copayment ($0)   Ship Date 09/30   Delivery Date 10/01   Signature Required No              Medication Adherence    Adherence tools used: alarm  Support network for adherence: family member, healthcare provider          Follow-up: 85 day(s)     Rachell Hernandez, Pharmacy Technician  Specialty Pharmacy Technician

## 2024-11-04 DIAGNOSIS — K21.9 GASTROESOPHAGEAL REFLUX DISEASE WITHOUT ESOPHAGITIS: ICD-10-CM

## 2024-11-04 RX ORDER — CLOPIDOGREL BISULFATE 75 MG/1
TABLET ORAL
Qty: 90 TABLET | Refills: 0 | Status: SHIPPED | OUTPATIENT
Start: 2024-11-04

## 2024-11-04 RX ORDER — PANTOPRAZOLE SODIUM 40 MG/1
40 TABLET, DELAYED RELEASE ORAL DAILY
Qty: 90 TABLET | Refills: 2 | Status: SHIPPED | OUTPATIENT
Start: 2024-11-04

## 2024-11-04 NOTE — TELEPHONE ENCOUNTER
LOV                   3/20/2024  NOV                   3/26/2025  Last refill             5/13/24  Protocol              met

## 2024-11-06 ENCOUNTER — OFFICE VISIT (OUTPATIENT)
Dept: CARDIOLOGY | Facility: CLINIC | Age: 54
End: 2024-11-06
Payer: MEDICARE

## 2024-11-06 VITALS
HEIGHT: 70 IN | WEIGHT: 256 LBS | SYSTOLIC BLOOD PRESSURE: 140 MMHG | DIASTOLIC BLOOD PRESSURE: 78 MMHG | BODY MASS INDEX: 36.65 KG/M2 | HEART RATE: 85 BPM

## 2024-11-06 DIAGNOSIS — E66.9 OBESITY (BMI 35.0-39.9 WITHOUT COMORBIDITY): Primary | ICD-10-CM

## 2024-11-06 DIAGNOSIS — Z85.528 HISTORY OF RENAL CELL CARCINOMA: ICD-10-CM

## 2024-11-06 DIAGNOSIS — E11.22 TYPE 2 DIABETES MELLITUS WITH STAGE 3 CHRONIC KIDNEY DISEASE, WITH LONG-TERM CURRENT USE OF INSULIN, UNSPECIFIED WHETHER STAGE 3A OR 3B CKD: ICD-10-CM

## 2024-11-06 DIAGNOSIS — I25.700 CORONARY ARTERY DISEASE INVOLVING CORONARY BYPASS GRAFT OF NATIVE HEART WITH UNSTABLE ANGINA PECTORIS: ICD-10-CM

## 2024-11-06 DIAGNOSIS — Z79.4 TYPE 2 DIABETES MELLITUS WITH STAGE 3 CHRONIC KIDNEY DISEASE, WITH LONG-TERM CURRENT USE OF INSULIN, UNSPECIFIED WHETHER STAGE 3A OR 3B CKD: ICD-10-CM

## 2024-11-06 DIAGNOSIS — N18.30 TYPE 2 DIABETES MELLITUS WITH STAGE 3 CHRONIC KIDNEY DISEASE, WITH LONG-TERM CURRENT USE OF INSULIN, UNSPECIFIED WHETHER STAGE 3A OR 3B CKD: ICD-10-CM

## 2024-11-06 PROBLEM — R07.9 CHEST PAIN: Status: RESOLVED | Noted: 2021-11-10 | Resolved: 2024-11-06

## 2024-11-06 PROBLEM — E66.812 CLASS 2 OBESITY WITH BODY MASS INDEX (BMI) OF 36.0 TO 36.9 IN ADULT: Status: RESOLVED | Noted: 2019-01-25 | Resolved: 2024-11-06

## 2024-11-06 PROCEDURE — 99214 OFFICE O/P EST MOD 30 MIN: CPT | Performed by: INTERNAL MEDICINE

## 2024-11-06 PROCEDURE — 3078F DIAST BP <80 MM HG: CPT | Performed by: INTERNAL MEDICINE

## 2024-11-06 PROCEDURE — 93000 ELECTROCARDIOGRAM COMPLETE: CPT | Performed by: INTERNAL MEDICINE

## 2024-11-06 PROCEDURE — 3077F SYST BP >= 140 MM HG: CPT | Performed by: INTERNAL MEDICINE
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
## 2024-11-08 DIAGNOSIS — I10 ESSENTIAL HYPERTENSION: ICD-10-CM

## 2024-11-08 RX ORDER — OLMESARTAN MEDOXOMIL 40 MG/1
TABLET ORAL
Qty: 90 TABLET | Refills: 0 | Status: SHIPPED | OUTPATIENT
Start: 2024-11-08

## 2024-12-19 DIAGNOSIS — E78.2 MIXED HYPERLIPIDEMIA: ICD-10-CM

## 2024-12-19 RX ORDER — EZETIMIBE 10 MG/1
10 TABLET ORAL DAILY
Qty: 90 TABLET | Refills: 0 | Status: SHIPPED | OUTPATIENT
Start: 2024-12-19

## 2024-12-19 NOTE — TELEPHONE ENCOUNTER
Rx Refill Note  Requested Prescriptions     Pending Prescriptions Disp Refills    ezetimibe (ZETIA) 10 MG tablet [Pharmacy Med Name: Ezetimibe 10 MG Oral Tablet] 90 tablet 0     Sig: Take 1 tablet by mouth once daily      Last office visit with prescribing clinician: 9/25/2024   Last telemedicine visit with prescribing clinician: Visit date not found   Next office visit with prescribing clinician: 3/19/2025                         Would you like a call back once the refill request has been completed: [] Yes [] No    If the office needs to give you a call back, can they leave a voicemail: [] Yes [] No    Mary Shirley MA  12/19/24, 09:24 EST

## 2024-12-20 ENCOUNTER — SPECIALTY PHARMACY (OUTPATIENT)
Dept: ENDOCRINOLOGY | Age: 54
End: 2024-12-20
Payer: MEDICARE

## 2024-12-20 NOTE — PROGRESS NOTES
Specialty Pharmacy Refill Coordination Note     Ricardo is a 54 y.o. male contacted today regarding refills of  2 specialty medication(s).    Reviewed and verified with patient:       Specialty medication(s) and dose(s) confirmed: yes    Refill Questions      Flowsheet Row Most Recent Value   Changes to allergies? No   Changes to medications? No   New conditions or infections since last clinic visit No   Unplanned office visit, urgent care, ED, or hospital admission in the last 4 weeks  No   How does patient/caregiver feel medication is working? Good   Financial problems or insurance changes  No   Since the previous refill, were any specialty medication doses or scheduled injections missed or delayed?  No   Does this patient require a clinical escalation to a pharmacist? No            Delivery Questions      Flowsheet Row Most Recent Value   Delivery method  at Pharmacy   Number of medications in delivery 2   Medication(s) being filled and delivered Insulin Degludec (Tresiba FlexTouch), Dapagliflozin Propanediol   Doses left of specialty medications 1 week   Copay verified? Yes   Copay amount $0.00   Copay form of payment No copayment ($0)   Signature Required No              Medication Adherence    Adherence tools used: alarm  Support network for adherence: family member, healthcare provider          Follow-up: 83 day(s)     Sylvia Kohler, Pharmacy Technician  Specialty Pharmacy Technician

## 2024-12-27 ENCOUNTER — OFFICE VISIT (OUTPATIENT)
Dept: FAMILY MEDICINE CLINIC | Facility: CLINIC | Age: 54
End: 2024-12-27
Payer: MEDICARE

## 2024-12-27 VITALS
OXYGEN SATURATION: 94 % | WEIGHT: 252.4 LBS | BODY MASS INDEX: 36.13 KG/M2 | HEART RATE: 82 BPM | SYSTOLIC BLOOD PRESSURE: 140 MMHG | TEMPERATURE: 98.7 F | DIASTOLIC BLOOD PRESSURE: 80 MMHG | HEIGHT: 70 IN

## 2024-12-27 DIAGNOSIS — J06.9 VIRAL UPPER RESPIRATORY TRACT INFECTION: ICD-10-CM

## 2024-12-27 DIAGNOSIS — R05.9 COUGH, UNSPECIFIED TYPE: Primary | ICD-10-CM

## 2024-12-27 DIAGNOSIS — J44.9 CHRONIC OBSTRUCTIVE PULMONARY DISEASE, UNSPECIFIED COPD TYPE: ICD-10-CM

## 2024-12-27 PROCEDURE — 3077F SYST BP >= 140 MM HG: CPT | Performed by: NURSE PRACTITIONER

## 2024-12-27 PROCEDURE — 3052F HG A1C>EQUAL 8.0%<EQUAL 9.0%: CPT | Performed by: NURSE PRACTITIONER

## 2024-12-27 PROCEDURE — 1159F MED LIST DOCD IN RCRD: CPT | Performed by: NURSE PRACTITIONER

## 2024-12-27 PROCEDURE — 99213 OFFICE O/P EST LOW 20 MIN: CPT | Performed by: NURSE PRACTITIONER

## 2024-12-27 PROCEDURE — 1160F RVW MEDS BY RX/DR IN RCRD: CPT | Performed by: NURSE PRACTITIONER

## 2024-12-27 PROCEDURE — 3079F DIAST BP 80-89 MM HG: CPT | Performed by: NURSE PRACTITIONER

## 2024-12-27 PROCEDURE — 1125F AMNT PAIN NOTED PAIN PRSNT: CPT | Performed by: NURSE PRACTITIONER

## 2024-12-27 PROCEDURE — 87428 SARSCOV & INF VIR A&B AG IA: CPT | Performed by: NURSE PRACTITIONER

## 2024-12-27 RX ORDER — DEXTROMETHORPHAN HYDROBROMIDE AND PROMETHAZINE HYDROCHLORIDE 15; 6.25 MG/5ML; MG/5ML
5 SYRUP ORAL 4 TIMES DAILY PRN
Qty: 180 ML | Refills: 0 | Status: SHIPPED | OUTPATIENT
Start: 2024-12-27

## 2024-12-27 RX ORDER — PREDNISONE 5 MG/1
TABLET ORAL
Qty: 21 TABLET | Refills: 0 | Status: SHIPPED | OUTPATIENT
Start: 2024-12-27

## 2024-12-27 RX ORDER — AZITHROMYCIN 250 MG/1
TABLET, FILM COATED ORAL
Qty: 6 TABLET | Refills: 0 | Status: SHIPPED | OUTPATIENT
Start: 2024-12-27

## 2024-12-27 NOTE — PROGRESS NOTES
"Chief Complaint  Cough (Prod cough - dark green/brown), Headache, Nasal Congestion, and Generalized Body Aches    Subjective        Ricardo Pereira presents to [facility] MEDICAL [facility] PRIMARY CARE  Cough  Associated symptoms include headaches.   Headache    Patient is here to discuss cough, congestion, runny nose, chest pain due to the coughing. He is getting lightheaded when he has cough spells, which he always does when he coughs and is sick. He is an occasional smoker. His grandson was recently sick. He is using otc meds with minimal improvement. He is using nebulizer at home.   Objective   Vital Signs:  /80 (BP Location: Left arm, Patient Position: Sitting, Cuff Size: Large Adult)   Pulse 82   Temp 98.7 °F (37.1 °C) (Temporal)   Ht 177.8 cm (70\")   Wt 114 kg (252 lb 6.4 oz)   SpO2 94%   BMI 36.22 kg/m²   Estimated body mass index is 36.22 kg/m² as calculated from the following:    Height as of this encounter: 177.8 cm (70\").    Weight as of this encounter: 114 kg (252 lb 6.4 oz).            Physical Exam  Constitutional:       Appearance: Normal appearance.   HENT:      Head: Normocephalic.      Nose: Congestion present.   Eyes:      Extraocular Movements: Extraocular movements intact.      Pupils: Pupils are equal, round, and reactive to light.   Cardiovascular:      Rate and Rhythm: Normal rate and regular rhythm.      Heart sounds: Normal heart sounds.   Pulmonary:      Effort: Tachypnea present.      Breath sounds: Examination of the right-upper field reveals wheezing. Examination of the left-upper field reveals wheezing. Examination of the right-middle field reveals wheezing. Examination of the left-middle field reveals wheezing. Wheezing present.   Musculoskeletal:         General: Normal range of motion.      Cervical back: Normal range of motion.   Skin:     General: Skin is warm and dry.   Neurological:      General: No focal deficit present.      Mental Status: He is alert and oriented " to person, place, and time.   Psychiatric:         Mood and Affect: Mood normal.        Result Review :                Assessment and Plan   Diagnoses and all orders for this visit:    1. Cough, unspecified type (Primary)  -     POCT SARS-CoV-2 Antigen KAYE + Flu    2. Chronic obstructive pulmonary disease, unspecified COPD type  -     azithromycin (Zithromax Z-Ciaran) 250 MG tablet; Take 2 tablets by mouth on day 1, then 1 tablet daily on days 2-5  Dispense: 6 tablet; Refill: 0  -     predniSONE 5 MG (21) tablet therapy pack dose pack; Take as directed on package instructions.  Dispense: 21 tablet; Refill: 0    3. Viral upper respiratory tract infection  -     promethazine-dextromethorphan (PROMETHAZINE-DM) 6.25-15 MG/5ML syrup; Take 5 mL by mouth 4 (Four) Times a Day As Needed for Cough.  Dispense: 180 mL; Refill: 0    Advised patient to hold zpack, and take only if symptoms worsen.          Follow Up   Return if symptoms worsen or fail to improve.  Patient was given instructions and counseling regarding his condition or for health maintenance advice. Please see specific information pulled into the AVS if appropriate.

## 2025-01-28 RX ORDER — CLOPIDOGREL BISULFATE 75 MG/1
TABLET ORAL
Qty: 90 TABLET | Refills: 0 | Status: SHIPPED | OUTPATIENT
Start: 2025-01-28

## 2025-02-01 DIAGNOSIS — I10 ESSENTIAL HYPERTENSION: ICD-10-CM

## 2025-02-03 RX ORDER — OLMESARTAN MEDOXOMIL 40 MG/1
TABLET ORAL
Qty: 90 TABLET | Refills: 0 | Status: SHIPPED | OUTPATIENT
Start: 2025-02-03

## 2025-02-26 ENCOUNTER — SPECIALTY PHARMACY (OUTPATIENT)
Dept: ENDOCRINOLOGY | Age: 55
End: 2025-02-26
Payer: MEDICARE

## 2025-02-26 NOTE — PROGRESS NOTES
"   Specialty Pharmacy Patient Management Program  One-Time Clinical Outreach     Ricardo Pereira is a 54 y.o. male seen by an Endocrinology provider for Type 2 Diabetes and enrolled in the Endocrinology Patient Management program offered by [facility].      Contacted patient to complete clinical assessment, however, patient reports at this time he has not been taking his medications for about a month. He reports that he's been feeling \"burnt out\" with his healthcare and with family issues at home. Offered to see if we could find some support services for him but at this time he feels like he does have family support and plans to restart his medications. Discussed that I would not recommend starting back at the high insulin doses that he was on before (Tresiba 150units/day and Admelog 50units TID before meals) to avoid adverse effects. Advised him I would talk with his provider, MADHU Keane, to determine next steps. Patient has not been checking blood sugars either but states he's physically felt okay.     Discussed patient's situation with his provider, MADHU Keane, who recommended patient to be seen sooner than scheduled follow-up on 3/19/25 and to review plan for medication restart.     Advised patient of providers recommendation but he did not know his availability at this time. He requested we call him back tomorrow after 2pm to reschedule a sooner visit. Patient agreeable to wait until he discusses medication restart with provider before resuming his medications.     All questions answered, patient in agreement with plan, and patient expressed understanding .     Nilam Gates, PharmD, BCACP, BC-ADM, [facility]  Clinical Specialty Pharmacist, Endocrinology  2/26/2025  15:24 EST  "

## 2025-02-27 NOTE — PROGRESS NOTES
"   Specialty Pharmacy Patient Management Program  One-Time Clinical Outreach     Ricardo Pereira is a 54 y.o. male seen by an Endocrinology provider for Type 2 Diabetes and enrolled in the Endocrinology Patient Management program offered by [facility].      Open Medication Therapy Problems  DM2 (diabetes mellitus, type 2)   1 Current Medication: Insulin Degludec (Tresiba FlexTouch) 200 UNIT/ML solution pen-injector pen injection   Current Medication Sig: Inject 150 Units under the skin into the appropriate area as directed Every Morning.   Rationale: Patient prefers not to take - Adherence - Adherence   Identified Date: 2/26/2025   Note: Contacted patient to complete clinical assessment, however, patient reports at this time he has not been taking his medications for about a month. He reports that he's been feeling \"burnt out\" with his healthcare and with family issues at home. Offered to see if we could find some support services for him but at this time he feels like he does have family support and plans to restart his medications. Discussed that I would not recommend starting back at the high insulin doses that he was on before (Tresiba 150units/day and Admelog 50units TID before meals) to avoid adverse effects. Advised him I would talk with his provider, MADHU Keane, to determine next steps. Patient has not been checking blood sugars either but states he's physically felt okay.   Discussed patient's situation with his provider, MADHU Keane, who recommended patient to be seen sooner than scheduled follow-up on 3/19/25 and to review plan for medication restart.   Advised patient of providers recommendation but he did not know his availability at this time. He requested we call him back tomorrow after 2pm to reschedule a sooner visit. Patient agreeable to wait until he discusses medication restart with provider before resuming his medications.  2/27 - reception called patient " today to schedule appt - pt states he will not be able to come in any sooner than his scheduled appt of 3/19.             Nilam Gates, PharmD, BCACP, BC-ADM, [facility]  Clinical Specialty Pharmacist, Endocrinology  2/27/2025  14:13 EST

## 2025-03-07 DIAGNOSIS — E11.65 TYPE 2 DIABETES MELLITUS WITH HYPERGLYCEMIA, WITH LONG-TERM CURRENT USE OF INSULIN: Primary | ICD-10-CM

## 2025-03-07 DIAGNOSIS — Z79.4 TYPE 2 DIABETES MELLITUS WITH HYPERGLYCEMIA, WITH LONG-TERM CURRENT USE OF INSULIN: Primary | ICD-10-CM

## 2025-03-13 DIAGNOSIS — E78.2 MIXED HYPERLIPIDEMIA: ICD-10-CM

## 2025-03-13 RX ORDER — EZETIMIBE 10 MG/1
10 TABLET ORAL DAILY
Qty: 30 TABLET | Refills: 0 | Status: SHIPPED | OUTPATIENT
Start: 2025-03-13

## 2025-03-13 NOTE — TELEPHONE ENCOUNTER
Rx Refill Note  Requested Prescriptions     Pending Prescriptions Disp Refills    ezetimibe (ZETIA) 10 MG tablet [Pharmacy Med Name: Ezetimibe 10 MG Oral Tablet] 90 tablet 0     Sig: Take 1 tablet by mouth once daily      Last office visit with prescribing clinician: 9/25/2024   Last telemedicine visit with prescribing clinician: Visit date not found   Next office visit with prescribing clinician: 3/19/2025                         Would you like a call back once the refill request has been completed: [] Yes [] No    If the office needs to give you a call back, can they leave a voicemail: [] Yes [] No    Mary Shirley MA  03/13/25, 10:36 EDT

## 2025-03-19 ENCOUNTER — SPECIALTY PHARMACY (OUTPATIENT)
Facility: HOSPITAL | Age: 55
End: 2025-03-19
Payer: MEDICARE

## 2025-03-19 ENCOUNTER — OFFICE VISIT (OUTPATIENT)
Dept: ENDOCRINOLOGY | Age: 55
End: 2025-03-19
Payer: MEDICARE

## 2025-03-19 VITALS
WEIGHT: 245.2 LBS | SYSTOLIC BLOOD PRESSURE: 124 MMHG | HEART RATE: 83 BPM | TEMPERATURE: 98.2 F | BODY MASS INDEX: 35.1 KG/M2 | DIASTOLIC BLOOD PRESSURE: 80 MMHG | OXYGEN SATURATION: 96 % | HEIGHT: 70 IN

## 2025-03-19 DIAGNOSIS — Z86.73 H/O: CVA (CEREBROVASCULAR ACCIDENT): ICD-10-CM

## 2025-03-19 DIAGNOSIS — Z79.4 TYPE 2 DIABETES MELLITUS WITH HYPERGLYCEMIA, WITH LONG-TERM CURRENT USE OF INSULIN: ICD-10-CM

## 2025-03-19 DIAGNOSIS — E66.812 CLASS 2 SEVERE OBESITY DUE TO EXCESS CALORIES WITH SERIOUS COMORBIDITY AND BODY MASS INDEX (BMI) OF 35.0 TO 35.9 IN ADULT: ICD-10-CM

## 2025-03-19 DIAGNOSIS — E11.65 TYPE 2 DIABETES MELLITUS WITH HYPERGLYCEMIA, WITH LONG-TERM CURRENT USE OF INSULIN: ICD-10-CM

## 2025-03-19 DIAGNOSIS — E78.2 MIXED HYPERLIPIDEMIA: ICD-10-CM

## 2025-03-19 DIAGNOSIS — E66.01 CLASS 2 SEVERE OBESITY DUE TO EXCESS CALORIES WITH SERIOUS COMORBIDITY AND BODY MASS INDEX (BMI) OF 35.0 TO 35.9 IN ADULT: ICD-10-CM

## 2025-03-19 DIAGNOSIS — E06.3 HYPOTHYROIDISM DUE TO HASHIMOTO'S THYROIDITIS: ICD-10-CM

## 2025-03-19 DIAGNOSIS — Z79.4 TYPE 2 DIABETES MELLITUS WITH HYPERGLYCEMIA, WITH LONG-TERM CURRENT USE OF INSULIN: Primary | ICD-10-CM

## 2025-03-19 DIAGNOSIS — I25.10 CORONARY ARTERY DISEASE INVOLVING NATIVE CORONARY ARTERY OF NATIVE HEART WITHOUT ANGINA PECTORIS: ICD-10-CM

## 2025-03-19 DIAGNOSIS — E11.65 TYPE 2 DIABETES MELLITUS WITH HYPERGLYCEMIA, WITH LONG-TERM CURRENT USE OF INSULIN: Primary | ICD-10-CM

## 2025-03-19 PROCEDURE — 3046F HEMOGLOBIN A1C LEVEL >9.0%: CPT | Performed by: NURSE PRACTITIONER

## 2025-03-19 PROCEDURE — 3074F SYST BP LT 130 MM HG: CPT | Performed by: NURSE PRACTITIONER

## 2025-03-19 PROCEDURE — 3079F DIAST BP 80-89 MM HG: CPT | Performed by: NURSE PRACTITIONER

## 2025-03-19 PROCEDURE — 99214 OFFICE O/P EST MOD 30 MIN: CPT | Performed by: NURSE PRACTITIONER

## 2025-03-19 RX ORDER — INSULIN DEGLUDEC 200 U/ML
150 INJECTION, SOLUTION SUBCUTANEOUS EVERY MORNING
Qty: 72 ML | Refills: 1 | Status: SHIPPED | OUTPATIENT
Start: 2025-03-19

## 2025-03-19 RX ORDER — PEN NEEDLE, DIABETIC 32GX 5/32"
1 NEEDLE, DISPOSABLE MISCELLANEOUS 4 TIMES DAILY
Qty: 400 EACH | Refills: 1 | Status: SHIPPED | OUTPATIENT
Start: 2025-03-19

## 2025-03-19 RX ORDER — INSULIN LISPRO 100 U/ML
50 INJECTION, SOLUTION SUBCUTANEOUS 3 TIMES DAILY
Qty: 135 ML | Refills: 1 | Status: SHIPPED | OUTPATIENT
Start: 2025-03-19

## 2025-03-19 RX ORDER — ROSUVASTATIN CALCIUM 40 MG/1
40 TABLET, COATED ORAL DAILY
Qty: 90 TABLET | Refills: 1 | Status: SHIPPED | OUTPATIENT
Start: 2025-03-19

## 2025-03-19 RX ORDER — LEVOTHYROXINE SODIUM 100 UG/1
100 TABLET ORAL DAILY
Qty: 90 TABLET | Refills: 1 | Status: SHIPPED | OUTPATIENT
Start: 2025-03-19

## 2025-03-19 RX ORDER — DAPAGLIFLOZIN 10 MG/1
1 TABLET, FILM COATED ORAL DAILY
Qty: 90 TABLET | Refills: 1 | Status: SHIPPED | OUTPATIENT
Start: 2025-03-19

## 2025-03-19 RX ORDER — EZETIMIBE 10 MG/1
10 TABLET ORAL DAILY
Qty: 90 TABLET | Refills: 1 | Status: SHIPPED | OUTPATIENT
Start: 2025-03-19

## 2025-03-19 NOTE — PROGRESS NOTES
"Chief Complaint  Diabetes    Subjective        Ricardo Pereira presents to [facility] ENDOCRINOLOGY  History of Present Illness    LOV 9/2024  Over the last several months he became overwhelmed with his chronic conditions, socioeconomic status and family stress and stopped focusing on taking care of him self and stopped taking his medications     At this time he declines behavioral health referral but may be open to it in the future   No thoughts of self harm    Resumed medications about one week ago     Type 2 dm, > 10 years    Dm regimen: Tresiba 150u QAM, Novolog about 50u with meals, farxiga 10 mg po daily   Pmh s/f: nephrectomy secondary to renal cancer 10+ years ago, spinal stimulator in place   Known complications: CAD, CVA  diabetic eye exam: 8/2024  Hypothyroid: Levothyroxine 100mcg daily       Objective   Vital Signs:  /80   Pulse 83   Temp 98.2 °F (36.8 °C) (Oral)   Ht 177.8 cm (70\")   Wt 111 kg (245 lb 3.2 oz)   SpO2 96%   BMI 35.18 kg/m²   Estimated body mass index is 35.18 kg/m² as calculated from the following:    Height as of this encounter: 177.8 cm (70\").    Weight as of this encounter: 111 kg (245 lb 3.2 oz).        Physical Exam  Vitals reviewed.   Constitutional:       General: He is not in acute distress.  HENT:      Head: Normocephalic and atraumatic.   Cardiovascular:      Rate and Rhythm: Normal rate.   Pulmonary:      Effort: Pulmonary effort is normal. No respiratory distress.   Musculoskeletal:         General: No signs of injury. Normal range of motion.      Cervical back: Normal range of motion and neck supple.   Skin:     General: Skin is warm and dry.   Neurological:      Mental Status: He is alert and oriented to person, place, and time. Mental status is at baseline.   Psychiatric:         Mood and Affect: Mood normal.         Behavior: Behavior normal.         Thought Content: Thought content normal.         Judgment: Judgment normal.          Result " Review :  The following data was reviewed by: MADUH Keane on 03/19/2025:  Common labs          9/18/2024    09:12 3/12/2025    08:07   Common Labs   Glucose 90  199    BUN 12  10    Creatinine 1.24  0.97    Sodium 142  140    Potassium 4.6  4.2    Chloride 104  103    Calcium 9.6  9.4    Albumin 4.1     Total Bilirubin 0.5     Alkaline Phosphatase 89     AST (SGOT) 64     ALT (SGPT) 63     Total Cholesterol 96     Triglycerides 164     HDL Cholesterol 22     LDL Cholesterol  46     Hemoglobin A1C 8.20  12.10    Microalbumin, Urine 9.3                 Assessment and Plan   Diagnoses and all orders for this visit:    1. Type 2 diabetes mellitus with hyperglycemia, with long-term current use of insulin (Primary)    2. Hypothyroidism due to Hashimoto's thyroiditis    3. Class 2 severe obesity due to excess calories with serious comorbidity and body mass index (BMI) of 35.0 to 35.9 in adult    4. H/O: CVA (cerebrovascular accident)    5. Coronary artery disease involving native coronary artery of native heart without angina pectoris    6. Mixed hyperlipidemia             Follow Up   Return in about 6 months (around 9/19/2025).    High risk of complications given his mental health, socioeconomic status and elevated A1c results  He is committed to working each day to try and take care of himself better as ultimately he wants to always be able to help his family whenver they need- which is the main source of his anxiety/ depression   Requesting 6m f/u given cost of copays at visits and financial burdens   Consider PAP for available medications   Consider behavioral health and his physical health will continue to be affected if he does not focus on improving his mental state     Patient was given instructions and counseling regarding his condition or for health maintenance advice. Please see specific information pulled into the AVS if appropriate.       MADHU Keane

## 2025-03-19 NOTE — TELEPHONE ENCOUNTER
Specialty Pharmacy Patient Management Program  Prescription Refill Request     Patient currently fills medications at  Pharmacy. Needing refill(s) on the following:      Requested Prescriptions     Pending Prescriptions Disp Refills    dapagliflozin Propanediol (Farxiga) 10 MG tablet 90 tablet 1     Sig: Take 1 Tablet by mouth Daily. Indications: Type 2 Diabetes    ezetimibe (ZETIA) 10 MG tablet 90 tablet 1     Sig: Take 1 tablet by mouth Daily.    Insulin Degludec (Tresiba FlexTouch) 200 UNIT/ML solution pen-injector pen injection 72 mL 1     Sig: Inject 150 Units under the skin into the appropriate area as directed Every Morning.    Insulin Lispro (ADMELOG SOLOSTAR) 100 UNIT/ML injection pen 135 mL 1     Sig: Inject 50 Units under the skin into the appropriate area as directed 3 (Three) Times a Day With Meals    Insulin Pen Needle (BD Pen Needle Maribel 2nd Gen) 32G X 4 MM misc 400 each 1     Sig: Use 1 pen needle 4 (Four) Times a Day for insulin injections    levothyroxine (Synthroid) 100 MCG tablet 90 tablet 1     Sig: Take 1 tablet by mouth Daily. Indications: Underactive Thyroid       Last visit: 03/19/25 - Today      Next visit: 09/19/25    Pended for MADHU Keane to review, and approve if appropriate.       Nilam Gates, PharmD, BCACP, BC-ADM, CDCES  Clinical Specialty Pharmacist, Endocrinology  3/19/2025  12:56 EDT

## 2025-03-19 NOTE — PROGRESS NOTES
"   Specialty Pharmacy Patient Management Program  One-Time Clinical Outreach     Ricardo Pereira is a 54 y.o. male seen by an Endocrinology provider for Type 2 Diabetes and enrolled in the Endocrinology Patient Management program offered by [facility].      Open Medication Therapy Problems  DM2 (diabetes mellitus, type 2)   1 Current Medication: Insulin Degludec (Tresiba FlexTouch) 200 UNIT/ML solution pen-injector pen injection (Discontinued)   Current Medication Sig: Inject 150 Units under the skin into the appropriate area as directed Every Morning.   Rationale: Patient prefers not to take - Adherence - Adherence   Identified Date: 2/26/2025   Note: Contacted patient to complete clinical assessment, however, patient reports at this time he has not been taking his medications for about a month. He reports that he's been feeling \"burnt out\" with his healthcare and with family issues at home. Offered to see if we could find some support services for him but at this time he feels like he does have family support and plans to restart his medications. Discussed that I would not recommend starting back at the high insulin doses that he was on before (Tresiba 150units/day and Admelog 50units TID before meals) to avoid adverse effects. Advised him I would talk with his provider, MADHU Keane, to determine next steps. Patient has not been checking blood sugars either but states he's physically felt okay.   Discussed patient's situation with his provider, MADHU Keane, who recommended patient to be seen sooner than scheduled follow-up on 3/19/25 and to review plan for medication restart.   Advised patient of providers recommendation but he did not know his availability at this time. He requested we call him back tomorrow after 2pm to reschedule a sooner visit. Patient agreeable to wait until he discusses medication restart with provider before resuming his " "medications.  _______________________________________________________________________     2/27 - reception called patient today to schedule appt - pt states he will not be able to come in any sooner than his scheduled appt of 3/19.  _______________________________________________________________________     3/19/25 10:22 AM - In person discussion following office visit with provider     Patient discussed adherence with provider and \"He is committed to working each day to try and take care of himself better\". He reported restarting medications ~1 week ago and denies missed doses since. Will plan to follow-up with patient in 2 weeks to assess adherence and make sure he's still doing okay.     All questions answered, patient in agreement with plan, and patient expressed understanding .             Nilam Gates, PharmD, BCACP, BC-ADM, CDCES  Clinical Specialty Pharmacist, Endocrinology  3/19/2025  13:54 EDT  "
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
## 2025-03-26 ENCOUNTER — OFFICE VISIT (OUTPATIENT)
Dept: FAMILY MEDICINE CLINIC | Facility: CLINIC | Age: 55
End: 2025-03-26
Payer: MEDICARE

## 2025-03-26 VITALS
OXYGEN SATURATION: 97 % | DIASTOLIC BLOOD PRESSURE: 70 MMHG | SYSTOLIC BLOOD PRESSURE: 126 MMHG | HEIGHT: 70 IN | WEIGHT: 245 LBS | BODY MASS INDEX: 35.07 KG/M2 | TEMPERATURE: 98 F | RESPIRATION RATE: 16 BRPM | HEART RATE: 87 BPM

## 2025-03-26 DIAGNOSIS — Z79.4 TYPE 2 DIABETES MELLITUS WITH STAGE 3 CHRONIC KIDNEY DISEASE, WITH LONG-TERM CURRENT USE OF INSULIN, UNSPECIFIED WHETHER STAGE 3A OR 3B CKD: ICD-10-CM

## 2025-03-26 DIAGNOSIS — E78.2 MIXED HYPERLIPIDEMIA: ICD-10-CM

## 2025-03-26 DIAGNOSIS — E55.9 VITAMIN D DEFICIENCY: ICD-10-CM

## 2025-03-26 DIAGNOSIS — E11.42 DIABETIC POLYNEUROPATHY ASSOCIATED WITH TYPE 2 DIABETES MELLITUS: ICD-10-CM

## 2025-03-26 DIAGNOSIS — Z12.5 SPECIAL SCREENING, PROSTATE CANCER: ICD-10-CM

## 2025-03-26 DIAGNOSIS — Z00.00 MEDICARE ANNUAL WELLNESS VISIT, SUBSEQUENT: Primary | ICD-10-CM

## 2025-03-26 DIAGNOSIS — E11.22 TYPE 2 DIABETES MELLITUS WITH STAGE 3 CHRONIC KIDNEY DISEASE, WITH LONG-TERM CURRENT USE OF INSULIN, UNSPECIFIED WHETHER STAGE 3A OR 3B CKD: ICD-10-CM

## 2025-03-26 DIAGNOSIS — I10 ESSENTIAL HYPERTENSION: ICD-10-CM

## 2025-03-26 DIAGNOSIS — N18.30 TYPE 2 DIABETES MELLITUS WITH STAGE 3 CHRONIC KIDNEY DISEASE, WITH LONG-TERM CURRENT USE OF INSULIN, UNSPECIFIED WHETHER STAGE 3A OR 3B CKD: ICD-10-CM

## 2025-03-26 DIAGNOSIS — E06.3 HYPOTHYROIDISM DUE TO HASHIMOTO THYROIDITIS: Chronic | ICD-10-CM

## 2025-03-26 NOTE — PROGRESS NOTES
Subjective   The ABCs of the Annual Wellness Visit  Medicare Wellness Visit      Ricardo Pereira is a 54 y.o. patient who presents for a Medicare Wellness Visit.    Follow-up for hypertension.  Currently, has been feeling well and asymptomatic without any headaches, vision changes, cough, chest pain, shortness of breath, swelling, focal neurologic deficit, memory loss or syncope.  Has been taking the medications regularly and adherent with the regimen olmesartan 40 mg daily.  Denies medication side effects and no significant interval events.       Here for follow-up of diabetes.  Followed by Dr. Gottlieb endocrinology patient states to have been compliant with medications.  Blood sugar monitoring - patient states has been running on average 125 in mornings fasting.  No episodes of hypoglycemia, nausea, vomiting, new rashes, syncope or other issues.  Denies any difficulties with the current medication regimen of Farxiga 10 mg daily, Tresiba 200 units/mL 140U, and Fiasp 45U TID.  Last A1c on 3/12/25 12.1% and 9/18/2024 of 8.2% last microalbumin on 9/18/2024 was negative.  Had been under stress and had been off medications completely for 6 weeks and diabetes uncontrolled.  Now back seeing endocrinology.     History of coronary artery disease with stent placed in the RCA 6/10/2021.  He continues with his Plavix 75 mg daily and aspirin 81 mg daily.  Still seeing cardiology Dr Carrillo.     History of hyper cholesterol.  Currently, has been feeling well without any myalgias, muscle aches, weakness, numbness, chest pain, short of breath or other issues.  Currently, is adherent with medication regimen of Zetia 10 mg daily, rosuvastatin 40 mg daily and Vascepa 2 g 2 times daily that is and denies medication side effects.  Last lipid panel performed on 9/18/2024.     History of hypo-thyroid.  Followed by endocrinology Dr. Gottlieb.  Denies fatigue, weakness, constipation/diarrhea, hair/skin changes, weight gain/loss,  depression/anxiety, rashes, palpitations, swelling, chest pain, shortness of breath or other issues.  Has been compliant with taking the medication of levothyroxine 100 mcg daily with no recent changes.  Denies any difficulty with the current medication.  Last TSH on 9/18/2024.     History of GERD tolerating with the pantoprazole 40 mg daily.     History of vitamin D deficiency on no medication currently.     History of diabetic peripheral neuropathy and has tried lyrica, gabapentin with no relief.  Then tried cymbalta with severe N, Diarrhea and syncopal episode.  Had spinal stimulator placed 6/2023 Dr Parker and the leg pain improved but now with pain in the back.       The following portions of the patient's history were reviewed and updated as appropriate: allergies, current medications, past family history, past medical history, past social history, past surgical history, and problem list.  Compared to one year ago, the patient's physical health is the same.  Compared to one year ago, the patient's mental health is the same.    Recent Hospitalizations:  He was not admitted to the hospital during the last year.     Current Medical Providers:  Patient Care Team:  Simba Moore MD as PCP - General (Internal Medicine)  Avel Gottlieb MD as Consulting Physician (Endocrinology)  Favian Carrillo MD as Consulting Physician (Cardiology)  Sylvia Mireles APRN as Nurse Practitioner (Endocrinology)    Outpatient Medications Prior to Visit   Medication Sig Dispense Refill    albuterol (PROVENTIL) (2.5 MG/3ML) 0.083% nebulizer solution Take 2.5 mg by nebulization Every 4 (Four) Hours As Needed for Wheezing. 540 mL 12    albuterol sulfate  (90 Base) MCG/ACT inhaler Inhale 2 puffs Every 4 (Four) Hours As Needed for Wheezing. 8 g 2    clopidogrel (PLAVIX) 75 MG tablet Take 1 tablet by mouth once daily 90 tablet 0    dapagliflozin Propanediol (Farxiga) 10 MG tablet Take 1 Tablet by mouth Daily. Indications:  Type 2 Diabetes 90 tablet 1    ezetimibe (ZETIA) 10 MG tablet Take 1 tablet by mouth Daily. 90 tablet 1    Insulin Degludec (Tresiba FlexTouch) 200 UNIT/ML solution pen-injector pen injection Inject 150 Units under the skin into the appropriate area as directed Every Morning. 72 mL 1    Insulin Lispro (ADMELOG SOLOSTAR) 100 UNIT/ML injection pen Inject 50 Units under the skin into the appropriate area as directed 3 (Three) Times a Day With Meals 135 mL 1    Insulin Pen Needle (BD Pen Needle Maribel 2nd Gen) 32G X 4 MM misc Use 1 pen needle 4 (Four) Times a Day for insulin injections 400 each 1    levothyroxine (Synthroid) 100 MCG tablet Take 1 tablet by mouth Daily. Indications: Underactive Thyroid 90 tablet 1    olmesartan (BENICAR) 40 MG tablet Take 1 tablet by mouth once daily 90 tablet 0    pantoprazole (PROTONIX) 40 MG EC tablet Take 1 tablet by mouth once daily 90 tablet 2    rosuvastatin (CRESTOR) 40 MG tablet Take 1 tablet by mouth once daily 90 tablet 1    Vascepa 1 g capsule capsule Take 2 g by mouth Every 12 (Twelve) Hours. 360 capsule 1     No facility-administered medications prior to visit.     No opioid medication identified on active medication list. I have reviewed chart for other potential  high risk medication/s and harmful drug interactions in the elderly.      Aspirin is not on active medication list.  Aspirin use is contraindicated for this patient due to: current use of clopidogrel.  .    Patient Active Problem List   Diagnosis    DM2 (diabetes mellitus, type 2)    Vitamin D deficiency    Chronic pancreatitis    Mixed hyperlipidemia    Abnormal endocrine laboratory test finding    Secondary hypothyroidism    Hyperglycemia    Essential hypertension    COPD (chronic obstructive pulmonary disease)    Acute exacerbation of chronic obstructive pulmonary disease (COPD)    Hypothyroidism    COPD exacerbation    Hyperosmolar (nonketotic) coma    Acute diverticulitis    REGAN (obstructive sleep apnea)     "History of renal cell carcinoma    H/O unilateral nephrectomy    Hospital discharge follow-up    Hyponatremia    Diabetic neuropathy    Onychomycosis    Severe lumbar pain    Back pain with radiculopathy    Chronic right flank pain    Bronchitis    Medicare annual wellness visit, subsequent    Claudication    Cancer of kidney    Abscess of back    COVID-19 virus infection    Anxiety    Coronary artery disease involving coronary bypass graft of native heart with unstable angina pectoris    Moderate episode of recurrent major depressive disorder    Infected sebaceous cyst of skin    Cerebral infarction    Obesity (BMI 35.0-39.9 without comorbidity)     Advance Care Planning Advance Directive is not on file.  ACP discussion was held with the patient during this visit. Patient does not have an advance directive, information provided.      Objective   Vitals:    03/26/25 0931   BP: 126/70   BP Location: Right arm   Patient Position: Sitting   Pulse: 87   Resp: 16   Temp: 98 °F (36.7 °C)   SpO2: 97%   Weight: 111 kg (245 lb)   Height: 177.8 cm (70\")   PainSc: 6    PainLoc: Back       Estimated body mass index is 35.15 kg/m² as calculated from the following:    Height as of this encounter: 177.8 cm (70\").    Weight as of this encounter: 111 kg (245 lb).    Class 2 Severe Obesity (BMI >=35 and <=39.9). Obesity-related health conditions include the following: obstructive sleep apnea, hypertension, diabetes mellitus, dyslipidemias, GERD, and osteoarthritis. Obesity is unchanged. BMI is is above average; BMI management plan is completed. We discussed portion control and increasing exercise.           Does the patient have evidence of cognitive impairment? No, Minicog 5 out of 5 score  Lab Results   Component Value Date    HGBA1C 12.10 (H) 03/12/2025                                                                                                Health  Risk Assessment    Smoking Status:  Social History     Tobacco Use   Smoking " Status Some Days    Types: Cigars    Passive exposure: Current   Smokeless Tobacco Never   Tobacco Comments    occasional cigar use     Alcohol Consumption:  Social History     Substance and Sexual Activity   Alcohol Use Never       Fall Risk Screen  STEADI Fall Risk Assessment was completed, and patient is at MODERATE risk for falls. Assessment completed on:3/26/2025    Depression Screening   Little interest or pleasure in doing things? Not at all   Feeling down, depressed, or hopeless? Several days   PHQ-2 Total Score 1      Health Habits and Functional and Cognitive Screening:      3/26/2025     9:33 AM   Functional & Cognitive Status   Do you have difficulty preparing food and eating? No   Do you have difficulty bathing yourself, getting dressed or grooming yourself? No   Do you have difficulty using the toilet? No   Do you have difficulty moving around from place to place? No   Do you have trouble with steps or getting out of a bed or a chair? No   Current Diet Limited Junk Food   Dental Exam Up to date   Eye Exam Up to date   Exercise (times per week) 4 times per week   Current Exercises Include Walking   Do you need help using the phone?  No   Are you deaf or do you have serious difficulty hearing?  No   Do you need help to go to places out of walking distance? No   Do you need help shopping? No   Do you need help preparing meals?  No   Do you need help with housework?  No   Do you need help with laundry? No   Do you need help taking your medications? No   Do you need help managing money? No   Do you ever drive or ride in a car without wearing a seat belt? Yes   Have you felt unusual stress, anger or loneliness in the last month? Yes   Who do you live with? Spouse   If you need help, do you have trouble finding someone available to you? No   Have you been bothered in the last four weeks by sexual problems? No   Do you have difficulty concentrating, remembering or making decisions? Yes            Age-appropriate Screening Schedule:  Refer to the list below for future screening recommendations based on patient's age, sex and/or medical conditions. Orders for these recommended tests are listed in the plan section. The patient has been provided with a written plan.    Health Maintenance List  Health Maintenance   Topic Date Due    DIABETIC EYE EXAM  Never done    Hepatitis B (1 of 3 - 19+ 3-dose series) Never done    ZOSTER VACCINE (1 of 2) Never done    INFLUENZA VACCINE  07/01/2024    COVID-19 Vaccine (5 - 2024-25 season) 09/01/2024    HEMOGLOBIN A1C  09/12/2025    LIPID PANEL  09/18/2025    URINE MICROALBUMIN-CREATININE RATIO (uACR)  09/18/2025    ANNUAL WELLNESS VISIT  03/26/2026    TDAP/TD VACCINES (3 - Td or Tdap) 10/31/2032    HEPATITIS C SCREENING  Completed    Pneumococcal Vaccine 50+  Completed    COLORECTAL CANCER SCREENING  Discontinued                                                                                                                                                CMS Preventative Services Quick Reference  Risk Factors Identified During Encounter  Immunizations Discussed/Encouraged: Influenza, Shingrix, and COVID19  Diagnoses and all orders for this visit:    1. Medicare annual wellness visit, subsequent (Primary)    2. Type 2 diabetes mellitus with stage 3 chronic kidney disease, with long-term current use of insulin, unspecified whether stage 3a or 3b CKD    3. Hypothyroidism due to Hashimoto thyroiditis    4. Mixed hyperlipidemia    5. Essential hypertension    6. Vitamin D deficiency    7. Diabetic polyneuropathy associated with type 2 diabetes mellitus    8. Special screening, prostate cancer  -     PSA Screen      Annual wellness visit reviewed with patient.  All past history, medications, social history, and problem list were reviewed.  Discussed advanced directives and living will.  Patient has living will: Living will: no and information packet given to patient to  complete at home and bring copy to office.  Discussed fall risk and precautions encourage removing throw rugs and using grab bars within the home and bathroom.  Will check the labs as ordered above to evaluate the blood sugars, kidney, liver, cholesterol for screening.  Discussed flu shot recommended to get the high-dose influenza vaccine annually in the fall.  The patient has started, but not completed, their COVID-19 vaccination series.  Prevnar-13 and pneumovax-23 up to date and appropriate.  Shingrix vaccination series recommended.  Encourage follow-up with the eye doctor on annual basis for glaucoma evaluation.  Discussed weight and encouraged exercise as tolerated while following a healthy diet.  Colon cancer screening discussed and current status:  cologuard Cologuard completed on 3/18/2023 and negative..  Discussed prostate screening for which he is due for PSA that has been ordered.  Follow up with current specialists as needed.   The above risks/problems have been discussed with the patient.  Pertinent information has been shared with the patient in the After Visit Summary.  An After Visit Summary and PPPS were made available to the patient.    Follow Up:   Next Medicare Wellness visit to be scheduled in 1 year.

## 2025-03-27 LAB — PSA SERPL-MCNC: 1.55 NG/ML (ref 0–4)

## 2025-04-04 ENCOUNTER — SPECIALTY PHARMACY (OUTPATIENT)
Dept: ENDOCRINOLOGY | Age: 55
End: 2025-04-04
Payer: MEDICARE

## 2025-04-04 NOTE — PROGRESS NOTES
Specialty Pharmacy Patient Management Program  Prior Authorization      Prior Authorization for Tresiba was APPROVED    Approval Start Date: 01/01/25  Approval End Date: 04/04/26  CoverMyMeds Key: [id number]      Scheduled new prior authorization renewal outreach task to be completed on 4/4/26.    Nilam Gates, PharmD, BCACP, BC-ADM, [facility]  Clinical Specialty Pharmacist, Endocrinology  4/4/2025  10:08 EDT

## 2025-04-04 NOTE — PROGRESS NOTES
Specialty Pharmacy Patient Management Program  Prior Authorization      Prior Authorization for Tresiba was submitted    CoverMyMeds Key: [id number]    Will recheck status on 4/7/25, if no response received.     Nilam Gates, PharmD, BCACP, BC-ADM, [facility]  Clinical Specialty Pharmacist, Endocrinology  4/4/2025  10:06 EDT

## 2025-04-04 NOTE — PROGRESS NOTES
Specialty Pharmacy Patient Management Program  Endocrinology Refill Outreach      Ricardo is a 54 y.o. male contacted today regarding refills of his medication(s).    Specialty medication(s) and dose(s) confirmed: Tresiba U200 injecting 150units daily, Admelog 50units before meals and Farxiga 10mg daily     Refill Questions      Flowsheet Row Most Recent Value   Changes to allergies? No   Changes to medications? No   New conditions or infections since last clinic visit No   Unplanned office visit, urgent care, ED, or hospital admission in the last 4 weeks  No   How does patient/caregiver feel medication is working? Good   Financial problems or insurance changes  No   Since the previous refill, were any specialty medication doses or scheduled injections missed or delayed?  No   Does this patient require a clinical escalation to a pharmacist? No          Delivery Questions      Flowsheet Row Most Recent Value   Delivery method [employer]   Delivery address verified with patient/caregiver? Yes   Delivery address Home   Number of medications in delivery 3   Medication(s) being filled and delivered Insulin Degludec (Tresiba FlexTouch), Dapagliflozin Propanediol, Insulin Lispro (ADMELOG SOLOSTAR)   Copay verified? Yes   Copay amount $29.20  [Farxiga $4.90 + Admelog $12.15 + Tresiba $12.15]   Copay form of payment Credit/debit on file   Signature Required No            Follow-Up: 77 days     Nilam Gates, [place], BCACP, BC-ADM, [id number]  Clinical Specialty Pharmacist, Endocrinology  4/4/2025  13:31 EDT

## 2025-04-04 NOTE — PROGRESS NOTES
"   Specialty Pharmacy Patient Management Program  One-Time Clinical Outreach     Ricardo Pereira is a 54 y.o. male seen by an Endocrinology provider for Type 2 Diabetes and enrolled in the Endocrinology Patient Management program offered by [facility].      Open Medication Therapy Problems  DM2 (diabetes mellitus, type 2)   1 Current Medication: Insulin Degludec (Tresiba FlexTouch) 200 UNIT/ML solution pen-injector pen injection (Discontinued)   Current Medication Sig: Inject 150 Units under the skin into the appropriate area as directed Every Morning.   Rationale: Patient prefers not to take - Adherence - Adherence   Identified Date: 2/26/2025   Note: Contacted patient to complete clinical assessment, however, patient reports at this time he has not been taking his medications for about a month. He reports that he's been feeling \"burnt out\" with his healthcare and with family issues at home. Offered to see if we could find some support services for him but at this time he feels like he does have family support and plans to restart his medications. Discussed that I would not recommend starting back at the high insulin doses that he was on before (Tresiba 150units/day and Admelog 50units TID before meals) to avoid adverse effects. Advised him I would talk with his provider, MADHU Keane, to determine next steps. Patient has not been checking blood sugars either but states he's physically felt okay.   Discussed patient's situation with his provider, MADHU Keane, who recommended patient to be seen sooner than scheduled follow-up on 3/19/25 and to review plan for medication restart.   Advised patient of providers recommendation but he did not know his availability at this time. He requested we call him back tomorrow after 2pm to reschedule a sooner visit. Patient agreeable to wait until he discusses medication restart with provider before resuming his " "medications.  _______________________________________________________________________     2/27 - reception called patient today to schedule appt - pt states he will not be able to come in any sooner than his scheduled appt of 3/19.  _______________________________________________________________________     3/19/25 10:22 AM - In person discussion following office visit with provider     Patient discussed adherence with provider and \"He is committed to working each day to try and take care of himself better\". He reported restarting medications ~1 week ago and denies missed doses since. Will plan to follow-up with patient in 2 weeks to assess adherence and make sure he's still doing okay.     All questions answered, patient in agreement with plan, and patient expressed understanding .     _______________________________________________________________________   4/4/25 - 1:25pm - phone call with patient   Patient states he has been able to keep up with taking his medications since his recent visit with MADHU Keane, and denies missed doses or adverse effects. Patient states he does have family support at this time to help stay on track.   All questions answered.             Nilam Gates, PharmD, BCACP, BC-ADM, [facility]  Clinical Specialty Pharmacist, Endocrinology  4/4/2025  13:39 EDT  "

## 2025-04-08 DIAGNOSIS — E78.2 MIXED HYPERLIPIDEMIA: ICD-10-CM

## 2025-04-08 RX ORDER — EZETIMIBE 10 MG/1
10 TABLET ORAL DAILY
Qty: 30 TABLET | Refills: 0 | OUTPATIENT
Start: 2025-04-08

## 2025-04-08 NOTE — TELEPHONE ENCOUNTER
Rx Refill Note  Requested Prescriptions     Pending Prescriptions Disp Refills    ezetimibe (ZETIA) 10 MG tablet [Pharmacy Med Name: Ezetimibe 10 MG Oral Tablet] 30 tablet 0     Sig: Take 1 tablet by mouth once daily      Last office visit with prescribing clinician: 3/19/2025   Last telemedicine visit with prescribing clinician: Visit date not found   Next office visit with prescribing clinician: 9/19/2025                         Would you like a call back once the refill request has been completed: [] Yes [] No    If the office needs to give you a call back, can they leave a voicemail: [] Yes [] No    Mary Shirley MA  04/08/25, 11:20 EDT

## 2025-04-28 DIAGNOSIS — I10 ESSENTIAL HYPERTENSION: ICD-10-CM

## 2025-04-29 RX ORDER — OLMESARTAN MEDOXOMIL 40 MG/1
40 TABLET ORAL DAILY
Qty: 90 TABLET | Refills: 0 | Status: SHIPPED | OUTPATIENT
Start: 2025-04-29

## 2025-04-29 RX ORDER — CLOPIDOGREL BISULFATE 75 MG/1
75 TABLET ORAL DAILY
Qty: 90 TABLET | Refills: 0 | Status: SHIPPED | OUTPATIENT
Start: 2025-04-29

## 2025-05-11 DIAGNOSIS — E78.2 MIXED HYPERLIPIDEMIA: ICD-10-CM

## 2025-05-12 RX ORDER — ICOSAPENT ETHYL 1000 MG/1
2 CAPSULE ORAL EVERY 12 HOURS
Qty: 360 CAPSULE | Refills: 0 | Status: SHIPPED | OUTPATIENT
Start: 2025-05-12

## 2025-05-12 NOTE — TELEPHONE ENCOUNTER
Rx Refill Note  Requested Prescriptions     Pending Prescriptions Disp Refills    Vascepa 1 g capsule capsule [Pharmacy Med Name: Vascepa 1 GM Oral Capsule] 360 capsule 0     Sig: TAKE 2 CAPSULES BY MOUTH EVERY 12 HOURS      Last office visit with prescribing clinician: 3/19/2025   Last telemedicine visit with prescribing clinician: Visit date not found   Next office visit with prescribing clinician: 9/19/2025                         Would you like a call back once the refill request has been completed: [] Yes [] No    If the office needs to give you a call back, can they leave a voicemail: [] Yes [] No    Hanh Sharp  05/12/25, 08:08 EDT

## 2025-06-11 ENCOUNTER — APPOINTMENT (OUTPATIENT)
Dept: GENERAL RADIOLOGY | Facility: HOSPITAL | Age: 55
DRG: 062 | End: 2025-06-11
Payer: MEDICARE

## 2025-06-11 ENCOUNTER — APPOINTMENT (OUTPATIENT)
Dept: CT IMAGING | Facility: HOSPITAL | Age: 55
DRG: 062 | End: 2025-06-11
Payer: MEDICARE

## 2025-06-11 ENCOUNTER — HOSPITAL ENCOUNTER (INPATIENT)
Facility: HOSPITAL | Age: 55
LOS: 2 days | Discharge: HOME | DRG: 062 | End: 2025-06-13
Attending: EMERGENCY MEDICINE | Admitting: INTERNAL MEDICINE
Payer: MEDICARE

## 2025-06-11 DIAGNOSIS — I10 ESSENTIAL HYPERTENSION: ICD-10-CM

## 2025-06-11 DIAGNOSIS — R29.90 STROKE-LIKE SYMPTOMS: Primary | ICD-10-CM

## 2025-06-11 DIAGNOSIS — R29.898 LEFT ARM WEAKNESS: ICD-10-CM

## 2025-06-11 DIAGNOSIS — Z78.9 DECREASED ACTIVITIES OF DAILY LIVING (ADL): ICD-10-CM

## 2025-06-11 DIAGNOSIS — J35.1 ENLARGED TONSILS: ICD-10-CM

## 2025-06-11 DIAGNOSIS — R29.810 FACIAL DROOP: ICD-10-CM

## 2025-06-11 DIAGNOSIS — E78.2 MIXED HYPERLIPIDEMIA: ICD-10-CM

## 2025-06-11 DIAGNOSIS — Z86.39 HISTORY OF DIABETES MELLITUS, TYPE II: ICD-10-CM

## 2025-06-11 DIAGNOSIS — N28.9 ACUTE RENAL INSUFFICIENCY: ICD-10-CM

## 2025-06-11 LAB
ABO GROUP BLD: NORMAL
ALBUMIN SERPL-MCNC: 4.2 G/DL (ref 3.5–5.2)
ALBUMIN/GLOB SERPL: 1.4 G/DL
ALP SERPL-CCNC: 118 U/L (ref 39–117)
ALT SERPL W P-5'-P-CCNC: 43 U/L (ref 1–41)
ANION GAP SERPL CALCULATED.3IONS-SCNC: 14 MMOL/L (ref 5–15)
APTT PPP: 26.7 SECONDS (ref 22.7–35.4)
AST SERPL-CCNC: 31 U/L (ref 1–40)
BASOPHILS # BLD AUTO: 0.06 10*3/MM3 (ref 0–0.2)
BASOPHILS NFR BLD AUTO: 0.5 % (ref 0–1.5)
BILIRUB SERPL-MCNC: 0.4 MG/DL (ref 0–1.2)
BLD GP AB SCN SERPL QL: NEGATIVE
BUN SERPL-MCNC: 11 MG/DL (ref 6–20)
BUN/CREAT SERPL: 8.1 (ref 7–25)
CALCIUM SPEC-SCNC: 9 MG/DL (ref 8.6–10.5)
CHLORIDE SERPL-SCNC: 103 MMOL/L (ref 98–107)
CO2 SERPL-SCNC: 21 MMOL/L (ref 22–29)
CREAT SERPL-MCNC: 1.36 MG/DL (ref 0.76–1.27)
DEPRECATED RDW RBC AUTO: 45.8 FL (ref 37–54)
EGFRCR SERPLBLD CKD-EPI 2021: 62.6 ML/MIN/1.73
EOSINOPHIL # BLD AUTO: 0.24 10*3/MM3 (ref 0–0.4)
EOSINOPHIL NFR BLD AUTO: 2 % (ref 0.3–6.2)
ERYTHROCYTE [DISTWIDTH] IN BLOOD BY AUTOMATED COUNT: 13.4 % (ref 12.3–15.4)
GLOBULIN UR ELPH-MCNC: 2.9 GM/DL
GLUCOSE BLDC GLUCOMTR-MCNC: 138 MG/DL (ref 70–130)
GLUCOSE BLDC GLUCOMTR-MCNC: 142 MG/DL (ref 70–130)
GLUCOSE BLDC GLUCOMTR-MCNC: 90 MG/DL (ref 70–130)
GLUCOSE SERPL-MCNC: 91 MG/DL (ref 65–99)
HCT VFR BLD AUTO: 53.7 % (ref 37.5–51)
HGB BLD-MCNC: 18.9 G/DL (ref 13–17.7)
HOLD SPECIMEN: NORMAL
HOLD SPECIMEN: NORMAL
IMM GRANULOCYTES # BLD AUTO: 0.05 10*3/MM3 (ref 0–0.05)
IMM GRANULOCYTES NFR BLD AUTO: 0.4 % (ref 0–0.5)
INR PPP: 0.96 (ref 0.9–1.1)
LYMPHOCYTES # BLD AUTO: 4.45 10*3/MM3 (ref 0.7–3.1)
LYMPHOCYTES NFR BLD AUTO: 37.6 % (ref 19.6–45.3)
MCH RBC QN AUTO: 32.8 PG (ref 26.6–33)
MCHC RBC AUTO-ENTMCNC: 35.2 G/DL (ref 31.5–35.7)
MCV RBC AUTO: 93.2 FL (ref 79–97)
MONOCYTES # BLD AUTO: 0.73 10*3/MM3 (ref 0.1–0.9)
MONOCYTES NFR BLD AUTO: 6.2 % (ref 5–12)
NEUTROPHILS NFR BLD AUTO: 53.3 % (ref 42.7–76)
NEUTROPHILS NFR BLD AUTO: 6.29 10*3/MM3 (ref 1.7–7)
NRBC BLD AUTO-RTO: 0 /100 WBC (ref 0–0.2)
PLATELET # BLD AUTO: 187 10*3/MM3 (ref 140–450)
PMV BLD AUTO: 10.6 FL (ref 6–12)
POTASSIUM SERPL-SCNC: 3.9 MMOL/L (ref 3.5–5.2)
PROT SERPL-MCNC: 7.1 G/DL (ref 6–8.5)
PROTHROMBIN TIME: 12.7 SECONDS (ref 11.7–14.2)
RBC # BLD AUTO: 5.76 10*6/MM3 (ref 4.14–5.8)
RH BLD: NEGATIVE
SODIUM SERPL-SCNC: 138 MMOL/L (ref 136–145)
T&S EXPIRATION DATE: NORMAL
WBC NRBC COR # BLD AUTO: 11.82 10*3/MM3 (ref 3.4–10.8)
WHOLE BLOOD HOLD COAG: NORMAL
WHOLE BLOOD HOLD SPECIMEN: NORMAL

## 2025-06-11 PROCEDURE — 86900 BLOOD TYPING SEROLOGIC ABO: CPT | Performed by: EMERGENCY MEDICINE

## 2025-06-11 PROCEDURE — 85730 THROMBOPLASTIN TIME PARTIAL: CPT | Performed by: EMERGENCY MEDICINE

## 2025-06-11 PROCEDURE — 80053 COMPREHEN METABOLIC PANEL: CPT | Performed by: EMERGENCY MEDICINE

## 2025-06-11 PROCEDURE — 70498 CT ANGIOGRAPHY NECK: CPT

## 2025-06-11 PROCEDURE — 82948 REAGENT STRIP/BLOOD GLUCOSE: CPT

## 2025-06-11 PROCEDURE — 25510000001 IOPAMIDOL PER 1 ML: Performed by: EMERGENCY MEDICINE

## 2025-06-11 PROCEDURE — 25010000002 TENECTEPLASE PER 50 MG: Performed by: EMERGENCY MEDICINE

## 2025-06-11 PROCEDURE — 0042T HC CT CEREBRAL PERFUSION W/WO CONTRAST: CPT

## 2025-06-11 PROCEDURE — 93005 ELECTROCARDIOGRAM TRACING: CPT | Performed by: EMERGENCY MEDICINE

## 2025-06-11 PROCEDURE — 85025 COMPLETE CBC W/AUTO DIFF WBC: CPT | Performed by: EMERGENCY MEDICINE

## 2025-06-11 PROCEDURE — 86850 RBC ANTIBODY SCREEN: CPT | Performed by: EMERGENCY MEDICINE

## 2025-06-11 PROCEDURE — 93010 ELECTROCARDIOGRAM REPORT: CPT | Performed by: STUDENT IN AN ORGANIZED HEALTH CARE EDUCATION/TRAINING PROGRAM

## 2025-06-11 PROCEDURE — 85610 PROTHROMBIN TIME: CPT | Performed by: EMERGENCY MEDICINE

## 2025-06-11 PROCEDURE — 3E03317 INTRODUCTION OF OTHER THROMBOLYTIC INTO PERIPHERAL VEIN, PERCUTANEOUS APPROACH: ICD-10-PCS | Performed by: INTERNAL MEDICINE

## 2025-06-11 PROCEDURE — 71045 X-RAY EXAM CHEST 1 VIEW: CPT

## 2025-06-11 PROCEDURE — 99291 CRITICAL CARE FIRST HOUR: CPT

## 2025-06-11 PROCEDURE — 86901 BLOOD TYPING SEROLOGIC RH(D): CPT | Performed by: EMERGENCY MEDICINE

## 2025-06-11 PROCEDURE — 70496 CT ANGIOGRAPHY HEAD: CPT

## 2025-06-11 RX ORDER — ASPIRIN 325 MG
325 TABLET ORAL DAILY
Status: DISCONTINUED | OUTPATIENT
Start: 2025-06-12 | End: 2025-06-13 | Stop reason: HOSPADM

## 2025-06-11 RX ORDER — IOPAMIDOL 755 MG/ML
100 INJECTION, SOLUTION INTRAVASCULAR
Status: DISCONTINUED | OUTPATIENT
Start: 2025-06-11 | End: 2025-06-11

## 2025-06-11 RX ORDER — IBUPROFEN 600 MG/1
1 TABLET ORAL
Status: DISCONTINUED | OUTPATIENT
Start: 2025-06-11 | End: 2025-06-13 | Stop reason: HOSPADM

## 2025-06-11 RX ORDER — NICOTINE POLACRILEX 4 MG
15 LOZENGE BUCCAL
Status: DISCONTINUED | OUTPATIENT
Start: 2025-06-11 | End: 2025-06-13 | Stop reason: HOSPADM

## 2025-06-11 RX ORDER — DEXTROSE MONOHYDRATE 25 G/50ML
25 INJECTION, SOLUTION INTRAVENOUS
Status: DISCONTINUED | OUTPATIENT
Start: 2025-06-11 | End: 2025-06-13 | Stop reason: HOSPADM

## 2025-06-11 RX ORDER — SODIUM CHLORIDE 0.9 % (FLUSH) 0.9 %
10 SYRINGE (ML) INJECTION
Status: COMPLETED | OUTPATIENT
Start: 2025-06-11 | End: 2025-06-11

## 2025-06-11 RX ORDER — SODIUM CHLORIDE 0.9 % (FLUSH) 0.9 %
10 SYRINGE (ML) INJECTION AS NEEDED
Status: DISCONTINUED | OUTPATIENT
Start: 2025-06-11 | End: 2025-06-13 | Stop reason: HOSPADM

## 2025-06-11 RX ORDER — SODIUM CHLORIDE 0.9 % (FLUSH) 0.9 %
10 SYRINGE (ML) INJECTION ONCE
Status: COMPLETED | OUTPATIENT
Start: 2025-06-11 | End: 2025-06-11

## 2025-06-11 RX ORDER — ATORVASTATIN CALCIUM 80 MG/1
80 TABLET, FILM COATED ORAL NIGHTLY
Status: DISCONTINUED | OUTPATIENT
Start: 2025-06-11 | End: 2025-06-13 | Stop reason: HOSPADM

## 2025-06-11 RX ORDER — IOPAMIDOL 755 MG/ML
150 INJECTION, SOLUTION INTRAVASCULAR
Status: COMPLETED | OUTPATIENT
Start: 2025-06-11 | End: 2025-06-11

## 2025-06-11 RX ORDER — ASPIRIN 300 MG/1
300 SUPPOSITORY RECTAL DAILY
Status: DISCONTINUED | OUTPATIENT
Start: 2025-06-12 | End: 2025-06-13 | Stop reason: HOSPADM

## 2025-06-11 RX ADMIN — TENECTEPLASE 25 MG: KIT at 18:50

## 2025-06-11 RX ADMIN — Medication 10 ML: at 18:50

## 2025-06-11 RX ADMIN — IOPAMIDOL 145 ML: 755 INJECTION, SOLUTION INTRAVENOUS at 18:56

## 2025-06-11 NOTE — ED PROVIDER NOTES
EMERGENCY DEPARTMENT ENCOUNTER    Room Number:  I377/1  PCP: Simba Moore MD  Historian: Patient    I initially evaluated the patient immediately upon arrival in bed 29    HPI:  Chief Complaint: Left arm weakness  A complete HPI/ROS/PMH/PSH/SH/FH are unobtainable due to: Nothing  Context: Ricardo Pereira is a 54 y.o. male with a medical history of diabetes, hypertension, COPD, diabetic neuropathy, hyperlipidemia, lacunar stroke who presents to the ED c/o acute left arm weakness that began about 20 to 30 minutes ago.  Patient had sudden onset of left arm weakness and numbness in his left face and left arm.  Denies headache, vision changes, chest pain, slurred speech, or dizziness.  He takes Plavix but is not on any anticoagulants.  He has a history of a lacunar stroke without residual deficits.            PAST MEDICAL HISTORY  Active Ambulatory Problems     Diagnosis Date Noted    DM2 (diabetes mellitus, type 2) 08/29/2017    Vitamin D deficiency 08/29/2017    Chronic pancreatitis 08/29/2017    Mixed hyperlipidemia 08/29/2017    Abnormal endocrine laboratory test finding 08/29/2017    Secondary hypothyroidism 08/29/2017    Hyperglycemia 08/29/2017    Essential hypertension 08/29/2017    COPD (chronic obstructive pulmonary disease) 03/06/2018    Acute exacerbation of chronic obstructive pulmonary disease (COPD) 03/06/2018    Hypothyroidism 03/09/2018    COPD exacerbation 02/11/2019    Hyperosmolar (nonketotic) coma 07/03/2019    Acute diverticulitis 07/06/2019    REGAN (obstructive sleep apnea) 07/06/2019    History of renal cell carcinoma 07/06/2019    H/O unilateral nephrectomy 07/06/2019    Hospital discharge follow-up 07/15/2019    Hyponatremia 07/15/2019    Diabetic neuropathy 07/15/2019    Onychomycosis 07/15/2019    Severe lumbar pain 11/25/2019    Back pain with radiculopathy 11/25/2019    Chronic right flank pain 11/25/2019    Bronchitis 03/09/2020    Medicare annual wellness visit, subsequent 03/09/2020     Claudication 03/09/2020    Cancer of kidney 07/31/2014    Abscess of back 06/18/2020    COVID-19 virus infection 02/08/2021    Anxiety     Coronary artery disease involving coronary bypass graft of native heart with unstable angina pectoris 06/12/2021    Moderate episode of recurrent major depressive disorder 06/23/2021    Infected sebaceous cyst of skin 06/23/2021    Cerebral infarction 03/20/2024    Obesity (BMI 35.0-39.9 without comorbidity) 03/20/2024     Resolved Ambulatory Problems     Diagnosis Date Noted    Pain of upper abdomen 04/10/2016    Pancreatitis 04/10/2016    Incisional hernia 05/11/2017    MADINA (acute kidney injury) 03/07/2018    Class 2 obesity with body mass index (BMI) of 36.0 to 36.9 in adult 01/25/2019    Chest pain 11/10/2021     Past Medical History:   Diagnosis Date    Abdominal hernia     Abdominal pain, acute     Abdominal pain, LLQ     Abnormal brain scan     Abnormal involuntary movements     Acute pancreatitis     Allergic rhinitis     Arthritis of right glenohumeral joint     Asthma     Bilateral carpal tunnel syndrome     BMI 38.0-38.9,adult     Burn of left upper extremity, second degree, initial encounter     Cancer     Chronic generalized abdominal pain     Chronic pain     Common migraine without aura     Cough syncope     Depression     Diabetes mellitus     Diabetic peripheral neuropathy     Disease of thyroid gland     Diverticulitis of colon     Elevated blood sugar level     Elevated cholesterol     Elevated transaminase level     Encounter for immunization     Fibromyalgia     Gait disturbance     GERD (gastroesophageal reflux disease)     Heart attack 06/11/2021    Hepatic steatosis     History of pancreatitis     History of renal cell cancer     Hyperlipidemia     Hypertension     Hypertriglyceridemia     IBS (irritable bowel syndrome)     Incomplete tear of rotator cuff     Intractable chronic migraine without aura and with status migrainosus     Late effects of  cerebrovascular disease     Left ankle pain     Left ankle sprain     Left foot pain     Left-sided chest pain     Leg pain     Low vitamin D level     Lumbar strain     Medicare annual wellness visit, initial     Migraine, chronic, without aura, intractable     Nausea and vomiting     Numbness     Obstructive sleep apnea     Photophobia     Pre-syncope     Recurrent renal cell carcinoma of kidney     Renal failure (ARF), acute on chronic     Right shoulder pain     Sciatica without lumbago     Sciatica, left side     Severe obesity     Shortness of breath     Sleep apnea     Stroke     SVT (supraventricular tachycardia)     Syncope and collapse     Type 2 diabetes mellitus     Umbilical hernia     Viral URI with cough          PAST SURGICAL HISTORY  Past Surgical History:   Procedure Laterality Date    ABDOMINAL SURGERY      APPENDECTOMY      CARDIAC CATHETERIZATION Left 6/11/2021    Procedure: Left Heart Catherization;  Surgeon: Henrietta Carey MD;  Location: [facility] CATH INVASIVE LOCATION;  Service: Cardiovascular;  Laterality: Left;    CARDIAC CATHETERIZATION N/A 6/11/2021    Procedure: Coronary angiography;  Surgeon: Henrietta Carey MD;  Location: [facility] CATH INVASIVE LOCATION;  Service: Cardiovascular;  Laterality: N/A;    CARDIAC CATHETERIZATION N/A 6/11/2021    Procedure: Stent TERRELL coronary;  Surgeon: Henrietta Carey MD;  Location: [facility] CATH INVASIVE LOCATION;  Service: Cardiovascular;  Laterality: N/A;    COLONOSCOPY  10/27/2014    tics    HERNIA REPAIR      umbilical    INGUINAL HERNIA REPAIR      NEPHRECTOMY Right     TONSILLECTOMY      VENTRAL/INCISIONAL HERNIA REPAIR N/A 5/11/2017    Procedure: VENTRAL/INCISIONAL HERNIA REPAIR LAPAROSCOPIC, RECURRENT INCISION, WITH MESH AND AHEDLYSIS;  Surgeon: Albin Bee MD;  Location: [facility] MAIN OR;  Service:     WISDOM TOOTH EXTRACTION           FAMILY HISTORY  Family History   Problem Relation Age of Onset    Asthma Other     Bipolar disorder Other     COPD  Other     Depression Other     Lupus Other          systemic lupus erythematosus    Arthritis Other     No Known Problems Mother     No Known Problems Father          SOCIAL HISTORY  Social History     Socioeconomic History    Marital status:    Tobacco Use    Smoking status: Every Day     Types: Cigars     Passive exposure: Current    Smokeless tobacco: Never   Vaping Use    Vaping status: Never Used   Substance and Sexual Activity    Alcohol use: Never    Drug use: No    Sexual activity: Defer         ALLERGIES  Duloxetine and Penicillins    REVIEW OF SYSTEMS  Review of Systems  Included in HPI  All systems reviewed and negative except for those discussed in HPI.      PHYSICAL EXAM  ED Triage Vitals [06/11/25 1828]   Temp Heart Rate Resp BP SpO2   -- 112 18 -- 97 %      Temp src Heart Rate Source Patient Position BP Location FiO2 (%)   -- -- -- -- --       Physical Exam      GENERAL: Awake, alert, oriented x 3.  Well-developed, well-nourished and nontoxic-appearing male.  Appears older than stated age.    HENT: NCAT, nares patent  EYES: PERRL, EOMI  CV: regular rhythm, normal rate  RESPIRATORY: normal effort, clear to auscultation bilaterally  ABDOMEN: soft, nontender  MUSCULOSKELETAL: Extremities are nontender   NEURO: Speech is clear and fluent.  No expressive or receptive aphasia.  Tongue protrudes midline.  There is mild left facial droop.  There is normal strength and light touch sensation in the right arm and both legs.  There is weakness in the left arm with some effort against gravity.  There is decreased light touch sensation in the left face and left arm.  There is abnormal extinction testing in the left face and left arm.  Cannot perform finger-to-nose testing on the left.  Finger-nose testing is normal on the right.  Heel-to-shin testing is normal bilaterally.  Visual fields are normal upon confrontation.  NIHSS of 6.  PSYCH:  calm, cooperative  SKIN: warm, dry    Vital signs and nursing notes  reviewed.    Interval: baseline  1a. Level of Consciousness: 0-->Alert, keenly responsive  1b. LOC Questions: 0-->Answers both questions correctly  1c. LOC Commands: 0-->Performs both tasks correctly  2. Best Gaze: 0-->Normal  3. Visual: 0-->No visual loss  4. Facial Palsy: 1-->Minor paralysis (flattened nasolabial fold, asymmetry on smiling)  5a. Motor Arm, Left: 3-->No effort against gravity, limb falls  5b. Motor Arm, Right: 0-->No drift, limb holds 90 (or 45) degrees for full 10 secs  6a. Motor Leg, Left: 0-->No drift, leg holds 30 degree position for full 5 secs  6b. Motor Leg, Right: 0-->No drift, leg holds 30 degree position for full 5 secs  7. Limb Ataxia: 1-->Present in one limb  8. Sensory: 1-->Mild-to-moderate sensory loss, patient feels pinprick is less sharp or is dull on the affected side, or there is a loss of superficial pain with pinprick, but patient is aware of being touched  9. Best Language: 0-->No aphasia, normal  10. Dysarthria: 1-->Mild-to-moderate dysarthria, patient slurs at least some words and, at worst, can be understood with some difficulty  11. Extinction and Inattention (formerly Neglect): 0-->No abnormality    Total (NIH Stroke Scale): 7        LAB RESULTS  Recent Results (from the past 24 hours)   Comprehensive Metabolic Panel    Collection Time: 06/11/25  6:36 PM    Specimen: Blood   Result Value Ref Range    Glucose 91 65 - 99 mg/dL    BUN 11.0 6.0 - 20.0 mg/dL    Creatinine 1.36 (H) 0.76 - 1.27 mg/dL    Sodium 138 136 - 145 mmol/L    Potassium 3.9 3.5 - 5.2 mmol/L    Chloride 103 98 - 107 mmol/L    CO2 21.0 (L) 22.0 - 29.0 mmol/L    Calcium 9.0 8.6 - 10.5 mg/dL    Total Protein 7.1 6.0 - 8.5 g/dL    Albumin 4.2 3.5 - 5.2 g/dL    ALT (SGPT) 43 (H) 1 - 41 U/L    AST (SGOT) 31 1 - 40 U/L    Alkaline Phosphatase 118 (H) 39 - 117 U/L    Total Bilirubin 0.4 0.0 - 1.2 mg/dL    Globulin 2.9 gm/dL    A/G Ratio 1.4 g/dL    BUN/Creatinine Ratio 8.1 7.0 - 25.0    Anion Gap 14.0 5.0 - 15.0  mmol/L    eGFR 62.6 >60.0 mL/min/1.73   Protime-INR    Collection Time: 06/11/25  6:36 PM    Specimen: Blood   Result Value Ref Range    Protime 12.7 11.7 - 14.2 Seconds    INR 0.96 0.90 - 1.10   aPTT    Collection Time: 06/11/25  6:36 PM    Specimen: Blood   Result Value Ref Range    PTT 26.7 22.7 - 35.4 seconds   Type & Screen    Collection Time: 06/11/25  6:36 PM    Specimen: Blood   Result Value Ref Range    ABO Type B     RH type Negative     Antibody Screen Negative     T&S Expiration Date 6/14/2025 11:59:59 PM    Green Top (Gel)    Collection Time: 06/11/25  6:36 PM   Result Value Ref Range    Extra Tube Hold for add-ons.    Lavender Top    Collection Time: 06/11/25  6:36 PM   Result Value Ref Range    Extra Tube hold for add-on    Gold Top - SST    Collection Time: 06/11/25  6:36 PM   Result Value Ref Range    Extra Tube Hold for add-ons.    Light Blue Top    Collection Time: 06/11/25  6:36 PM   Result Value Ref Range    Extra Tube Hold for add-ons.    CBC Auto Differential    Collection Time: 06/11/25  6:36 PM    Specimen: Blood   Result Value Ref Range    WBC 11.82 (H) 3.40 - 10.80 10*3/mm3    RBC 5.76 4.14 - 5.80 10*6/mm3    Hemoglobin 18.9 (H) 13.0 - 17.7 g/dL    Hematocrit 53.7 (H) 37.5 - 51.0 %    MCV 93.2 79.0 - 97.0 fL    MCH 32.8 26.6 - 33.0 pg    MCHC 35.2 31.5 - 35.7 g/dL    RDW 13.4 12.3 - 15.4 %    RDW-SD 45.8 37.0 - 54.0 fl    MPV 10.6 6.0 - 12.0 fL    Platelets 187 140 - 450 10*3/mm3    Neutrophil % 53.3 42.7 - 76.0 %    Lymphocyte % 37.6 19.6 - 45.3 %    Monocyte % 6.2 5.0 - 12.0 %    Eosinophil % 2.0 0.3 - 6.2 %    Basophil % 0.5 0.0 - 1.5 %    Immature Grans % 0.4 0.0 - 0.5 %    Neutrophils, Absolute 6.29 1.70 - 7.00 10*3/mm3    Lymphocytes, Absolute 4.45 (H) 0.70 - 3.10 10*3/mm3    Monocytes, Absolute 0.73 0.10 - 0.90 10*3/mm3    Eosinophils, Absolute 0.24 0.00 - 0.40 10*3/mm3    Basophils, Absolute 0.06 0.00 - 0.20 10*3/mm3    Immature Grans, Absolute 0.05 0.00 - 0.05 10*3/mm3    nRBC 0.0  0.0 - 0.2 /100 WBC   POC Glucose Once    Collection Time: 06/11/25  6:36 PM    Specimen: Blood   Result Value Ref Range    Glucose 90 70 - 130 mg/dL   ECG 12 Lead Stroke Evaluation    Collection Time: 06/11/25  7:21 PM   Result Value Ref Range    QT Interval 383 ms    QTC Interval 460 ms   POC Glucose Once    Collection Time: 06/11/25  8:57 PM    Specimen: Blood   Result Value Ref Range    Glucose 138 (H) 70 - 130 mg/dL       Ordered the above labs and reviewed the results.        RADIOLOGY  CT Angiogram Head w AI Analysis of LVO  CT Angiogram Head w AI Analysis of LVO, CT Angiogram Neck  CT Angiogram Head w AI Analysis of LVO, CT Angiogram Neck, CT CEREBRAL PERFUSION WITH & WITHOUT CONTRAST  Result Date: 6/11/2025  CT ANGIOGRAM NECK AND HEAD WITH CONTRAST AND CT PERFUSION WITH CONTRAST  HISTORY: Left-sided weakness.  COMPARISON: None.  FINDINGS: Initially, a noncontrasted CT examination of the brain was performed. The brain and ventricles are symmetrical. There is no evidence of intracranial hemorrhage or of acute infarction. Moderate to severe vascular calcification is appreciated. A small arachnoid cyst is appreciated involving the left middle cranial fossa anteriorly measuring approximately 2 cm in size.  A CT perfusion study was performed. There was no evidence of abnormal CBF. Areas of delayed perfusion involving the right temporal occipital region and involving the superior aspect of the left cerebellar hemisphere is noted on the Tmax portion of the examination. These areas may be artifactual but posterior fossa delayed perfusion cannot be excluded.  A CT angiogram of the neck and head was then performed. Multiplanar as well as three-dimensional reconstructions were generated. The great vessels are arranged in a classic configuration. There is 0% stenosis of the left internal carotid artery using NASCET criteria. There is irregularity involving the right internal carotid artery secondary to calcified and  noncalcified plaque proximally but with 0% stenosis using NASCET criteria. Mild to moderate vascular calcification by the carotid siphons is appreciated bilaterally. The proximal aspects of the anterior and middle cerebral arteries appear unremarkable.  Both vertebral arteries were opacified. The right vertebral artery is minimally larger than that of the left. The basilar artery and the proximal aspects of the posterior cerebral arteries appear unremarkable.  Multiple caries are appreciated. The parapharyngeal tonsils are mildly prominent bilaterally. Minor salivary gland calcifications are appreciated.      Note: This is a preliminary report. The 3-dimensional reconstructions are not yet available for review 1.  There is no evidence of acute infarction or intracranial hemorrhage. The CT perfusion examination suggests areas of delayed perfusion involving the right PCA and left superior cerebral artery vascular distributions. There is also delayed perfusion suggesting involving the left cerebral hemisphere anterolaterally. This may be artifactual. Further evaluation with MRI examination of the brain is recommended. 2.  Multiple caries are appreciated. Standard dental radiographs are recommended. 3.  The parapharyngeal tonsils are prominent bilaterally, slightly more so on the right. Clinical correlation is suggested. 4.  A 2 cm arachnoid cyst involving the left middle cranial fossa anteriorly is appreciated   The noncontrasted CT examination of the brain was made available for interpretation at 1846 hours and a preliminary report called at 1847 hours. The CT angiogram was made available for interpretation at 1900 hours and a preliminary report called at 1904 hours.  AI analysis of LVO was utilized.  Radiation dose reduction techniques were utilized, including automated exposure control and exposure modulation based on body size.         XR Chest 1 View  Result Date: 6/11/2025  XR CHEST 1 VW-  HISTORY: Male who is 54  years-old, stroke protocol  TECHNIQUE: Frontal view of the chest  COMPARISON: 11/10/2021  FINDINGS: Heart, mediastinum and pulmonary vasculature are unremarkable. No focal pulmonary consolidation, pleural effusion, or pneumothorax. No acute osseous process.      No evidence for acute pulmonary process. Follow-up as clinical indications persist.  This report was finalized on 6/11/2025 8:25 PM by Dr. Tong Wong M.D on Workstation: [id number]        Ordered the above noted radiological studies. Reviewed by me in PACS.            PROCEDURES  Critical Care    Performed by: Favian Tolliver MD  Authorized by: Favian Tolliver MD    Critical care provider statement:     Critical care time (minutes):  44    Critical care time was exclusive of:  Separately billable procedures and treating other patients    Critical care was necessary to treat or prevent imminent or life-threatening deterioration of the following conditions:  CNS failure or compromise    Critical care was time spent personally by me on the following activities:  Development of treatment plan with patient or surrogate, discussions with consultants, discussions with primary provider, evaluation of patient's response to treatment, examination of patient, obtaining history from patient or surrogate, ordering and performing treatments and interventions, ordering and review of laboratory studies, ordering and review of radiographic studies, pulse oximetry, re-evaluation of patient's condition and review of old charts    I assumed direction of critical care for this patient from another provider in my specialty: no      Care discussed with: admitting provider      Care discussed with comment:  Dr. Delarosa, Dr. Mei          OUTPATIENT MEDICATION MANAGEMENT:  Current Facility-Administered Medications Ordered in Epic   Medication Dose Route Frequency Provider Last Rate Last Admin    [START ON 6/12/2025] aspirin tablet 325 mg  325 mg Oral Daily Lesli  MADHU Kam        Or    [START ON 6/12/2025] aspirin suppository 300 mg  300 mg Rectal Daily Kamille Ballesteros APRN        atorvastatin (LIPITOR) tablet 80 mg  80 mg Oral Nightly Kamille Ballesteros APRN        dextrose (D50W) (25 g/50 mL) IV injection 25 g  25 g Intravenous Q15 Min PRN Kamille Ballesteros APRN        dextrose (GLUTOSE) oral gel 15 g  15 g Oral Q15 Min PRN Kamille Ballesteros APRN        glucagon (GLUCAGEN) injection 1 mg  1 mg Intramuscular Q15 Min PRN Kamille Ballesteros APRN        insulin regular (humuLIN R,novoLIN R) injection 2-7 Units  2-7 Units Subcutaneous Q6H Kamille Ballesteros APRN        sodium chloride 0.9 % flush 10 mL  10 mL Intravenous PRN Favian Tolliver MD         No current [facility]-ordered outpatient medications on file.           MEDICATIONS GIVEN IN ER  Medications   sodium chloride 0.9 % flush 10 mL (has no administration in time range)   atorvastatin (LIPITOR) tablet 80 mg (has no administration in time range)   aspirin tablet 325 mg (has no administration in time range)     Or   aspirin suppository 300 mg (has no administration in time range)   dextrose (GLUTOSE) oral gel 15 g (has no administration in time range)   dextrose (D50W) (25 g/50 mL) IV injection 25 g (has no administration in time range)   glucagon (GLUCAGEN) injection 1 mg (has no administration in time range)   insulin regular (humuLIN R,novoLIN R) injection 2-7 Units ( Subcutaneous Not Given 6/11/25 2114)   iopamidol (ISOVUE-370) 76 % injection 150 mL (145 mL Intravenous Given by Other 6/11/25 1856)   sodium chloride 0.9 % flush 10 mL (10 mL Intravenous Given 6/11/25 1850)     Followed by   tenecteplase for stroke (TNKASE) injection 25 mg (25 mg Intravenous Given 6/11/25 1850)     Followed by   sodium chloride 0.9 % flush 10 mL (10 mL Intravenous Given 6/11/25 1850)                   MEDICAL DECISION MAKING, PROGRESS, and CONSULTS    All labs have been independently reviewed by me.  All radiology studies have been  reviewed by me and I have also reviewed the radiology report.   EKG's independently viewed and interpreted by me.  Discussion below represents my analysis of pertinent findings related to patient's condition, differential diagnosis, treatment plan and final disposition.      Additional sources:    - Discussed/ obtained information from independent historians: Spouse at bedside    - External (non-ED) record review: Patient was last admitted here in November 2021 for chest pain.  Cardiac catheterization done in June 2021 showed severe mid RCA disease.  Stent was placed in the RCA.    Patient saw his PCP in March 2025 for his annual exam and follow-up for type 2 diabetes, hypothyroidism, hyperlipidemia, hypertension, vitamin D deficiency, and diabetic polyneuropathy    -Prescription drug monitoring program review:     N/A    - Chronic or social conditions impacting patient care (Social Determinants of Health): None          Orders placed during this visit:  Orders Placed This Encounter   Procedures    Critical Care    CT Angiogram Head w AI Analysis of LVO    CT Angiogram Neck    CT CEREBRAL PERFUSION WITH & WITHOUT CONTRAST    XR Chest 1 View    CT Head Without Contrast    Sledge Draw    Comprehensive Metabolic Panel    Protime-INR    aPTT    CBC Auto Differential    Hemoglobin A1c    Lipid Panel    CBC (No Diff)    Comprehensive Metabolic Panel    NPO Diet NPO Type: Ice Chips, Sips with Meds    Initiate Department's Acute Stroke Process (Team D, Code 19, etc.)    Perform NIH Stroke Scale    Measure Actual Weight    Notify Provider    Notify Provider for SBP Greater Than 140 for Hemorrhagic Stroke Patient    Head of Bed 30 Degrees or Less    Undress and Gown    Vital Signs    Neuro Checks    No Hypotonic Fluids    Nursing Dysphagia Screening (Complete Prior to Giving anything PO)    RN to Place Order SLP Consult (IF swallow screen failed) - Eval & Treat Choosing Reason of RN Dysphagia Screen Failed    Follow  Department Guidelines - Notify Pharmacy of Thrombolytic Therapy Administration    NIHSS Assessment - Prior to Thrombolytic Administration    Neuro Checks Per Post-Thrombolytic Therapy Flowsheet    Notify Provider and Activate Thrombolytic Induced Angioedema Order Set (see comments)    Vital Signs Per Post-Thrombolytic Therapy Flowsheet    Maintain Blood Pressure Per Listed Parameters    No Arterial Punctures, IM Injections or Invasive Procedures For 24 Hours    No Anticoagulant / Antiplatelet Agents For 24 Hours    Continuous Pulse Oximetry    Telemetry - Place Orders & Notify Provider of Results When Patient Experiences Acute Chest Pain, Dysrhythmia or Respiratory Distress    RN to Place Order SLP Consult - Eval & Treat Choosing Reason of RN Dysphagia Screen Failed    Nurse to Call MD or Nutrition Services for Diet if Patient Passes Dysphagia Screen    Intake and Output    Order CT Head Without Contrast for Neurological Decline    Provide Stroke Education Material    Tobacco Cessation Education    Repeat NIHSS Immediately After Thrombolytic Infusion Complete    AVOID Indwelling Urinary Catheterization During Thrombolytic Infusion & For At Least 24 Hours Post Infusion    Avoid venipuncture, if venipuncture necessary, attempt to use an upper extremity vessel that can be manually compressed    Saline Lock & Maintain IV Access    Place Sequential Compression Device    Maintain Sequential Compression Device    Activity As Tolerated    Notify Provider    Nursing Dysphagia Screening (Complete Prior to Giving Anything By Mouth)    Code Status and Medical Interventions: CPR (Attempt to Resuscitate); Full Support    Inpatient Neurology Consult Stroke    Inpatient Neurology Consult Stroke    Pulmonology (on-call MD unless specified)    Notify Stroke Coordinator    Inpatient Case Management  Consult    Inpatient Diabetes Educator Consult    OT Consult: Eval & Treat    PT Consult: Eval & Treat As Tolerated     Oxygen Therapy- Nasal Cannula; Titrate 1-6 LPM Per SpO2; 90 - 95%    SLP Consult: Eval & Treat Communication Disorder    POC Glucose Once    POC Glucose Once    POC Glucose Q6H    POC Glucose Q6H    POC Glucose Once    ECG 12 Lead Stroke Evaluation    Telemetry Scan    Adult Transthoracic Echo Complete W/ Cont if Necessary Per Protocol (With Agitated Saline)    Type & Screen    Insert Large-Bore Peripheral IV - RIGHT AC Preferred    Insert Peripheral IV    Inpatient Admission    Bleeding Precautions    Post Thrombolytic Precautions    CBC & Differential    Green Top (Gel)    Lavender Top    Gold Top - SST    Light Blue Top         Additional orders considered but not ordered:          Differential diagnosis includes, but is not limited to:    CVA, TIA, intracranial hemorrhage, atypical migraine      Independent interpretation of labs, radiology studies, and discussions with consultants:  ED Course as of 06/11/25 2155 Wed Jun 11, 2025 1832 Team D protocol initiated []   1833 Case discussed with Dr. Delarosa, stroke neurologist.  Pertinent history and exam findings were discussed with him.  He agrees with obtaining stat imaging.  If head CT is negative for hemorrhage, patient may be a TNK candidate. [WH]   1841 BP: 154/88 [WH]   1841 Heart Rate: 109 [WH]   1841 Resp: 18 [WH]   1841 SpO2: 96 % []   1846 Per Dr. Delarosa, head CT does not show any acute intracranial hemorrhage.  He recommends giving the patient TNK. [WH]   1909 INR: 0.96 [WH]   1909 Glucose: 90 [WH]   1909 WBC(!): 11.82 [WH]   1909 Hemoglobin(!): 18.9 [WH]   1909 Head CT personally interpreted by me.  My personal interpretation is: No intracranial hemorrhage.  No midline shift. [WH]   1910 Creatinine(!): 1.36  0.97 three months ago [WH]   1921 Patient's symptoms are unchanged.  He denies headache.  Plans for ICU admission were discussed with the patient and his family. [WH]   1930 EKG personally interpreted by me at 1924.  My personal  interpretation is:          EKG time: 1921  Rhythm/Rate: Sinus rhythm, rate 87  P waves and NE: Normal  QRS, axis: RAD, RSR prime in V1  ST and T waves: Normal    Interpreted Contemporaneously by me, independently viewed  EKG is not significantly changed compared to prior EKG done on 11/11/2021   [WH]   2017 Case discussed with TANIA Simental, she agrees to admit the patient for Dr. Mei.  Pertinent history, exam findings, test results, ED course, and diagnoses were discussed with her. [WH]   2034 Patient reports that his left arm numbness and weakness has improved. On reexam, patient is now able to lift his left arm but there is still mild weakness.  He can now feel light touch in the left hand. [WH]   2055 Per the radiologist, CTA head/neck did not show any acute hemorrhage, acute infarction, or large vessel occlusion.  CT perfusion study showed an area of delayed perfusion in the right PCA and left superior artery distributions. [WH]   2100 MDM: Patient presented to the ED with acute left facial droop, left facial numbness, and left arm weakness/numbness.  Initial stroke score was 6.  Team D protocol was initiated.  Case was discussed with the stroke neurologist.  Head CT was negative for acute hemorrhage and patient was given TNK.  CTA head/neck were negative acute.  CT perfusion showed several areas of delayed perfusion.  Patient's symptoms improved after administration of TNK.  Creatinine was mildly elevated from baseline but labs were otherwise unremarkable.  Patient will be admitted to the ICU.  Critical care was performed on this patient [WH]      ED Course User Index  [] Favian Tolliver MD         COMPLEXITY OF CARE  The patient requires admission.      DIAGNOSIS  Final diagnoses:   Stroke-like symptoms   Left arm weakness   Facial droop   Acute renal insufficiency   Mixed hyperlipidemia   Essential hypertension   History of diabetes mellitus, type II         DISPOSITION  ADMISSION    Discussed  treatment plan and reason for admission with pt/family and admitting physician.  Pt/family voiced understanding of the plan for admission for further testing/treatment as needed.               Latest Documented Vital Signs:  AS OF 21:55 EDT VITALS:    BP - 115/71  HR - 94  TEMP - 98.1 °F (36.7 °C) (Oral)  O2 SATS - 95%            --    Please note that portions of this were completed with a voice recognition program.       Note Disclaimer: At [facility], we believe that sharing information builds trust and better relationships. You are receiving this note because you are receiving care at [facility] or recently visited. It is possible you will see health information before a provider has talked with you about it. This kind of information can be easy to misunderstand. To help you fully understand what it means for your health, we urge you to discuss this note with your provider.             Favian Tolliver MD  06/11/25 0346

## 2025-06-12 ENCOUNTER — APPOINTMENT (OUTPATIENT)
Dept: CT IMAGING | Facility: HOSPITAL | Age: 55
DRG: 062 | End: 2025-06-12
Payer: MEDICARE

## 2025-06-12 ENCOUNTER — APPOINTMENT (OUTPATIENT)
Dept: CARDIOLOGY | Facility: HOSPITAL | Age: 55
DRG: 062 | End: 2025-06-12
Payer: MEDICARE

## 2025-06-12 LAB
ALBUMIN SERPL-MCNC: 3.8 G/DL (ref 3.5–5.2)
ALBUMIN SERPL-MCNC: 3.9 G/DL (ref 3.5–5.2)
ALBUMIN/GLOB SERPL: 1.4 G/DL
ALP SERPL-CCNC: 82 U/L (ref 39–117)
ALT SERPL W P-5'-P-CCNC: 37 U/L (ref 1–41)
ANION GAP SERPL CALCULATED.3IONS-SCNC: 16 MMOL/L (ref 5–15)
ANION GAP SERPL CALCULATED.3IONS-SCNC: 9.1 MMOL/L (ref 5–15)
AORTIC DIMENSIONLESS INDEX: 0.72 (DI)
ASCENDING AORTA: 3.2 CM
AST SERPL-CCNC: 25 U/L (ref 1–40)
AV MEAN PRESS GRAD SYS DOP V1V2: 3.6 MMHG
AV VMAX SYS DOP: 129.4 CM/SEC
BH CV ECHO MEAS - ACS: 1.88 CM
BH CV ECHO MEAS - AO MAX PG: 6.7 MMHG
BH CV ECHO MEAS - AO ROOT DIAM: 3.9 CM
BH CV ECHO MEAS - AO V2 VTI: 24.8 CM
BH CV ECHO MEAS - AVA(I,D): 2.7 CM2
BH CV ECHO MEAS - EDV(CUBED): 136.1 ML
BH CV ECHO MEAS - EDV(MOD-SP2): 113 ML
BH CV ECHO MEAS - EDV(MOD-SP4): 117 ML
BH CV ECHO MEAS - EF(MOD-SP2): 51.3 %
BH CV ECHO MEAS - EF(MOD-SP4): 51.3 %
BH CV ECHO MEAS - ESV(CUBED): 29.6 ML
BH CV ECHO MEAS - ESV(MOD-SP2): 55 ML
BH CV ECHO MEAS - ESV(MOD-SP4): 57 ML
BH CV ECHO MEAS - FS: 39.8 %
BH CV ECHO MEAS - IVS/LVPW: 1.37 CM
BH CV ECHO MEAS - IVSD: 1.19 CM
BH CV ECHO MEAS - LAT PEAK E' VEL: 8.2 CM/SEC
BH CV ECHO MEAS - LV DIASTOLIC VOL/BSA (35-75): 50.6 CM2
BH CV ECHO MEAS - LV MASS(C)D: 197.4 GRAMS
BH CV ECHO MEAS - LV MAX PG: 3.1 MMHG
BH CV ECHO MEAS - LV MEAN PG: 1.58 MMHG
BH CV ECHO MEAS - LV SYSTOLIC VOL/BSA (12-30): 24.6 CM2
BH CV ECHO MEAS - LV V1 MAX: 88.3 CM/SEC
BH CV ECHO MEAS - LV V1 VTI: 17.8 CM
BH CV ECHO MEAS - LVIDD: 5.1 CM
BH CV ECHO MEAS - LVIDS: 3.1 CM
BH CV ECHO MEAS - LVOT AREA: 3.8 CM2
BH CV ECHO MEAS - LVOT DIAM: 2.19 CM
BH CV ECHO MEAS - LVPWD: 0.87 CM
BH CV ECHO MEAS - MED PEAK E' VEL: 7.5 CM/SEC
BH CV ECHO MEAS - MV A DUR: 0.1 SEC
BH CV ECHO MEAS - MV A MAX VEL: 42.3 CM/SEC
BH CV ECHO MEAS - MV DEC SLOPE: 515.5 CM/SEC2
BH CV ECHO MEAS - MV DEC TIME: 0.2 SEC
BH CV ECHO MEAS - MV E MAX VEL: 48.4 CM/SEC
BH CV ECHO MEAS - MV E/A: 1.15
BH CV ECHO MEAS - MV MAX PG: 1.7 MMHG
BH CV ECHO MEAS - MV MEAN PG: 0.77 MMHG
BH CV ECHO MEAS - MV P1/2T: 37.3 MSEC
BH CV ECHO MEAS - MV V2 VTI: 20.2 CM
BH CV ECHO MEAS - MVA(P1/2T): 5.9 CM2
BH CV ECHO MEAS - MVA(VTI): 3.3 CM2
BH CV ECHO MEAS - PA ACC TIME: 0.09 SEC
BH CV ECHO MEAS - PA V2 MAX: 90.3 CM/SEC
BH CV ECHO MEAS - PULM A REVS DUR: 0.11 SEC
BH CV ECHO MEAS - PULM A REVS VEL: 20.5 CM/SEC
BH CV ECHO MEAS - PULM DIAS VEL: 23.8 CM/SEC
BH CV ECHO MEAS - PULM S/D: 1.21
BH CV ECHO MEAS - PULM SYS VEL: 28.7 CM/SEC
BH CV ECHO MEAS - RAP SYSTOLE: 3 MMHG
BH CV ECHO MEAS - RV MAX PG: 1.23 MMHG
BH CV ECHO MEAS - RV V1 MAX: 55.5 CM/SEC
BH CV ECHO MEAS - RV V1 VTI: 9.6 CM
BH CV ECHO MEAS - SUP REN AO DIAM: 2 CM
BH CV ECHO MEAS - SV(LVOT): 67.3 ML
BH CV ECHO MEAS - SV(MOD-SP2): 58 ML
BH CV ECHO MEAS - SV(MOD-SP4): 60 ML
BH CV ECHO MEAS - SVI(LVOT): 29.1 ML/M2
BH CV ECHO MEAS - SVI(MOD-SP2): 25.1 ML/M2
BH CV ECHO MEAS - SVI(MOD-SP4): 25.9 ML/M2
BH CV ECHO MEAS - TAPSE (>1.6): 1.63 CM
BH CV ECHO MEASUREMENTS AVERAGE E/E' RATIO: 6.17
BH CV ECHO SHUNT ASSESSMENT PERFORMED (HIDDEN SCRIPTING): 1
BH CV XLRA - RV BASE: 3.9 CM
BH CV XLRA - RV LENGTH: 7.6 CM
BH CV XLRA - RV MID: 3.2 CM
BH CV XLRA - TDI S': 10.2 CM/SEC
BILIRUB SERPL-MCNC: 0.4 MG/DL (ref 0–1.2)
BUN SERPL-MCNC: 13 MG/DL (ref 6–20)
BUN SERPL-MCNC: 15 MG/DL (ref 6–20)
BUN/CREAT SERPL: 11.5 (ref 7–25)
BUN/CREAT SERPL: 12.6 (ref 7–25)
CALCIUM SPEC-SCNC: 9 MG/DL (ref 8.6–10.5)
CALCIUM SPEC-SCNC: 9.3 MG/DL (ref 8.6–10.5)
CHLORIDE SERPL-SCNC: 100 MMOL/L (ref 98–107)
CHLORIDE SERPL-SCNC: 105 MMOL/L (ref 98–107)
CHOLEST SERPL-MCNC: 197 MG/DL (ref 0–200)
CO2 SERPL-SCNC: 18 MMOL/L (ref 22–29)
CO2 SERPL-SCNC: 23.9 MMOL/L (ref 22–29)
CREAT SERPL-MCNC: 1.03 MG/DL (ref 0.76–1.27)
CREAT SERPL-MCNC: 1.3 MG/DL (ref 0.76–1.27)
DEPRECATED RDW RBC AUTO: 49.5 FL (ref 37–54)
EGFRCR SERPLBLD CKD-EPI 2021: 66.1 ML/MIN/1.73
EGFRCR SERPLBLD CKD-EPI 2021: 87.4 ML/MIN/1.73
ERYTHROCYTE [DISTWIDTH] IN BLOOD BY AUTOMATED COUNT: 13.6 % (ref 12.3–15.4)
GLOBULIN UR ELPH-MCNC: 2.8 GM/DL
GLUCOSE BLDC GLUCOMTR-MCNC: 126 MG/DL (ref 70–130)
GLUCOSE BLDC GLUCOMTR-MCNC: 270 MG/DL (ref 70–130)
GLUCOSE BLDC GLUCOMTR-MCNC: 322 MG/DL (ref 70–130)
GLUCOSE BLDC GLUCOMTR-MCNC: 420 MG/DL (ref 70–130)
GLUCOSE BLDC GLUCOMTR-MCNC: 427 MG/DL (ref 70–130)
GLUCOSE BLDC GLUCOMTR-MCNC: 455 MG/DL (ref 70–130)
GLUCOSE BLDC GLUCOMTR-MCNC: 482 MG/DL (ref 70–130)
GLUCOSE SERPL-MCNC: 129 MG/DL (ref 65–99)
GLUCOSE SERPL-MCNC: 503 MG/DL (ref 65–99)
HBA1C MFR BLD: 9.6 % (ref 4.8–5.6)
HCT VFR BLD AUTO: 51.1 % (ref 37.5–51)
HDLC SERPL-MCNC: 23 MG/DL (ref 40–60)
HGB BLD-MCNC: 17.1 G/DL (ref 13–17.7)
LDLC SERPL CALC-MCNC: 114 MG/DL (ref 0–100)
LDLC/HDLC SERPL: 4.57 {RATIO}
LEFT ATRIUM VOLUME INDEX: 12.3 ML/M2
LV EF BIPLANE MOD: 52.4 %
MCH RBC QN AUTO: 32.6 PG (ref 26.6–33)
MCHC RBC AUTO-ENTMCNC: 33.5 G/DL (ref 31.5–35.7)
MCV RBC AUTO: 97.5 FL (ref 79–97)
PHOSPHATE SERPL-MCNC: 2.1 MG/DL (ref 2.5–4.5)
PLATELET # BLD AUTO: 151 10*3/MM3 (ref 140–450)
PMV BLD AUTO: 11.2 FL (ref 6–12)
POTASSIUM SERPL-SCNC: 3.8 MMOL/L (ref 3.5–5.2)
POTASSIUM SERPL-SCNC: 4.4 MMOL/L (ref 3.5–5.2)
PROT SERPL-MCNC: 6.6 G/DL (ref 6–8.5)
QT INTERVAL: 383 MS
QTC INTERVAL: 460 MS
RBC # BLD AUTO: 5.24 10*6/MM3 (ref 4.14–5.8)
SINUS: 3.4 CM
SODIUM SERPL-SCNC: 134 MMOL/L (ref 136–145)
SODIUM SERPL-SCNC: 138 MMOL/L (ref 136–145)
STJ: 2.5 CM
TRIGL SERPL-MCNC: 345 MG/DL (ref 0–150)
VLDLC SERPL-MCNC: 60 MG/DL (ref 5–40)
WBC NRBC COR # BLD AUTO: 9.06 10*3/MM3 (ref 3.4–10.8)

## 2025-06-12 PROCEDURE — 97162 PT EVAL MOD COMPLEX 30 MIN: CPT

## 2025-06-12 PROCEDURE — 25510000001 IOPAMIDOL 61 % SOLUTION: Performed by: INTERNAL MEDICINE

## 2025-06-12 PROCEDURE — 94640 AIRWAY INHALATION TREATMENT: CPT

## 2025-06-12 PROCEDURE — 84100 ASSAY OF PHOSPHORUS: CPT

## 2025-06-12 PROCEDURE — 97530 THERAPEUTIC ACTIVITIES: CPT

## 2025-06-12 PROCEDURE — 93306 TTE W/DOPPLER COMPLETE: CPT

## 2025-06-12 PROCEDURE — 82948 REAGENT STRIP/BLOOD GLUCOSE: CPT

## 2025-06-12 PROCEDURE — 99222 1ST HOSP IP/OBS MODERATE 55: CPT | Performed by: STUDENT IN AN ORGANIZED HEALTH CARE EDUCATION/TRAINING PROGRAM

## 2025-06-12 PROCEDURE — 83036 HEMOGLOBIN GLYCOSYLATED A1C: CPT

## 2025-06-12 PROCEDURE — 80061 LIPID PANEL: CPT

## 2025-06-12 PROCEDURE — 70450 CT HEAD/BRAIN W/O DYE: CPT

## 2025-06-12 PROCEDURE — 80053 COMPREHEN METABOLIC PANEL: CPT

## 2025-06-12 PROCEDURE — 63710000001 INSULIN LISPRO (HUMAN) PER 5 UNITS

## 2025-06-12 PROCEDURE — 63710000001 INSULIN GLARGINE PER 5 UNITS: Performed by: INTERNAL MEDICINE

## 2025-06-12 PROCEDURE — 94761 N-INVAS EAR/PLS OXIMETRY MLT: CPT

## 2025-06-12 PROCEDURE — 63710000001 INSULIN LISPRO (HUMAN) PER 5 UNITS: Performed by: INTERNAL MEDICINE

## 2025-06-12 PROCEDURE — 85027 COMPLETE CBC AUTOMATED: CPT

## 2025-06-12 PROCEDURE — 63710000001 INSULIN LISPRO (HUMAN) PER 5 UNITS: Performed by: HOSPITALIST

## 2025-06-12 PROCEDURE — 97166 OT EVAL MOD COMPLEX 45 MIN: CPT

## 2025-06-12 PROCEDURE — 94760 N-INVAS EAR/PLS OXIMETRY 1: CPT

## 2025-06-12 PROCEDURE — 93306 TTE W/DOPPLER COMPLETE: CPT | Performed by: INTERNAL MEDICINE

## 2025-06-12 PROCEDURE — 73201 CT UPPER EXTREMITY W/DYE: CPT

## 2025-06-12 PROCEDURE — 25010000002 METHYLPREDNISOLONE PER 40 MG: Performed by: INTERNAL MEDICINE

## 2025-06-12 PROCEDURE — 72125 CT NECK SPINE W/O DYE: CPT

## 2025-06-12 PROCEDURE — 94799 UNLISTED PULMONARY SVC/PX: CPT

## 2025-06-12 RX ORDER — IPRATROPIUM BROMIDE AND ALBUTEROL SULFATE 2.5; .5 MG/3ML; MG/3ML
3 SOLUTION RESPIRATORY (INHALATION)
Status: DISCONTINUED | OUTPATIENT
Start: 2025-06-12 | End: 2025-06-12

## 2025-06-12 RX ORDER — INSULIN LISPRO 100 [IU]/ML
5 INJECTION, SOLUTION INTRAVENOUS; SUBCUTANEOUS
Status: DISCONTINUED | OUTPATIENT
Start: 2025-06-12 | End: 2025-06-13 | Stop reason: HOSPADM

## 2025-06-12 RX ORDER — IPRATROPIUM BROMIDE AND ALBUTEROL SULFATE 2.5; .5 MG/3ML; MG/3ML
3 SOLUTION RESPIRATORY (INHALATION) EVERY 4 HOURS PRN
Status: DISCONTINUED | OUTPATIENT
Start: 2025-06-12 | End: 2025-06-13 | Stop reason: HOSPADM

## 2025-06-12 RX ORDER — INSULIN LISPRO 100 [IU]/ML
30 INJECTION, SOLUTION INTRAVENOUS; SUBCUTANEOUS ONCE
Status: COMPLETED | OUTPATIENT
Start: 2025-06-12 | End: 2025-06-12

## 2025-06-12 RX ORDER — METHYLPREDNISOLONE SODIUM SUCCINATE 40 MG/ML
40 INJECTION, POWDER, LYOPHILIZED, FOR SOLUTION INTRAMUSCULAR; INTRAVENOUS
Status: DISCONTINUED | OUTPATIENT
Start: 2025-06-12 | End: 2025-06-13

## 2025-06-12 RX ORDER — IOPAMIDOL 612 MG/ML
85 INJECTION, SOLUTION INTRAVASCULAR
Status: COMPLETED | OUTPATIENT
Start: 2025-06-12 | End: 2025-06-12

## 2025-06-12 RX ORDER — INSULIN LISPRO 100 [IU]/ML
4-24 INJECTION, SOLUTION INTRAVENOUS; SUBCUTANEOUS
Status: DISCONTINUED | OUTPATIENT
Start: 2025-06-12 | End: 2025-06-13 | Stop reason: HOSPADM

## 2025-06-12 RX ORDER — PANTOPRAZOLE SODIUM 40 MG/1
40 TABLET, DELAYED RELEASE ORAL DAILY
Status: DISCONTINUED | OUTPATIENT
Start: 2025-06-12 | End: 2025-06-13 | Stop reason: HOSPADM

## 2025-06-12 RX ORDER — ACETAMINOPHEN 325 MG/1
650 TABLET ORAL EVERY 6 HOURS PRN
Status: DISCONTINUED | OUTPATIENT
Start: 2025-06-12 | End: 2025-06-13 | Stop reason: HOSPADM

## 2025-06-12 RX ORDER — LEVOTHYROXINE SODIUM 100 UG/1
100 TABLET ORAL DAILY
Status: DISCONTINUED | OUTPATIENT
Start: 2025-06-12 | End: 2025-06-13 | Stop reason: HOSPADM

## 2025-06-12 RX ADMIN — INSULIN LISPRO 24 UNITS: 100 INJECTION, SOLUTION INTRAVENOUS; SUBCUTANEOUS at 18:39

## 2025-06-12 RX ADMIN — METHYLPREDNISOLONE SODIUM SUCCINATE 40 MG: 40 INJECTION, POWDER, FOR SOLUTION INTRAMUSCULAR; INTRAVENOUS at 11:44

## 2025-06-12 RX ADMIN — INSULIN GLARGINE 50 UNITS: 100 INJECTION, SOLUTION SUBCUTANEOUS at 20:09

## 2025-06-12 RX ADMIN — INSULIN LISPRO 5 UNITS: 100 INJECTION, SOLUTION INTRAVENOUS; SUBCUTANEOUS at 18:40

## 2025-06-12 RX ADMIN — ACETAMINOPHEN 650 MG: 325 TABLET ORAL at 19:49

## 2025-06-12 RX ADMIN — INSULIN GLARGINE 50 UNITS: 100 INJECTION, SOLUTION SUBCUTANEOUS at 14:05

## 2025-06-12 RX ADMIN — ATORVASTATIN CALCIUM 80 MG: 80 TABLET, FILM COATED ORAL at 20:10

## 2025-06-12 RX ADMIN — INSULIN LISPRO 30 UNITS: 100 INJECTION, SOLUTION INTRAVENOUS; SUBCUTANEOUS at 20:09

## 2025-06-12 RX ADMIN — INSULIN LISPRO 24 UNITS: 100 INJECTION, SOLUTION INTRAVENOUS; SUBCUTANEOUS at 16:29

## 2025-06-12 RX ADMIN — LEVOTHYROXINE SODIUM 100 MCG: 100 TABLET ORAL at 11:45

## 2025-06-12 RX ADMIN — IPRATROPIUM BROMIDE AND ALBUTEROL SULFATE 3 ML: .5; 3 SOLUTION RESPIRATORY (INHALATION) at 11:26

## 2025-06-12 RX ADMIN — PANTOPRAZOLE SODIUM 40 MG: 40 TABLET, DELAYED RELEASE ORAL at 11:45

## 2025-06-12 RX ADMIN — IOPAMIDOL 85 ML: 612 INJECTION, SOLUTION INTRAVENOUS at 17:33

## 2025-06-12 RX ADMIN — ASPIRIN 325 MG ORAL TABLET 325 MG: 325 PILL ORAL at 19:50
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
## 2025-06-12 NOTE — ED NOTES
Nursing report ED to floor  Ricardo RODRIGO Pool  54 y.o.  male    HPI :  HPI  Stated Reason for Visit: weakness  History Obtained From: patient    Chief Complaint  Chief Complaint   Patient presents with    Extremity Weakness       Admitting doctor:   Sai Mei MD    Admitting diagnosis:   There were no encounter diagnoses.    Code status:   Current Code Status       Date Active Code Status Order ID Comments User Context       Prior            Allergies:   Duloxetine and Penicillins    Isolation:   No active isolations    Intake and Output  No intake or output data in the 24 hours ending 06/11/25 2022    Weight:       06/11/25 1832   Weight: 116 kg (255 lb 15.3 oz)       Most recent vitals:   Vitals:    06/11/25 2000 06/11/25 2015 06/11/25 2016 06/11/25 2020   BP: (!) 148/101 134/79 134/79    Pulse: 97 94 87 88   Resp: 14 14     Temp:       TempSrc:       SpO2: 98% 96% 96% 95%   Weight:       Height:           Active LDAs/IV Access:   Lines, Drains & Airways       Active LDAs       Name Placement date Placement time Site Days    Peripheral IV 06/11/25 1836 20 G Anterior;Left;Proximal Forearm 06/11/25 1836  Forearm  less than 1    Peripheral IV 06/11/25 1911 20 G Right Antecubital 06/11/25 1911  Antecubital  less than 1                    Labs (abnormal labs have a star):   Labs Reviewed   CBC WITH AUTO DIFFERENTIAL - Abnormal; Notable for the following components:       Result Value    WBC 11.82 (*)     Hemoglobin 18.9 (*)     Hematocrit 53.7 (*)     Lymphocytes, Absolute 4.45 (*)     All other components within normal limits   PROTIME-INR - Normal   APTT - Normal   POCT GLUCOSE FINGERSTICK - Normal   RAINBOW DRAW    Narrative:     The following orders were created for panel order [place] Draw.  Procedure                               Abnormality         Status                     ---------                               -----------         ------                     Green Top (Gel)[[id number]]                                   Final result               Lavender Top[[id number]]                                     Final result               Gold Top - SST[[id number]]                                   Final result               Light Blue Top[[id number]]                                   Final result                 Please view results for these tests on the individual orders.   COMPREHENSIVE METABOLIC PANEL   POCT GLUCOSE FINGERSTICK   TYPE AND SCREEN   CBC AND DIFFERENTIAL    Narrative:     The following orders were created for panel order CBC & Differential.  Procedure                               Abnormality         Status                     ---------                               -----------         ------                     CBC Auto Differential[[id number]]        Abnormal            Final result                 Please view results for these tests on the individual orders.   GREEN TOP   LAVENDER TOP   GOLD TOP - SST   LIGHT BLUE TOP       EKG:   ECG 12 Lead Stroke Evaluation   Preliminary Result   HEART RATE=87  bpm   RR Wsunixmb=101  ms   [place] Ayvsfbty=161  ms   P Horizontal Axis=6  deg   P Front Axis=34  deg   QRSD Foslrmld=685  ms   QT Ovizutow=797  ms   JWaM=458  ms   QRS Axis=120  deg   T Wave Axis=55  deg   - ABNORMAL ECG -   Sinus rhythm   Borderline intraventricular conduction delay   Left posterior fascicular block   RSR' in V1 or V2, probably normal variant   When compared with ECG of 11-Nov-2021 05:16:28,   No significant change   Date and Time of Study:2025-06-11 19:21:57          Meds given in ED:   Medications   sodium chloride 0.9 % flush 10 mL (has no administration in time range)   iopamidol (ISOVUE-370) 76 % injection 150 mL (145 mL Intravenous Given by Other 6/11/25 1856)   sodium chloride 0.9 % flush 10 mL (10 mL Intravenous Given 6/11/25 1850)     Followed by   tenecteplase for stroke (TNKASE) injection 25 mg (25 mg Intravenous Given 6/11/25 1850)     Followed by   sodium chloride 0.9 % flush 10 mL  (10 mL Intravenous Given 6/11/25 4958)       Imaging results:  No radiology results for the last day    Ambulatory status:   - bedrest    Social issues:   Social History     Socioeconomic History    Marital status:    Tobacco Use    Smoking status: Some Days     Types: Cigars     Passive exposure: Current    Smokeless tobacco: Never    Tobacco comments:     occasional cigar use   Vaping Use    Vaping status: Never Used   Substance and Sexual Activity    Alcohol use: Never    Drug use: No    Sexual activity: Defer       Peripheral Neurovascular  Peripheral Neurovascular (Adult)  Peripheral Neurovascular WDL: .WDL except, neurovascular assessment upper  LUE Neurovascular Assessment  Sensation LUE: numbness present    Neuro Cognitive  Neuro Cognitive (Adult)  Cognitive/Neuro/Behavioral WDL: .WDL except  Additional Documentation: NIH Stroke Scale (group)  Pupils  Pupil Size Left: 2 mm  Pupil Size Right: 2 mm  Evelyne Coma Scale  Best Eye Response: 4-->(E4) spontaneous  Best Motor Response: 6-->(M6) obeys commands  Best Verbal Response: 5-->(V5) oriented  [place] Coma Scale Score: 15  NIH Stroke Scale  Interval: baseline  1a. Level of Consciousness: 0-->Alert, keenly responsive  1b. LOC Questions: 0-->Answers both questions correctly  1c. LOC Commands: 0-->Performs both tasks correctly  2. Best Gaze: 0-->Normal  3. Visual: 0-->No visual loss  4. Facial Palsy: 1-->Minor paralysis (flattened nasolabial fold, asymmetry on smiling)  5a. Motor Arm, Left: 3-->No effort against gravity, limb falls  5b. Motor Arm, Right: 0-->No drift, limb holds 90 (or 45) degrees for full 10 secs  6a. Motor Leg, Left: 0-->No drift, leg holds 30 degree position for full 5 secs  6b. Motor Leg, Right: 0-->No drift, leg holds 30 degree position for full 5 secs  7. Limb Ataxia: 1-->Present in one limb  8. Sensory: 1-->Mild-to-moderate sensory loss, patient feels pinprick is less sharp or is dull on the affected side, or there is a loss of  superficial pain with pinprick, but patient is aware of being touched  9. Best Language: 0-->No aphasia, normal  10. Dysarthria: 1-->Mild-to-moderate dysarthria, patient slurs at least some words and, at worst, can be understood with some difficulty  11. Extinction and Inattention (formerly Neglect): 0-->No abnormality  Total (NIH Stroke Scale): 7    Learning  Learning Assessment  Learning Readiness and Ability: no barriers identified    Respiratory  Respiratory  Airway WDL: WDL  Respiratory WDL  Respiratory WDL: WDL    Abdominal Pain       Pain Assessments  Pain (Adult)  (0-10) Pain Rating: Rest: 6  (0-10) Pain Rating: Activity: 6  Pain Location: extremity  Pain Side/Orientation: left, lower    NIH Stroke Scale  NIH Stroke Scale  Interval: baseline  1a. Level of Consciousness: 0-->Alert, keenly responsive  1b. LOC Questions: 0-->Answers both questions correctly  1c. LOC Commands: 0-->Performs both tasks correctly  2. Best Gaze: 0-->Normal  3. Visual: 0-->No visual loss  4. Facial Palsy: 1-->Minor paralysis (flattened nasolabial fold, asymmetry on smiling)  5a. Motor Arm, Left: 3-->No effort against gravity, limb falls  5b. Motor Arm, Right: 0-->No drift, limb holds 90 (or 45) degrees for full 10 secs  6a. Motor Leg, Left: 0-->No drift, leg holds 30 degree position for full 5 secs  6b. Motor Leg, Right: 0-->No drift, leg holds 30 degree position for full 5 secs  7. Limb Ataxia: 1-->Present in one limb  8. Sensory: 1-->Mild-to-moderate sensory loss, patient feels pinprick is less sharp or is dull on the affected side, or there is a loss of superficial pain with pinprick, but patient is aware of being touched  9. Best Language: 0-->No aphasia, normal  10. Dysarthria: 1-->Mild-to-moderate dysarthria, patient slurs at least some words and, at worst, can be understood with some difficulty  11. Extinction and Inattention (formerly Neglect): 0-->No abnormality  Total (NIH Stroke Scale): 7    Martinez Ziegler  RN  06/11/25 20:22 EDT

## 2025-06-12 NOTE — SIGNIFICANT NOTE
06/12/25 1304   OTHER   Discipline occupational therapist   Rehab Time/Intention   Session Not Performed other (see comments)  (Patient is s/p TNK at 1850 on 6/11. Per RNJamila patient is on bed rest with q1 neuro checks until 1850 on 6/13. OT will follow up for evaluation completion on 6/13.)   Recommendation   OT - Next Appointment 06/13/25

## 2025-06-12 NOTE — CONSULTS
"Neurology Consult Note    Consult Date: 6/12/2025    Referring MD: Sai Mei MD    Reason for Consult I have been asked to see the patient in neurological consultation to render advice and opinion regarding left-sided weakness    Ricardo Pereira is a 54 y.o. male with past medical history of type 2 diabetes mellitus, hypertension, COPD, obstructive sleep apnea on CPAP, hypothyroidism, hyperlipidemia, coronary artery disease status post stenting on Plavix presented to the ER with sudden onset left arm weakness numbness left facial droop and left facial numbness which began 30 minutes prior to arrival.    Patient has a spinal cord stimulator in place, also has chronic pain and peripheral neuropathy from diabetes.  Yesterday while he was at home he had sudden onset left arm weakness numbness, left facial numbness and facial droop and slurred speech which progressed to left leg weakness.    Past Medical History:   Diagnosis Date    Abdominal hernia     Abdominal pain, acute     Abdominal pain, LLQ     Abnormal brain scan     Abnormal involuntary movements     Acute pancreatitis     Allergic rhinitis     Anxiety     Arthritis of right glenohumeral joint     Asthma     Bilateral carpal tunnel syndrome     BMI 38.0-38.9,adult     Burn of left upper extremity, second degree, initial encounter     Cancer     Chronic generalized abdominal pain     Chronic pain     flank pain \" permenantly damaged muscle\"    Common migraine without aura     COPD (chronic obstructive pulmonary disease)     COPD exacerbation     Coronary artery disease involving coronary bypass graft of native heart with unstable angina pectoris 6/12/2021    Cough syncope     Depression     Diabetes mellitus     Insulin pump    Diabetic neuropathy     Diabetic peripheral neuropathy     Disease of thyroid gland     hypothyroid    Diverticulitis of colon     Elevated blood sugar level     Elevated cholesterol     Elevated transaminase level     Encounter " "for immunization     Fibromyalgia     Gait disturbance     GERD (gastroesophageal reflux disease)     Heart attack 06/11/2021    two block in right artery, two stints pur in.     Hepatic steatosis     History of pancreatitis     X 2    History of renal cell cancer     stage 4 , Rt kidney    Hospital discharge follow-up     Hyperlipidemia     Hypertension     Hypertriglyceridemia     IBS (irritable bowel syndrome)     Incomplete tear of rotator cuff     Intractable chronic migraine without aura and with status migrainosus     Late effects of cerebrovascular disease     Left ankle pain     Left ankle sprain     Left foot pain     Left-sided chest pain     Leg pain     Low vitamin D level     Lumbar strain     Medicare annual wellness visit, initial     Migraine, chronic, without aura, intractable     Nausea and vomiting     Numbness     Obstructive sleep apnea     Photophobia     Pre-syncope     Recurrent renal cell carcinoma of kidney     Renal failure (ARF), acute on chronic     Right shoulder pain     Sciatica without lumbago     Sciatica, left side     Severe obesity     Severe obesity (BMI 35.0-39.9)    Shortness of breath     Sleep apnea     Stroke     Stroke, lacunar    SVT (supraventricular tachycardia)     Syncope and collapse     Type 2 diabetes mellitus     Umbilical hernia     Viral URI with cough        Exam  /70   Pulse 72   Temp 98 °F (36.7 °C) (Oral)   Resp 16   Ht 177.8 cm (70\")   Wt 116 kg (255 lb 15.3 oz)   SpO2 94%   BMI 36.73 kg/m²   Gen: NAD, vitals reviewed  MS: oriented x3, recent/remote memory intact, normal attention/concentration, language intact, no neglect.  CN: visual acuity grossly normal, PERRL, EOMI, not appreciate facial droop patient wife states his speech changes are chronic and not really new  Motor: Left arm 4 -/5 left lower extremity 2/5  Right upper right lower 5/5  Increased sensation on the left upper and left lower leg  Chronic peripheral neuropathy in bilateral " lower leg    DATA:    Lab Results   Component Value Date    GLUCOSE 129 (H) 06/12/2025    CALCIUM 9.0 06/12/2025     06/12/2025    K 3.8 06/12/2025    CO2 23.9 06/12/2025     06/12/2025    BUN 13.0 06/12/2025    CREATININE 1.03 06/12/2025    EGFRIFAFRI 86 10/15/2021    EGFRIFNONA 87 11/11/2021    BCR 12.6 06/12/2025    ANIONGAP 9.1 06/12/2025     Lab Results   Component Value Date    WBC 9.06 06/12/2025    HGB 17.1 06/12/2025    HCT 51.1 (H) 06/12/2025    MCV 97.5 (H) 06/12/2025     06/12/2025   Vitals  Afebrile  Pulse rate 70-80  Respiratory rate 16-18  Systolic blood pressure 110-130s    Lab review:   Sodium 138  Creatinine 1.03  Blood glucose 129  Normal LFTs    A1c 9.6      WBC 9.06  Hemoglobin 17 and platelets 150    Imaging review:   CT head without no acute hypodensity or hypodensity appreciated    CT angio head and neck no acute large vessel occlusion atherosclerosis aneurysms are not    CT perfusion no core or penumbra identified    Cannot get an MRI because of spinal stimulator    TTE pending    Diagnoses:  Concern for acute right MCA ischemic stroke status post TNK  Chronic pain s/p spinal stimulator  Right upper extremity pain    Type 2 diabetes mellitus poorly controlled with an A1c of 9.6  Hypertension  Hyperlipidemia  Coronary artery disease status post stent    Pre-stroke MRS: 2  NIHSS: NIHSS:    Baseline  0-->Alert: keenly responsive  0-->Answers both questions correctly  0-->Performs both tasks correctly  0=normal  0=No visual loss  0=Normal symmetric movement  1-->Drift: limb holds 90 (or 45) degrees, but drifts down before full 10 seconds: does not hit bed or other support  0-->No drift: limb holds 90 (or 45) degrees for full 10 secs  3-->No effort against gravity: limb falls  0-->No drift: limb holds 90 (or 45) degrees for full 10 secs  0=Absent  1=Mild to moderate sensory loss; patient feels pinprick is less sharp or is dull on the affected side; there is a loss of  superficial pain with pinprick but patient is aware He is being touched  0=No aphasia, normal  1=Mild to moderate, patient slurs at least some words and at worst, can be understood with some difficulty  0=No abnormality    Total score: 6      Patient is a 54-year-old with history of poorly controlled type 2 diabetes mellitus morbid obesity, sleep apnea, coronary artery disease status post stenting, chronic pain status post spinal stimulator presents to the ED with sudden onset left arm left leg weakness left facial numbness he was within the window for TNKase so he was given TNK.  CTA head and neck did not show any large vessel occlusion.    Patient cannot get an MRI because of spinal stimulator    Plan  Get repeat CT head and CT cervical spine  ICU doctors also plan to get a CT of the left upper extremities  Aspirin 81 and Plavix 75 mg daily for 21 days followed by Plavix 75 mg daily (initiated after 24 hours post TNK)  Continue Lipitor 80 mg daily  A1c goal less than 6.5  LDL goal less than 75  Patient needs to continue using CPAP machine  He would benefit from inpatient rehab considering significant deficits on exam.    Okay to transfer out of the ICU 24 hours post TNK    MDM   Reviewed: Previous charts, nursing notes and vitals   Reviewed: Previous labs and CT CTA/CTP scan    Interpretation: Labs and CT/CTA/CTP scan   Total time providing critical care is :40 minutes. This excluded time spent performing separately reportable procedures and services  Consults :Neurology/Stroke    Please note that portions of this note were completed with a voice recognition program.     Juan Delarosa MD  Neuro Hospitalist /Vascular Neurology.

## 2025-06-12 NOTE — PROGRESS NOTES
[facility]     LOS: 1 day     Subjective:    CC:Extremity Weakness    DIET:  Diet Order   Procedures    Diet: Regular/House; Fluid Consistency: Thin (IDDSI 0)       Objective:    Vital Signs:  Temp:  [98 °F (36.7 °C)-98.2 °F (36.8 °C)] 98 °F (36.7 °C)  Heart Rate:  [] 80  Resp:  [14-18] 18  BP: (105-169)/() 107/66    SpO2:  [91 %-100 %] 91 %  on   ;   Device (Oxygen Therapy): room air  Body mass index is 36.59 kg/m².    Physical Exam  General Awake alert answering all questions appropriately  Heart regular rate rhythm no murmurs rubs gallop  Lungs clear to auscultation bilaterally  Abdomen soft nontender nondistended organomegaly  Extremities of cyanosis clubbing or edema  Neuro he is able to stand with a lot of support from his arms.  Left leg is weaker than his right leg.  Good strength in his arms.  Face is symmetric and speech is normal    Results Review:    Glucose   Date Value Ref Range Status   06/12/2025 129 (H) 65 - 99 mg/dL Final   06/11/2025 91 65 - 99 mg/dL Final     Results from last 7 days   Lab Units 06/12/25  0354   WBC 10*3/mm3 9.06   HEMOGLOBIN g/dL 17.1   HEMATOCRIT % 51.1*   PLATELETS 10*3/mm3 151     Results from last 7 days   Lab Units 06/12/25  0354   SODIUM mmol/L 138   POTASSIUM mmol/L 3.8   CHLORIDE mmol/L 105   CO2 mmol/L 23.9   BUN mg/dL 13.0   CREATININE mg/dL 1.03   CALCIUM mg/dL 9.0   BILIRUBIN mg/dL 0.4   ALK PHOS U/L 82   ALT (SGPT) U/L 37   AST (SGOT) U/L 25   GLUCOSE mg/dL 129*     Results from last 7 days   Lab Units 06/11/25  1836   INR  0.96   APTT seconds 26.7             Cultures:  No results found for: \"BLOODCX\", \"URINECX\", \"WOUNDCX\", \"MRSACX\", \"RESPCX\", \"STOOLCX\"    I have reviewed daily medications and changes in CPOE    Scheduled meds  aspirin, 325 mg, Oral, Daily   Or  aspirin, 300 mg, Rectal, Daily  atorvastatin, 80 mg, Oral, Nightly  insulin glargine, 50 Units, Subcutaneous, Q12H  insulin lispro, 4-24 Units, Subcutaneous, 4x " Daily With Meals & Nightly  ipratropium-albuterol, 3 mL, Nebulization, Q4H - RT  levothyroxine, 100 mcg, Oral, Daily  methylPREDNISolone sodium succinate, 40 mg, Intravenous, BID - Nitrates  pantoprazole, 40 mg, Oral, Daily           PRN meds    dextrose    dextrose    glucagon (human recombinant)    sodium chloride        Stroke-like symptoms        Assessment/Plan:  54-year-old gentleman has got history of type 2 diabetes, COPD, obstructive sleep apnea not compliant with CPAP, hypothyroidism, coronary artery disease with prior stenting in 2001, prior stroke, renal cell carcinoma remotely successfully treated; who comes to the hospital because he was watching TV and had sudden onset of left arm weakness and numbness which then went to his left face.  He came immediately to the hospital.  And on his way to the hospital and in the ER he noted his left leg was also more weak.  He had a CT angio with perfusion which suggested right sided PCA stroke for which he underwent TNK.    Also noted on his CT scan is multiple dental caries which radiology recommends follow-up imaging for and paraesophageal tonsils are prominent.  This will need to be followed up outpatient.  Also a 2 cm arachnoid cyst in the left middle fossa.      Acute CVA status post TNK  -Aspirin to start tonight and Plavix likely to start in the a.m. this plan was discussed with Dr. Delarosa  -Repeat CT of the head  -Continue with statin    Hypothyroidism  - Continue with levothyroxine  - Check TSH in the a.m.    COPD with mild exacerbation  - Continue with Solu-Medrol 40 IV twice daily and DuoNebs every 4 hours  - Seen by pulmonary    Diabetes  - Continue with Lantus  -Sliding scale insulin  - Sugars quite high likely from steroids  -Just received Lantus at 2:00pm today so we will monitor  - Will add mealtime insulin    Crushing Left arm pain (per reports, not mention it to me)-CT of the arm is unremarkable, discussed with radiology    Dvt ppx with  scd    Alirio Alford MD  06/12/25  16:21 EDT

## 2025-06-12 NOTE — PROGRESS NOTES
Lpc crosscover    Pt eating pasta, jess[employer] sandwich and two cheesecakes this afternoon and repeat glucose is over 400.  RFP  Insulin ssi given  Diet changed to strict diabetic.  Recheck glucose in 2 hours to ensure improving  Diabetic education ordered.  Jn RN.    Electronically signed by Artur Jim MD, 06/12/25, 4:36 PM EDT.

## 2025-06-12 NOTE — SIGNIFICANT NOTE
06/12/25 1251   OTHER   Discipline physical therapist   Rehab Time/Intention   Session Not Performed other (see comments)  (pt on bedrest s/p TNK, PT will f/u 6/13 for PT eval)   Recommendation   PT - Next Appointment 06/13/25        Physician confirms case reviewed for anesthesia consultation requirements.
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
## 2025-06-12 NOTE — PLAN OF CARE
Goal Outcome Evaluation:  Plan of Care Reviewed With: patient           Outcome Evaluation: Patient is 54 year old male presenting to [facility] on 6/11/2025 with reports of acute onset L UE weakness, numbness, and L facial droop. CT perfusion suggestive of delayed perfusion involving T PCA L superior cerebral artery distributions. PMH significant for prior CVA. At baseline, patient is independent with ADLs, does not use a device for mobility, and lives with his spouse. Following a comprehensive Occupational Therapy assessment conducted today, the patient demonstrates a decline from their baseline functional status, exhibiting deficits related to balance, strength, tolerance, coordination, transfers and mobility that significantly impact independence in activities of daily living. The patient would benefit from skilled Occupational Therapy services to enhance safety and facilitate a return to prior level of independence. IPR is recommended upon discharge from the hospital setting as continued rehabilitation is necessary to address functional deficits and support optimal recovery.    Anticipated Discharge Disposition (OT): inpatient rehabilitation facility

## 2025-06-12 NOTE — PROGRESS NOTES
[facility]  [phone]  Dr. Riki Collier    Subjective:  LOS: 1  [unfilled]      Chief Complaint:  Acute stroke    Improved strength in left arm but complains of pain in upper arm. No injury. Since stroke events. Current smoker.    Objective   Vital Signs past 24hrs  Temp range: Temp (24hrs), Av.1 °F (36.7 °C), Min:98 °F (36.7 °C), Max:98.2 °F (36.8 °C)    BP range: BP: (107-169)/() 118/70  Pulse range: Heart Rate:  [] 72  Resp rate range: Resp:  [14-18] 16  Device (Oxygen Therapy): room air   Oxygen range:SpO2:  [92 %-100 %] 94 %   Mechanical Ventilator:     Physical Exam  Constitutional:       Appearance: He is obese.   Eyes:      Pupils: Pupils are equal, round, and reactive to light.   Cardiovascular:      Rate and Rhythm: Normal rate and regular rhythm.      Heart sounds: No murmur heard.  Pulmonary:      Effort: Pulmonary effort is normal.      Breath sounds: Decreased breath sounds and wheezing present.   Abdominal:      General: Bowel sounds are normal.      Palpations: Abdomen is soft. There is no mass.      Tenderness: There is no abdominal tenderness.   Musculoskeletal:         General: No swelling.      Comments: Tenderness in left upper arm without evidence of external injury   Neurological:      Mental Status: He is alert.      Comments: Moves LUE; LLE is 0/5; mild left facial droop       Results Review:    I have reviewed the laboratory and imaging data since the last note by [facility] physician.  My annotations are noted in assessment and plan.      Result Review:  I have personally reviewed the results from last note by [facility] physician to 2025 09:32 EDT and agree with these findings:  [x]  Laboratory list / accordion  [x]  Microbiology  [x]  Radiology  []  EKG/Telemetry   []  Cardiology/Vascular   []  Pathology  []  Old records  []  Other:      Medication Review:  I have reviewed the current MAR.  My annotations are noted in assessment and plan.    aspirin, 325 mg, Oral,  Daily   Or  aspirin, 300 mg, Rectal, Daily  atorvastatin, 80 mg, Oral, Nightly  insulin regular, 2-7 Units, Subcutaneous, Q6H           Lines, Drains & Airways       Active LDAs       Name Placement date Placement time Site Days    Peripheral IV 06/11/25 1836 20 G Anterior;Left;Proximal Forearm 06/11/25 1836  Forearm  less than 1    Peripheral IV 06/11/25 1911 20 G Right Antecubital 06/11/25 1911  Antecubital  less than 1                    PCCM Problems  Acute ischemic stroke s/p TNK  Left upper arm pain and tenderness  COPD exacerbation  Smoker (cigars, previously cigs)  Relevant Medical Diagnoses  CAD on Plavix  Prior hx of stroke  REGAN non-compliant CPAP  Type 2 DM  Hypothyroidism  Hypertriglyceridemia      THESE ARE NEW MEDICAL PROBLEMS TO ME.    Plan of Treatment    Neuro has seen. CT head and cervical spine and ECHO planned.    Hx CAD on Plavix. Resume after 6PM today.    Left upper arm pain and tenderness. Unclear etiology. Start with CT.    Treat for COPD exacerbation with nebs and steroids.    Advised to quit smoking.    Needs to resume CPAP.    SS for DM. Check on home dose of long acting, then resume.    Home meds.    ? Diet.    Transfer OK with neuro. Consult [facility] to help with DM management.    Riki Collier MD  06/12/25  09:32 EDT    Isolation status: No active isolations    Dietary Orders (From admission, onward)       Start     Ordered    06/12/25 0044  NPO Diet NPO Type: Strict NPO  Diet Effective Now        Comments: Should Remain in Effect Until a Complete SLP Evaluation Occurs & a Superceding Order is Received   Question:  NPO Type  Answer:  Strict NPO    06/12/25 0043                    Part of this note may be an electronic transcription/translation of spoken language to printed text using the Dragon Dictation System.
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
## 2025-06-12 NOTE — PLAN OF CARE
Goal Outcome Evaluation:  Plan of Care Reviewed With: patient, spouse           Outcome Evaluation: Pt is 55 yo male admitted to [facility] for L sided weakness, s/p TNK, CT shows no evidence of acute infarct, suggest areas of delayed perfusion in R PCA and L sup cerebral artery. PMH significant for HTN, DM, COPD, REGAN. Pt lives with spouse and son, independent with mobility at baseline. Per RN, neurologist would like patient mobilized today so able to proceed with PT evaluation. Patient sitting up on EOB, c/o L sided weakness, performed sit to stand with minAx2, able to take a few steps with rwx and minAx2 with maximl cues for sequencing and use of rwx to assist with stability of LLE. Patient has impairments consisting of L sided weakness, decreased activity tolerance and would benefit from skilled PT. dc recommendation is rehab.    Anticipated Discharge Disposition (PT): inpatient rehabilitation facility

## 2025-06-12 NOTE — PLAN OF CARE
Pt arrived to ICU at 2100 post TNK. A/Ox4 and VSS. Pt still experiencing decreased sensation, numbness and tingling to left side. NIH: 3. Pt and spouse educated and updated on plan of care.

## 2025-06-12 NOTE — PLAN OF CARE
Goal Outcome Evaluation:              Outcome Evaluation: Noted passed swallow screen. SLP to follow for dysphagia evaluation and speech/language/cognitive evaluation as warranted.

## 2025-06-13 ENCOUNTER — READMISSION MANAGEMENT (OUTPATIENT)
Dept: CALL CENTER | Facility: HOSPITAL | Age: 55
End: 2025-06-13
Payer: MEDICARE

## 2025-06-13 VITALS
WEIGHT: 255 LBS | HEART RATE: 82 BPM | SYSTOLIC BLOOD PRESSURE: 125 MMHG | TEMPERATURE: 97.9 F | DIASTOLIC BLOOD PRESSURE: 67 MMHG | HEIGHT: 70 IN | BODY MASS INDEX: 36.51 KG/M2 | RESPIRATION RATE: 15 BRPM | OXYGEN SATURATION: 97 %

## 2025-06-13 PROBLEM — F17.290 CIGAR SMOKER: Status: ACTIVE | Noted: 2025-06-13

## 2025-06-13 PROBLEM — E03.9 HYPOTHYROIDISM (ACQUIRED): Status: ACTIVE | Noted: 2018-03-09

## 2025-06-13 PROBLEM — E78.1 HYPERTRIGLYCERIDEMIA: Status: ACTIVE | Noted: 2025-06-13

## 2025-06-13 PROBLEM — I25.10 CAD (CORONARY ARTERY DISEASE): Status: ACTIVE | Noted: 2021-06-12

## 2025-06-13 LAB
ALBUMIN SERPL-MCNC: 3.9 G/DL (ref 3.5–5.2)
ALBUMIN/GLOB SERPL: 1.6 G/DL
ALP SERPL-CCNC: 78 U/L (ref 39–117)
ALT SERPL W P-5'-P-CCNC: 34 U/L (ref 1–41)
ANION GAP SERPL CALCULATED.3IONS-SCNC: 10.5 MMOL/L (ref 5–15)
AST SERPL-CCNC: 21 U/L (ref 1–40)
BILIRUB SERPL-MCNC: 0.4 MG/DL (ref 0–1.2)
BUN SERPL-MCNC: 16 MG/DL (ref 6–20)
BUN/CREAT SERPL: 17.2 (ref 7–25)
CALCIUM SPEC-SCNC: 9.5 MG/DL (ref 8.6–10.5)
CHLORIDE SERPL-SCNC: 102 MMOL/L (ref 98–107)
CO2 SERPL-SCNC: 23.5 MMOL/L (ref 22–29)
CREAT SERPL-MCNC: 0.93 MG/DL (ref 0.76–1.27)
DEPRECATED RDW RBC AUTO: 44.3 FL (ref 37–54)
EGFRCR SERPLBLD CKD-EPI 2021: 98.8 ML/MIN/1.73
ERYTHROCYTE [DISTWIDTH] IN BLOOD BY AUTOMATED COUNT: 12.9 % (ref 12.3–15.4)
GLOBULIN UR ELPH-MCNC: 2.4 GM/DL
GLUCOSE BLDC GLUCOMTR-MCNC: 180 MG/DL (ref 70–130)
GLUCOSE BLDC GLUCOMTR-MCNC: 182 MG/DL (ref 70–130)
GLUCOSE BLDC GLUCOMTR-MCNC: 200 MG/DL (ref 70–130)
GLUCOSE SERPL-MCNC: 199 MG/DL (ref 65–99)
HCT VFR BLD AUTO: 47.2 % (ref 37.5–51)
HGB BLD-MCNC: 16.2 G/DL (ref 13–17.7)
MAGNESIUM SERPL-MCNC: 2.1 MG/DL (ref 1.6–2.6)
MCH RBC QN AUTO: 32.1 PG (ref 26.6–33)
MCHC RBC AUTO-ENTMCNC: 34.3 G/DL (ref 31.5–35.7)
MCV RBC AUTO: 93.7 FL (ref 79–97)
PHOSPHATE SERPL-MCNC: 3.1 MG/DL (ref 2.5–4.5)
PLATELET # BLD AUTO: 153 10*3/MM3 (ref 140–450)
PMV BLD AUTO: 11.1 FL (ref 6–12)
POTASSIUM SERPL-SCNC: 4.2 MMOL/L (ref 3.5–5.2)
PROT SERPL-MCNC: 6.3 G/DL (ref 6–8.5)
RBC # BLD AUTO: 5.04 10*6/MM3 (ref 4.14–5.8)
SODIUM SERPL-SCNC: 136 MMOL/L (ref 136–145)
TSH SERPL DL<=0.05 MIU/L-ACNC: 0.69 UIU/ML (ref 0.27–4.2)
WBC NRBC COR # BLD AUTO: 12.81 10*3/MM3 (ref 3.4–10.8)

## 2025-06-13 PROCEDURE — 97530 THERAPEUTIC ACTIVITIES: CPT

## 2025-06-13 PROCEDURE — 63710000001 INSULIN LISPRO (HUMAN) PER 5 UNITS: Performed by: HOSPITALIST

## 2025-06-13 PROCEDURE — 99232 SBSQ HOSP IP/OBS MODERATE 35: CPT | Performed by: PHYSICIAN ASSISTANT

## 2025-06-13 PROCEDURE — 92523 SPEECH SOUND LANG COMPREHEN: CPT

## 2025-06-13 PROCEDURE — 85027 COMPLETE CBC AUTOMATED: CPT

## 2025-06-13 PROCEDURE — 82948 REAGENT STRIP/BLOOD GLUCOSE: CPT

## 2025-06-13 PROCEDURE — 80053 COMPREHEN METABOLIC PANEL: CPT

## 2025-06-13 PROCEDURE — 83735 ASSAY OF MAGNESIUM: CPT | Performed by: INTERNAL MEDICINE

## 2025-06-13 PROCEDURE — 63710000001 INSULIN GLARGINE PER 5 UNITS: Performed by: INTERNAL MEDICINE

## 2025-06-13 PROCEDURE — 63710000001 INSULIN LISPRO (HUMAN) PER 5 UNITS: Performed by: INTERNAL MEDICINE

## 2025-06-13 PROCEDURE — 99222 1ST HOSP IP/OBS MODERATE 55: CPT | Performed by: STUDENT IN AN ORGANIZED HEALTH CARE EDUCATION/TRAINING PROGRAM

## 2025-06-13 PROCEDURE — 84100 ASSAY OF PHOSPHORUS: CPT | Performed by: INTERNAL MEDICINE

## 2025-06-13 PROCEDURE — 84443 ASSAY THYROID STIM HORMONE: CPT | Performed by: HOSPITALIST

## 2025-06-13 RX ORDER — ASPIRIN 81 MG/1
81 TABLET ORAL DAILY
Qty: 90 TABLET | Refills: 0 | Status: SHIPPED | OUTPATIENT
Start: 2025-06-13 | End: 2025-09-11

## 2025-06-13 RX ORDER — ATORVASTATIN CALCIUM 80 MG/1
80 TABLET, FILM COATED ORAL NIGHTLY
Qty: 90 TABLET | Refills: 0 | Status: SHIPPED | OUTPATIENT
Start: 2025-06-13 | End: 2025-09-11

## 2025-06-13 RX ADMIN — INSULIN GLARGINE 50 UNITS: 100 INJECTION, SOLUTION SUBCUTANEOUS at 08:15

## 2025-06-13 RX ADMIN — INSULIN LISPRO 8 UNITS: 100 INJECTION, SOLUTION INTRAVENOUS; SUBCUTANEOUS at 08:17

## 2025-06-13 RX ADMIN — ASPIRIN 325 MG ORAL TABLET 325 MG: 325 PILL ORAL at 08:15

## 2025-06-13 RX ADMIN — INSULIN LISPRO 5 UNITS: 100 INJECTION, SOLUTION INTRAVENOUS; SUBCUTANEOUS at 08:17

## 2025-06-13 RX ADMIN — LEVOTHYROXINE SODIUM 100 MCG: 100 TABLET ORAL at 06:20

## 2025-06-13 RX ADMIN — PANTOPRAZOLE SODIUM 40 MG: 40 TABLET, DELAYED RELEASE ORAL at 08:14

## 2025-06-13 NOTE — PLAN OF CARE
Goal Outcome Evaluation:   Continue with current plan of care. Pt did improve in his sensation in his arms, face and legs after he was able to sit on the edge of the bed.  MD did order for pt to have additional diabetic education.

## 2025-06-13 NOTE — DISCHARGE SUMMARY
Date of Admission: 6/11/2025  Date of Discharge:  6/13/2025    Discharge Diagnosis:    Acute ischemic stroke s/p TNK  Left upper arm pain and tenderness  COPD exacerbation  Smoker (cigars, previously cigs)  Relevant Medical Diagnoses  CAD on Plavix  Prior hx of stroke  REGAN non-compliant CPAP  Type 2 DM  Hypothyroidism  Hypertriglyceridemia    Hospital Course    Presenting Problem/History of Present Illness    Patient is a 54-year-old male with past medical history of diabetes, hypertension, COPD, REGAN noncompliant with CPAP, hypothyroid, hyperlipidemia, CAD s/p stenting on Plavix and prior stroke who presented to the ED with acute onset left arm weakness/numbness, left facial droop and left facial numbness that began 30 minutes prior to arrival. CT head was without hemorrhage and he was otherwise ruled a TNK candidate. He received TNK at 1850.  CT head without evidence of acute infarct.  CT perfusion suggestive of delayed perfusion involving right PCA left superior cerebral artery distributions.  Patient reports symptoms are improving post TNK.     Ricardo Pereira is a 54 y.o. male with past medical history of type 2 diabetes mellitus, hypertension, COPD, obstructive sleep apnea on CPAP, hypothyroidism, hyperlipidemia, coronary artery disease status post stenting on Plavix presented to the ER with sudden onset left arm weakness numbness left facial droop and left facial numbness which began 30 minutes prior to arrival.     Patient has a spinal cord stimulator in place, also has chronic pain and peripheral neuropathy from diabetes.  Yesterday while he was at home he had sudden onset left arm weakness numbness, left facial numbness and facial droop and slurred speech which progressed to left leg weakness.    Subsequent Course of Management    Patient had TNK.  Cannot get an MRI due to spinal stimulator.  He looks good today.  No weakness.  No pain or tenderness in the extremities.  Okay for discharge home today per  "neurology.  They recommend aspirin and Plavix.    Of note patient is on very large doses of insulin at home.  He is noted to be noncompliant with a diet while inpatient.  He was noted to be taking cheese cakes, past and Leroy Efrem which is over and above his prescribed diet.  He was instructed on proper management of his diabetes.  However for now I will resume his home dose of insulin.    He was wheezing on admission.  Currently not wheezing.  Will send down on prednisone low-dose taper.  He needs to quit smoking and uses inhalers regularly.    Hold Benicar on discharge as blood pressure is lowish. Needs to check with PCP. Message sent.    Examination on Date of Discharge    /78   Pulse 82   Temp 97.9 °F (36.6 °C) (Oral)   Resp 18   Ht 177.8 cm (70\")   Wt 116 kg (255 lb)   SpO2 96%   BMI 36.59 kg/m²     Physical Exam  Constitutional:       Appearance: He is obese.   Eyes:      Pupils: Pupils are equal, round, and reactive to light.   Cardiovascular:      Rate and Rhythm: Normal rate and regular rhythm.      Heart sounds: No murmur heard.  Pulmonary:      Effort: Pulmonary effort is normal.      Breath sounds: Normal breath sounds.   Abdominal:      General: Bowel sounds are normal.      Palpations: Abdomen is soft. There is no mass.   Musculoskeletal:         General: No swelling.   Neurological:      Mental Status: He is alert.         Test Results    Surgical Procedures Since Admission:       Results for orders placed during the hospital encounter of 06/11/25    Adult Transthoracic Echo Complete W/ Cont if Necessary Per Protocol (With Agitated Saline)    Interpretation Summary    Left ventricular systolic function is normal. Left ventricular ejection fraction appears to be 56 - 60%.    Left ventricular diastolic function was normal.    The right ventricular cavity is mildly dilated. Normal right ventricular systolic function noted.    Saline test results are negative.    Insufficient TR velocity " profile to estimate the right ventricular systolic pressure    There is no evidence of pericardial effusion.            Results from last 7 days   Lab Units 06/13/25  0404 06/12/25  0354 06/11/25  1836   WBC 10*3/mm3 12.81* 9.06 11.82*   HEMOGLOBIN g/dL 16.2 17.1 18.9*   HEMATOCRIT % 47.2 51.1* 53.7*   PLATELETS 10*3/mm3 153 151 187       Results from last 7 days   Lab Units 06/13/25  0404 06/12/25  1849 06/12/25  0354 06/11/25  1836   SODIUM mmol/L 136 134* 138 138   POTASSIUM mmol/L 4.2 4.4 3.8 3.9   CHLORIDE mmol/L 102 100 105 103   CO2 mmol/L 23.5 18.0* 23.9 21.0*   BUN mg/dL 16.0 15.0 13.0 11.0   CREATININE mg/dL 0.93 1.30* 1.03 1.36*             Microbiology Results (last 10 days)       ** No results found for the last 240 hours. **            CT Head Without Contrast  Result Date: 6/13/2025  CT SCAN OF THE HEAD AND CERVICAL SPINE WITHOUT CONTRAST ON 06/12/2025  CLINICAL HISTORY: This is a 54-year-old male patient who is post TNK 24 hours after thrombolytic administration. The patient also complains of upper extremity pain without trauma.  HEAD CT TECHNIQUE: Spiral CTA images were obtained from the base of the skull to the vertex without intravenous contrast. The images were reformatted and are submitted in 3 mm thick axial, sagittal and coronal CT sections with brain algorithm.  COMPARISON: This is correlated to yesterday's contrast-enhanced CT angiogram of the head and neck and CT perfusion study of the brain on 06/11/2025 at 6:43 pm.  FINDINGS: The brain parenchyma is normal in attenuation. The ventricles are normal in size. There is a small arachnoid cyst in the anterior aspect of the left middle cranial fossa that measures maximally 2.2 x 1.7 x 2.2 cm in size, unchanged since yesterday's head CT that abuts and minimally deforms the anterior left temporal lobe and is felt to be of no significance. Otherwise I see no extra-axial fluid collections and no evidence of acute intracranial hemorrhage. The  calvarium and the skull base are normal in appearance. The paranasal sinuses and the mastoid air cells and the middle ear cavities are clear. The orbits are normal in appearance.      1. No acute intracranial abnormality is identified with no change when compared to yesterday's head CT and contrast-enhanced CT angiogram of the head and neck and CT perfusion study on 06/11/2025 at 6:43 pm.  2. There is a 2.2 x 1.7 x 2.2 cm incidental arachnoid cyst in the anterior aspect of the left middle cranial fossa felt to be of doubtful significance and unchanged. The remainder of the head CT is normal. If there remains any clinical suspicion of acute stroke an MRI of the brain is recommended for more complete assessment.  CERVICAL SPINE CT TECHNIQUE: Spiral CTA images were obtained from the skull base down to the superior body of the T3 thoracic level and the images were reformatted and submitted in 2 mm soft tissue algorithm and 1 mm thick axial, sagittal and coronal CT sections with high resolution bone algorithm.  COMPARISON: This is correlated to yesterday's CT angiogram of the neck on 06/11/2025 at 6:43 ppm.  FINDINGS: Atlantooccipital articulation is within normal limits.  At C1-2, there are some arthritic changes at the atlantodental interval with narrowing of the interval and marginal spurring off the superior aspect of the anterior ring of C1 and the odontoid. Otherwise, the C1-2 level is normal in appearance.  At C2-3, the disc space, facets and uncovertebral joints are normal in appearance with no canal or foraminal narrowing.  At C3-4, there is very minimal posterior endplate spurring but no canal narrowing. The facets and uncovertebral joints are normal and there is no foraminal narrowing.  At C4-5, the disc space, facets and uncovertebral joints are normal in appearance with no canal or foraminal narrowing.  The images are extremely grainy from C5-T3 and limits evaluation of posterior disc margins as well as the  epidural space and the thecal sac which are unable to be adequately assessed.  At C5-6 posterior endplates, facets and uncovertebral joints are within normal limits with no bony canal or foraminal narrowing.  At C6-7 the posterior endplates and left facets are normal with no canal or left foraminal narrowing. There is some right facet overgrowth and a prominent spur off the right superior articular facet of C7 that extends into the posterior lateral right neural foramen and moderately narrows the right foramen and could affect the exiting right C7 nerve root.  At C7-T1 the posterior endplates and facets are normal with no bony canal or foraminal narrowing.  IMPRESSION: 1. The images are extremely grainy from C5 down to T3, limits evaluation of the posterior disc margin and also limits evaluation of the extra-axial spaces and thecal sac from C5-T3. Overall there is some right facet spurring at C6-7 that mild to moderately narrows the right foramen and could potentially affect the exiting right C7 nerve root but otherwise, I see no canal or foraminal narrowing in the cervical spine. The etiology of the left upper extremity pain is not established on this exam.  Radiation dose reduction techniques were utilized, including automated exposure control and exposure modulation based on body size.   This report was finalized on 6/13/2025 6:37 AM by Dr. Michael Maria M.D on Workstation: [id number]      CT Cervical Spine Without Contrast  Result Date: 6/13/2025  CT SCAN OF THE HEAD AND CERVICAL SPINE WITHOUT CONTRAST ON 06/12/2025  CLINICAL HISTORY: This is a 54-year-old male patient who is post TNK 24 hours after thrombolytic administration. The patient also complains of upper extremity pain without trauma.  HEAD CT TECHNIQUE: Spiral CTA images were obtained from the base of the skull to the vertex without intravenous contrast. The images were reformatted and are submitted in 3 mm thick axial, sagittal and coronal CT  sections with brain algorithm.  COMPARISON: This is correlated to yesterday's contrast-enhanced CT angiogram of the head and neck and CT perfusion study of the brain on 06/11/2025 at 6:43 pm.  FINDINGS: The brain parenchyma is normal in attenuation. The ventricles are normal in size. There is a small arachnoid cyst in the anterior aspect of the left middle cranial fossa that measures maximally 2.2 x 1.7 x 2.2 cm in size, unchanged since yesterday's head CT that abuts and minimally deforms the anterior left temporal lobe and is felt to be of no significance. Otherwise I see no extra-axial fluid collections and no evidence of acute intracranial hemorrhage. The calvarium and the skull base are normal in appearance. The paranasal sinuses and the mastoid air cells and the middle ear cavities are clear. The orbits are normal in appearance.      1. No acute intracranial abnormality is identified with no change when compared to yesterday's head CT and contrast-enhanced CT angiogram of the head and neck and CT perfusion study on 06/11/2025 at 6:43 pm.  2. There is a 2.2 x 1.7 x 2.2 cm incidental arachnoid cyst in the anterior aspect of the left middle cranial fossa felt to be of doubtful significance and unchanged. The remainder of the head CT is normal. If there remains any clinical suspicion of acute stroke an MRI of the brain is recommended for more complete assessment.  CERVICAL SPINE CT TECHNIQUE: Spiral CTA images were obtained from the skull base down to the superior body of the T3 thoracic level and the images were reformatted and submitted in 2 mm soft tissue algorithm and 1 mm thick axial, sagittal and coronal CT sections with high resolution bone algorithm.  COMPARISON: This is correlated to yesterday's CT angiogram of the neck on 06/11/2025 at 6:43 ppm.  FINDINGS: Atlantooccipital articulation is within normal limits.  At C1-2, there are some arthritic changes at the atlantodental interval with narrowing of the  interval and marginal spurring off the superior aspect of the anterior ring of C1 and the odontoid. Otherwise, the C1-2 level is normal in appearance.  At C2-3, the disc space, facets and uncovertebral joints are normal in appearance with no canal or foraminal narrowing.  At C3-4, there is very minimal posterior endplate spurring but no canal narrowing. The facets and uncovertebral joints are normal and there is no foraminal narrowing.  At C4-5, the disc space, facets and uncovertebral joints are normal in appearance with no canal or foraminal narrowing.  The images are extremely grainy from C5-T3 and limits evaluation of posterior disc margins as well as the epidural space and the thecal sac which are unable to be adequately assessed.  At C5-6 posterior endplates, facets and uncovertebral joints are within normal limits with no bony canal or foraminal narrowing.  At C6-7 the posterior endplates and left facets are normal with no canal or left foraminal narrowing. There is some right facet overgrowth and a prominent spur off the right superior articular facet of C7 that extends into the posterior lateral right neural foramen and moderately narrows the right foramen and could affect the exiting right C7 nerve root.  At C7-T1 the posterior endplates and facets are normal with no bony canal or foraminal narrowing.  IMPRESSION: 1. The images are extremely grainy from C5 down to T3, limits evaluation of the posterior disc margin and also limits evaluation of the extra-axial spaces and thecal sac from C5-T3. Overall there is some right facet spurring at C6-7 that mild to moderately narrows the right foramen and could potentially affect the exiting right C7 nerve root but otherwise, I see no canal or foraminal narrowing in the cervical spine. The etiology of the left upper extremity pain is not established on this exam.  Radiation dose reduction techniques were utilized, including automated exposure control and exposure  modulation based on body size.   This report was finalized on 6/13/2025 6:37 AM by Dr. Michael Maria M.D on Workstation: [id number]      CT Upper Extremity Left With Contrast  Result Date: 6/12/2025  CT UPPER EXTREMITY LEFT W CONTRAST-  INDICATIONS: Pain. Radiation dose reduction techniques were utilized, including automated exposure control and exposure modulation based on body size.  TECHNIQUE: Enhanced CT of the left upper arm  COMPARISON: None available  FINDINGS:  No acute fracture, erosion, or dislocation is identified.  No enhancing soft tissue masses or discrete drainable fluid collections are identified.  The partly included lung shows apical predominant emphysematous changes, mild likely atelectasis.  If further imaging evaluation of the arm is indicated, MRI could be considered.       As described.    This report was finalized on 6/12/2025 5:45 PM by Dr. Tong Wong M.D on Workstation: [id number]      Adult Transthoracic Echo Complete W/ Cont if Necessary Per Protocol (With Agitated Saline)  Result Date: 6/12/2025    Left ventricular systolic function is normal. Left ventricular ejection fraction appears to be 56 - 60%.   Left ventricular diastolic function was normal.   The right ventricular cavity is mildly dilated. Normal right ventricular systolic function noted.   Saline test results are negative.   Insufficient TR velocity profile to estimate the right ventricular systolic pressure   There is no evidence of pericardial effusion.     CT Angiogram Head w AI Analysis of LVO  Result Date: 6/12/2025  CT ANGIOGRAM NECK AND HEAD WITH CONTRAST AND CT PERFUSION WITH CONTRAST  HISTORY: Left-sided weakness.  COMPARISON: None.  FINDINGS: Initially, a noncontrasted CT examination of the brain was performed. The brain and ventricles are symmetrical. There is no evidence of intracranial hemorrhage or of acute infarction. Moderate to severe vascular calcification is appreciated. A small arachnoid cyst is  appreciated involving the left middle cranial fossa anteriorly measuring approximately 2 cm in size.  A CT perfusion study was performed. There was no evidence of abnormal CBF. Areas of delayed perfusion involving the right temporal occipital region and involving the superior aspect of the left cerebellar hemisphere is noted on the Tmax portion of the examination. These areas may be artifactual but posterior fossa delayed perfusion cannot be excluded.  A CT angiogram of the neck and head was then performed. Multiplanar as well as three-dimensional reconstructions were generated. The great vessels are arranged in a classic configuration. There is 0% stenosis of the left internal carotid artery using NASCET criteria. There is irregularity involving the right internal carotid artery secondary to calcified and noncalcified plaque proximally but with 0% stenosis using NASCET criteria. Mild to moderate vascular calcification by the carotid siphons is appreciated bilaterally. The proximal aspects of the anterior and middle cerebral arteries appear unremarkable.  Both vertebral arteries were opacified. The right vertebral artery is minimally larger than that of the left. The basilar artery and the proximal aspects of the posterior cerebral arteries appear unremarkable.  Multiple caries are appreciated. The parapharyngeal tonsils are mildly prominent bilaterally. Minor salivary gland calcifications are appreciated.      1.  There is no evidence of acute infarction or intracranial hemorrhage. The CT perfusion examination suggests areas of delayed perfusion involving the right PCA and left superior cerebral artery vascular distributions. There is also delayed perfusion suggesting involving the left cerebral hemisphere anterolaterally. This may be artifactual. Further evaluation with MRI examination of the brain is recommended. 2.  Multiple caries are appreciated. Standard dental radiographs are recommended. 3.  The  parapharyngeal tonsils are prominent bilaterally, slightly more so on the right. Clinical correlation is suggested. 4.  A 2 cm arachnoid cyst involving the left middle cranial fossa anteriorly is appreciated   The noncontrasted CT examination of the brain was made available for interpretation at 1846 hours and a preliminary report called at 1847 hours. The CT angiogram was made available for interpretation at 1900 hours and a preliminary report called at 1904 hours.  AI analysis of LVO was utilized.  Radiation dose reduction techniques were utilized, including automated exposure control and exposure modulation based on body size.     This report was finalized on 6/12/2025 3:59 PM by Dr. Alirio Ponce M.D on Workstation: [id number]      CT Angiogram Neck  Result Date: 6/12/2025  CT ANGIOGRAM NECK AND HEAD WITH CONTRAST AND CT PERFUSION WITH CONTRAST  HISTORY: Left-sided weakness.  COMPARISON: None.  FINDINGS: Initially, a noncontrasted CT examination of the brain was performed. The brain and ventricles are symmetrical. There is no evidence of intracranial hemorrhage or of acute infarction. Moderate to severe vascular calcification is appreciated. A small arachnoid cyst is appreciated involving the left middle cranial fossa anteriorly measuring approximately 2 cm in size.  A CT perfusion study was performed. There was no evidence of abnormal CBF. Areas of delayed perfusion involving the right temporal occipital region and involving the superior aspect of the left cerebellar hemisphere is noted on the Tmax portion of the examination. These areas may be artifactual but posterior fossa delayed perfusion cannot be excluded.  A CT angiogram of the neck and head was then performed. Multiplanar as well as three-dimensional reconstructions were generated. The great vessels are arranged in a classic configuration. There is 0% stenosis of the left internal carotid artery using NASCET criteria. There is irregularity  involving the right internal carotid artery secondary to calcified and noncalcified plaque proximally but with 0% stenosis using NASCET criteria. Mild to moderate vascular calcification by the carotid siphons is appreciated bilaterally. The proximal aspects of the anterior and middle cerebral arteries appear unremarkable.  Both vertebral arteries were opacified. The right vertebral artery is minimally larger than that of the left. The basilar artery and the proximal aspects of the posterior cerebral arteries appear unremarkable.  Multiple caries are appreciated. The parapharyngeal tonsils are mildly prominent bilaterally. Minor salivary gland calcifications are appreciated.      1.  There is no evidence of acute infarction or intracranial hemorrhage. The CT perfusion examination suggests areas of delayed perfusion involving the right PCA and left superior cerebral artery vascular distributions. There is also delayed perfusion suggesting involving the left cerebral hemisphere anterolaterally. This may be artifactual. Further evaluation with MRI examination of the brain is recommended. 2.  Multiple caries are appreciated. Standard dental radiographs are recommended. 3.  The parapharyngeal tonsils are prominent bilaterally, slightly more so on the right. Clinical correlation is suggested. 4.  A 2 cm arachnoid cyst involving the left middle cranial fossa anteriorly is appreciated   The noncontrasted CT examination of the brain was made available for interpretation at 1846 hours and a preliminary report called at 1847 hours. The CT angiogram was made available for interpretation at 1900 hours and a preliminary report called at 1904 hours.  AI analysis of LVO was utilized.  Radiation dose reduction techniques were utilized, including automated exposure control and exposure modulation based on body size.     This report was finalized on 6/12/2025 3:59 PM by Dr. Alirio Ponce M.D on Workstation: [id number]      CT  CEREBRAL PERFUSION WITH & WITHOUT CONTRAST  Result Date: 6/12/2025  CT ANGIOGRAM NECK AND HEAD WITH CONTRAST AND CT PERFUSION WITH CONTRAST  HISTORY: Left-sided weakness.  COMPARISON: None.  FINDINGS: Initially, a noncontrasted CT examination of the brain was performed. The brain and ventricles are symmetrical. There is no evidence of intracranial hemorrhage or of acute infarction. Moderate to severe vascular calcification is appreciated. A small arachnoid cyst is appreciated involving the left middle cranial fossa anteriorly measuring approximately 2 cm in size.  A CT perfusion study was performed. There was no evidence of abnormal CBF. Areas of delayed perfusion involving the right temporal occipital region and involving the superior aspect of the left cerebellar hemisphere is noted on the Tmax portion of the examination. These areas may be artifactual but posterior fossa delayed perfusion cannot be excluded.  A CT angiogram of the neck and head was then performed. Multiplanar as well as three-dimensional reconstructions were generated. The great vessels are arranged in a classic configuration. There is 0% stenosis of the left internal carotid artery using NASCET criteria. There is irregularity involving the right internal carotid artery secondary to calcified and noncalcified plaque proximally but with 0% stenosis using NASCET criteria. Mild to moderate vascular calcification by the carotid siphons is appreciated bilaterally. The proximal aspects of the anterior and middle cerebral arteries appear unremarkable.  Both vertebral arteries were opacified. The right vertebral artery is minimally larger than that of the left. The basilar artery and the proximal aspects of the posterior cerebral arteries appear unremarkable.  Multiple caries are appreciated. The parapharyngeal tonsils are mildly prominent bilaterally. Minor salivary gland calcifications are appreciated.      1.  There is no evidence of acute infarction  or intracranial hemorrhage. The CT perfusion examination suggests areas of delayed perfusion involving the right PCA and left superior cerebral artery vascular distributions. There is also delayed perfusion suggesting involving the left cerebral hemisphere anterolaterally. This may be artifactual. Further evaluation with MRI examination of the brain is recommended. 2.  Multiple caries are appreciated. Standard dental radiographs are recommended. 3.  The parapharyngeal tonsils are prominent bilaterally, slightly more so on the right. Clinical correlation is suggested. 4.  A 2 cm arachnoid cyst involving the left middle cranial fossa anteriorly is appreciated   The noncontrasted CT examination of the brain was made available for interpretation at 1846 hours and a preliminary report called at 1847 hours. The CT angiogram was made available for interpretation at 1900 hours and a preliminary report called at 1904 hours.  AI analysis of LVO was utilized.  Radiation dose reduction techniques were utilized, including automated exposure control and exposure modulation based on body size.     This report was finalized on 6/12/2025 3:59 PM by Dr. Alirio Ponce M.D on Workstation: [id number]      XR Chest 1 View  Result Date: 6/11/2025  XR CHEST 1 VW-  HISTORY: Male who is 54 years-old, stroke protocol  TECHNIQUE: Frontal view of the chest  COMPARISON: 11/10/2021  FINDINGS: Heart, mediastinum and pulmonary vasculature are unremarkable. No focal pulmonary consolidation, pleural effusion, or pneumothorax. No acute osseous process.      No evidence for acute pulmonary process. Follow-up as clinical indications persist.  This report was finalized on 6/11/2025 8:25 PM by Dr. Tong Wong M.D on Workstation: [id number]        Consulting Physician(s)         Provider   Role Specialty     Favian Carrillo MD      Consulting Physician Cardiology     Laverne Aguayo MD      Consulting Physician Internal Medicine                    Discharge Instructions      Dietary Orders (From admission, onward)       Start     Ordered    06/12/25 1635  Diet: Cardiac, Diabetic; Healthy Heart (2-3 Na+); Consistent Carbohydrate; Fluid Consistency: Thin (IDDSI 0)  Diet Effective Now        References:    Diet Order Definitions   Question Answer Comment   Diets: Cardiac    Diets: Diabetic    Cardiac Diet: Healthy Heart (2-3 Na+)    Diabetic Diet: Consistent Carbohydrate    Fluid Consistency: Thin (IDDSI 0)        06/12/25 1634                            Your medication list        PAUSE taking these medications        Instructions Last Dose Given Next Dose Due   olmesartan 40 MG tablet  Wait to take this until your doctor or other care provider tells you to start again.  Commonly known as: BENICAR      Take 1 tablet by mouth once daily              START taking these medications        Instructions Last Dose Given Next Dose Due   aspirin 81 MG EC tablet      Take 1 tablet by mouth Daily for 90 days.       atorvastatin 80 MG tablet  Commonly known as: LIPITOR      Take 1 tablet by mouth Every Night for 90 days.              CONTINUE taking these medications        Instructions Last Dose Given Next Dose Due   albuterol sulfate  (90 Base) MCG/ACT inhaler  Commonly known as: PROVENTIL HFA;VENTOLIN HFA;PROAIR HFA      Inhale 2 puffs Every 4 (Four) Hours As Needed for Wheezing.       albuterol (2.5 MG/3ML) 0.083% nebulizer solution  Commonly known as: PROVENTIL      Take 2.5 mg by nebulization Every 4 (Four) Hours As Needed for Wheezing.       BD Pen Needle Maribel 2nd Gen 32G X 4 MM misc  Generic drug: Insulin Pen Needle      Use 1 pen needle 4 (Four) Times a Day for insulin injections       clopidogrel 75 MG tablet  Commonly known as: PLAVIX      Take 1 tablet by mouth once daily       ezetimibe 10 MG tablet  Commonly known as: ZETIA      Take 1 tablet by mouth Daily.       Farxiga 10 MG tablet  Generic drug: dapagliflozin Propanediol      Take 1 Tablet by  mouth Daily. Indications: Type 2 Diabetes       Insulin Lispro 100 UNIT/ML injection pen  Commonly known as: ADMELOG SOLOSTAR      Inject 50 Units under the skin into the appropriate area as directed 3 (Three) Times a Day With Meals       levothyroxine 100 MCG tablet  Commonly known as: Synthroid      Take 1 tablet by mouth Daily. Indications: Underactive Thyroid       pantoprazole 40 MG EC tablet  Commonly known as: PROTONIX      Take 1 tablet by mouth once daily       Tresiba FlexTouch 200 UNIT/ML solution pen-injector pen injection  Generic drug: Insulin Degludec      Inject 150 Units under the skin into the appropriate area as directed Every Morning.              STOP taking these medications      rosuvastatin 40 MG tablet  Commonly known as: CRESTOR        Vascepa 1 g capsule capsule  Generic drug: icosapent ethyl                  Where to Get Your Medications        These medications were sent to [facility] [place] [address]      Hours: Monday to Friday 7 AM to 6 PM, Saturday & Sunday 8 AM to 4:30 PM (Closed 12 PM to 12:30 PM) Phone: [phone]   aspirin 81 MG EC tablet  atorvastatin 80 MG tablet          Follow-up Information       Simba Moore MD Follow up in 1 week(s).    Specialty: Internal Medicine  Contact information:  [address] [facility] [address]  [facility] [id number] [phone]               Juan Delarosa MD Follow up in 3 month(s).    Specialties: Neurology, Hospitalist, Vascular Neurology  Contact information:  [address] [facility] [address]  [place] [id number] [phone]                                 Condition on Discharge:  Stable     Riki Collier MD  06/13/25  10:28 EDT    Time: I spent over 30mins in the discharge planning of this patient.    Some of this encounter note is an electronic transcription/translation of spoken language to printed text.

## 2025-06-13 NOTE — CASE MANAGEMENT/SOCIAL WORK
Case Management Discharge Note      Final Note: Home via private vehicle         Selected Continued Care - Discharged on 6/13/2025 Admission date: 6/11/2025 - Discharge disposition: Home or Self Care      Destination    No services have been selected for the patient.                Durable Medical Equipment    No services have been selected for the patient.                Dialysis/Infusion    No services have been selected for the patient.                Home Medical Care    No services have been selected for the patient.                Therapy    No services have been selected for the patient.                Community Resources    No services have been selected for the patient.                Community & DME    No services have been selected for the patient.                    Selected Continued Care - Episodes Includes continued care and service providers with selected services from the active episodes listed below          Transportation Services  Private: Car    Final Discharge Disposition Code: 01 - home or self-care

## 2025-06-13 NOTE — PROGRESS NOTES
Name: Ricardo Pereira ADMIT: 2025   : 1970  PCP: Simba Moore MD    MRN: [id number] LOS: 2 days   AGE/SEX: 54 y.o. male  ROOM: [facility]     Subjective   Subjective   Patient seen this morning.  No acute events overnight.  Refused IV steroids morning due to elevated blood glucose.  Denies shortness of breath, on room air, no wheezing.  Denies any weakness or numbness.    Review of Systems   As above  Objective   Objective   Vital Signs  Temp:  [97.9 °F (36.6 °C)-98.5 °F (36.9 °C)] 97.9 °F (36.6 °C)  Heart Rate:  [66-96] 82  Resp:  [15-18] 15  BP: (105-135)/() 125/67  SpO2:  [91 %-98 %] 97 %  on   ;   Device (Oxygen Therapy): room air  Body mass index is 36.59 kg/m².  Physical Exam    General: Alert, sitting up in the bed, not in distress,  HEENT: Normocephalic, atraumatic  CV: Regular rate and rhythm, no murmurs rubs or gallops  Lungs: Clear to auscultation bilaterally, no crackles or wheezes  Abdomen: Soft, nontender, nondistended  Extremities: No significant peripheral edema , no cyanosis     Results Review     I reviewed the patient's new clinical results.  Results from last 7 days   Lab Units 25  0404 25  0354 25  1836   WBC 10*3/mm3 12.81* 9.06 11.82*   HEMOGLOBIN g/dL 16.2 17.1 18.9*   PLATELETS 10*3/mm3 153 151 187     Results from last 7 days   Lab Units 25  0404 25  1849 25  0354 25  1836   SODIUM mmol/L 136 134* 138 138   POTASSIUM mmol/L 4.2 4.4 3.8 3.9   CHLORIDE mmol/L 102 100 105 103   CO2 mmol/L 23.5 18.0* 23.9 21.0*   BUN mg/dL 16.0 15.0 13.0 11.0   CREATININE mg/dL 0.93 1.30* 1.03 1.36*   GLUCOSE mg/dL 199* 503* 129* 91   Estimated Creatinine Clearance: 118.5 mL/min (by C-G formula based on SCr of 0.93 mg/dL).  Results from last 7 days   Lab Units 25  0404 25  1849 25  0354 25  1836   ALBUMIN g/dL 3.9 3.9 3.8 4.2   BILIRUBIN mg/dL 0.4  --  0.4 0.4   ALK PHOS U/L 78  --  82 118*   AST (SGOT) U/L 21  --  25 31    ALT (SGPT) U/L 34  --  37 43*     Results from last 7 days   Lab Units 06/13/25  0404 06/12/25  1849 06/12/25  0354 06/11/25  1836   CALCIUM mg/dL 9.5 9.3 9.0 9.0   ALBUMIN g/dL 3.9 3.9 3.8 4.2   MAGNESIUM mg/dL 2.1  --   --   --    PHOSPHORUS mg/dL 3.1 2.1*  --   --        COVID19   Date Value Ref Range Status   11/10/2021 Not Detected Not Detected - Ref. Range Final   06/10/2021 Not Detected Not Detected - Ref. Range Final     Hemoglobin A1C   Date/Time Value Ref Range Status   06/12/2025 0354 9.60 (H) 4.80 - 5.60 % Final     Glucose   Date/Time Value Ref Range Status   06/13/2025 0727 200 (H) 70 - 130 mg/dL Final   06/13/2025 0601 180 (H) 70 - 130 mg/dL Final   06/13/2025 0403 182 (H) 70 - 130 mg/dL Final   06/12/2025 2229 270 (H) 70 - 130 mg/dL Final   06/12/2025 2133 322 (H) 70 - 130 mg/dL Final   06/12/2025 1950 420 (C) 70 - 130 mg/dL Final   06/12/2025 1829 482 (C) 70 - 130 mg/dL Final           CT Head Without Contrast, CT Cervical Spine Without Contrast  Narrative: CT SCAN OF THE HEAD AND CERVICAL SPINE WITHOUT CONTRAST ON 06/12/2025     CLINICAL HISTORY: This is a 54-year-old male patient who is post TNK 24  hours after thrombolytic administration. The patient also complains of  upper extremity pain without trauma.     HEAD CT TECHNIQUE: Spiral CTA images were obtained from the base of the  skull to the vertex without intravenous contrast. The images were  reformatted and are submitted in 3 mm thick axial, sagittal and coronal  CT sections with brain algorithm.     COMPARISON: This is correlated to yesterday's contrast-enhanced CT  angiogram of the head and neck and CT perfusion study of the brain on  06/11/2025 at 6:43 pm.     FINDINGS: The brain parenchyma is normal in attenuation. The ventricles  are normal in size. There is a small arachnoid cyst in the anterior  aspect of the left middle cranial fossa that measures maximally 2.2 x  1.7 x 2.2 cm in size, unchanged since yesterday's head CT that abuts  and  minimally deforms the anterior left temporal lobe and is felt to be of  no significance. Otherwise I see no extra-axial fluid collections and no  evidence of acute intracranial hemorrhage. The calvarium and the skull  base are normal in appearance. The paranasal sinuses and the mastoid air  cells and the middle ear cavities are clear. The orbits are normal in  appearance.     Impression: 1. No acute intracranial abnormality is identified with no change when  compared to yesterday's head CT and contrast-enhanced CT angiogram of  the head and neck and CT perfusion study on 06/11/2025 at 6:43 pm.     2. There is a 2.2 x 1.7 x 2.2 cm incidental arachnoid cyst in the  anterior aspect of the left middle cranial fossa felt to be of doubtful  significance and unchanged. The remainder of the head CT is normal. If  there remains any clinical suspicion of acute stroke an MRI of the brain  is recommended for more complete assessment.     CERVICAL SPINE CT TECHNIQUE: Spiral CTA images were obtained from the  skull base down to the superior body of the T3 thoracic level and the  images were reformatted and submitted in 2 mm soft tissue algorithm and  1 mm thick axial, sagittal and coronal CT sections with high resolution  bone algorithm.     COMPARISON: This is correlated to yesterday's CT angiogram of the neck  on 06/11/2025 at 6:43 ppm.     FINDINGS: Atlantooccipital articulation is within normal limits.     At C1-2, there are some arthritic changes at the atlantodental interval  with narrowing of the interval and marginal spurring off the superior  aspect of the anterior ring of C1 and the odontoid. Otherwise, the C1-2  level is normal in appearance.     At C2-3, the disc space, facets and uncovertebral joints are normal in  appearance with no canal or foraminal narrowing.     At C3-4, there is very minimal posterior endplate spurring but no canal  narrowing. The facets and uncovertebral joints are normal and there is  no  foraminal narrowing.     At C4-5, the disc space, facets and uncovertebral joints are normal in  appearance with no canal or foraminal narrowing.     The images are extremely grainy from C5-T3 and limits evaluation of  posterior disc margins as well as the epidural space and the thecal sac  which are unable to be adequately assessed.     At C5-6 posterior endplates, facets and uncovertebral joints are within  normal limits with no bony canal or foraminal narrowing.     At C6-7 the posterior endplates and left facets are normal with no canal  or left foraminal narrowing. There is some right facet overgrowth and a  prominent spur off the right superior articular facet of C7 that extends  into the posterior lateral right neural foramen and moderately narrows  the right foramen and could affect the exiting right C7 nerve root.      At C7-T1 the posterior endplates and facets are normal with no bony  canal or foraminal narrowing.     IMPRESSION:  1. The images are extremely grainy from C5 down to T3, limits evaluation  of the posterior disc margin and also limits evaluation of the  extra-axial spaces and thecal sac from C5-T3. Overall there is some  right facet spurring at C6-7 that mild to moderately narrows the right  foramen and could potentially affect the exiting right C7 nerve root but  otherwise, I see no canal or foraminal narrowing in the cervical spine.  The etiology of the left upper extremity pain is not established on this  exam.     Radiation dose reduction techniques were utilized, including automated  exposure control and exposure modulation based on body size.        This report was finalized on 6/13/2025 6:37 AM by Dr. Michael Maria M.D  on Workstation: [id number]       Scheduled Medications  aspirin, 325 mg, Oral, Daily   Or  aspirin, 300 mg, Rectal, Daily  atorvastatin, 80 mg, Oral, Nightly  insulin glargine, 50 Units, Subcutaneous, Q12H  insulin lispro, 4-24 Units, Subcutaneous, 4x Daily With Meals  & Nightly  insulin lispro, 5 Units, Subcutaneous, TID With Meals  levothyroxine, 100 mcg, Oral, Daily  pantoprazole, 40 mg, Oral, Daily    Infusions   Diet  Diet: Cardiac, Diabetic; Healthy Heart (2-3 Na+); Consistent Carbohydrate; Fluid Consistency: Thin (IDDSI 0)    I have personally reviewed     []  Laboratory   []  Microbiology   []  Radiology   []  EKG/Telemetry  []  Cardiology/Vascular   []  Pathology    []  Records       Assessment/Plan     Active Hospital Problems    Diagnosis  POA    **Stroke-like symptoms [R29.90]  Yes    Cigar smoker [F17.290]  Unknown    Hypertriglyceridemia [E78.1]  Unknown    Obesity (BMI 35.0-39.9 without comorbidity) [E66.9]  Yes    CAD (coronary artery disease) [I25.10]  Yes    REGAN (obstructive sleep apnea) [G47.33]  Yes    Hypothyroidism (acquired) [E03.9]  Yes    Acute exacerbation of chronic obstructive pulmonary disease (COPD) [J44.1]  Yes    DM2 (diabetes mellitus, type 2) [E11.9]  Yes      Resolved Hospital Problems   No resolved problems to display.       54-year-old gentleman has got history of type 2 diabetes, COPD, obstructive sleep apnea not compliant with CPAP, hypothyroidism, coronary artery disease with prior stenting in 2001, prior stroke, renal cell carcinoma remotely successfully treated; who comes to the hospital because he was watching TV and had sudden onset of left arm weakness and numbness which then went to his left face.  He came immediately to the hospital.  And on his way to the hospital and in the ER he noted his left leg was also more weak.  He had a CT angio with perfusion which suggested right sided PCA stroke for which he underwent TNK.     Also noted on his CT scan is multiple dental caries which radiology recommends follow-up imaging for and paraesophageal tonsils are prominent.  This will need to be followed up outpatient.  Also a 2 cm arachnoid cyst in the left middle fossa.  Will need follow-up with  dentist and ENT for further evaluation of  dental caries and enlarged tonsils.  Discussed with patient        Acute CVA status post TNK  -Repeat CT of the head was stable  - MRI was unable to be obtained due to history of spinal stimulator  -Patient was cleared to be discharged from neurological standpoint,   -discharged on aspirin, Plavix, statin.  -Follow-up with neurology as recommended.     Hypothyroidism  - Continue with levothyroxine  - TSH was normal    COPD with mild exacerbation  -  DuoNebs every 4 hours  - No wheezing on exam this morning, on room air.  IV steroids discontinued secondary to hyperglycemia.  Discharged on p.o. steroids per pulmonology.         Diabetes  - Blood glucose was elevated secondary to steroids  - IV steroids discontinued  - Home regimen continued at discharge.  - Patient was counseled on lifestyle modifications, diabetic diet.    Leukocytosis  -  secondary to steroids.  No signs of infection.      was discharged by primary from ICU before being transferred to telemetry floor as patient had rapid clinical improvement and cleared for discharge from neurology standpoint.        SCDs for DVT prophylaxis.  Full code.  Discussed with patient   Expected Discharge Date: 6/13/2025; Expected Discharge Time:        Copied text in this note has been reviewed and is accurate as of 06/13/25.         Dictated utilizing Dragon dictation        Laverne Aguayo MD  [facility]  06/13/25  11:55 EDT

## 2025-06-13 NOTE — OUTREACH NOTE
Prep Survey      Flowsheet Row Responses   [facility] patient discharged from? [place]   Is LACE score < 7 ? No   Eligibility [facility]   Date of Admission 06/11/25   Date of Discharge 06/13/25   Discharge Disposition Home or Self Care   Discharge diagnosis Stroke-like symptoms   Does the patient have one of the following disease processes/diagnoses(primary or secondary)? Stroke   Does the patient have Home health ordered? No   Is there a DME ordered? No   Medication alerts for this patient see AVS   Prep survey completed? Yes            Leanne TARIQ - Registered Nurse              Leanne TARIQ - Registered Nurse

## 2025-06-13 NOTE — PLAN OF CARE
Goal Outcome Evaluation:  Plan of Care Reviewed With: patient           Outcome Evaluation: Pt sitting up on EOB, reports all his symptoms resolved shortly after treatment session yesterday. Patient reports he is up ad sabiha in room, ready to go home, anxiously awaiting neurologist. Patient demonstrates 5/5 strength in LLE today, performed sit to stand with supervision, ambulated around nurses station with SBA/CGA without AD and with steady gait. Pt demonstrates remarkable improvement in mobility today, all symptoms resolved, no further need for skilled PT. Anticipate patient may dc home today.    Anticipated Discharge Disposition (PT): home with assist
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
## 2025-06-13 NOTE — PROGRESS NOTES
"DOS: 2025  NAME: Ricardo Pereira   : 1970  PCP: Simba Moore MD  Chief Complaint   Patient presents with    Extremity Weakness       Chief complaint: L sided facial droop  Subjective: Pt to be discharged.  His symptoms are resolved.  Also his blood sugars are 150s 160s at home.  No follow-up appointment with endocrinology until September.  Smokes cigars    Objective:  Vital signs: /77   Pulse 75   Temp 97.9 °F (36.6 °C) (Oral)   Resp 18   Ht 177.8 cm (70\")   Wt 116 kg (255 lb)   SpO2 94%   BMI 36.59 kg/m²      Gen: NAD, vitals reviewed  MS: oriented x3, recent/remote memory intact, normal attention/concentration, language intact, no neglect.  CN: visual acuity grossly normal, PERRL, EOMI, no facial droop, no dysarthria  Motor: No asymmetry, no pronator drift, normal tone  Sensory: intact to light touch all 4 ext.  Coordination: intact    ROS:  No weakness, numbness  No fevers, chills    Scheduled Medications:aspirin, 325 mg, Oral, Daily   Or  aspirin, 300 mg, Rectal, Daily  atorvastatin, 80 mg, Oral, Nightly  insulin glargine, 50 Units, Subcutaneous, Q12H  insulin lispro, 4-24 Units, Subcutaneous, 4x Daily With Meals & Nightly  insulin lispro, 5 Units, Subcutaneous, TID With Meals  levothyroxine, 100 mcg, Oral, Daily  pantoprazole, 40 mg, Oral, Daily      Infusions:    PRN Medications:    acetaminophen    dextrose    dextrose    glucagon (human recombinant)    ipratropium-albuterol    sodium chloride    Laboratory results:  Lab Results   Component Value Date    GLUCOSE 199 (H) 2025    CALCIUM 9.5 2025     2025    K 4.2 2025    CO2 23.5 2025     2025    BUN 16.0 2025    CREATININE 0.93 2025    EGFRIFAFRI 86 10/15/2021    EGFRIFNONA 87 2021    BCR 17.2 2025    ANIONGAP 10.5 2025     Lab Results   Component Value Date    WBC 12.81 (H) 2025    HGB 16.2 2025    HCT 47.2 2025    MCV 93.7 2025 "     06/13/2025     Lab Results   Component Value Date     (H) 06/12/2025    LDL 46 09/18/2024    LDL 48 03/20/2024         Lab 06/12/25  0354   HEMOGLOBIN A1C 9.60*        Review of labs:     Review and interpretation of imaging:     CT Head Without Contrast  Result Date: 6/13/2025  CT SCAN OF THE HEAD AND CERVICAL SPINE WITHOUT CONTRAST ON 06/12/2025  CLINICAL HISTORY: This is a 54-year-old male patient who is post TNK 24 hours after thrombolytic administration. The patient also complains of upper extremity pain without trauma.  HEAD CT TECHNIQUE: Spiral CTA images were obtained from the base of the skull to the vertex without intravenous contrast. The images were reformatted and are submitted in 3 mm thick axial, sagittal and coronal CT sections with brain algorithm.  COMPARISON: This is correlated to yesterday's contrast-enhanced CT angiogram of the head and neck and CT perfusion study of the brain on 06/11/2025 at 6:43 pm.  FINDINGS: The brain parenchyma is normal in attenuation. The ventricles are normal in size. There is a small arachnoid cyst in the anterior aspect of the left middle cranial fossa that measures maximally 2.2 x 1.7 x 2.2 cm in size, unchanged since yesterday's head CT that abuts and minimally deforms the anterior left temporal lobe and is felt to be of no significance. Otherwise I see no extra-axial fluid collections and no evidence of acute intracranial hemorrhage. The calvarium and the skull base are normal in appearance. The paranasal sinuses and the mastoid air cells and the middle ear cavities are clear. The orbits are normal in appearance.      1. No acute intracranial abnormality is identified with no change when compared to yesterday's head CT and contrast-enhanced CT angiogram of the head and neck and CT perfusion study on 06/11/2025 at 6:43 pm.  2. There is a 2.2 x 1.7 x 2.2 cm incidental arachnoid cyst in the anterior aspect of the left middle cranial fossa felt to be  of doubtful significance and unchanged. The remainder of the head CT is normal. If there remains any clinical suspicion of acute stroke an MRI of the brain is recommended for more complete assessment.  CERVICAL SPINE CT TECHNIQUE: Spiral CTA images were obtained from the skull base down to the superior body of the T3 thoracic level and the images were reformatted and submitted in 2 mm soft tissue algorithm and 1 mm thick axial, sagittal and coronal CT sections with high resolution bone algorithm.  COMPARISON: This is correlated to yesterday's CT angiogram of the neck on 06/11/2025 at 6:43 ppm.  FINDINGS: Atlantooccipital articulation is within normal limits.  At C1-2, there are some arthritic changes at the atlantodental interval with narrowing of the interval and marginal spurring off the superior aspect of the anterior ring of C1 and the odontoid. Otherwise, the C1-2 level is normal in appearance.  At C2-3, the disc space, facets and uncovertebral joints are normal in appearance with no canal or foraminal narrowing.  At C3-4, there is very minimal posterior endplate spurring but no canal narrowing. The facets and uncovertebral joints are normal and there is no foraminal narrowing.  At C4-5, the disc space, facets and uncovertebral joints are normal in appearance with no canal or foraminal narrowing.  The images are extremely grainy from C5-T3 and limits evaluation of posterior disc margins as well as the epidural space and the thecal sac which are unable to be adequately assessed.  At C5-6 posterior endplates, facets and uncovertebral joints are within normal limits with no bony canal or foraminal narrowing.  At C6-7 the posterior endplates and left facets are normal with no canal or left foraminal narrowing. There is some right facet overgrowth and a prominent spur off the right superior articular facet of C7 that extends into the posterior lateral right neural foramen and moderately narrows the right foramen and  could affect the exiting right C7 nerve root.  At C7-T1 the posterior endplates and facets are normal with no bony canal or foraminal narrowing.  IMPRESSION: 1. The images are extremely grainy from C5 down to T3, limits evaluation of the posterior disc margin and also limits evaluation of the extra-axial spaces and thecal sac from C5-T3. Overall there is some right facet spurring at C6-7 that mild to moderately narrows the right foramen and could potentially affect the exiting right C7 nerve root but otherwise, I see no canal or foraminal narrowing in the cervical spine. The etiology of the left upper extremity pain is not established on this exam.  Radiation dose reduction techniques were utilized, including automated exposure control and exposure modulation based on body size.   This report was finalized on 6/13/2025 6:37 AM by Dr. Michael Maria M.D on Workstation: [id number]      CT Cervical Spine Without Contrast  Result Date: 6/13/2025  CT SCAN OF THE HEAD AND CERVICAL SPINE WITHOUT CONTRAST ON 06/12/2025  CLINICAL HISTORY: This is a 54-year-old male patient who is post TNK 24 hours after thrombolytic administration. The patient also complains of upper extremity pain without trauma.  HEAD CT TECHNIQUE: Spiral CTA images were obtained from the base of the skull to the vertex without intravenous contrast. The images were reformatted and are submitted in 3 mm thick axial, sagittal and coronal CT sections with brain algorithm.  COMPARISON: This is correlated to yesterday's contrast-enhanced CT angiogram of the head and neck and CT perfusion study of the brain on 06/11/2025 at 6:43 pm.  FINDINGS: The brain parenchyma is normal in attenuation. The ventricles are normal in size. There is a small arachnoid cyst in the anterior aspect of the left middle cranial fossa that measures maximally 2.2 x 1.7 x 2.2 cm in size, unchanged since yesterday's head CT that abuts and minimally deforms the anterior left temporal lobe  and is felt to be of no significance. Otherwise I see no extra-axial fluid collections and no evidence of acute intracranial hemorrhage. The calvarium and the skull base are normal in appearance. The paranasal sinuses and the mastoid air cells and the middle ear cavities are clear. The orbits are normal in appearance.      1. No acute intracranial abnormality is identified with no change when compared to yesterday's head CT and contrast-enhanced CT angiogram of the head and neck and CT perfusion study on 06/11/2025 at 6:43 pm.  2. There is a 2.2 x 1.7 x 2.2 cm incidental arachnoid cyst in the anterior aspect of the left middle cranial fossa felt to be of doubtful significance and unchanged. The remainder of the head CT is normal. If there remains any clinical suspicion of acute stroke an MRI of the brain is recommended for more complete assessment.  CERVICAL SPINE CT TECHNIQUE: Spiral CTA images were obtained from the skull base down to the superior body of the T3 thoracic level and the images were reformatted and submitted in 2 mm soft tissue algorithm and 1 mm thick axial, sagittal and coronal CT sections with high resolution bone algorithm.  COMPARISON: This is correlated to yesterday's CT angiogram of the neck on 06/11/2025 at 6:43 ppm.  FINDINGS: Atlantooccipital articulation is within normal limits.  At C1-2, there are some arthritic changes at the atlantodental interval with narrowing of the interval and marginal spurring off the superior aspect of the anterior ring of C1 and the odontoid. Otherwise, the C1-2 level is normal in appearance.  At C2-3, the disc space, facets and uncovertebral joints are normal in appearance with no canal or foraminal narrowing.  At C3-4, there is very minimal posterior endplate spurring but no canal narrowing. The facets and uncovertebral joints are normal and there is no foraminal narrowing.  At C4-5, the disc space, facets and uncovertebral joints are normal in appearance with  no canal or foraminal narrowing.  The images are extremely grainy from C5-T3 and limits evaluation of posterior disc margins as well as the epidural space and the thecal sac which are unable to be adequately assessed.  At C5-6 posterior endplates, facets and uncovertebral joints are within normal limits with no bony canal or foraminal narrowing.  At C6-7 the posterior endplates and left facets are normal with no canal or left foraminal narrowing. There is some right facet overgrowth and a prominent spur off the right superior articular facet of C7 that extends into the posterior lateral right neural foramen and moderately narrows the right foramen and could affect the exiting right C7 nerve root.  At C7-T1 the posterior endplates and facets are normal with no bony canal or foraminal narrowing.  IMPRESSION: 1. The images are extremely grainy from C5 down to T3, limits evaluation of the posterior disc margin and also limits evaluation of the extra-axial spaces and thecal sac from C5-T3. Overall there is some right facet spurring at C6-7 that mild to moderately narrows the right foramen and could potentially affect the exiting right C7 nerve root but otherwise, I see no canal or foraminal narrowing in the cervical spine. The etiology of the left upper extremity pain is not established on this exam.  Radiation dose reduction techniques were utilized, including automated exposure control and exposure modulation based on body size.   This report was finalized on 6/13/2025 6:37 AM by Dr. Michael Maria M.D on Workstation: [id number]      CT Upper Extremity Left With Contrast  Result Date: 6/12/2025  CT UPPER EXTREMITY LEFT W CONTRAST-  INDICATIONS: Pain. Radiation dose reduction techniques were utilized, including automated exposure control and exposure modulation based on body size.  TECHNIQUE: Enhanced CT of the left upper arm  COMPARISON: None available  FINDINGS:  No acute fracture, erosion, or dislocation is  identified.  No enhancing soft tissue masses or discrete drainable fluid collections are identified.  The partly included lung shows apical predominant emphysematous changes, mild likely atelectasis.  If further imaging evaluation of the arm is indicated, MRI could be considered.       As described.    This report was finalized on 6/12/2025 5:45 PM by Dr. Tong Wong M.D on Workstation: [id number]      CT Angiogram Head w AI Analysis of LVO  Result Date: 6/12/2025  CT ANGIOGRAM NECK AND HEAD WITH CONTRAST AND CT PERFUSION WITH CONTRAST  HISTORY: Left-sided weakness.  COMPARISON: None.  FINDINGS: Initially, a noncontrasted CT examination of the brain was performed. The brain and ventricles are symmetrical. There is no evidence of intracranial hemorrhage or of acute infarction. Moderate to severe vascular calcification is appreciated. A small arachnoid cyst is appreciated involving the left middle cranial fossa anteriorly measuring approximately 2 cm in size.  A CT perfusion study was performed. There was no evidence of abnormal CBF. Areas of delayed perfusion involving the right temporal occipital region and involving the superior aspect of the left cerebellar hemisphere is noted on the Tmax portion of the examination. These areas may be artifactual but posterior fossa delayed perfusion cannot be excluded.  A CT angiogram of the neck and head was then performed. Multiplanar as well as three-dimensional reconstructions were generated. The great vessels are arranged in a classic configuration. There is 0% stenosis of the left internal carotid artery using NASCET criteria. There is irregularity involving the right internal carotid artery secondary to calcified and noncalcified plaque proximally but with 0% stenosis using NASCET criteria. Mild to moderate vascular calcification by the carotid siphons is appreciated bilaterally. The proximal aspects of the anterior and middle cerebral arteries appear unremarkable.   Both vertebral arteries were opacified. The right vertebral artery is minimally larger than that of the left. The basilar artery and the proximal aspects of the posterior cerebral arteries appear unremarkable.  Multiple caries are appreciated. The parapharyngeal tonsils are mildly prominent bilaterally. Minor salivary gland calcifications are appreciated.      1.  There is no evidence of acute infarction or intracranial hemorrhage. The CT perfusion examination suggests areas of delayed perfusion involving the right PCA and left superior cerebral artery vascular distributions. There is also delayed perfusion suggesting involving the left cerebral hemisphere anterolaterally. This may be artifactual. Further evaluation with MRI examination of the brain is recommended. 2.  Multiple caries are appreciated. Standard dental radiographs are recommended. 3.  The parapharyngeal tonsils are prominent bilaterally, slightly more so on the right. Clinical correlation is suggested. 4.  A 2 cm arachnoid cyst involving the left middle cranial fossa anteriorly is appreciated   The noncontrasted CT examination of the brain was made available for interpretation at 1846 hours and a preliminary report called at 1847 hours. The CT angiogram was made available for interpretation at 1900 hours and a preliminary report called at 1904 hours.  AI analysis of LVO was utilized.  Radiation dose reduction techniques were utilized, including automated exposure control and exposure modulation based on body size.     This report was finalized on 6/12/2025 3:59 PM by Dr. Alirio Ponce M.D on Workstation: [id number]      CT Angiogram Neck  Result Date: 6/12/2025  CT ANGIOGRAM NECK AND HEAD WITH CONTRAST AND CT PERFUSION WITH CONTRAST  HISTORY: Left-sided weakness.  COMPARISON: None.  FINDINGS: Initially, a noncontrasted CT examination of the brain was performed. The brain and ventricles are symmetrical. There is no evidence of intracranial  hemorrhage or of acute infarction. Moderate to severe vascular calcification is appreciated. A small arachnoid cyst is appreciated involving the left middle cranial fossa anteriorly measuring approximately 2 cm in size.  A CT perfusion study was performed. There was no evidence of abnormal CBF. Areas of delayed perfusion involving the right temporal occipital region and involving the superior aspect of the left cerebellar hemisphere is noted on the Tmax portion of the examination. These areas may be artifactual but posterior fossa delayed perfusion cannot be excluded.  A CT angiogram of the neck and head was then performed. Multiplanar as well as three-dimensional reconstructions were generated. The great vessels are arranged in a classic configuration. There is 0% stenosis of the left internal carotid artery using NASCET criteria. There is irregularity involving the right internal carotid artery secondary to calcified and noncalcified plaque proximally but with 0% stenosis using NASCET criteria. Mild to moderate vascular calcification by the carotid siphons is appreciated bilaterally. The proximal aspects of the anterior and middle cerebral arteries appear unremarkable.  Both vertebral arteries were opacified. The right vertebral artery is minimally larger than that of the left. The basilar artery and the proximal aspects of the posterior cerebral arteries appear unremarkable.  Multiple caries are appreciated. The parapharyngeal tonsils are mildly prominent bilaterally. Minor salivary gland calcifications are appreciated.      1.  There is no evidence of acute infarction or intracranial hemorrhage. The CT perfusion examination suggests areas of delayed perfusion involving the right PCA and left superior cerebral artery vascular distributions. There is also delayed perfusion suggesting involving the left cerebral hemisphere anterolaterally. This may be artifactual. Further evaluation with MRI examination of the  brain is recommended. 2.  Multiple caries are appreciated. Standard dental radiographs are recommended. 3.  The parapharyngeal tonsils are prominent bilaterally, slightly more so on the right. Clinical correlation is suggested. 4.  A 2 cm arachnoid cyst involving the left middle cranial fossa anteriorly is appreciated   The noncontrasted CT examination of the brain was made available for interpretation at 1846 hours and a preliminary report called at 1847 hours. The CT angiogram was made available for interpretation at 1900 hours and a preliminary report called at 1904 hours.  AI analysis of LVO was utilized.  Radiation dose reduction techniques were utilized, including automated exposure control and exposure modulation based on body size.     This report was finalized on 6/12/2025 3:59 PM by Dr. Alirio Ponce M.D on Workstation: [id number]      CT CEREBRAL PERFUSION WITH & WITHOUT CONTRAST  Result Date: 6/12/2025  CT ANGIOGRAM NECK AND HEAD WITH CONTRAST AND CT PERFUSION WITH CONTRAST  HISTORY: Left-sided weakness.  COMPARISON: None.  FINDINGS: Initially, a noncontrasted CT examination of the brain was performed. The brain and ventricles are symmetrical. There is no evidence of intracranial hemorrhage or of acute infarction. Moderate to severe vascular calcification is appreciated. A small arachnoid cyst is appreciated involving the left middle cranial fossa anteriorly measuring approximately 2 cm in size.  A CT perfusion study was performed. There was no evidence of abnormal CBF. Areas of delayed perfusion involving the right temporal occipital region and involving the superior aspect of the left cerebellar hemisphere is noted on the Tmax portion of the examination. These areas may be artifactual but posterior fossa delayed perfusion cannot be excluded.  A CT angiogram of the neck and head was then performed. Multiplanar as well as three-dimensional reconstructions were generated. The great vessels are  arranged in a classic configuration. There is 0% stenosis of the left internal carotid artery using NASCET criteria. There is irregularity involving the right internal carotid artery secondary to calcified and noncalcified plaque proximally but with 0% stenosis using NASCET criteria. Mild to moderate vascular calcification by the carotid siphons is appreciated bilaterally. The proximal aspects of the anterior and middle cerebral arteries appear unremarkable.  Both vertebral arteries were opacified. The right vertebral artery is minimally larger than that of the left. The basilar artery and the proximal aspects of the posterior cerebral arteries appear unremarkable.  Multiple caries are appreciated. The parapharyngeal tonsils are mildly prominent bilaterally. Minor salivary gland calcifications are appreciated.      1.  There is no evidence of acute infarction or intracranial hemorrhage. The CT perfusion examination suggests areas of delayed perfusion involving the right PCA and left superior cerebral artery vascular distributions. There is also delayed perfusion suggesting involving the left cerebral hemisphere anterolaterally. This may be artifactual. Further evaluation with MRI examination of the brain is recommended. 2.  Multiple caries are appreciated. Standard dental radiographs are recommended. 3.  The parapharyngeal tonsils are prominent bilaterally, slightly more so on the right. Clinical correlation is suggested. 4.  A 2 cm arachnoid cyst involving the left middle cranial fossa anteriorly is appreciated   The noncontrasted CT examination of the brain was made available for interpretation at 1846 hours and a preliminary report called at 1847 hours. The CT angiogram was made available for interpretation at 1900 hours and a preliminary report called at 1904 hours.  AI analysis of LVO was utilized.  Radiation dose reduction techniques were utilized, including automated exposure control and exposure modulation  based on body size.     This report was finalized on 6/12/2025 3:59 PM by Dr. Alirio Ponce M.D on Workstation: [id number]      XR Chest 1 View  Result Date: 6/11/2025  XR CHEST 1 VW-  HISTORY: Male who is 54 years-old, stroke protocol  TECHNIQUE: Frontal view of the chest  COMPARISON: 11/10/2021  FINDINGS: Heart, mediastinum and pulmonary vasculature are unremarkable. No focal pulmonary consolidation, pleural effusion, or pneumothorax. No acute osseous process.      No evidence for acute pulmonary process. Follow-up as clinical indications persist.  This report was finalized on 6/11/2025 8:25 PM by Dr. Tong Wong M.D on Workstation: [id number]          Workup to date:  TTE    Left ventricular systolic function is normal. Left ventricular ejection fraction appears to be 56 - 60%.    Left ventricular diastolic function was normal.    The right ventricular cavity is mildly dilated. Normal right ventricular systolic function noted.    Saline test results are negative.    Insufficient TR velocity profile to estimate the right ventricular systolic pressure    There is no evidence of pericardial effusion.     Diagnoses:  SP TNK  Diabetes  Hypertension  Smoking history  CAD    Impression: 54-year-old male with past medical history of diabetes, hypertension, COPD, REGAN, hypothyroidism, hyperlipidemia, CAD with stenting in 2021 on Plavix and baby aspirin, chronic pain with spinal cord stimulator.  He comes to the ER on 6/11 with acute onset left face numbness and weakness also involvement of left leg concerning for stroke NIH 6.  Head CT without acute finding, CTA negative for LVO there is no perfusion abnormality.  He was within the window of thrombolytics..  He received TNK and monitored in the ICU thereafter.  Repeat head CT.  He cannot have MRI for stimulator.  C-spine for report of L arm pain and neck pain    Patient is on Plavix and baby aspirin at home.  He needs better blood sugar control.  Discussed smoking  cessation.  Neurology stroke clinic follow-up to be arranged    Thank you for this consultation.  Discussed above plan with neuro attending, Dr. Delarosa who agrees with above plan.  Neurology team is available for concerns or questions.

## 2025-06-13 NOTE — PLAN OF CARE
Goal Outcome Evaluation:              Outcome Evaluation: Cognitive-communication eval completed. Cognitive-linguistic abilities appear WFL. Pt scored 26/30 on MoCA (expected is >/=26/30). No acute SLP needs identified which require intervention at this time. SLP will sign off. Please re-consult SLP if any needs arise.    Anticipated Discharge Disposition (SLP): No further SLP services warranted    SLP Diagnosis: functional speech/language skills, functional cognitive-linguistic skills (06/13/25 1005)  SLP Diagnosis Comments: Cognitive-linguistic abilities appear WFL. No acute SLP needs identified which require intervention at this time. SLP will sign off. Please re-consult SLP if any needs arise. (06/13/25 1005)
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
## 2025-06-13 NOTE — PLAN OF CARE
Goal Outcome Evaluation:  Plan of Care Reviewed With: patient        Progress: improving  Outcome Evaluation: Patient with good participation in OT session this AM. Patient reports all deficits have resolved. Patient met seated EOB this session. SPV with all transfers and mobility without use of AD, no LLE buckling or LOBs noted with mobility. Patient is able to don socks EOB without assist and demonstrates symmetrical BUE strength, 4+/5. Patient presents at or near baseline status at this time. OT will sign off.    Anticipated Discharge Disposition (OT): home
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
## 2025-06-16 ENCOUNTER — TRANSITIONAL CARE MANAGEMENT TELEPHONE ENCOUNTER (OUTPATIENT)
Dept: CALL CENTER | Facility: HOSPITAL | Age: 55
End: 2025-06-16
Payer: MEDICARE

## 2025-06-16 NOTE — OUTREACH NOTE
Call Center TCM Note      Flowsheet Row Responses   [facility] patient discharged from? [place]   Does the patient have one of the following disease processes/diagnoses(primary or secondary)? Stroke   TCM attempt successful? Yes   Call start time 1445   Call end time 1449   Discharge diagnosis Stroke-like symptoms   Meds reviewed with patient/caregiver? Yes   Is the patient having any side effects they believe may be caused by any medication additions or changes? No   Does the patient have all medications ordered at discharge? Yes   Prescription comments New rx's asa, Atorvastatin in place. Pt understands Olmesrtan is on hold for now, and Rosvuastatin, Vascepa are discontinued   Is the patient taking all medications as directed (includes completed medication regime)? Yes   Does the patient have an appointment with their PCP within 7-14 days of discharge? Yes   Has home health visited the patient within 72 hours of discharge? N/A   Psychosocial issues? No   Does the patient require any assistance with activities of daily living such as eating, bathing, dressing, walking, etc.? No   Does the patient have any residual symptoms from stroke/TIA? Yes   Residual symptoms comments Pt states sypmtoms are resolving, minimal facial and U/L extemity weakness much better.   Does the patient understand the diet ordered at discharge? Yes   Did the patient receive a copy of their discharge instructions? Yes   Nursing interventions Reviewed instructions with patient   What is the patient's perception of their health status since discharge? Improving   Nursing interventions Nurse provided patient education   Is the patient/caregiver able to teach back the risk factors for a stroke? High blood pressure-goal below 120/80, Carotid or other artery disease, Illegal drug use, Sleep apnea, Smoking, Diabetes, History of Afib, HIgh red blood cell count, High Cholesterol, Physical inactivity and obesity, Excessive alcohol intake,  History of TIAs   Is the patient/caregiver able to teach back signs and symptoms related to disease process for when to call PCP? Yes   Is the patient/caregiver able to teach back signs and symptoms related to disease process for when to call 911? Yes   Is the patient/caregiver able to teach back the hierarchy of who to call/visit for symptoms/problems? PCP, Specialist, Home health nurse, Urgent Care, ED, 911 Yes   Is the patient able to teach back FAST for Stroke? B alance: Watch for sudden loss of balance, E yes: Check for vision loss, F ace: Look for an uneven smile, A rm: Check if one arm is weak, S peech: Listen for slurred speech, T aracelis: Call 9-1-1 right away   TCM call completed? Yes   Wrap up additional comments D/C DX: stroke-like symptoms,  Left facial, upper and lower extremity weakness, numbness acute onset - Per pt symptoms not resolved but much improved. No questions at this time. TCM APPT with PCP office (Katlin Cortez for this visit) is 06/20/2025. Pt is calling to schedule [facility] follow up today   Call end time 3112            LUCY ROOT - Medical Assistant    6/16/2025, 14:53 EDT

## 2025-06-17 ENCOUNTER — TELEPHONE (OUTPATIENT)
Dept: NEUROLOGY | Facility: CLINIC | Age: 55
End: 2025-06-17

## 2025-06-17 NOTE — TELEPHONE ENCOUNTER
Caller: Ricardo Pereira    Relationship to patient: Self    Best call back number: [phone] - OK TO LEAVE VM    New or established patient?  [x] New  [] Established    Date of discharge: 6/13/2025    Facility discharged from: [facility]    Diagnosis/Symptoms: STROKE    Length of stay (If applicable): 2 DAYS    Specialty Only: Did you see a [employer] health provider?    [x] Yes  [] No  If so, who? DR CABRERA    Additional Details: DISCHARGE STATES FOLLOW UP IN 3 MONTHS WITH DR CABRERA.

## 2025-06-18 ENCOUNTER — PATIENT OUTREACH (OUTPATIENT)
Dept: CASE MANAGEMENT | Facility: OTHER | Age: 55
End: 2025-06-18
Payer: MEDICARE

## 2025-06-18 NOTE — OUTREACH NOTE
AMBULATORY CASE MANAGEMENT NOTE    Names and Relationships of Patient/Support Persons: Contact: Ricardo Pereria; Relationship: Self -     CCM Interim Update    Called and spoke to patient for CCM services. Introduced self, role and reason for the call. Following up after recent hospital admission. Patient states he currently feels well, denies any symptoms. He will f/u with PCP on 6/20. Offered CCM for DM management, patient refused at this time states he is currently managing. He follows Endocrinology. He is back to home medications now and blood sugars have been running 112-148. Much improved from when he was in the hospital. Denies further needs a this time. Encouraged to reach out should he need further assistance in the future.         Education Documentation  No documentation found.        Tanvir TARIQ  Ambulatory Case Management    6/18/2025, 13:42 EDT

## 2025-06-20 ENCOUNTER — OFFICE VISIT (OUTPATIENT)
Dept: FAMILY MEDICINE CLINIC | Facility: CLINIC | Age: 55
End: 2025-06-20
Payer: MEDICARE

## 2025-06-20 VITALS
OXYGEN SATURATION: 95 % | HEART RATE: 81 BPM | HEIGHT: 70 IN | WEIGHT: 246.63 LBS | BODY MASS INDEX: 35.31 KG/M2 | SYSTOLIC BLOOD PRESSURE: 128 MMHG | DIASTOLIC BLOOD PRESSURE: 82 MMHG | TEMPERATURE: 97 F

## 2025-06-20 DIAGNOSIS — I25.10 CORONARY ARTERY DISEASE, UNSPECIFIED VESSEL OR LESION TYPE, UNSPECIFIED WHETHER ANGINA PRESENT, UNSPECIFIED WHETHER NATIVE OR TRANSPLANTED HEART: ICD-10-CM

## 2025-06-20 DIAGNOSIS — G47.33 OSA (OBSTRUCTIVE SLEEP APNEA): ICD-10-CM

## 2025-06-20 DIAGNOSIS — R46.89 NON-COMPLIANT BEHAVIOR: ICD-10-CM

## 2025-06-20 DIAGNOSIS — E03.9 HYPOTHYROIDISM (ACQUIRED): ICD-10-CM

## 2025-06-20 DIAGNOSIS — N18.30 TYPE 2 DIABETES MELLITUS WITH STAGE 3 CHRONIC KIDNEY DISEASE, WITH LONG-TERM CURRENT USE OF INSULIN, UNSPECIFIED WHETHER STAGE 3A OR 3B CKD: ICD-10-CM

## 2025-06-20 DIAGNOSIS — Z86.73 HISTORY OF STROKE: ICD-10-CM

## 2025-06-20 DIAGNOSIS — I63.9 ACUTE ISCHEMIC STROKE: ICD-10-CM

## 2025-06-20 DIAGNOSIS — Z79.4 TYPE 2 DIABETES MELLITUS WITH STAGE 3 CHRONIC KIDNEY DISEASE, WITH LONG-TERM CURRENT USE OF INSULIN, UNSPECIFIED WHETHER STAGE 3A OR 3B CKD: ICD-10-CM

## 2025-06-20 DIAGNOSIS — E78.1 HYPERTRIGLYCERIDEMIA: ICD-10-CM

## 2025-06-20 DIAGNOSIS — E78.2 MIXED HYPERLIPIDEMIA: ICD-10-CM

## 2025-06-20 DIAGNOSIS — E11.22 TYPE 2 DIABETES MELLITUS WITH STAGE 3 CHRONIC KIDNEY DISEASE, WITH LONG-TERM CURRENT USE OF INSULIN, UNSPECIFIED WHETHER STAGE 3A OR 3B CKD: ICD-10-CM

## 2025-06-20 DIAGNOSIS — M79.622 LEFT UPPER ARM PAIN: ICD-10-CM

## 2025-06-20 DIAGNOSIS — I10 ESSENTIAL HYPERTENSION: ICD-10-CM

## 2025-06-20 DIAGNOSIS — J44.1 ACUTE EXACERBATION OF CHRONIC OBSTRUCTIVE PULMONARY DISEASE (COPD): ICD-10-CM

## 2025-06-20 DIAGNOSIS — F17.290 CIGAR SMOKER: ICD-10-CM

## 2025-06-20 DIAGNOSIS — Z09 HOSPITAL DISCHARGE FOLLOW-UP: Primary | ICD-10-CM

## 2025-06-20 DIAGNOSIS — R29.90 STROKE-LIKE SYMPTOMS: ICD-10-CM

## 2025-06-20 LAB — GLUCOSE BLDC GLUCOMTR-MCNC: 152 MG/DL (ref 70–130)

## 2025-06-20 PROCEDURE — 99495 TRANSJ CARE MGMT MOD F2F 14D: CPT

## 2025-06-20 PROCEDURE — 3074F SYST BP LT 130 MM HG: CPT

## 2025-06-20 PROCEDURE — 1160F RVW MEDS BY RX/DR IN RCRD: CPT

## 2025-06-20 PROCEDURE — 1125F AMNT PAIN NOTED PAIN PRSNT: CPT

## 2025-06-20 PROCEDURE — 1111F DSCHRG MED/CURRENT MED MERGE: CPT

## 2025-06-20 PROCEDURE — 82948 REAGENT STRIP/BLOOD GLUCOSE: CPT

## 2025-06-20 PROCEDURE — 3046F HEMOGLOBIN A1C LEVEL >9.0%: CPT

## 2025-06-20 PROCEDURE — 3079F DIAST BP 80-89 MM HG: CPT

## 2025-06-20 PROCEDURE — 1159F MED LIST DOCD IN RCRD: CPT
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
## 2025-06-23 ENCOUNTER — PATIENT OUTREACH (OUTPATIENT)
Dept: CASE MANAGEMENT | Facility: OTHER | Age: 55
End: 2025-06-23
Payer: MEDICARE

## 2025-06-23 ENCOUNTER — READMISSION MANAGEMENT (OUTPATIENT)
Dept: CALL CENTER | Facility: HOSPITAL | Age: 55
End: 2025-06-23
Payer: MEDICARE

## 2025-06-23 ENCOUNTER — TELEPHONE (OUTPATIENT)
Dept: CASE MANAGEMENT | Facility: OTHER | Age: 55
End: 2025-06-23
Payer: MEDICARE

## 2025-06-23 PROBLEM — Z86.73 HISTORY OF STROKE: Status: ACTIVE | Noted: 2025-06-23

## 2025-06-23 PROBLEM — M79.622 LEFT UPPER ARM PAIN: Status: ACTIVE | Noted: 2025-06-23

## 2025-06-23 PROBLEM — R46.89 NON-COMPLIANT BEHAVIOR: Status: ACTIVE | Noted: 2025-06-23

## 2025-06-23 PROBLEM — I63.9 ACUTE ISCHEMIC STROKE: Status: ACTIVE | Noted: 2025-06-23

## 2025-06-23 NOTE — OUTREACH NOTE
Stroke Week 2 Survey      Flowsheet Row Responses   [facility] patient discharged from? [place]   Does the patient have one of the following disease processes/diagnoses(primary or secondary)? Stroke   Week 2 attempt successful? Yes   Call start time 1356   Call end time 1358   Discharge diagnosis Stroke-like symptoms   Person spoke with today (if not patient) and relationship patient   Meds reviewed with patient/caregiver? Yes   Comments regarding PCP Seen aprn in office.   Has home health visited the patient within 72 hours of discharge? N/A   Psychosocial issues? No   Does the patient require any assistance with activities of daily living such as eating, bathing, dressing, walking, etc.? No   Does the patient have any residual symptoms from stroke/TIA? Yes   Residual symptoms comments L arm and L Leg weakness.   Did the patient receive a copy of their discharge instructions? Yes   Nursing interventions Reviewed instructions with patient   What is the patient's perception of their health status since discharge? Improving   Nursing interventions Nurse provided patient education   Is the patient able to teach back FAST for Stroke? B alance: Watch for sudden loss of balance, E yes: Check for vision loss, F ace: Look for an uneven smile, A rm: Check if one arm is weak, S peech: Listen for slurred speech, T aracelis: Call 9-1-1 right away   Is the patient/caregiver able to teach back the risk factors for a stroke? High blood pressure-goal below 120/80, History of TIAs   Is the patient/caregiver able to teach back signs and symptoms related to disease process for when to call PCP? Yes   Is the patient/caregiver able to teach back signs and symptoms related to disease process for when to call 911? Yes   Is the patient/caregiver able to teach back the hierarchy of who to call/visit for symptoms/problems? PCP, Specialist, Home health nurse, Urgent Care, ED, 911 Yes   Week 2 call completed? Yes   Is the patient  interested in additional calls from an ambulatory ? No   Would this patient benefit from a Referral to [facility] Social Work? No   Wrap up additional comments patient reports doing well. No concerns or questions noted.   Call end time 3743            Michelle ROBERTS - Registered Nurse

## 2025-06-23 NOTE — OUTREACH NOTE
AMBULATORY CASE MANAGEMENT NOTE    Names and Relationships of Patient/Support Persons: Contact: Ricardo Pereira; Relationship: Self -     Patient Outreach    Called and spoke to patient. Discussed need for BP cuff. He is on disability and unable to afford BP cuff. Offered that I can seek assistance through [facility] for a used BP cuff if available he can obtain this for free. Patient is agreeable. AC to coordinate resources.     Care Coordination    Sent request to [employer] for BP cuff.     Education Documentation  No documentation found.        Tanvir TARIQ  Ambulatory Case Management    6/23/2025, 09:46 EDT

## 2025-06-24 ENCOUNTER — PATIENT OUTREACH (OUTPATIENT)
Dept: CASE MANAGEMENT | Facility: OTHER | Age: 55
End: 2025-06-24
Payer: MEDICARE

## 2025-06-24 ENCOUNTER — TELEPHONE (OUTPATIENT)
Dept: CASE MANAGEMENT | Facility: OTHER | Age: 55
End: 2025-06-24
Payer: MEDICARE

## 2025-06-24 NOTE — OUTREACH NOTE
AMBULATORY CASE MANAGEMENT NOTE    Names and Relationships of Patient/Support Persons: Contact: Ricardo Pereira; Relationship: Self -     Patient Outreach    Received a call from patient. Informed him that [employer] have a free BP cuff he can . He is agreeable and will plan to . Address and phone number provided. Encouraged patient to call back should he have any issues. ACM will plan to follow up.     Education Documentation  No documentation found.        Tanvir TARIQ  Ambulatory Case Management    6/24/2025, 13:16 EDT

## 2025-06-25 ENCOUNTER — TELEPHONE (OUTPATIENT)
Dept: ENDOCRINOLOGY | Age: 55
End: 2025-06-25
Payer: MEDICARE

## 2025-06-25 ENCOUNTER — TELEPHONE (OUTPATIENT)
Dept: ENDOCRINOLOGY | Age: 55
End: 2025-06-25

## 2025-06-25 ENCOUNTER — SPECIALTY PHARMACY (OUTPATIENT)
Dept: ENDOCRINOLOGY | Age: 55
End: 2025-06-25
Payer: MEDICARE

## 2025-06-25 NOTE — TELEPHONE ENCOUNTER
----- Message from Sylvia Mireles sent at 6/25/2025  2:47 PM EDT -----  Regarding: RE: Schedule sooner follow-up  Please arrange for him to be seen before I am out of the office end of next week  ----- Message -----  From: Nilam Gates, PharmD  Sent: 6/25/2025   2:45 PM EDT  To: MADHU Keane  Subject: Schedule sooner follow-up                        FYI  - Patient was admitted 6/11/2025 - 6/13/2025 for Acute ischemic stroke s/p TNK   He was instructed to resume home insulin doses on discharge   Med changes:   STOP Vascepa   STOP Rosuvastatin - START Atorvastatin 80mg daily   START Aspirin 81mg daily   PAUSE Olmesartan       6/20 - he was seen by family med for hospital follow-up and they recommended he reach out to Endo to follow-up sooner if needed     You last saw patient 3/19 and recommended 6 month follow-up - scheduled currently for 9/19    Not sure if you'd like to see him sooner

## 2025-06-25 NOTE — PROGRESS NOTES
Specialty Pharmacy Patient Management Program  Refill Outreach     Ricardo was contacted today regarding refills of their medication(s).    Refill Questions      Flowsheet Row Most Recent Value   Changes to allergies? No   Changes to medications? No   New conditions or infections since last clinic visit No   Unplanned office visit, urgent care, ED, or hospital admission in the last 4 weeks  No   How does patient/caregiver feel medication is working? Good   Financial problems or insurance changes  No   Since the previous refill, were any specialty medication doses or scheduled injections missed or delayed?  No   Does this patient require a clinical escalation to a pharmacist? No            Delivery Questions      Flowsheet Row Most Recent Value   Delivery method [employer]   Delivery address verified with patient/caregiver? Yes   Delivery address Home   Number of medications in delivery 4   Medication(s) being filled and delivered Ezetimibe (ZETIA), Dapagliflozin Propanediol, Insulin Lispro (ADMELOG SOLOSTAR), Insulin Degludec (Tresiba FlexTouch)   Doses left of specialty medications 1 week   Copay verified? Yes   Copay amount $4.90   Copay form of payment Credit/debit on file   Delivery Date Selection 06/27/25   Signature Required No   Do you consent to receive electronic handouts?  No            Medication Adherence    Adherence tools used: alarm  Support network for adherence: family member, healthcare provider          Follow-up: 77 day(s)     Sylvia Kohler, Pharmacy Technician  6/25/2025  13:35 EDT

## 2025-06-25 NOTE — TELEPHONE ENCOUNTER
Hub staff attempted to follow warm transfer process and was unsuccessful     Caller: Ricardo Pereira    Relationship to patient: Self    Best call back number:   Telephone Information:   Mobile [phone]     Patient is needing: PT CALLED NEEDING TO RESCHEDULE UPCOMING APPT, PROVIDER WANTING TO SEE PT BY END OF NEXT WEEK, NEXT AVAIL ISN'T UNTIL AUGUST. PLEASE ADVISE.

## 2025-07-01 ENCOUNTER — TELEPHONE (OUTPATIENT)
Dept: CASE MANAGEMENT | Facility: OTHER | Age: 55
End: 2025-07-01
Payer: MEDICARE

## 2025-07-02 ENCOUNTER — TELEPHONE (OUTPATIENT)
Dept: CASE MANAGEMENT | Facility: OTHER | Age: 55
End: 2025-07-02
Payer: MEDICARE

## 2025-07-02 ENCOUNTER — PATIENT OUTREACH (OUTPATIENT)
Dept: CASE MANAGEMENT | Facility: OTHER | Age: 55
End: 2025-07-02
Payer: MEDICARE

## 2025-07-02 NOTE — OUTREACH NOTE
AMBULATORY CASE MANAGEMENT NOTE    Names and Relationships of Patient/Support Persons: Contact: Ricardo Pereira; Relationship: Self -     Patient Outreach    Received call back from patient. He states he was not able to  BP cuff from [facility] due to his car broke down. He states that he went ahead and bought one from [employer]. He states Home BP readings has been elevated, most recent reading was 148/91. He currently is checking it TID. Encouraged patient to continue monitoring BP readings and keep a log. He states the BP cuff he bough keeps record. He verbalizes compliance to his medications. He takes care of his grandson and that somehow is keeping him somewhat active. Reminded to keep eye on salt intake. Denies chest pain, SOB, or edema. He mentioned chronic headache. Encouraged to keep diary of his symptoms. ACM will plan to f/u with patient for BP management. He is agreeable.     Care Coordination    [facility] notified that patient will not be picking up free BP cuff, cancelling order.     Education Documentation  No documentation found.        Tanvir TARIQ  Ambulatory Case Management    7/2/2025, 13:33 EDT

## 2025-07-02 NOTE — PLAN OF CARE
Problem: Hypertension  Goal: Make Healthy Diet Choices  Outcome: Progressing  Intervention: My Healthy Diet To Do List  Description: Why is this important?Taking small steps to change how you eat is a good place to start.  Flowsheets (Taken 7/2/2025 1342)  My Healthy Diet To Do List: read food labels for fat, fiber, carbohydrates, sodium and portion size  Goal: Track and Manage My Blood Pressure  Outcome: Progressing  Intervention: My Blood Pressure Management To Do List  Description: Why is this important?You may not be able to feel high blood pressure, but it can still hurt your body.High blood pressure can cause heart or kidney problems. It can also cause a stroke.Checking your blood pressure at home and at different times of the day can help to control blood pressure.  Flowsheets (Taken 7/2/2025 1342)  My Blood Pressure Management To Do List:   check blood pressure daily   write blood pressure results in a log or diary or use an lorenza   choose a place to take my blood pressure (home, clinic or office, retail store)  Goal: Become More Active  Outcome: Progressing  Intervention: My Activity To Do List  Description: Why is this important?It is easy to come up with reasons not to exercise.Taking small steps to be more active, like a short walk or taking the stairs, is a good place to start.  Flowsheets (Taken 7/2/2025 1342)  My Activity To Do List: choose a type of activity I enjoy, such as biking, gardening, team sports or walking  Goal: Stop or Cut Down Drinking Alcohol  Outcome: Progressing  Goal: Stop or Cut Down Tobacco/Nicotine Use  Outcome: Progressing  Goal: Mange My Medicine  Outcome: Progressing  Intervention: My Medication Management To Do List  Description: Why is this important?It is important to take your medicine so that you can control your condition and prevent problems.Even if you do not feel your high blood pressure, it is still important to take your medicine.Call the office if you cannot afford  the medicine or if you have questions about it.  Flowsheets (Taken 7/2/2025 1343)  My Medication Management To Do List: call for a refill a week before running out of medicine  Goal: Manage My Stress  Outcome: Progressing  Goal: Optimal Care Coordination of a Patient Experiencing Hypertension  Outcome: Progressing  Intervention: Identify and Monitor Blood Pressure Elevation  Flowsheets (Taken 7/2/2025 1343)  Identify and Monitor Blood Pressure Elevation:   blood pressure trends reviewed   blood pressure equipment and technique reviewed   home or ambulatory blood pressure monitoring encouraged  Intervention: Mutually Develop and Foster Achievement of Patient Goals  Flowsheets (Taken 7/2/2025 1343)  Mutually Develop and Foster Achievement of Patient Goals:   barriers to meeting goals identified   verbalization of feelings encouraged   self-reliance encouraged  Intervention: Facilitate Adherence to Lifestyle Change  Flowsheets (Taken 7/2/2025 1343)  Facilitate Adherence to Lifestyle Change: reduction in dietary sodium encouraged  Intervention: Alleviate Barriers to Medication Regimen  Flowsheets (Taken 7/2/2025 1343)  Alleviate Barriers to Medication Regimen:   barriers to medication adherence identified   medication-adherence assessment completed   response to pharmacologic therapy monitored   understanding of current medications assessed

## 2025-07-02 NOTE — TELEPHONE ENCOUNTER
Attempted to call for follow up outreach.   No answer. [facility]. Attempt #2.

## 2025-07-08 DIAGNOSIS — B37.9 YEAST INFECTION: ICD-10-CM

## 2025-07-08 RX ORDER — CLOTRIMAZOLE 1 %
1 CREAM (GRAM) TOPICAL 2 TIMES DAILY
Qty: 28 G | Refills: 0 | Status: SHIPPED | OUTPATIENT
Start: 2025-07-08

## 2025-07-16 ENCOUNTER — TELEPHONE (OUTPATIENT)
Dept: CASE MANAGEMENT | Facility: OTHER | Age: 55
End: 2025-07-16
Payer: MEDICARE

## 2025-07-17 ENCOUNTER — TELEPHONE (OUTPATIENT)
Dept: CASE MANAGEMENT | Facility: OTHER | Age: 55
End: 2025-07-17
Payer: MEDICARE

## 2025-07-17 ENCOUNTER — PATIENT OUTREACH (OUTPATIENT)
Dept: CASE MANAGEMENT | Facility: OTHER | Age: 55
End: 2025-07-17
Payer: MEDICARE

## 2025-07-17 NOTE — OUTREACH NOTE
AMBULATORY CASE MANAGEMENT NOTE    Names and Relationships of Patient/Support Persons: Contact: Ricardo Pereira; Relationship: Self -     Patient Outreach    Received MyChart reply from patient.   He reported BP has been good at home.   He denies further needs at this time and would reach out for further needs.   ACM signing off for no further needs.     Education Documentation  No documentation found.        Tanvir TARIQ  Ambulatory Case Management    7/17/2025, 13:26 EDT

## 2025-07-23 DIAGNOSIS — K21.9 GASTROESOPHAGEAL REFLUX DISEASE WITHOUT ESOPHAGITIS: ICD-10-CM

## 2025-07-23 RX ORDER — PANTOPRAZOLE SODIUM 40 MG/1
40 TABLET, DELAYED RELEASE ORAL DAILY
Qty: 90 TABLET | Refills: 0 | Status: SHIPPED | OUTPATIENT
Start: 2025-07-23

## 2025-07-23 RX ORDER — CLOPIDOGREL BISULFATE 75 MG/1
75 TABLET ORAL DAILY
Qty: 90 TABLET | Refills: 0 | Status: SHIPPED | OUTPATIENT
Start: 2025-07-23

## 2025-08-28 ENCOUNTER — CALL CENTER PROGRAMS (OUTPATIENT)
Dept: CALL CENTER | Facility: HOSPITAL | Age: 55
End: 2025-08-28
Payer: MEDICARE

## (undated) DEVICE — ENDOPATH XCEL BLADELESS TROCARS WITH STABILITY SLEEVES: Brand: ENDOPATH XCEL

## (undated) DEVICE — CATH DIAG IMPULSE FL3.5 5F 100CM

## (undated) DEVICE — DRSNG WND BORDR/ADHS NONADHR/GZ LF 2X2IN STRL

## (undated) DEVICE — DEV INDEFLATOR P/N 580289

## (undated) DEVICE — ANTIBACTERIAL UNDYED BRAIDED (POLYGLACTIN 910), SYNTHETIC ABSORBABLE SUTURE: Brand: COATED VICRYL

## (undated) DEVICE — 6F .070 JR 4 100CM: Brand: CORDIS

## (undated) DEVICE — 3M™ STERI-STRIP™ COMPOUND BENZOIN TINCTURE 40 BAGS/CARTON 4 CARTONS/CASE C1544: Brand: 3M™ STERI-STRIP™

## (undated) DEVICE — TREK CORONARY DILATATION CATHETER 3.50 MM X 15 MM / RAPID-EXCHANGE: Brand: TREK

## (undated) DEVICE — APPL CHLORAPREP W/TINT 26ML ORNG

## (undated) DEVICE — NDL SPINE 18G 31/2IN PNK

## (undated) DEVICE — CATH DIAG IMPULSE PIG 5F 100CM

## (undated) DEVICE — GLIDESHEATH SLENDER STAINLESS STEEL KIT: Brand: GLIDESHEATH SLENDER

## (undated) DEVICE — GW EMR FIX EXCHG J STD .035 3MM 260CM

## (undated) DEVICE — ENDOPATH XCEL UNIVERSAL TROCAR STABLILITY SLEEVES: Brand: ENDOPATH XCEL

## (undated) DEVICE — PK CATH CARD 40

## (undated) DEVICE — KT MANIFLD CARDIAC

## (undated) DEVICE — ENDOCUT SCISSOR TIP, DISPOSABLE: Brand: RENEW

## (undated) DEVICE — 3M™ STERI-STRIP™ REINFORCED ADHESIVE SKIN CLOSURES, R1547, 1/2 IN X 4 IN (12 MM X 100 MM), 6 STRIPS/ENVELOPE: Brand: 3M™ STERI-STRIP™

## (undated) DEVICE — SYR LUERLOK 30CC

## (undated) DEVICE — RUNTHROUGH NS EXTRA FLOPPY PTCA GUIDEWIRE: Brand: RUNTHROUGH

## (undated) DEVICE — TREK CORONARY DILATATION CATHETER 4.0 MM X 15 MM / RAPID-EXCHANGE: Brand: TREK

## (undated) DEVICE — GLV SURG BIOGEL LTX PF 7

## (undated) DEVICE — LOU LAP CHOLE: Brand: MEDLINE INDUSTRIES, INC.

## (undated) DEVICE — CATH DIAG IMPULSE FR4 5F 100CM

## (undated) DEVICE — NC TREK CORONARY DILATATION CATHETER 4.0 MM X 20 MM / RAPID-EXCHANGE: Brand: NC TREK